# Patient Record
Sex: FEMALE | Race: WHITE | Employment: UNEMPLOYED | URBAN - METROPOLITAN AREA
[De-identification: names, ages, dates, MRNs, and addresses within clinical notes are randomized per-mention and may not be internally consistent; named-entity substitution may affect disease eponyms.]

---

## 2018-03-15 ENCOUNTER — OFFICE VISIT (OUTPATIENT)
Dept: FAMILY MEDICINE CLINIC | Age: 48
End: 2018-03-15
Payer: COMMERCIAL

## 2018-03-15 VITALS
RESPIRATION RATE: 20 BRPM | SYSTOLIC BLOOD PRESSURE: 134 MMHG | TEMPERATURE: 97.6 F | HEIGHT: 61 IN | WEIGHT: 184 LBS | DIASTOLIC BLOOD PRESSURE: 89 MMHG | HEART RATE: 95 BPM | BODY MASS INDEX: 34.74 KG/M2

## 2018-03-15 DIAGNOSIS — M35.00 SJOGREN'S SYNDROME, WITH UNSPECIFIED ORGAN INVOLVEMENT (HCC): ICD-10-CM

## 2018-03-15 DIAGNOSIS — H93.13 TINNITUS OF BOTH EARS: Primary | ICD-10-CM

## 2018-03-15 PROCEDURE — G8427 DOCREV CUR MEDS BY ELIG CLIN: HCPCS | Performed by: FAMILY MEDICINE

## 2018-03-15 PROCEDURE — 99212 OFFICE O/P EST SF 10 MIN: CPT | Performed by: FAMILY MEDICINE

## 2018-03-15 PROCEDURE — G8484 FLU IMMUNIZE NO ADMIN: HCPCS | Performed by: FAMILY MEDICINE

## 2018-03-15 PROCEDURE — G8417 CALC BMI ABV UP PARAM F/U: HCPCS | Performed by: FAMILY MEDICINE

## 2018-03-15 PROCEDURE — 99213 OFFICE O/P EST LOW 20 MIN: CPT | Performed by: FAMILY MEDICINE

## 2018-03-15 PROCEDURE — 1036F TOBACCO NON-USER: CPT | Performed by: FAMILY MEDICINE

## 2018-03-15 RX ORDER — IBUPROFEN 800 MG/1
800 TABLET ORAL EVERY 6 HOURS PRN
Qty: 60 TABLET | Refills: 2 | Status: SHIPPED | OUTPATIENT
Start: 2018-03-15 | End: 2018-06-28

## 2018-03-15 RX ORDER — INSULIN LISPRO 200 [IU]/ML
INJECTION, SOLUTION SUBCUTANEOUS
COMMUNITY
Start: 2018-03-06 | End: 2019-12-20 | Stop reason: ALTCHOICE

## 2018-03-15 RX ORDER — MINOXIDIL 2 %
SOLUTION, NON-ORAL TOPICAL
Refills: 2 | COMMUNITY
Start: 2018-02-24 | End: 2019-12-20 | Stop reason: SDUPTHER

## 2018-03-15 ASSESSMENT — ENCOUNTER SYMPTOMS
VOMITING: 0
DOUBLE VISION: 0
BLURRED VISION: 0
CONSTIPATION: 0
SORE THROAT: 0
DIARRHEA: 0
NAUSEA: 0
COUGH: 0
SHORTNESS OF BREATH: 0
ABDOMINAL PAIN: 0
BACK PAIN: 0

## 2018-03-15 NOTE — PROGRESS NOTES
likely due to MTX and other rheumatoid meds. Will send for hearing test and she will discuss with Rheumatology about other meds for the future. Consider ENT consult in future if worsens. Health Maintenance     Health Maintenance Due   Topic Date Due    HIV screen  02/11/1986    DTaP/Tdap/Td vaccine (1 - Tdap) 02/11/1990    Cervical cancer screen  02/11/1992    Diabetic foot exam  09/24/2016    Diabetic retinal exam  03/03/2017    Flu vaccine (1) 09/01/2017    A1C test (Diabetic or Prediabetic)  02/08/2018            Reviewed age and gender appropriate health screening exams and vaccinations. Advised patient regarding importance of keeping up with recommended health maintenance and to schedule as soon as possible if overdue, as this is important in assessing for undiagnosed pathology, especially cancer, as well as protecting against potentially harmful/life threatening disease. Refilled all appropriate medications today. RTO in 3 months.     Future Appointments  Date Time Provider Jenae Kang   4/13/2018 1:00 PM SCHEDULE, [facility] None   5/25/2018 1:20 PM MD Anne Marie Rojasjosiah [facility]            Signed by : Berto Goss M.D., [place] PGY-3    This case was discussed with Dr Efraín Lowery (s)

## 2018-04-06 ENCOUNTER — HOSPITAL ENCOUNTER (OUTPATIENT)
Age: 48
Discharge: HOME | End: 2018-04-06
Payer: COMMERCIAL

## 2018-04-06 LAB
ALBUMIN SERPL-MCNC: 3.9 G/DL (ref 3.5–5.2)
ALP BLD-CCNC: 107 U/L (ref 35–104)
ALT SERPL-CCNC: 16 U/L (ref 0–32)
ANION GAP SERPL CALCULATED.3IONS-SCNC: 16 MMOL/L (ref 7–16)
AST SERPL-CCNC: 13 U/L (ref 0–31)
BILIRUB SERPL-MCNC: 0.3 MG/DL (ref 0–1.2)
BUN BLDV-MCNC: 13 MG/DL (ref 6–20)
CALCIUM SERPL-MCNC: 9.5 MG/DL (ref 8.6–10.2)
CHLORIDE BLD-SCNC: 105 MMOL/L (ref 98–107)
CHOLESTEROL, TOTAL: 197 MG/DL (ref 0–199)
CO2: 22 MMOL/L (ref 22–29)
CREAT SERPL-MCNC: 0.6 MG/DL (ref 0.5–1)
CREATININE URINE: 182 MG/DL (ref 29–226)
GFR AFRICAN AMERICAN: >60
GFR NON-AFRICAN AMERICAN: >60 ML/MIN/1.73
GLUCOSE BLD-MCNC: 79 MG/DL (ref 74–109)
HBA1C MFR BLD: 10 % (ref 4.8–5.9)
HCT VFR BLD CALC: 39.3 % (ref 34–48)
HDLC SERPL-MCNC: 28 MG/DL
HEMOGLOBIN: 12.3 G/DL (ref 11.5–15.5)
LDL CHOLESTEROL CALCULATED: ABNORMAL MG/DL (ref 0–99)
MCH RBC QN AUTO: 24 PG (ref 26–35)
MCHC RBC AUTO-ENTMCNC: 31.3 % (ref 32–34.5)
MCV RBC AUTO: 76.8 FL (ref 80–99.9)
MICROALBUMIN UR-MCNC: 1848.7 MG/L
MICROALBUMIN/CREAT UR-RTO: 1015.8 (ref 0–30)
PDW BLD-RTO: 14.7 FL (ref 11.5–15)
PLATELET # BLD: 285 E9/L (ref 130–450)
PMV BLD AUTO: 9.4 FL (ref 7–12)
POTASSIUM SERPL-SCNC: 3.7 MMOL/L (ref 3.5–5)
RBC # BLD: 5.12 E12/L (ref 3.5–5.5)
SODIUM BLD-SCNC: 143 MMOL/L (ref 132–146)
T4 FREE: 0.81 NG/DL (ref 0.93–1.7)
TOTAL PROTEIN: 7.4 G/DL (ref 6.4–8.3)
TRIGL SERPL-MCNC: 418 MG/DL (ref 0–149)
TSH SERPL DL<=0.05 MIU/L-ACNC: 4.47 UIU/ML (ref 0.27–4.2)
VITAMIN B-12: 459 PG/ML (ref 211–946)
VITAMIN D 25-HYDROXY: 14 NG/ML (ref 30–100)
VLDLC SERPL CALC-MCNC: ABNORMAL MG/DL
WBC # BLD: 13.2 E9/L (ref 4.5–11.5)

## 2018-04-06 PROCEDURE — 84443 ASSAY THYROID STIM HORMONE: CPT

## 2018-04-06 PROCEDURE — 83721 ASSAY OF BLOOD LIPOPROTEIN: CPT

## 2018-04-06 PROCEDURE — 85027 COMPLETE CBC AUTOMATED: CPT

## 2018-04-06 PROCEDURE — 36415 COLL VENOUS BLD VENIPUNCTURE: CPT

## 2018-04-06 PROCEDURE — 80053 COMPREHEN METABOLIC PANEL: CPT

## 2018-04-06 PROCEDURE — 82570 ASSAY OF URINE CREATININE: CPT

## 2018-04-06 PROCEDURE — 82607 VITAMIN B-12: CPT

## 2018-04-06 PROCEDURE — 80061 LIPID PANEL: CPT

## 2018-04-06 PROCEDURE — 84439 ASSAY OF FREE THYROXINE: CPT

## 2018-04-06 PROCEDURE — 83036 HEMOGLOBIN GLYCOSYLATED A1C: CPT

## 2018-04-06 PROCEDURE — 82044 UR ALBUMIN SEMIQUANTITATIVE: CPT

## 2018-04-06 PROCEDURE — 82306 VITAMIN D 25 HYDROXY: CPT

## 2018-04-10 LAB
Lab: NORMAL
REPORT: NORMAL
THIS TEST SENT TO: NORMAL

## 2018-04-13 ENCOUNTER — HOSPITAL ENCOUNTER (OUTPATIENT)
Dept: AUDIOLOGY | Age: 48
Discharge: HOME | End: 2018-04-13
Payer: COMMERCIAL

## 2018-04-13 PROCEDURE — 92567 TYMPANOMETRY: CPT | Performed by: AUDIOLOGIST

## 2018-04-13 PROCEDURE — 92557 COMPREHENSIVE HEARING TEST: CPT | Performed by: AUDIOLOGIST

## 2018-06-05 ENCOUNTER — OFFICE VISIT (OUTPATIENT)
Dept: FAMILY MEDICINE CLINIC | Age: 48
End: 2018-06-05
Payer: COMMERCIAL

## 2018-06-05 VITALS
RESPIRATION RATE: 16 BRPM | WEIGHT: 178.1 LBS | HEIGHT: 61 IN | HEART RATE: 77 BPM | OXYGEN SATURATION: 98 % | DIASTOLIC BLOOD PRESSURE: 74 MMHG | BODY MASS INDEX: 33.62 KG/M2 | SYSTOLIC BLOOD PRESSURE: 119 MMHG | TEMPERATURE: 97.7 F

## 2018-06-05 DIAGNOSIS — H93.13 TINNITUS OF BOTH EARS: ICD-10-CM

## 2018-06-05 DIAGNOSIS — M35.00 SJOGREN'S SYNDROME, WITH UNSPECIFIED ORGAN INVOLVEMENT (HCC): ICD-10-CM

## 2018-06-05 DIAGNOSIS — M06.9 RHEUMATOID ARTHRITIS, INVOLVING UNSPECIFIED SITE, UNSPECIFIED RHEUMATOID FACTOR PRESENCE: Primary | ICD-10-CM

## 2018-06-05 PROCEDURE — 99212 OFFICE O/P EST SF 10 MIN: CPT | Performed by: FAMILY MEDICINE

## 2018-06-05 PROCEDURE — G8417 CALC BMI ABV UP PARAM F/U: HCPCS | Performed by: FAMILY MEDICINE

## 2018-06-05 PROCEDURE — 99213 OFFICE O/P EST LOW 20 MIN: CPT | Performed by: FAMILY MEDICINE

## 2018-06-05 PROCEDURE — G8427 DOCREV CUR MEDS BY ELIG CLIN: HCPCS | Performed by: FAMILY MEDICINE

## 2018-06-05 PROCEDURE — 1036F TOBACCO NON-USER: CPT | Performed by: FAMILY MEDICINE

## 2018-06-05 ASSESSMENT — ENCOUNTER SYMPTOMS
BACK PAIN: 0
BLURRED VISION: 0
DOUBLE VISION: 0
NAUSEA: 0
COUGH: 0
SORE THROAT: 0
VOMITING: 0
CONSTIPATION: 0
SHORTNESS OF BREATH: 0
ABDOMINAL PAIN: 0
DIARRHEA: 0

## 2018-06-28 ENCOUNTER — APPOINTMENT (OUTPATIENT)
Dept: GENERAL RADIOLOGY | Age: 48
End: 2018-06-28
Payer: COMMERCIAL

## 2018-06-28 ENCOUNTER — HOSPITAL ENCOUNTER (EMERGENCY)
Age: 48
Discharge: HOME | End: 2018-06-28
Attending: EMERGENCY MEDICINE
Payer: COMMERCIAL

## 2018-06-28 VITALS
RESPIRATION RATE: 14 BRPM | SYSTOLIC BLOOD PRESSURE: 119 MMHG | DIASTOLIC BLOOD PRESSURE: 85 MMHG | OXYGEN SATURATION: 97 % | TEMPERATURE: 96.6 F | HEART RATE: 107 BPM

## 2018-06-28 DIAGNOSIS — N34.2 INFECTIVE URETHRITIS: Primary | ICD-10-CM

## 2018-06-28 DIAGNOSIS — R73.9 HYPERGLYCEMIA: ICD-10-CM

## 2018-06-28 LAB
ALBUMIN SERPL-MCNC: 4.3 G/DL (ref 3.5–5.2)
ALP BLD-CCNC: 106 U/L (ref 35–104)
ALT SERPL-CCNC: 14 U/L (ref 0–32)
ANION GAP SERPL CALCULATED.3IONS-SCNC: 15 MMOL/L (ref 7–16)
ANION GAP SERPL CALCULATED.3IONS-SCNC: 15 MMOL/L (ref 7–16)
AST SERPL-CCNC: 14 U/L (ref 0–31)
BACTERIA: ABNORMAL /HPF
BETA-HYDROXYBUTYRATE: 0.93 MMOL/L (ref 0.02–0.27)
BILIRUB SERPL-MCNC: 0.3 MG/DL (ref 0–1.2)
BILIRUBIN URINE: NEGATIVE
BLOOD, URINE: NEGATIVE
BUN BLDV-MCNC: 30 MG/DL (ref 6–20)
BUN BLDV-MCNC: 31 MG/DL (ref 6–20)
CALCIUM SERPL-MCNC: 10.2 MG/DL (ref 8.6–10.2)
CALCIUM SERPL-MCNC: 9.5 MG/DL (ref 8.6–10.2)
CHLORIDE BLD-SCNC: 105 MMOL/L (ref 98–107)
CHLORIDE BLD-SCNC: 106 MMOL/L (ref 98–107)
CHP ED QC CHECK: YES
CLARITY: CLEAR
CO2: 20 MMOL/L (ref 22–29)
CO2: 21 MMOL/L (ref 22–29)
COLOR: YELLOW
CREAT SERPL-MCNC: 0.9 MG/DL (ref 0.5–1)
CREAT SERPL-MCNC: 0.9 MG/DL (ref 0.5–1)
EPITHELIAL CELLS, UA: ABNORMAL /HPF
GFR AFRICAN AMERICAN: >60
GFR AFRICAN AMERICAN: >60
GFR NON-AFRICAN AMERICAN: >60 ML/MIN/1.73
GFR NON-AFRICAN AMERICAN: >60 ML/MIN/1.73
GLUCOSE BLD-MCNC: 172 MG/DL (ref 74–109)
GLUCOSE BLD-MCNC: 187 MG/DL (ref 74–109)
GLUCOSE URINE: NEGATIVE MG/DL
HCT VFR BLD CALC: 43 % (ref 34–48)
HEMOGLOBIN: 13.4 G/DL (ref 11.5–15.5)
KETONES, URINE: 15 MG/DL
LEUKOCYTE ESTERASE, URINE: ABNORMAL
MCH RBC QN AUTO: 23.5 PG (ref 26–35)
MCHC RBC AUTO-ENTMCNC: 31.2 % (ref 32–34.5)
MCV RBC AUTO: 75.4 FL (ref 80–99.9)
NITRITE, URINE: NEGATIVE
PDW BLD-RTO: 15.4 FL (ref 11.5–15)
PH UA: 5.5 (ref 5–9)
PH VENOUS: 7.32 (ref 7.3–7.42)
PLATELET # BLD: 256 E9/L (ref 130–450)
PMV BLD AUTO: 10.5 FL (ref 7–12)
POTASSIUM SERPL-SCNC: 4.3 MMOL/L (ref 3.5–5)
POTASSIUM SERPL-SCNC: 4.4 MMOL/L (ref 3.5–5)
PREGNANCY TEST URINE, POC: NEGATIVE
PROTEIN UA: 100 MG/DL
RBC # BLD: 5.7 E12/L (ref 3.5–5.5)
RBC UA: ABNORMAL /HPF (ref 0–2)
SODIUM BLD-SCNC: 141 MMOL/L (ref 132–146)
SODIUM BLD-SCNC: 141 MMOL/L (ref 132–146)
SPECIFIC GRAVITY UA: 1.02 (ref 1–1.03)
TOTAL PROTEIN: 8.1 G/DL (ref 6.4–8.3)
UROBILINOGEN, URINE: 0.2 E.U./DL
WBC # BLD: 16.4 E9/L (ref 4.5–11.5)
WBC UA: ABNORMAL /HPF (ref 0–5)

## 2018-06-28 PROCEDURE — 99284 EMERGENCY DEPT VISIT MOD MDM: CPT

## 2018-06-28 PROCEDURE — 2580000003 HC RX 258: Performed by: EMERGENCY MEDICINE

## 2018-06-28 PROCEDURE — 36415 COLL VENOUS BLD VENIPUNCTURE: CPT

## 2018-06-28 PROCEDURE — 82010 KETONE BODYS QUAN: CPT

## 2018-06-28 PROCEDURE — 6360000002 HC RX W HCPCS: Performed by: EMERGENCY MEDICINE

## 2018-06-28 PROCEDURE — 82800 BLOOD PH: CPT

## 2018-06-28 PROCEDURE — 96365 THER/PROPH/DIAG IV INF INIT: CPT

## 2018-06-28 PROCEDURE — 81001 URINALYSIS AUTO W/SCOPE: CPT

## 2018-06-28 PROCEDURE — 80048 BASIC METABOLIC PNL TOTAL CA: CPT

## 2018-06-28 PROCEDURE — 80053 COMPREHEN METABOLIC PANEL: CPT

## 2018-06-28 PROCEDURE — 85027 COMPLETE CBC AUTOMATED: CPT

## 2018-06-28 PROCEDURE — 71045 X-RAY EXAM CHEST 1 VIEW: CPT

## 2018-06-28 RX ORDER — CEFDINIR 300 MG/1
300 CAPSULE ORAL 2 TIMES DAILY
Qty: 14 CAPSULE | Refills: 0 | Status: SHIPPED | OUTPATIENT
Start: 2018-06-28 | End: 2018-07-05

## 2018-06-28 RX ORDER — LISINOPRIL 10 MG/1
10 TABLET ORAL DAILY
COMMUNITY
End: 2021-06-30

## 2018-06-28 RX ORDER — HYDROXYCHLOROQUINE SULFATE 200 MG/1
200 TABLET, FILM COATED ORAL DAILY
COMMUNITY
End: 2019-03-01 | Stop reason: ALTCHOICE

## 2018-06-28 RX ORDER — ERGOCALCIFEROL 1.25 MG/1
50000 CAPSULE ORAL WEEKLY
COMMUNITY
End: 2021-06-30 | Stop reason: ALTCHOICE

## 2018-06-28 RX ORDER — PREGABALIN 100 MG/1
100 CAPSULE ORAL 3 TIMES DAILY
COMMUNITY
End: 2021-09-10 | Stop reason: SDUPTHER

## 2018-06-28 RX ORDER — FLUOXETINE HYDROCHLORIDE 20 MG/1
40 CAPSULE ORAL DAILY
COMMUNITY
End: 2020-02-04 | Stop reason: CLARIF

## 2018-06-28 RX ORDER — CHLORAL HYDRATE 500 MG
1000 CAPSULE ORAL 4 TIMES DAILY
COMMUNITY
End: 2021-09-28

## 2018-06-28 RX ORDER — 0.9 % SODIUM CHLORIDE 0.9 %
1000 INTRAVENOUS SOLUTION INTRAVENOUS ONCE
Status: COMPLETED | OUTPATIENT
Start: 2018-06-28 | End: 2018-06-28

## 2018-06-28 RX ADMIN — SODIUM CHLORIDE 1000 ML: 9 INJECTION, SOLUTION INTRAVENOUS at 17:27

## 2018-06-28 RX ADMIN — DEXTROSE MONOHYDRATE 1 G: 5 INJECTION, SOLUTION INTRAVENOUS at 20:30

## 2018-06-28 ASSESSMENT — PAIN DESCRIPTION - LOCATION: LOCATION: BACK;HEAD

## 2018-06-28 ASSESSMENT — PAIN SCALES - GENERAL: PAINLEVEL_OUTOF10: 4

## 2018-06-28 NOTE — ED PROVIDER NOTES
Department of Emergency Medicine   ED  Provider Note  Admit Date/RoomTime: 6/28/2018  4:28 PM  ED Room: 40/40     HPI:   Gold Lorenzo is a 53 y.o. female presenting to the ED for blood sugar check, beginning today. The complaint has been intermittent, moderate in severity, and worsened by nothing. Patient reports she has diabetes with fluctuating blood sugar and has trouble keeping it regular. When asked what her normal dose of Humalog, she states she does not know because fluctuates. Patient reports that her blood sugar was in the 400s this morning, took 20 units of Humalog then was in the 300s around lunchtime. She took another dose of her Humalog around lunchtime without eating, then reports her blood sugar was in the 80s. She also experienced dizziness, nausea, and 1 episode of emesis at this time she came ED for evaluation. Asymptomatic at time of interview. Patient denies fever/chills, cough, congestion, chest pain, shortness of breath, edema, headache, visual disturbances, focal paresthesias, focal weakness, abdominal pain, diarrhea, constipation, hematochezia, melena, incontinence, dysuria, hematuria, trauma, neck or back pain or other complaints. ROS:   Pertinent positives and negatives are stated within HPI, all other systems reviewed and are negative.    --------------------------------------------- PAST HISTORY ---------------------------------------------  Past Medical History:  has a past medical history of Anxiety; Arthritis; Depression; Diabetes mellitus ([facility] [address] and Thyroid disease. Past Surgical History:  has a past surgical history that includes Shoulder arthroscopy (Right, 06/16/2017). Social History:  reports that she has quit smoking. She has never used smokeless tobacco. She reports that she does not drink alcohol or use drugs. Family History: She was adopted. Family history is unknown by patient. The patients home medications have been reviewed.     Allergies: Amoxicillin and WBC, UA 10-20 (A) 0 - 5 /HPF    RBC, UA 0-1 0 - 2 /HPF    Epi Cells FEW /HPF    Bacteria, UA MODERATE (A) /HPF   Basic Metabolic Panel   Result Value Ref Range    Sodium 141 132 - 146 mmol/L    Potassium 4.3 3.5 - 5.0 mmol/L    Chloride 106 98 - 107 mmol/L    CO2 20 (L) 22 - 29 mmol/L    Anion Gap 15 7 - 16 mmol/L    Glucose 187 (H) 74 - 109 mg/dL    BUN 30 (H) 6 - 20 mg/dL    CREATININE 0.9 0.5 - 1.0 mg/dL    GFR Non-African American >60 >=60 mL/min/1.73    GFR African American >60     Calcium 9.5 8.6 - 10.2 mg/dL   POC Pregnancy Urine Qual   Result Value Ref Range    Preg Test, Ur NEGATIVE     QC OK? YES        RADIOLOGY:  Interpreted by Radiologist.  XR CHEST PORTABLE   Final Result   ALERT:  THIS IS AN ABNORMAL REPORT   1. Possible worsening right basilar subsegmental atelectasis/scarring   suspected, however, further evaluation with CT scan of the chest is   recommended to exclude any potential of an underlying pulmonary   nodule, mass or malignancy.          ------------------------- NURSING NOTES AND VITALS REVIEWED ---------------------------   The nursing notes within the ED encounter and vital signs as below have been reviewed. /85   Pulse 107   Temp 96.6 °F (35.9 °C) (Temporal)   Resp 14   SpO2 97%   Oxygen Saturation Interpretation: Normal    ---------------------------------------------------PHYSICAL EXAM--------------------------------------    Constitutional/General: Alert and oriented x3, well appearing, non toxic in NAD  Head: NC/AT  Eyes: PERRL, EOMI  Mouth: Oropharynx clear, handling secretions,   Neck: Supple, full ROM,   Pulmonary: Lungs clear to auscultation bilaterally, no wheezes, rales, or rhonchi. Not in respiratory distress  Cardiovascular:  Regular rate and regular rhythm, no murmurs, gallops, or rubs. 2+ distal pulses  Abdomen: Soft, non tender, non distended. No guarding or rebound. Extremities: Moves all extremities x 4.  Warm and well perfused  Skin: warm and dry without under my direction and personally reviewed by me in its entirety. I confirm that the note above accurately reflects all work, treatment, procedures, and medical decision making performed by me.          Michael Ornelas,   07/04/18 3063

## 2018-06-28 NOTE — ED NOTES
Niño catheter order discontinued per Dr. Josie Fletcher. Pt voided and specimen collected.      Troy Dutton RN  06/28/18 Nery Cao

## 2018-07-06 ENCOUNTER — HOSPITAL ENCOUNTER (EMERGENCY)
Age: 48
Discharge: HOME | End: 2018-07-07
Attending: EMERGENCY MEDICINE
Payer: COMMERCIAL

## 2018-07-06 DIAGNOSIS — R11.2 NAUSEA AND VOMITING, INTRACTABILITY OF VOMITING NOT SPECIFIED, UNSPECIFIED VOMITING TYPE: Primary | ICD-10-CM

## 2018-07-06 LAB
ALBUMIN SERPL-MCNC: 3.9 G/DL (ref 3.5–5.2)
ALP BLD-CCNC: 107 U/L (ref 35–104)
ALT SERPL-CCNC: 17 U/L (ref 0–32)
ANION GAP SERPL CALCULATED.3IONS-SCNC: 16 MMOL/L (ref 7–16)
AST SERPL-CCNC: 18 U/L (ref 0–31)
BACTERIA: ABNORMAL /HPF
BETA-HYDROXYBUTYRATE: 0.64 MMOL/L (ref 0.02–0.27)
BILIRUB SERPL-MCNC: 0.4 MG/DL (ref 0–1.2)
BILIRUBIN URINE: NEGATIVE
BLOOD, URINE: ABNORMAL
BUN BLDV-MCNC: 13 MG/DL (ref 6–20)
CALCIUM SERPL-MCNC: 9.2 MG/DL (ref 8.6–10.2)
CHLORIDE BLD-SCNC: 103 MMOL/L (ref 98–107)
CLARITY: CLEAR
CO2: 24 MMOL/L (ref 22–29)
COLOR: YELLOW
CREAT SERPL-MCNC: 0.7 MG/DL (ref 0.5–1)
EPITHELIAL CELLS, UA: ABNORMAL /HPF
GFR AFRICAN AMERICAN: >60
GFR NON-AFRICAN AMERICAN: >60 ML/MIN/1.73
GLUCOSE BLD-MCNC: 157 MG/DL (ref 74–109)
GLUCOSE URINE: NEGATIVE MG/DL
HCT VFR BLD CALC: 40.2 % (ref 34–48)
HEMOGLOBIN: 12.6 G/DL (ref 11.5–15.5)
KETONES, URINE: 15 MG/DL
LACTIC ACID: 1.2 MMOL/L (ref 0.5–2.2)
LEUKOCYTE ESTERASE, URINE: ABNORMAL
LIPASE: 18 U/L (ref 13–60)
MCH RBC QN AUTO: 23.7 PG (ref 26–35)
MCHC RBC AUTO-ENTMCNC: 31.3 % (ref 32–34.5)
MCV RBC AUTO: 75.7 FL (ref 80–99.9)
NITRITE, URINE: NEGATIVE
PDW BLD-RTO: 15.6 FL (ref 11.5–15)
PH UA: 6 (ref 5–9)
PH VENOUS: 7.42 (ref 7.3–7.42)
PLATELET # BLD: 254 E9/L (ref 130–450)
PMV BLD AUTO: 10.2 FL (ref 7–12)
POTASSIUM SERPL-SCNC: 3.5 MMOL/L (ref 3.5–5)
PROTEIN UA: 100 MG/DL
RBC # BLD: 5.31 E12/L (ref 3.5–5.5)
RBC UA: ABNORMAL /HPF (ref 0–2)
SODIUM BLD-SCNC: 143 MMOL/L (ref 132–146)
SPECIFIC GRAVITY UA: 1.02 (ref 1–1.03)
TOTAL PROTEIN: 7.2 G/DL (ref 6.4–8.3)
UROBILINOGEN, URINE: 0.2 E.U./DL
WBC # BLD: 13.2 E9/L (ref 4.5–11.5)
WBC UA: ABNORMAL /HPF (ref 0–5)

## 2018-07-06 PROCEDURE — 80053 COMPREHEN METABOLIC PANEL: CPT

## 2018-07-06 PROCEDURE — 83605 ASSAY OF LACTIC ACID: CPT

## 2018-07-06 PROCEDURE — 87088 URINE BACTERIA CULTURE: CPT

## 2018-07-06 PROCEDURE — 96374 THER/PROPH/DIAG INJ IV PUSH: CPT

## 2018-07-06 PROCEDURE — 2580000003 HC RX 258: Performed by: PREVENTIVE MEDICINE

## 2018-07-06 PROCEDURE — 85027 COMPLETE CBC AUTOMATED: CPT

## 2018-07-06 PROCEDURE — 82800 BLOOD PH: CPT

## 2018-07-06 PROCEDURE — 82010 KETONE BODYS QUAN: CPT

## 2018-07-06 PROCEDURE — 96376 TX/PRO/DX INJ SAME DRUG ADON: CPT

## 2018-07-06 PROCEDURE — 81001 URINALYSIS AUTO W/SCOPE: CPT

## 2018-07-06 PROCEDURE — 83690 ASSAY OF LIPASE: CPT

## 2018-07-06 PROCEDURE — 6360000002 HC RX W HCPCS: Performed by: PREVENTIVE MEDICINE

## 2018-07-06 PROCEDURE — 99284 EMERGENCY DEPT VISIT MOD MDM: CPT

## 2018-07-06 PROCEDURE — 36415 COLL VENOUS BLD VENIPUNCTURE: CPT

## 2018-07-06 RX ORDER — 0.9 % SODIUM CHLORIDE 0.9 %
2000 INTRAVENOUS SOLUTION INTRAVENOUS ONCE
Status: COMPLETED | OUTPATIENT
Start: 2018-07-06 | End: 2018-07-06

## 2018-07-06 RX ORDER — ONDANSETRON 4 MG/1
4 TABLET, ORALLY DISINTEGRATING ORAL EVERY 8 HOURS PRN
Qty: 24 TABLET | Refills: 0 | Status: SHIPPED | OUTPATIENT
Start: 2018-07-06 | End: 2018-09-05 | Stop reason: ALTCHOICE

## 2018-07-06 RX ORDER — ONDANSETRON 2 MG/ML
4 INJECTION INTRAMUSCULAR; INTRAVENOUS ONCE
Status: COMPLETED | OUTPATIENT
Start: 2018-07-06 | End: 2018-07-06

## 2018-07-06 RX ADMIN — ONDANSETRON 4 MG: 2 INJECTION INTRAMUSCULAR; INTRAVENOUS at 22:32

## 2018-07-06 RX ADMIN — ONDANSETRON 4 MG: 2 INJECTION INTRAMUSCULAR; INTRAVENOUS at 20:26

## 2018-07-06 RX ADMIN — SODIUM CHLORIDE 2000 ML: 9 INJECTION, SOLUTION INTRAVENOUS at 20:25

## 2018-07-06 ASSESSMENT — PAIN DESCRIPTION - FREQUENCY: FREQUENCY: CONTINUOUS

## 2018-07-06 ASSESSMENT — ENCOUNTER SYMPTOMS
ALLERGIC/IMMUNOLOGIC NEGATIVE: 1
VOMITING: 1
ABDOMINAL PAIN: 0
NAUSEA: 1
COUGH: 0
CONSTIPATION: 0
CHEST TIGHTNESS: 0
SHORTNESS OF BREATH: 0
DIARRHEA: 1

## 2018-07-06 ASSESSMENT — PAIN DESCRIPTION - DESCRIPTORS: DESCRIPTORS: SORE

## 2018-07-06 ASSESSMENT — PAIN DESCRIPTION - ORIENTATION: ORIENTATION: LEFT

## 2018-07-06 ASSESSMENT — PAIN DESCRIPTION - PAIN TYPE: TYPE: ACUTE PAIN

## 2018-07-06 ASSESSMENT — PAIN SCALES - GENERAL: PAINLEVEL_OUTOF10: 5

## 2018-07-07 VITALS
DIASTOLIC BLOOD PRESSURE: 86 MMHG | TEMPERATURE: 97.7 F | OXYGEN SATURATION: 97 % | RESPIRATION RATE: 16 BRPM | HEART RATE: 91 BPM | SYSTOLIC BLOOD PRESSURE: 145 MMHG

## 2018-07-07 NOTE — ED PROVIDER NOTES
This is a 61-year-old white female with past history of insulin-dependent diabetes, anxiety, thyroid disease presenting to the emergency department with 1 week of emesis. The patient reports she was seen approximately one week ago in the emergency department and was prescribed Cefdinir for a asymptomatic bacteriuria, 3 days into her treatment she began to have indigestion and nausea with vomiting too many times to count. She reports she has been unable to hold down her fluids since that time she now reporting left flank pain that was 3 out of 10 severity with no radiation. She continues to endorse no hematuria, dysuria or urinary frequency. Review of Systems   Constitutional: Positive for chills. Negative for fever. HENT: Negative for congestion. Eyes: Negative for visual disturbance. Respiratory: Negative for cough, chest tightness and shortness of breath. Cardiovascular: Negative for chest pain, palpitations and leg swelling. Gastrointestinal: Positive for diarrhea, nausea and vomiting. Negative for abdominal pain and constipation. Endocrine: Negative. Genitourinary: Positive for flank pain. Negative for dysuria, frequency, hematuria and urgency. Musculoskeletal: Negative for arthralgias. Skin: Negative. Allergic/Immunologic: Negative. Neurological: Negative for dizziness, weakness and headaches. Hematological: Negative. Psychiatric/Behavioral: Negative. Physical Exam   Constitutional: She is oriented to person, place, and time. She appears well-developed and well-nourished. No distress. HENT:   Head: Normocephalic and atraumatic. Right Ear: External ear normal.   Left Ear: External ear normal.   Nose: Nose normal.   Mouth/Throat: Oropharynx is clear and moist. No oropharyngeal exudate. Eyes: Conjunctivae and EOM are normal. Pupils are equal, round, and reactive to light. Right eye exhibits no discharge. Left eye exhibits no discharge.    Neck: Normal range of motion. Neck supple. No JVD present. No tracheal deviation present. No thyromegaly present. Cardiovascular: Normal rate, regular rhythm, normal heart sounds and intact distal pulses. Exam reveals no gallop and no friction rub. No murmur heard. Pulmonary/Chest: Effort normal and breath sounds normal. No respiratory distress. She has no wheezes. She has no rales. Abdominal: Soft. Bowel sounds are normal. She exhibits no distension. There is no tenderness. There is no rebound and no guarding. There is no left-sided CVA tenderness to palpation. Musculoskeletal: Normal range of motion. She exhibits no edema. Lymphadenopathy:     She has no cervical adenopathy. Neurological: She is alert and oriented to person, place, and time. Skin: Skin is warm and dry. She is not diaphoretic. Psychiatric: She has a normal mood and affect. Her behavior is normal. Judgment and thought content normal.   Vitals reviewed. Procedures    MDM  Number of Diagnoses or Management Options  Nausea and vomiting, intractability of vomiting not specified, unspecified vomiting type:   Diagnosis management comments: 76-year-old white female with past history of insulin-dependent diabetes mellitus with left flank pain and emesis times one week after being placed on Omnicef. We will obtain appropriate laboratory studies at this time. In the meantime will treat supportively with IV fluids and Zofran by mouth. 2319: Patient is feeling much better after receiving Zofran. Plan is to discharged home with recommendation to follow-up with her primary care physician. She continues to demonstrate asymptomatic bacteriuria. Recommend discontinuing her Omnicef. I have discussed the results of the workup as well as the plan with the patient and family who indicate understanding and agreement with the plan. All questions answered at this time.                --------------------------------------------- PAST HISTORY ---------------------------------------------  Past Medical History:  has a past medical history of Anxiety; Arthritis; Depression; Diabetes mellitus ([address] and Thyroid disease. Past Surgical History:  has a past surgical history that includes Shoulder arthroscopy (Right, 06/16/2017). Social History:  reports that she has quit smoking. She has never used smokeless tobacco. She reports that she does not drink alcohol or use drugs. Family History: She was adopted. Family history is unknown by patient. The patients home medications have been reviewed.     Allergies: Amoxicillin and Sulfa antibiotics    -------------------------------------------------- RESULTS -------------------------------------------------  Labs:  Results for orders placed or performed during the hospital encounter of 07/06/18   Urine culture   Result Value Ref Range    Urine Culture, Routine <10,000 CFU/mL  Corynebacterium species      CBC   Result Value Ref Range    WBC 13.2 (H) 4.5 - 11.5 E9/L    RBC 5.31 3.50 - 5.50 E12/L    Hemoglobin 12.6 11.5 - 15.5 g/dL    Hematocrit 40.2 34.0 - 48.0 %    MCV 75.7 (L) 80.0 - 99.9 fL    MCH 23.7 (L) 26.0 - 35.0 pg    MCHC 31.3 (L) 32.0 - 34.5 %    RDW 15.6 (H) 11.5 - 15.0 fL    Platelets 613 482 - 558 E9/L    MPV 10.2 7.0 - 12.0 fL   Comprehensive Metabolic Panel   Result Value Ref Range    Sodium 143 132 - 146 mmol/L    Potassium 3.5 3.5 - 5.0 mmol/L    Chloride 103 98 - 107 mmol/L    CO2 24 22 - 29 mmol/L    Anion Gap 16 7 - 16 mmol/L    Glucose 157 (H) 74 - 109 mg/dL    BUN 13 6 - 20 mg/dL    CREATININE 0.7 0.5 - 1.0 mg/dL    GFR Non-African American >60 >=60 mL/min/1.73    GFR African American >60     Calcium 9.2 8.6 - 10.2 mg/dL    Total Protein 7.2 6.4 - 8.3 g/dL    Alb 3.9 3.5 - 5.2 g/dL    Total Bilirubin 0.4 0.0 - 1.2 mg/dL    Alkaline Phosphatase 107 (H) 35 - 104 U/L    ALT 17 0 - 32 U/L    AST 18 0 - 31 U/L   Lipase   Result Value Ref Range    Lipase 18 13 - 60 U/L   Lactic Acid,

## 2018-07-09 LAB — URINE CULTURE, ROUTINE: NORMAL

## 2018-07-10 ENCOUNTER — OFFICE VISIT (OUTPATIENT)
Dept: FAMILY MEDICINE CLINIC | Age: 48
End: 2018-07-10
Payer: COMMERCIAL

## 2018-07-10 VITALS
DIASTOLIC BLOOD PRESSURE: 80 MMHG | BODY MASS INDEX: 33.23 KG/M2 | TEMPERATURE: 97.5 F | HEIGHT: 61 IN | HEART RATE: 82 BPM | OXYGEN SATURATION: 97 % | SYSTOLIC BLOOD PRESSURE: 129 MMHG | WEIGHT: 176 LBS | RESPIRATION RATE: 18 BRPM

## 2018-07-10 DIAGNOSIS — M54.2 NECK PAIN: ICD-10-CM

## 2018-07-10 PROCEDURE — 99212 OFFICE O/P EST SF 10 MIN: CPT | Performed by: STUDENT IN AN ORGANIZED HEALTH CARE EDUCATION/TRAINING PROGRAM

## 2018-07-10 PROCEDURE — 1036F TOBACCO NON-USER: CPT | Performed by: STUDENT IN AN ORGANIZED HEALTH CARE EDUCATION/TRAINING PROGRAM

## 2018-07-10 PROCEDURE — 99213 OFFICE O/P EST LOW 20 MIN: CPT | Performed by: STUDENT IN AN ORGANIZED HEALTH CARE EDUCATION/TRAINING PROGRAM

## 2018-07-10 PROCEDURE — G8427 DOCREV CUR MEDS BY ELIG CLIN: HCPCS | Performed by: STUDENT IN AN ORGANIZED HEALTH CARE EDUCATION/TRAINING PROGRAM

## 2018-07-10 PROCEDURE — G8417 CALC BMI ABV UP PARAM F/U: HCPCS | Performed by: STUDENT IN AN ORGANIZED HEALTH CARE EDUCATION/TRAINING PROGRAM

## 2018-09-05 ENCOUNTER — HOSPITAL ENCOUNTER (EMERGENCY)
Age: 48
Discharge: HOME | End: 2018-09-05
Payer: COMMERCIAL

## 2018-09-05 VITALS
TEMPERATURE: 97.3 F | HEIGHT: 61 IN | RESPIRATION RATE: 16 BRPM | DIASTOLIC BLOOD PRESSURE: 78 MMHG | WEIGHT: 176 LBS | OXYGEN SATURATION: 100 % | BODY MASS INDEX: 33.23 KG/M2 | HEART RATE: 80 BPM | SYSTOLIC BLOOD PRESSURE: 116 MMHG

## 2018-09-05 DIAGNOSIS — N39.0 URINARY TRACT INFECTION WITHOUT HEMATURIA, SITE UNSPECIFIED: ICD-10-CM

## 2018-09-05 DIAGNOSIS — R11.2 NAUSEA AND VOMITING, INTRACTABILITY OF VOMITING NOT SPECIFIED, UNSPECIFIED VOMITING TYPE: Primary | ICD-10-CM

## 2018-09-05 LAB
ANION GAP SERPL CALCULATED.3IONS-SCNC: 15 MMOL/L (ref 7–16)
BACTERIA: ABNORMAL /HPF
BASOPHILS ABSOLUTE: 0.06 E9/L (ref 0–0.2)
BASOPHILS RELATIVE PERCENT: 0.5 % (ref 0–2)
BILIRUBIN URINE: NEGATIVE
BLOOD, URINE: ABNORMAL
BUN BLDV-MCNC: 22 MG/DL (ref 6–20)
CALCIUM SERPL-MCNC: 9.8 MG/DL (ref 8.6–10.2)
CHLORIDE BLD-SCNC: 102 MMOL/L (ref 98–107)
CLARITY: ABNORMAL
CO2: 22 MMOL/L (ref 22–29)
COLOR: YELLOW
CREAT SERPL-MCNC: 0.9 MG/DL (ref 0.5–1)
EOSINOPHILS ABSOLUTE: 0.07 E9/L (ref 0.05–0.5)
EOSINOPHILS RELATIVE PERCENT: 0.6 % (ref 0–6)
EPITHELIAL CELLS, UA: ABNORMAL /HPF
GFR AFRICAN AMERICAN: >60
GFR NON-AFRICAN AMERICAN: >60 ML/MIN/1.73
GLUCOSE BLD-MCNC: 206 MG/DL (ref 74–109)
GLUCOSE URINE: 250 MG/DL
HCT VFR BLD CALC: 43.8 % (ref 34–48)
HEMOGLOBIN: 13.4 G/DL (ref 11.5–15.5)
IMMATURE GRANULOCYTES #: 0.1 E9/L
IMMATURE GRANULOCYTES %: 0.9 % (ref 0–5)
KETONES, URINE: ABNORMAL MG/DL
LEUKOCYTE ESTERASE, URINE: ABNORMAL
LYMPHOCYTES ABSOLUTE: 2.54 E9/L (ref 1.5–4)
LYMPHOCYTES RELATIVE PERCENT: 22 % (ref 20–42)
MCH RBC QN AUTO: 23.3 PG (ref 26–35)
MCHC RBC AUTO-ENTMCNC: 30.6 % (ref 32–34.5)
MCV RBC AUTO: 76.3 FL (ref 80–99.9)
MONOCYTES ABSOLUTE: 0.46 E9/L (ref 0.1–0.95)
MONOCYTES RELATIVE PERCENT: 4 % (ref 2–12)
NEUTROPHILS ABSOLUTE: 8.32 E9/L (ref 1.8–7.3)
NEUTROPHILS RELATIVE PERCENT: 72 % (ref 43–80)
NITRITE, URINE: NEGATIVE
PDW BLD-RTO: 15.6 FL (ref 11.5–15)
PH UA: 5.5 (ref 5–9)
PLATELET # BLD: 258 E9/L (ref 130–450)
PMV BLD AUTO: 10.1 FL (ref 7–12)
POTASSIUM SERPL-SCNC: 4 MMOL/L (ref 3.5–5)
PROTEIN UA: 100 MG/DL
RBC # BLD: 5.74 E12/L (ref 3.5–5.5)
RBC UA: ABNORMAL /HPF (ref 0–2)
SODIUM BLD-SCNC: 139 MMOL/L (ref 132–146)
SPECIFIC GRAVITY UA: >=1.03 (ref 1–1.03)
UROBILINOGEN, URINE: 0.2 E.U./DL
WBC # BLD: 11.6 E9/L (ref 4.5–11.5)
WBC UA: >20 /HPF (ref 0–5)

## 2018-09-05 PROCEDURE — 80048 BASIC METABOLIC PNL TOTAL CA: CPT

## 2018-09-05 PROCEDURE — 87088 URINE BACTERIA CULTURE: CPT

## 2018-09-05 PROCEDURE — 96365 THER/PROPH/DIAG IV INF INIT: CPT

## 2018-09-05 PROCEDURE — 85025 COMPLETE CBC W/AUTO DIFF WBC: CPT

## 2018-09-05 PROCEDURE — 96375 TX/PRO/DX INJ NEW DRUG ADDON: CPT

## 2018-09-05 PROCEDURE — 96376 TX/PRO/DX INJ SAME DRUG ADON: CPT

## 2018-09-05 PROCEDURE — 2580000003 HC RX 258: Performed by: PHYSICIAN ASSISTANT

## 2018-09-05 PROCEDURE — 6360000002 HC RX W HCPCS: Performed by: PHYSICIAN ASSISTANT

## 2018-09-05 PROCEDURE — 99284 EMERGENCY DEPT VISIT MOD MDM: CPT

## 2018-09-05 PROCEDURE — 36415 COLL VENOUS BLD VENIPUNCTURE: CPT

## 2018-09-05 PROCEDURE — 81001 URINALYSIS AUTO W/SCOPE: CPT

## 2018-09-05 RX ORDER — ONDANSETRON 2 MG/ML
4 INJECTION INTRAMUSCULAR; INTRAVENOUS ONCE
Status: COMPLETED | OUTPATIENT
Start: 2018-09-05 | End: 2018-09-05

## 2018-09-05 RX ORDER — SODIUM CHLORIDE, SODIUM LACTATE, POTASSIUM CHLORIDE, AND CALCIUM CHLORIDE .6; .31; .03; .02 G/100ML; G/100ML; G/100ML; G/100ML
2000 INJECTION, SOLUTION INTRAVENOUS ONCE
Status: COMPLETED | OUTPATIENT
Start: 2018-09-05 | End: 2018-09-05

## 2018-09-05 RX ORDER — PROMETHAZINE HYDROCHLORIDE 25 MG/1
25 TABLET ORAL EVERY 6 HOURS PRN
Qty: 20 TABLET | Refills: 0 | Status: SHIPPED | OUTPATIENT
Start: 2018-09-05 | End: 2018-09-10

## 2018-09-05 RX ORDER — METOCLOPRAMIDE HYDROCHLORIDE 5 MG/ML
10 INJECTION INTRAMUSCULAR; INTRAVENOUS ONCE
Status: COMPLETED | OUTPATIENT
Start: 2018-09-05 | End: 2018-09-05

## 2018-09-05 RX ORDER — NITROFURANTOIN 25; 75 MG/1; MG/1
100 CAPSULE ORAL 2 TIMES DAILY
Qty: 10 CAPSULE | Refills: 0 | Status: SHIPPED | OUTPATIENT
Start: 2018-09-05 | End: 2018-09-10

## 2018-09-05 RX ORDER — 0.9 % SODIUM CHLORIDE 0.9 %
2000 INTRAVENOUS SOLUTION INTRAVENOUS ONCE
Status: DISCONTINUED | OUTPATIENT
Start: 2018-09-05 | End: 2018-09-05

## 2018-09-05 RX ADMIN — ONDANSETRON 4 MG: 2 SOLUTION INTRAMUSCULAR; INTRAVENOUS at 14:42

## 2018-09-05 RX ADMIN — CEFTRIAXONE SODIUM 1 G: 1 INJECTION, POWDER, FOR SOLUTION INTRAMUSCULAR; INTRAVENOUS at 14:42

## 2018-09-05 RX ADMIN — SODIUM CHLORIDE, POTASSIUM CHLORIDE, SODIUM LACTATE AND CALCIUM CHLORIDE 2000 ML: 600; 310; 30; 20 INJECTION, SOLUTION INTRAVENOUS at 11:51

## 2018-09-05 RX ADMIN — ONDANSETRON 4 MG: 2 INJECTION INTRAMUSCULAR; INTRAVENOUS at 11:52

## 2018-09-05 RX ADMIN — METOCLOPRAMIDE 10 MG: 5 INJECTION, SOLUTION INTRAMUSCULAR; INTRAVENOUS at 11:52

## 2018-09-05 NOTE — ED PROVIDER NOTES
>60 >=60 mL/min/1.73    GFR African American >60     Calcium 9.8 8.6 - 10.2 mg/dL   Urinalysis   Result Value Ref Range    Color, UA Yellow Straw/Yellow    Clarity, UA CLOUDY (A) Clear    Glucose, Ur 250 (A) Negative mg/dL    Bilirubin Urine Negative Negative    Ketones, Urine TRACE (A) Negative mg/dL    Specific Gravity, UA >=1.030 1.005 - 1.030    Blood, Urine TRACE (A) Negative    pH, UA 5.5 5.0 - 9.0    Protein,  (A) Negative mg/dL    Urobilinogen, Urine 0.2 <2.0 E.U./dL    Nitrite, Urine Negative Negative    Leukocyte Esterase, Urine LARGE (A) Negative   Microscopic Urinalysis   Result Value Ref Range    WBC, UA >20 0 - 5 /HPF    RBC, UA 1-3 0 - 2 /HPF    Epi Cells MODERATE /HPF    Bacteria, UA MANY (A) /HPF     Imaging: All Radiology results interpreted by Radiologist unless otherwise noted. No orders to display     ED Course / Medical Decision Making     Medications   ondansetron [facility]) injection 4 mg (4 mg Intravenous Given 9/5/18 1152)   metoclopramide (REGLAN) injection 10 mg (10 mg Intravenous Given 9/5/18 1152)   lactated ringers bolus (0 mLs Intravenous Stopped 9/5/18 1457)   cefTRIAXone (ROCEPHIN) 1 g in dextrose 5 % 50 mL IVPB (vial-mate) (0 g Intravenous Stopped 9/5/18 1518)   ondansetron (ZOFRAN) injection 4 mg (4 mg Intravenous Given 9/5/18 1442)        Re-examination:  9/5/18       Time: 1515   Improved after receiving additional nausea medication and antibiotics for urinary tract infection. Consult(s):   none. Procedure(s):   none    MDM:   Presented with abdominal cramping and nausea and vomiting and was determined to have urinary tract infection and otherwise mildly elevated blood sugar. She was treated with IV fluids, antibiotics and antibiotics and improved while in the emergency room. She was discharged home to continue antibiotic therapy and also Phenergan for nausea control. Counseling:     The emergency provider has spoken with the patient and discussed todays results, in addition to providing specific details for the plan of care and counseling regarding the diagnosis and prognosis. Questions are answered at this time and they are agreeable with the plan. Assessment     1. Nausea and vomiting, intractability of vomiting not specified, unspecified vomiting type    2. Urinary tract infection without hematuria, site unspecified      Plan   Discharge to home  Patient condition is good    New Medications     New Prescriptions    NITROFURANTOIN, MACROCRYSTAL-MONOHYDRATE, (MACROBID) 100 MG CAPSULE    Take 1 capsule by mouth 2 times daily for 5 days    PROMETHAZINE (PHENERGAN) 25 MG TABLET    Take 1 tablet by mouth every 6 hours as needed for Nausea     Electronically signed by FRANSISCO Thompson   DD: 9/5/18  **This report was transcribed using voice recognition software. Every effort was made to ensure accuracy; however, inadvertent computerized transcription errors may be present.   END OF ED PROVIDER NOTE       FRANSISCO Nagy  09/05/18 2403

## 2018-09-05 NOTE — LETTER
[address] [facility] Emergency Department  [place] [address]  Phone: [phone]               September 5, 2018    Patient: Bushra Mendoza   YOB: 1970   Date of Visit: 9/5/2018       To Whom It May Concern:    Bushra Mendoza was seen and treated in our emergency department on 9/5/2018. She may return to work on 9/6/2018.       Sincerely,       Jeremias Harris RN         Signature:__________________________________

## 2018-09-05 NOTE — ED NOTES
FIRST PROVIDER CONTACT ASSESSMENT NOTE      Department of Emergency Medicine   9/5/18  11:19 AM    Chief Complaint: No chief complaint on file. History of Present Illness:    Nelly Whitney is a 53 y.o. female who presents to the ED by private car for nausea and vomiting since Monday. No abdominal pain, diarrhea. Last BM Monday. Focused Screening Exam:  Constitutional:  Alert, appears stated age and is in no distress.       *ALLERGIES*     Amoxicillin and Sulfa antibiotics     ED Triage Vitals   BP Temp Temp src Pulse Resp SpO2 Height Weight   -- -- -- -- -- -- -- --        Initial Plan of Care:  Initiate Treatment-Testing, Proceed toTreatment Area When Bed Available for ED Attending/MLP to Continue Care    -----------------END OF FIRST PROVIDER CONTACT ASSESSMENT NOTE--------------  Electronically signed by Ginnie Barthel, PA   DD: 9/5/18       Ginnie Barthel, PA  09/05/18 1123

## 2018-09-06 LAB — URINE CULTURE, ROUTINE: NORMAL

## 2018-11-01 ENCOUNTER — OFFICE VISIT (OUTPATIENT)
Dept: FAMILY MEDICINE CLINIC | Age: 48
End: 2018-11-01
Payer: COMMERCIAL

## 2018-11-01 VITALS
HEART RATE: 93 BPM | BODY MASS INDEX: 32.85 KG/M2 | WEIGHT: 174 LBS | SYSTOLIC BLOOD PRESSURE: 123 MMHG | TEMPERATURE: 97.8 F | OXYGEN SATURATION: 98 % | DIASTOLIC BLOOD PRESSURE: 76 MMHG | HEIGHT: 61 IN

## 2018-11-01 DIAGNOSIS — K31.84 DIABETIC GASTROPARESIS (HCC): Primary | ICD-10-CM

## 2018-11-01 DIAGNOSIS — Z79.4 TYPE 2 DIABETES MELLITUS WITH COMPLICATION, WITH LONG-TERM CURRENT USE OF INSULIN (HCC): ICD-10-CM

## 2018-11-01 DIAGNOSIS — E11.43 DIABETIC GASTROPARESIS (HCC): Primary | ICD-10-CM

## 2018-11-01 DIAGNOSIS — Z87.39 HISTORY OF BONE CYST: ICD-10-CM

## 2018-11-01 DIAGNOSIS — E11.8 TYPE 2 DIABETES MELLITUS WITH COMPLICATION, WITH LONG-TERM CURRENT USE OF INSULIN (HCC): ICD-10-CM

## 2018-11-01 PROCEDURE — G8417 CALC BMI ABV UP PARAM F/U: HCPCS | Performed by: STUDENT IN AN ORGANIZED HEALTH CARE EDUCATION/TRAINING PROGRAM

## 2018-11-01 PROCEDURE — 99213 OFFICE O/P EST LOW 20 MIN: CPT | Performed by: STUDENT IN AN ORGANIZED HEALTH CARE EDUCATION/TRAINING PROGRAM

## 2018-11-01 PROCEDURE — G8427 DOCREV CUR MEDS BY ELIG CLIN: HCPCS | Performed by: STUDENT IN AN ORGANIZED HEALTH CARE EDUCATION/TRAINING PROGRAM

## 2018-11-01 PROCEDURE — 99212 OFFICE O/P EST SF 10 MIN: CPT | Performed by: STUDENT IN AN ORGANIZED HEALTH CARE EDUCATION/TRAINING PROGRAM

## 2018-11-01 PROCEDURE — 3046F HEMOGLOBIN A1C LEVEL >9.0%: CPT | Performed by: STUDENT IN AN ORGANIZED HEALTH CARE EDUCATION/TRAINING PROGRAM

## 2018-11-01 PROCEDURE — 2022F DILAT RTA XM EVC RTNOPTHY: CPT | Performed by: STUDENT IN AN ORGANIZED HEALTH CARE EDUCATION/TRAINING PROGRAM

## 2018-11-01 PROCEDURE — 96160 PT-FOCUSED HLTH RISK ASSMT: CPT | Performed by: STUDENT IN AN ORGANIZED HEALTH CARE EDUCATION/TRAINING PROGRAM

## 2018-11-01 PROCEDURE — 1036F TOBACCO NON-USER: CPT | Performed by: STUDENT IN AN ORGANIZED HEALTH CARE EDUCATION/TRAINING PROGRAM

## 2018-11-01 PROCEDURE — G8484 FLU IMMUNIZE NO ADMIN: HCPCS | Performed by: STUDENT IN AN ORGANIZED HEALTH CARE EDUCATION/TRAINING PROGRAM

## 2018-11-01 RX ORDER — PROMETHAZINE HYDROCHLORIDE 12.5 MG/1
12.5 TABLET ORAL EVERY 6 HOURS PRN
Qty: 48 TABLET | Refills: 0 | Status: SHIPPED | OUTPATIENT
Start: 2018-11-01 | End: 2018-11-08

## 2018-11-01 ASSESSMENT — PATIENT HEALTH QUESTIONNAIRE - PHQ9
4. FEELING TIRED OR HAVING LITTLE ENERGY: 3
1. LITTLE INTEREST OR PLEASURE IN DOING THINGS: 3
7. TROUBLE CONCENTRATING ON THINGS, SUCH AS READING THE NEWSPAPER OR WATCHING TELEVISION: 0
3. TROUBLE FALLING OR STAYING ASLEEP: 3
9. THOUGHTS THAT YOU WOULD BE BETTER OFF DEAD, OR OF HURTING YOURSELF: 3
10. IF YOU CHECKED OFF ANY PROBLEMS, HOW DIFFICULT HAVE THESE PROBLEMS MADE IT FOR YOU TO DO YOUR WORK, TAKE CARE OF THINGS AT HOME, OR GET ALONG WITH OTHER PEOPLE: 2
2. FEELING DOWN, DEPRESSED OR HOPELESS: 3
5. POOR APPETITE OR OVEREATING: 3
SUM OF ALL RESPONSES TO PHQ QUESTIONS 1-9: 21
6. FEELING BAD ABOUT YOURSELF - OR THAT YOU ARE A FAILURE OR HAVE LET YOURSELF OR YOUR FAMILY DOWN: 3
SUM OF ALL RESPONSES TO PHQ QUESTIONS 1-9: 21
8. MOVING OR SPEAKING SO SLOWLY THAT OTHER PEOPLE COULD HAVE NOTICED. OR THE OPPOSITE, BEING SO FIGETY OR RESTLESS THAT YOU HAVE BEEN MOVING AROUND A LOT MORE THAN USUAL: 0
SUM OF ALL RESPONSES TO PHQ9 QUESTIONS 1 & 2: 6

## 2018-11-05 ASSESSMENT — ENCOUNTER SYMPTOMS
BACK PAIN: 0
RHINORRHEA: 0
VOMITING: 1
ABDOMINAL PAIN: 0
EYE DISCHARGE: 0
SHORTNESS OF BREATH: 0
EYE REDNESS: 0
WHEEZING: 0
CONSTIPATION: 0
COUGH: 0
EYE ITCHING: 0
ABDOMINAL DISTENTION: 0
NAUSEA: 1
DIARRHEA: 1
SORE THROAT: 0

## 2018-11-28 ENCOUNTER — OFFICE VISIT (OUTPATIENT)
Dept: FAMILY MEDICINE CLINIC | Age: 48
End: 2018-11-28
Payer: COMMERCIAL

## 2018-11-28 VITALS
RESPIRATION RATE: 18 BRPM | HEART RATE: 99 BPM | BODY MASS INDEX: 33.23 KG/M2 | HEIGHT: 61 IN | SYSTOLIC BLOOD PRESSURE: 128 MMHG | WEIGHT: 176 LBS | DIASTOLIC BLOOD PRESSURE: 82 MMHG | TEMPERATURE: 97.5 F | OXYGEN SATURATION: 99 %

## 2018-11-28 DIAGNOSIS — E11.8 TYPE 2 DIABETES MELLITUS WITH COMPLICATION, WITH LONG-TERM CURRENT USE OF INSULIN (HCC): Chronic | ICD-10-CM

## 2018-11-28 DIAGNOSIS — R60.9 SWELLING: ICD-10-CM

## 2018-11-28 DIAGNOSIS — R11.2 NON-INTRACTABLE VOMITING WITH NAUSEA, UNSPECIFIED VOMITING TYPE: Primary | ICD-10-CM

## 2018-11-28 DIAGNOSIS — Z79.4 TYPE 2 DIABETES MELLITUS WITH COMPLICATION, WITH LONG-TERM CURRENT USE OF INSULIN (HCC): Chronic | ICD-10-CM

## 2018-11-28 PROCEDURE — G8417 CALC BMI ABV UP PARAM F/U: HCPCS | Performed by: FAMILY MEDICINE

## 2018-11-28 PROCEDURE — 2022F DILAT RTA XM EVC RTNOPTHY: CPT | Performed by: FAMILY MEDICINE

## 2018-11-28 PROCEDURE — G8484 FLU IMMUNIZE NO ADMIN: HCPCS | Performed by: FAMILY MEDICINE

## 2018-11-28 PROCEDURE — 99212 OFFICE O/P EST SF 10 MIN: CPT | Performed by: FAMILY MEDICINE

## 2018-11-28 PROCEDURE — G8427 DOCREV CUR MEDS BY ELIG CLIN: HCPCS | Performed by: FAMILY MEDICINE

## 2018-11-28 PROCEDURE — 1036F TOBACCO NON-USER: CPT | Performed by: FAMILY MEDICINE

## 2018-11-28 PROCEDURE — 3046F HEMOGLOBIN A1C LEVEL >9.0%: CPT | Performed by: FAMILY MEDICINE

## 2018-11-28 PROCEDURE — 99213 OFFICE O/P EST LOW 20 MIN: CPT | Performed by: FAMILY MEDICINE

## 2018-11-28 RX ORDER — METOCLOPRAMIDE 5 MG/1
5 TABLET ORAL 3 TIMES DAILY
Qty: 90 TABLET | Refills: 5 | Status: SHIPPED | OUTPATIENT
Start: 2018-11-28 | End: 2018-12-06 | Stop reason: SDUPTHER

## 2018-11-28 NOTE — PATIENT INSTRUCTIONS
DASH Diet    What is the DASH diet? DASH stands for Dietary Approaches to Stop Hypertension. It is a balanced eating plan that your family doctor might recommend to help you lower your blood pressure. The DASH diet:  Is low in salt, saturated fat, cholesterol and total fat. Emphasizes fruits, vegetables, and fat-free or low-fat milk and milk products. Includes whole grains, fish, poultry and nuts. Limits the amount of red meat, sweets, added sugars and sugar-containing beverages in your diet. Is rich in potassium, magnesium and calcium, as well as protein and fiber. How can the DASH diet help me stay healthy? Getting too much sodium (salt) in your diet can contribute to high blood pressure (also called hypertension). Some salt is in foods naturally, and some salt is added to food when it is processed or prepared. Following the DASH diet can help you lower your blood pressure, or prevent high blood pressure, by reducing the amount of sodium in your diet to less than 2,300 mg per day. The fruits, vegetables and whole grains recommended in the DASH diet provide many other elements of a healthy diet, such as lycopene, beta-carotene and isoflavones. These can help protect your body against common health conditions, such as cancer, osteoporosis, stroke and diabetes. Following the DASH diet can also help reduce your risk of heart disease by lowering your low-density lipoprotein (LDL, or \"bad\") cholesterol level. Following the DASH diet may drop your blood pressure by a few points in as little as 2 weeks. However, you should not stop taking your blood pressure medicine, or any other prescribed medicine, without talking to your doctor first.  What kinds of foods are included in the [address]? The DASH diet is nutritionally balanced for good health. You dont need to buy any special foods or pills, or cook with any special recipes, to follow the DASH diet.  If you follow the DASH diet, you will eat about 2,000

## 2018-11-28 NOTE — PROGRESS NOTES
Chief Complaint   Patient presents with     Vomiting     for 3 months, about once weekly   No nausea   No relation to and particular food or time of day    No abd pain       Health Maintenance     patient declined the flu vaccine     Has had some left knee swelling        /82 (Site: Right Upper Arm, Position: Sitting, Cuff Size: Medium Adult)   Pulse 99   Temp 97.5 °F (36.4 °C) (Oral)   Resp 18   Ht 5' 1\" (1.549 m)   Wt 176 lb (79.8 kg)   SpO2 99%   BMI 33.25 kg/m²     General appearance fair  TM's intact   Canals clear    Nose and throat free of inflammation and exudate     Neck supple    No carotid bruits    No palpable thyroid abnormalities    Heart: regular rhythm   Lungs clear anterior and posterior    Abdomen supple     No masses, tenderness or organomegaly    Normal bowel sounds    Normal musculature   Neurological motor and sensory intact   Foot exam normal       Lab Results   Component Value Date    WBC 11.6 (H) 09/05/2018    HGB 13.4 09/05/2018    HCT 43.8 09/05/2018     09/05/2018    CHOL 197 04/06/2018    TRIG 418 (H) 04/06/2018    HDL 28 04/06/2018    ALT 17 07/06/2018    AST 18 07/06/2018     09/05/2018    K 4.0 09/05/2018     09/05/2018    CREATININE 0.9 09/05/2018    BUN 22 (H) 09/05/2018    CO2 22 09/05/2018    TSH 4.470 (H) 04/06/2018    LABA1C 10.0 (H) 04/06/2018    LABMICR 1848.7 (H) 04/06/2018           Patient Active Problem List   Diagnosis    Type 2 diabetes mellitus with complication, with long-term current use of insulin (HCC)    Depression    Rheumatoid arthritis (HCC)    Diabetic ketosis (HCC)    Microcytic anemia    Hyperglycemia    Acquired hypothyroidism    Vitamin D deficiency    Infective urethritis       Current Outpatient Prescriptions   Medication Sig Dispense Refill    metoclopramide (REGLAN) 5 MG tablet Take 1 tablet by mouth 3 times daily 90 tablet 5    metFORMIN (GLUCOPHAGE) 850 MG tablet Take 850 mg by mouth 2 times daily (with

## 2018-11-30 ENCOUNTER — HOSPITAL ENCOUNTER (OUTPATIENT)
Dept: ULTRASOUND IMAGING | Age: 48
Discharge: HOME | End: 2018-12-02
Payer: COMMERCIAL

## 2018-11-30 DIAGNOSIS — R11.2 NON-INTRACTABLE VOMITING WITH NAUSEA, UNSPECIFIED VOMITING TYPE: ICD-10-CM

## 2018-11-30 PROCEDURE — 76705 ECHO EXAM OF ABDOMEN: CPT

## 2018-12-06 ENCOUNTER — OFFICE VISIT (OUTPATIENT)
Dept: FAMILY MEDICINE CLINIC | Age: 48
End: 2018-12-06
Payer: COMMERCIAL

## 2018-12-06 VITALS
HEIGHT: 61 IN | TEMPERATURE: 97.8 F | HEART RATE: 88 BPM | DIASTOLIC BLOOD PRESSURE: 80 MMHG | BODY MASS INDEX: 33.08 KG/M2 | WEIGHT: 175.2 LBS | SYSTOLIC BLOOD PRESSURE: 126 MMHG | RESPIRATION RATE: 16 BRPM | OXYGEN SATURATION: 96 %

## 2018-12-06 DIAGNOSIS — K31.84 GASTROPARESIS: ICD-10-CM

## 2018-12-06 DIAGNOSIS — E11.8 TYPE 2 DIABETES MELLITUS WITH COMPLICATION, WITH LONG-TERM CURRENT USE OF INSULIN (HCC): Primary | Chronic | ICD-10-CM

## 2018-12-06 DIAGNOSIS — Z79.4 TYPE 2 DIABETES MELLITUS WITH COMPLICATION, WITH LONG-TERM CURRENT USE OF INSULIN (HCC): Primary | Chronic | ICD-10-CM

## 2018-12-06 PROCEDURE — 99213 OFFICE O/P EST LOW 20 MIN: CPT | Performed by: STUDENT IN AN ORGANIZED HEALTH CARE EDUCATION/TRAINING PROGRAM

## 2018-12-06 PROCEDURE — G8427 DOCREV CUR MEDS BY ELIG CLIN: HCPCS | Performed by: STUDENT IN AN ORGANIZED HEALTH CARE EDUCATION/TRAINING PROGRAM

## 2018-12-06 PROCEDURE — 1036F TOBACCO NON-USER: CPT | Performed by: STUDENT IN AN ORGANIZED HEALTH CARE EDUCATION/TRAINING PROGRAM

## 2018-12-06 PROCEDURE — G8417 CALC BMI ABV UP PARAM F/U: HCPCS | Performed by: STUDENT IN AN ORGANIZED HEALTH CARE EDUCATION/TRAINING PROGRAM

## 2018-12-06 PROCEDURE — 3046F HEMOGLOBIN A1C LEVEL >9.0%: CPT | Performed by: STUDENT IN AN ORGANIZED HEALTH CARE EDUCATION/TRAINING PROGRAM

## 2018-12-06 PROCEDURE — G8484 FLU IMMUNIZE NO ADMIN: HCPCS | Performed by: STUDENT IN AN ORGANIZED HEALTH CARE EDUCATION/TRAINING PROGRAM

## 2018-12-06 PROCEDURE — 2022F DILAT RTA XM EVC RTNOPTHY: CPT | Performed by: STUDENT IN AN ORGANIZED HEALTH CARE EDUCATION/TRAINING PROGRAM

## 2018-12-06 RX ORDER — METOCLOPRAMIDE 5 MG/1
5 TABLET ORAL 4 TIMES DAILY
Qty: 120 TABLET | Refills: 3 | Status: SHIPPED | OUTPATIENT
Start: 2018-12-06 | End: 2019-12-20 | Stop reason: ALTCHOICE

## 2018-12-06 ASSESSMENT — ENCOUNTER SYMPTOMS
RHINORRHEA: 0
WHEEZING: 0
COUGH: 0
COLOR CHANGE: 0
SORE THROAT: 0
NAUSEA: 0
EYE REDNESS: 0
ABDOMINAL PAIN: 0
VOMITING: 0
ABDOMINAL DISTENTION: 0
BACK PAIN: 0
CONSTIPATION: 0
DIARRHEA: 0
EYE DISCHARGE: 0
SHORTNESS OF BREATH: 0
EYE ITCHING: 0

## 2018-12-06 NOTE — PATIENT INSTRUCTIONS
Patient Education   Patient Education        Gastroparesis: Care Instructions  Your Care Instructions    When you have gastroparesis, your stomach takes a lot longer to empty. This delay can cause belly pain, bloating, and belching. It also can cause hiccups, heartburn, nausea or vomiting. You may not feel like eating. These symptoms may come and go. They most often occur during and after meals. You may feel full after only a few bites of food. This condition occurs when the nerves to the stomach don't work properly. Diabetes is the most common cause of this nerve damage. Gastroparesis can make it harder to control your blood sugar levels. But keeping your blood sugar levels under control may help with your symptoms. Parkinson's disease, stroke, and some medicines can also cause this condition. Home treatment can often help. Follow-up care is a key part of your treatment and safety. Be sure to make and go to all appointments, and call your doctor if you are having problems. It's also a good idea to know your test results and keep a list of the medicines you take. How can you care for yourself at home? · Eat several small meals each day rather than three large meals. · Eat foods that are low in fiber and fat. · If your doctor suggests it, take medicines that help the stomach empty more quickly. These are called motility agents. When should you call for help? Call your doctor now or seek immediate medical care if:    · You are vomiting.     · You have new or worse belly pain.     · You have a fever.     · You cannot pass stools or gas.    Watch closely for changes in your health, and be sure to contact your doctor if you have any problems. Where can you learn more? Go to https://Bank[employer].rugi[employer]. org and sign in to your [employer] account. Enter M106 in the [employer]ipp[employer] box to learn more about \"Gastroparesis: Care Instructions. \"     If you do not have an account, please click on the procedure. Metoclopramide oral is taken for only 4 to 12 weeks. NEVER USE METOCLOPRAMIDE IN LARGER AMOUNTS THAN RECOMMENDED, OR FOR LONGER THAN 12 WEEKS. High doses or long-term use of metoclopramide can cause a serious movement disorder that may not be reversible. The longer you use metoclopramide, the more likely you are to develop this movement disorder. The risk of this side effect is higher in diabetics and older adults (especially women). Metoclopramide is usually taken 30 minutes before meals and at bedtime, or only with meals that usually cause heartburn. Follow your doctor's dosing instructions very carefully. Do not use two different forms of metoclopramide (such as tablets and oral syrup) at the same time. Measure liquid medicine carefully. Use the dosing syringe provided, or use a medicine dose-measuring device (not a kitchen spoon). To take the orally disintegrating tablet (ODT):  · Remove a tablet from its blister pack only when you are ready to take the tablet. Use dry hands and take care not to damage a tablet while pushing it out of the blister. · Place the tablet in your mouth and allow it to dissolve, without chewing or swallowing it whole. You may sip liquid if needed to help swallow the dissolved tablet. Store at room temperature in a tightly-closed container, away from moisture and heat. Keep the bottle tightly closed. After you stop taking metoclopramide, you may have unpleasant withdrawal symptoms such as headache, dizziness, or nervousness. What happens if I miss a dose? Take the medicine as soon as you can, but skip the missed dose if it is almost time for your next dose. Do not take two doses at one time. What happens if I overdose? Seek emergency medical attention or call the Poison Help line at 1-147.120.5471. Overdose symptoms may include drowsiness, confusion, or uncontrolled muscle movements. What should I avoid while taking metoclopramide?   Drinking alcohol with this

## 2019-02-23 ENCOUNTER — APPOINTMENT (OUTPATIENT)
Dept: GENERAL RADIOLOGY | Age: 49
End: 2019-02-23
Payer: COMMERCIAL

## 2019-02-23 ENCOUNTER — HOSPITAL ENCOUNTER (EMERGENCY)
Age: 49
Discharge: HOME | End: 2019-02-23
Attending: EMERGENCY MEDICINE
Payer: COMMERCIAL

## 2019-02-23 VITALS
TEMPERATURE: 98 F | DIASTOLIC BLOOD PRESSURE: 60 MMHG | RESPIRATION RATE: 18 BRPM | HEART RATE: 109 BPM | SYSTOLIC BLOOD PRESSURE: 133 MMHG | BODY MASS INDEX: 33.07 KG/M2 | OXYGEN SATURATION: 94 % | WEIGHT: 175 LBS

## 2019-02-23 DIAGNOSIS — E86.0 MILD DEHYDRATION: ICD-10-CM

## 2019-02-23 DIAGNOSIS — R05.9 COUGH: ICD-10-CM

## 2019-02-23 DIAGNOSIS — J10.1 INFLUENZA A: Primary | ICD-10-CM

## 2019-02-23 LAB
ACETAMINOPHEN LEVEL: <5 MCG/ML (ref 10–30)
ALBUMIN SERPL-MCNC: 3.9 G/DL (ref 3.5–5.2)
ALP BLD-CCNC: 133 U/L (ref 35–104)
ALT SERPL-CCNC: 9 U/L (ref 0–32)
ANION GAP SERPL CALCULATED.3IONS-SCNC: 10 MMOL/L (ref 7–16)
AST SERPL-CCNC: 11 U/L (ref 0–31)
BASOPHILS ABSOLUTE: 0.04 E9/L (ref 0–0.2)
BASOPHILS RELATIVE PERCENT: 0.5 % (ref 0–2)
BETA-HYDROXYBUTYRATE: 0.07 MMOL/L (ref 0.02–0.27)
BILIRUB SERPL-MCNC: 0.2 MG/DL (ref 0–1.2)
BUN BLDV-MCNC: 11 MG/DL (ref 6–20)
CALCIUM SERPL-MCNC: 8.7 MG/DL (ref 8.6–10.2)
CHLORIDE BLD-SCNC: 107 MMOL/L (ref 98–107)
CHP ED QC CHECK: YES
CO2: 22 MMOL/L (ref 22–29)
CREAT SERPL-MCNC: 0.9 MG/DL (ref 0.5–1)
EOSINOPHILS ABSOLUTE: 0.12 E9/L (ref 0.05–0.5)
EOSINOPHILS RELATIVE PERCENT: 1.4 % (ref 0–6)
ETHANOL: <10 MG/DL (ref 0–0.08)
GFR AFRICAN AMERICAN: >60
GFR NON-AFRICAN AMERICAN: >60 ML/MIN/1.73
GLUCOSE BLD-MCNC: 173 MG/DL
GLUCOSE BLD-MCNC: 173 MG/DL (ref 74–99)
HCT VFR BLD CALC: 37.3 % (ref 34–48)
HEMOGLOBIN: 11.6 G/DL (ref 11.5–15.5)
IMMATURE GRANULOCYTES #: 0.07 E9/L
IMMATURE GRANULOCYTES %: 0.8 % (ref 0–5)
INFLUENZA A BY PCR: DETECTED
INFLUENZA B BY PCR: NOT DETECTED
LACTIC ACID: 2.1 MMOL/L (ref 0.5–2.2)
LIPASE: 27 U/L (ref 13–60)
LYMPHOCYTES ABSOLUTE: 1.37 E9/L (ref 1.5–4)
LYMPHOCYTES RELATIVE PERCENT: 15.7 % (ref 20–42)
MAGNESIUM: 2.5 MG/DL (ref 1.6–2.6)
MCH RBC QN AUTO: 24.2 PG (ref 26–35)
MCHC RBC AUTO-ENTMCNC: 31.1 % (ref 32–34.5)
MCV RBC AUTO: 77.7 FL (ref 80–99.9)
METER GLUCOSE: 135 MG/DL (ref 74–99)
METER GLUCOSE: 173 MG/DL (ref 74–99)
MONOCYTES ABSOLUTE: 0.56 E9/L (ref 0.1–0.95)
MONOCYTES RELATIVE PERCENT: 6.4 % (ref 2–12)
NEUTROPHILS ABSOLUTE: 6.55 E9/L (ref 1.8–7.3)
NEUTROPHILS RELATIVE PERCENT: 75.2 % (ref 43–80)
PDW BLD-RTO: 14.8 FL (ref 11.5–15)
PLATELET # BLD: 170 E9/L (ref 130–450)
PMV BLD AUTO: 10.1 FL (ref 7–12)
POTASSIUM SERPL-SCNC: 3.8 MMOL/L (ref 3.5–5)
RBC # BLD: 4.8 E12/L (ref 3.5–5.5)
SALICYLATE, SERUM: <0.3 MG/DL (ref 0–30)
SODIUM BLD-SCNC: 139 MMOL/L (ref 132–146)
STREP GRP A PCR: NEGATIVE
TOTAL PROTEIN: 7.2 G/DL (ref 6.4–8.3)
TRICYCLIC ANTIDEPRESSANTS SCREEN SERUM: NEGATIVE NG/ML
WBC # BLD: 8.7 E9/L (ref 4.5–11.5)

## 2019-02-23 PROCEDURE — 87040 BLOOD CULTURE FOR BACTERIA: CPT

## 2019-02-23 PROCEDURE — 99285 EMERGENCY DEPT VISIT HI MDM: CPT

## 2019-02-23 PROCEDURE — 80053 COMPREHEN METABOLIC PANEL: CPT

## 2019-02-23 PROCEDURE — 6370000000 HC RX 637 (ALT 250 FOR IP): Performed by: EMERGENCY MEDICINE

## 2019-02-23 PROCEDURE — 83735 ASSAY OF MAGNESIUM: CPT

## 2019-02-23 PROCEDURE — 93005 ELECTROCARDIOGRAM TRACING: CPT | Performed by: EMERGENCY MEDICINE

## 2019-02-23 PROCEDURE — G0480 DRUG TEST DEF 1-7 CLASSES: HCPCS

## 2019-02-23 PROCEDURE — 2580000003 HC RX 258: Performed by: EMERGENCY MEDICINE

## 2019-02-23 PROCEDURE — 87880 STREP A ASSAY W/OPTIC: CPT

## 2019-02-23 PROCEDURE — 71046 X-RAY EXAM CHEST 2 VIEWS: CPT

## 2019-02-23 PROCEDURE — 96360 HYDRATION IV INFUSION INIT: CPT

## 2019-02-23 PROCEDURE — 85025 COMPLETE CBC W/AUTO DIFF WBC: CPT

## 2019-02-23 PROCEDURE — 82010 KETONE BODYS QUAN: CPT

## 2019-02-23 PROCEDURE — 94664 DEMO&/EVAL PT USE INHALER: CPT

## 2019-02-23 PROCEDURE — 83690 ASSAY OF LIPASE: CPT

## 2019-02-23 PROCEDURE — 82962 GLUCOSE BLOOD TEST: CPT

## 2019-02-23 PROCEDURE — 80307 DRUG TEST PRSMV CHEM ANLYZR: CPT

## 2019-02-23 PROCEDURE — 36415 COLL VENOUS BLD VENIPUNCTURE: CPT

## 2019-02-23 PROCEDURE — 83605 ASSAY OF LACTIC ACID: CPT

## 2019-02-23 PROCEDURE — 87502 INFLUENZA DNA AMP PROBE: CPT

## 2019-02-23 PROCEDURE — 6360000002 HC RX W HCPCS: Performed by: EMERGENCY MEDICINE

## 2019-02-23 PROCEDURE — 96361 HYDRATE IV INFUSION ADD-ON: CPT

## 2019-02-23 RX ORDER — OSELTAMIVIR PHOSPHATE 75 MG/1
75 CAPSULE ORAL 2 TIMES DAILY
Qty: 10 CAPSULE | Refills: 0 | Status: SHIPPED | OUTPATIENT
Start: 2019-02-23 | End: 2019-02-28

## 2019-02-23 RX ORDER — ALBUTEROL SULFATE 90 UG/1
2 AEROSOL, METERED RESPIRATORY (INHALATION) EVERY 6 HOURS PRN
Qty: 1 INHALER | Refills: 0 | Status: SHIPPED | OUTPATIENT
Start: 2019-02-23 | End: 2019-12-20 | Stop reason: ALTCHOICE

## 2019-02-23 RX ORDER — BROMPHENIRAMINE MALEATE, PSEUDOEPHEDRINE HYDROCHLORIDE, AND DEXTROMETHORPHAN HYDROBROMIDE 2; 30; 10 MG/5ML; MG/5ML; MG/5ML
5 SYRUP ORAL 4 TIMES DAILY PRN
Qty: 120 ML | Refills: 0 | Status: SHIPPED | OUTPATIENT
Start: 2019-02-23 | End: 2019-02-28

## 2019-02-23 RX ORDER — ALBUTEROL SULFATE 2.5 MG/3ML
2.5 SOLUTION RESPIRATORY (INHALATION) ONCE
Status: COMPLETED | OUTPATIENT
Start: 2019-02-23 | End: 2019-02-23

## 2019-02-23 RX ORDER — OSELTAMIVIR PHOSPHATE 75 MG/1
75 CAPSULE ORAL ONCE
Status: COMPLETED | OUTPATIENT
Start: 2019-02-23 | End: 2019-02-23

## 2019-02-23 RX ORDER — 0.9 % SODIUM CHLORIDE 0.9 %
30 INTRAVENOUS SOLUTION INTRAVENOUS ONCE
Status: COMPLETED | OUTPATIENT
Start: 2019-02-23 | End: 2019-02-23

## 2019-02-23 RX ORDER — IBUPROFEN 800 MG/1
800 TABLET ORAL ONCE
Status: COMPLETED | OUTPATIENT
Start: 2019-02-23 | End: 2019-02-23

## 2019-02-23 RX ORDER — IBUPROFEN 800 MG/1
800 TABLET ORAL EVERY 8 HOURS PRN
Qty: 21 TABLET | Refills: 0 | Status: SHIPPED | OUTPATIENT
Start: 2019-02-23 | End: 2019-12-20 | Stop reason: ALTCHOICE

## 2019-02-23 RX ADMIN — IBUPROFEN 800 MG: 800 TABLET ORAL at 11:05

## 2019-02-23 RX ADMIN — SODIUM CHLORIDE 2382 ML: 9 INJECTION, SOLUTION INTRAVENOUS at 08:58

## 2019-02-23 RX ADMIN — ALBUTEROL SULFATE 2.5 MG: 2.5 SOLUTION RESPIRATORY (INHALATION) at 08:50

## 2019-02-23 RX ADMIN — OSELTAMIVIR PHOSPHATE 75 MG: 75 CAPSULE ORAL at 11:05

## 2019-02-23 ASSESSMENT — PAIN SCALES - GENERAL: PAINLEVEL_OUTOF10: 4

## 2019-02-24 LAB
EKG ATRIAL RATE: 109 BPM
EKG P AXIS: 20 DEGREES
EKG P-R INTERVAL: 146 MS
EKG Q-T INTERVAL: 350 MS
EKG QRS DURATION: 76 MS
EKG QTC CALCULATION (BAZETT): 471 MS
EKG R AXIS: -3 DEGREES
EKG T AXIS: 10 DEGREES
EKG VENTRICULAR RATE: 109 BPM

## 2019-02-27 LAB
BLOOD CULTURE, ROUTINE: NORMAL
CULTURE, BLOOD 2: NORMAL

## 2019-03-01 ENCOUNTER — OFFICE VISIT (OUTPATIENT)
Dept: FAMILY MEDICINE CLINIC | Age: 49
End: 2019-03-01
Payer: COMMERCIAL

## 2019-03-01 VITALS
WEIGHT: 172 LBS | DIASTOLIC BLOOD PRESSURE: 76 MMHG | BODY MASS INDEX: 32.47 KG/M2 | RESPIRATION RATE: 18 BRPM | SYSTOLIC BLOOD PRESSURE: 134 MMHG | HEIGHT: 61 IN | HEART RATE: 93 BPM | TEMPERATURE: 98.1 F | OXYGEN SATURATION: 97 %

## 2019-03-01 DIAGNOSIS — R05.9 COUGH: Primary | ICD-10-CM

## 2019-03-01 PROCEDURE — 1036F TOBACCO NON-USER: CPT | Performed by: FAMILY MEDICINE

## 2019-03-01 PROCEDURE — G8484 FLU IMMUNIZE NO ADMIN: HCPCS | Performed by: FAMILY MEDICINE

## 2019-03-01 PROCEDURE — 99212 OFFICE O/P EST SF 10 MIN: CPT | Performed by: FAMILY MEDICINE

## 2019-03-01 PROCEDURE — 99213 OFFICE O/P EST LOW 20 MIN: CPT | Performed by: FAMILY MEDICINE

## 2019-03-01 PROCEDURE — G8427 DOCREV CUR MEDS BY ELIG CLIN: HCPCS | Performed by: FAMILY MEDICINE

## 2019-03-01 PROCEDURE — G8417 CALC BMI ABV UP PARAM F/U: HCPCS | Performed by: FAMILY MEDICINE

## 2019-03-01 RX ORDER — BENZONATATE 100 MG/1
100-200 CAPSULE ORAL 3 TIMES DAILY PRN
Qty: 60 CAPSULE | Refills: 0 | Status: SHIPPED | OUTPATIENT
Start: 2019-03-01 | End: 2019-03-08

## 2019-03-01 ASSESSMENT — ENCOUNTER SYMPTOMS
ABDOMINAL DISTENTION: 0
SORE THROAT: 1
SINUS PAIN: 0
DIARRHEA: 0
SHORTNESS OF BREATH: 0
VOMITING: 0
CONSTIPATION: 0
SINUS PRESSURE: 0
ABDOMINAL PAIN: 0
CHEST TIGHTNESS: 0
RHINORRHEA: 0
COUGH: 1
WHEEZING: 0

## 2019-03-16 ENCOUNTER — HOSPITAL ENCOUNTER (EMERGENCY)
Age: 49
Discharge: HOME | End: 2019-03-16
Attending: EMERGENCY MEDICINE
Payer: COMMERCIAL

## 2019-03-16 ENCOUNTER — APPOINTMENT (OUTPATIENT)
Dept: CT IMAGING | Age: 49
End: 2019-03-16
Payer: COMMERCIAL

## 2019-03-16 VITALS
TEMPERATURE: 96.8 F | RESPIRATION RATE: 18 BRPM | HEIGHT: 61 IN | OXYGEN SATURATION: 99 % | WEIGHT: 172 LBS | BODY MASS INDEX: 32.47 KG/M2 | SYSTOLIC BLOOD PRESSURE: 125 MMHG | HEART RATE: 88 BPM | DIASTOLIC BLOOD PRESSURE: 78 MMHG

## 2019-03-16 DIAGNOSIS — H57.12 PAIN AROUND LEFT EYE: Primary | ICD-10-CM

## 2019-03-16 LAB
ANION GAP SERPL CALCULATED.3IONS-SCNC: 12 MMOL/L (ref 7–16)
BASOPHILS ABSOLUTE: 0.06 E9/L (ref 0–0.2)
BASOPHILS RELATIVE PERCENT: 0.5 % (ref 0–2)
BUN BLDV-MCNC: 23 MG/DL (ref 6–20)
C-REACTIVE PROTEIN: 1.1 MG/DL (ref 0–0.4)
CALCIUM SERPL-MCNC: 9.2 MG/DL (ref 8.6–10.2)
CHLORIDE BLD-SCNC: 103 MMOL/L (ref 98–107)
CO2: 26 MMOL/L (ref 22–29)
CREAT SERPL-MCNC: 0.8 MG/DL (ref 0.5–1)
EOSINOPHILS ABSOLUTE: 0.28 E9/L (ref 0.05–0.5)
EOSINOPHILS RELATIVE PERCENT: 2.4 % (ref 0–6)
GFR AFRICAN AMERICAN: >60
GFR NON-AFRICAN AMERICAN: >60 ML/MIN/1.73
GLUCOSE BLD-MCNC: 273 MG/DL (ref 74–99)
HCT VFR BLD CALC: 36.2 % (ref 34–48)
HEMOGLOBIN: 11.5 G/DL (ref 11.5–15.5)
IMMATURE GRANULOCYTES #: 0.1 E9/L
IMMATURE GRANULOCYTES %: 0.9 % (ref 0–5)
LYMPHOCYTES ABSOLUTE: 3.9 E9/L (ref 1.5–4)
LYMPHOCYTES RELATIVE PERCENT: 34 % (ref 20–42)
MCH RBC QN AUTO: 24.7 PG (ref 26–35)
MCHC RBC AUTO-ENTMCNC: 31.8 % (ref 32–34.5)
MCV RBC AUTO: 77.7 FL (ref 80–99.9)
MONOCYTES ABSOLUTE: 0.57 E9/L (ref 0.1–0.95)
MONOCYTES RELATIVE PERCENT: 5 % (ref 2–12)
NEUTROPHILS ABSOLUTE: 6.55 E9/L (ref 1.8–7.3)
NEUTROPHILS RELATIVE PERCENT: 57.2 % (ref 43–80)
PDW BLD-RTO: 15.3 FL (ref 11.5–15)
PLATELET # BLD: 273 E9/L (ref 130–450)
PMV BLD AUTO: 10.2 FL (ref 7–12)
POTASSIUM SERPL-SCNC: 4.1 MMOL/L (ref 3.5–5)
RBC # BLD: 4.66 E12/L (ref 3.5–5.5)
SEDIMENTATION RATE, ERYTHROCYTE: 43 MM/HR (ref 0–20)
SODIUM BLD-SCNC: 141 MMOL/L (ref 132–146)
WBC # BLD: 11.5 E9/L (ref 4.5–11.5)

## 2019-03-16 PROCEDURE — 85651 RBC SED RATE NONAUTOMATED: CPT

## 2019-03-16 PROCEDURE — 36415 COLL VENOUS BLD VENIPUNCTURE: CPT

## 2019-03-16 PROCEDURE — 6360000004 HC RX CONTRAST MEDICATION: Performed by: RADIOLOGY

## 2019-03-16 PROCEDURE — 70470 CT HEAD/BRAIN W/O & W/DYE: CPT

## 2019-03-16 PROCEDURE — 86140 C-REACTIVE PROTEIN: CPT

## 2019-03-16 PROCEDURE — 99283 EMERGENCY DEPT VISIT LOW MDM: CPT

## 2019-03-16 PROCEDURE — 80048 BASIC METABOLIC PNL TOTAL CA: CPT

## 2019-03-16 PROCEDURE — 85025 COMPLETE CBC W/AUTO DIFF WBC: CPT

## 2019-03-16 RX ORDER — TETRACAINE HYDROCHLORIDE 5 MG/ML
2 SOLUTION OPHTHALMIC ONCE
Status: DISCONTINUED | OUTPATIENT
Start: 2019-03-16 | End: 2019-03-16 | Stop reason: HOSPADM

## 2019-03-16 RX ADMIN — IOPAMIDOL 90 ML: 755 INJECTION, SOLUTION INTRAVENOUS at 15:30

## 2019-03-16 ASSESSMENT — VISUAL ACUITY
OD: 20/40
OS: 20/50

## 2019-03-16 ASSESSMENT — PAIN DESCRIPTION - ORIENTATION: ORIENTATION: LEFT

## 2019-03-16 ASSESSMENT — PAIN SCALES - GENERAL: PAINLEVEL_OUTOF10: 5

## 2019-03-16 ASSESSMENT — PAIN DESCRIPTION - DESCRIPTORS: DESCRIPTORS: OTHER (COMMENT)

## 2019-03-16 ASSESSMENT — PAIN DESCRIPTION - PAIN TYPE: TYPE: ACUTE PAIN

## 2019-03-16 ASSESSMENT — PAIN DESCRIPTION - LOCATION: LOCATION: EYE

## 2019-04-12 ENCOUNTER — TELEPHONE (OUTPATIENT)
Dept: FAMILY MEDICINE CLINIC | Age: 49
End: 2019-04-12

## 2019-04-12 DIAGNOSIS — E11.8 TYPE 2 DIABETES MELLITUS WITH COMPLICATION, WITH LONG-TERM CURRENT USE OF INSULIN (HCC): Primary | ICD-10-CM

## 2019-04-12 DIAGNOSIS — Z79.4 TYPE 2 DIABETES MELLITUS WITH COMPLICATION, WITH LONG-TERM CURRENT USE OF INSULIN (HCC): Primary | ICD-10-CM

## 2019-04-12 NOTE — TELEPHONE ENCOUNTER
Pt is requesting a referral for diabetes education   Pt is scheduled for diabetes education on 4-16-19 already

## 2019-04-16 ENCOUNTER — HOSPITAL ENCOUNTER (OUTPATIENT)
Dept: DIABETES SERVICES | Age: 49
Setting detail: THERAPIES SERIES
Discharge: HOME | End: 2019-04-16
Payer: COMMERCIAL

## 2019-04-16 ENCOUNTER — TELEPHONE (OUTPATIENT)
Dept: FAMILY MEDICINE CLINIC | Age: 49
End: 2019-04-16

## 2019-04-16 PROCEDURE — G0109 DIAB MANAGE TRN IND/GROUP: HCPCS

## 2019-04-16 NOTE — TELEPHONE ENCOUNTER
Call placed to patient per request of Dr Elie Jackson for positive depression screen. Patient states she has psychiatrist and has appointment next Friday. Dr Elie Jackson aware.

## 2019-04-17 ENCOUNTER — HOSPITAL ENCOUNTER (OUTPATIENT)
Dept: DIABETES SERVICES | Age: 49
Setting detail: THERAPIES SERIES
Discharge: HOME | End: 2019-04-17
Payer: COMMERCIAL

## 2019-04-17 PROCEDURE — G0109 DIAB MANAGE TRN IND/GROUP: HCPCS

## 2019-04-18 ENCOUNTER — HOSPITAL ENCOUNTER (OUTPATIENT)
Age: 49
Discharge: HOME | End: 2019-04-18
Payer: COMMERCIAL

## 2019-04-18 ENCOUNTER — HOSPITAL ENCOUNTER (OUTPATIENT)
Dept: DIABETES SERVICES | Age: 49
Setting detail: THERAPIES SERIES
Discharge: HOME | End: 2019-04-18
Payer: COMMERCIAL

## 2019-04-18 LAB
ALBUMIN SERPL-MCNC: 3.6 G/DL (ref 3.5–5.2)
ALP BLD-CCNC: 109 U/L (ref 35–104)
ALT SERPL-CCNC: 18 U/L (ref 0–32)
ANION GAP SERPL CALCULATED.3IONS-SCNC: 11 MMOL/L (ref 7–16)
AST SERPL-CCNC: 17 U/L (ref 0–31)
BILIRUB SERPL-MCNC: <0.2 MG/DL (ref 0–1.2)
BUN BLDV-MCNC: 15 MG/DL (ref 6–20)
CALCIUM SERPL-MCNC: 8.6 MG/DL (ref 8.6–10.2)
CHLORIDE BLD-SCNC: 111 MMOL/L (ref 98–107)
CHOLESTEROL, TOTAL: 203 MG/DL (ref 0–199)
CO2: 21 MMOL/L (ref 22–29)
CREAT SERPL-MCNC: 0.7 MG/DL (ref 0.5–1)
GFR AFRICAN AMERICAN: >60
GFR NON-AFRICAN AMERICAN: >60 ML/MIN/1.73
GLUCOSE BLD-MCNC: 79 MG/DL (ref 74–99)
HBA1C MFR BLD: 12.6 % (ref 4–5.6)
HDLC SERPL-MCNC: 34 MG/DL
LDL CHOLESTEROL CALCULATED: 119 MG/DL (ref 0–99)
MICROALBUMIN UR-MCNC: 1057.1 MG/L
POTASSIUM SERPL-SCNC: 3.5 MMOL/L (ref 3.5–5)
SODIUM BLD-SCNC: 143 MMOL/L (ref 132–146)
T4 FREE: 0.9 NG/DL (ref 0.93–1.7)
TOTAL PROTEIN: 7.1 G/DL (ref 6.4–8.3)
TRIGL SERPL-MCNC: 248 MG/DL (ref 0–149)
TSH SERPL DL<=0.05 MIU/L-ACNC: 3.54 UIU/ML (ref 0.27–4.2)
VITAMIN B-12: 379 PG/ML (ref 211–946)
VITAMIN D 25-HYDROXY: 29 NG/ML (ref 30–100)
VLDLC SERPL CALC-MCNC: 50 MG/DL

## 2019-04-18 PROCEDURE — 80053 COMPREHEN METABOLIC PANEL: CPT

## 2019-04-18 PROCEDURE — 84443 ASSAY THYROID STIM HORMONE: CPT

## 2019-04-18 PROCEDURE — 82607 VITAMIN B-12: CPT

## 2019-04-18 PROCEDURE — 84681 ASSAY OF C-PEPTIDE: CPT

## 2019-04-18 PROCEDURE — 80061 LIPID PANEL: CPT

## 2019-04-18 PROCEDURE — 36415 COLL VENOUS BLD VENIPUNCTURE: CPT

## 2019-04-18 PROCEDURE — 97804 MEDICAL NUTRITION GROUP: CPT

## 2019-04-18 PROCEDURE — 84439 ASSAY OF FREE THYROXINE: CPT

## 2019-04-18 PROCEDURE — 82044 UR ALBUMIN SEMIQUANTITATIVE: CPT

## 2019-04-18 PROCEDURE — 83036 HEMOGLOBIN GLYCOSYLATED A1C: CPT

## 2019-04-18 PROCEDURE — 82985 ASSAY OF GLYCATED PROTEIN: CPT

## 2019-04-18 PROCEDURE — 82306 VITAMIN D 25 HYDROXY: CPT

## 2019-04-18 PROCEDURE — 86376 MICROSOMAL ANTIBODY EACH: CPT

## 2019-04-18 SDOH — ECONOMIC STABILITY: FOOD INSECURITY: ADDITIONAL INFORMATION: YES

## 2019-04-18 ASSESSMENT — PATIENT HEALTH QUESTIONNAIRE - PHQ9
SUM OF ALL RESPONSES TO PHQ QUESTIONS 1-9: 19
2. FEELING DOWN, DEPRESSED OR HOPELESS: 3
SUM OF ALL RESPONSES TO PHQ9 QUESTIONS 1 & 2: 5
SUM OF ALL RESPONSES TO PHQ QUESTIONS 1-9: 19
1. LITTLE INTEREST OR PLEASURE IN DOING THINGS: 2

## 2019-04-18 NOTE — PROGRESS NOTES
Diabetes Self-Management Education Record    Participant Name: Nino Tavarez  Referring Provider: Kirti Islas MD  Assessment/Evaluation Ratings:  1=Needs Instruction   4=Demonstrates Understanding/Competency  2=Needs Review   NC=Not Covered    3=Comprehends Key Points  N/A=Not Applicable  Topics/Learning Objectives Pre-session Assess Date:  4/16/19  [address] Date Reinforce Date Post- session Eval Comments   Diabetes disease process & Treatment process: Define diabetes & pre-diabetes; Identify own type of diabetes; role of the pancreas; signs/symptoms; diagnostic criteria; prevention & treatment options; contributing factors. 2 4/16/19 KMS  4 Type 2, A1C 14.9%   Incorporating nutritional management into lifestyle: Describe effect of type, amount & timing of food on blood glucose; Describe basic meal planning techniques & current nutrition guidelines;Correctly read food labels & demonstrate CHO counting & portion control with personalized meal plan. Identify dining out strategies, & dietary sick day guidelines. 1 4/17/19 [facility]                4/18/19 [address] [place] [address]  3                3 4/17/19 Pt attended Session 2. Participated in group activities on Diabetes Plate Method and healthy  food choices. Demonstrated understanding of carb counting using food lists with carbohydrate serving sizes. Read CHO content of food correctly on nutrition facts using various food labels . Questions answered. Followed along and took notes. 4/18/19 Pt participated in group activities for meal planning. Instruction provided on a 1400 carb-consistent meal plan. Pt was able to choose correct amount of carbohydrate at meals using food models and Meal Panning Food Lists sheet Questions answered. Followed along and took notes. Incorporating physical activity into lifestyle:   Verbalize effect of exercise on blood glucose levels; benefits of regular exercise; safety considerations; contraindications; maintenance of activity.  2 4/16/19 KMS  4 Walks    Using medications safely:  Identify effects of diabetes medicines on blood glucose levels; List diabetes medication taken, action & side effects; appropriate injection sites; proper storage; supplies needed; proper technique; safe needle disposal guidelines. 2 4/16/19 KMS  4/17/19 KMS  4 Metformin 2 times daily. Invokana daily. Humilin u-500   110 B /70 D. Victoza 1.8    Monitoring blood glucose, interpreting and using results:  Identify recommended & personal blood glucose targets; importance of testing; testing supplies; HgbA1C target levels; Factors affecting blood glucose; Importance of logging blood glucose levels for pattern recognition; ketone testing; safe lancet disposal. 2 4/16/19 KMS  4 Monitors QID   Prevention, detection & treatment of acute complications:  Identify symptoms of hyper & hypoglycemia, and prevention & treatment strategies. Describe sick day guidelines & indications for ketone testing & physician notification. Identify short term consequences of poor control. 2 4/16/19 KMS  4    Prevention, detection & treatment of chronic complications:  Define the natural course of diabetes & describe the relationship of blood glucose levels to long term complications of diabetes. Identify preventative measures & standards of care. 2 4/16/19 KMS  4 Suffers from neuropathy    Developing strategies to address psychosocial issues:  Describe feelings about living with diabetes; Describe how stress, depression & anxiety affect blood glucose; Identify coping strategies; Identify support needed & support network available. 2 4/16/19 KMS  4/17/19 KMS  4 PHQ-9 Depression Screen Score: 19  Left message with nurse at [address] [place] [address] office with regard to \"thoughts of being better off dead\" on depression screening tool. Faxed copy to office. Patient states she has no thoughts of hurting herself.     Developing strategies to promote health/change behavior: Identify 7 self-care behaviors; Personal health risk factors; Benefits, challenges & strategies for behavioral change; Individualized goal selection. 2 4/17/19 [address]  4 Goal: I will call my diabetes educators for questions and support. Identified Barriers to learning/adherence to self management plan:    Emotional    Instruction Method:  Power Point Presentation , Handouts and Return demonstration     Education Materials/Equipment Provided: Self-management manual, Meal Plan and Nutritional Packet       Encounter Type Date Start Time End Time Comments No Show Dates   Assessment 4/16/19 [facility] [address] 930   In person    Session 1 4/16/19  1200      Session 2 4/17/19 [place]  4/17/19 3 [address]  [phone]     Session 3 4/18/19 [address] [phone]      Individual MNT         Gestational MNT         Shared Med Appt         Session 4        Meter Instrx        Insulin Instrx            Additional Comments:    DSMS Support Plan:  Follow-up plan/Date: July 2019   Contact Post Class Regarding:   Fasting Blood Sugar   HgbA1C   Weight   Hypertension/Follow-up with Physician   Self-Foot Exam Frequency   Monitoring Frequency   Exercise Routine   Goal Attainment  Post Education Referrals:      ? WIC   ? PAP   ? Wound Care   ? Social Service   ?  Home Medical    ?Physician Specialist   ?[address]   ? Sleep Lab   ? Other

## 2019-04-18 NOTE — PROGRESS NOTES
Diabetes Self-Management Education Record    Participant Name: Gorge Stinson  Referring Provider: Brandi South MD  Assessment/Evaluation Ratings:  1=Needs Instruction   4=Demonstrates Understanding/Competency  2=Needs Review   NC=Not Covered    3=Comprehends Key Points  N/A=Not Applicable  Topics/Learning Objectives Pre-session Assess Date:  4/16/19  [address] Date Reinforce Date Post- session Eval Comments   Diabetes disease process & Treatment process: Define diabetes & pre-diabetes; Identify own type of diabetes; role of the pancreas; signs/symptoms; diagnostic criteria; prevention & treatment options; contributing factors. 2       Incorporating nutritional management into lifestyle: Describe effect of type, amount & timing of food on blood glucose; Describe basic meal planning techniques & current nutrition guidelines;Correctly read food labels & demonstrate CHO counting & portion control with personalized meal plan. Identify dining out strategies, & dietary sick day guidelines. 1 4/17/19 763 [facility]                4/18/19 3 [facility]  3                3 4/17/19 Pt attended Session 2. Participated in group activities on Diabetes Plate Method and healthy  food choices. Demonstrated understanding of carb counting using food lists with carbohydrate serving sizes. Read CHO content of food correctly on nutrition facts using various food labels . Questions answered. Followed along and took notes. 4/18/19 Pt participated in group activities for meal planning. Instruction provided on a 1400 carb-consistent meal plan. Pt was able to choose correct amount of carbohydrate at meals using food models and Meal Panning Food Lists sheet Questions answered. Followed along and took notes. Incorporating physical activity into lifestyle:   Verbalize effect of exercise on blood glucose levels; benefits of regular exercise; safety considerations; contraindications; maintenance of activity.  2       Using medications safely:  Identify effects of diabetes medicines on blood glucose levels; List diabetes medication taken, action & side effects; appropriate injection sites; proper storage; supplies needed; proper technique; safe needle disposal guidelines. Metformin 2 times daily. Invokana daily. Humilin u-500   110 B /70 D. Victoza 1.8    Monitoring blood glucose, interpreting and using results:  Identify recommended & personal blood glucose targets; importance of testing; testing supplies; HgbA1C target levels; Factors affecting blood glucose; Importance of logging blood glucose levels for pattern recognition; ketone testing; safe lancet disposal. 2       Prevention, detection & treatment of acute complications:  Identify symptoms of hyper & hypoglycemia, and prevention & treatment strategies. Describe sick day guidelines & indications for ketone testing & physician notification. Identify short term consequences of poor control. 2       Prevention, detection & treatment of chronic complications:  Define the natural course of diabetes & describe the relationship of blood glucose levels to long term complications of diabetes. Identify preventative measures & standards of care. 2       Developing strategies to address psychosocial issues:  Describe feelings about living with diabetes; Describe how stress, depression & anxiety affect blood glucose; Identify coping strategies; Identify support needed & support network available. 2    PHQ-9 Depression Screen Score: 19  ? Physician notified of suicidal ideations   Developing strategies to promote health/change behavior: Identify 7 self-care behaviors; Personal health risk factors; Benefits, challenges & strategies for behavioral change; Individualized goal selection. 2 4/17/19 763 [facility]  4 Goal: I will call my diabetes educators for questions and support.      Identified Barriers to learning/adherence to self management plan:    Emotional    Instruction Method:  Power Point Presentation , Handouts and Return

## 2019-04-21 LAB
C-PEPTIDE: 0.2 NG/ML (ref 0.8–3.5)
FRUCTOSAMINE: 334 UMOL/L (ref 170–285)
THYROID PEROXIDASE (TPO) ABS: <0.3 IU/ML (ref 0–9)

## 2019-05-15 ENCOUNTER — HOSPITAL ENCOUNTER (OUTPATIENT)
Dept: GENERAL RADIOLOGY | Age: 49
Discharge: HOME | End: 2019-05-17
Payer: COMMERCIAL

## 2019-05-15 DIAGNOSIS — Z12.39 SCREENING BREAST EXAMINATION: ICD-10-CM

## 2019-05-15 DIAGNOSIS — R60.9 SWELLING: ICD-10-CM

## 2019-05-15 PROCEDURE — 73562 X-RAY EXAM OF KNEE 3: CPT

## 2019-05-15 PROCEDURE — 77063 BREAST TOMOSYNTHESIS BI: CPT

## 2019-06-17 ENCOUNTER — OFFICE VISIT (OUTPATIENT)
Dept: FAMILY MEDICINE CLINIC | Age: 49
End: 2019-06-17
Payer: COMMERCIAL

## 2019-06-17 ENCOUNTER — HOSPITAL ENCOUNTER (OUTPATIENT)
Age: 49
Discharge: HOME | End: 2019-06-19
Payer: COMMERCIAL

## 2019-06-17 VITALS
OXYGEN SATURATION: 97 % | TEMPERATURE: 97.7 F | HEART RATE: 100 BPM | DIASTOLIC BLOOD PRESSURE: 89 MMHG | HEIGHT: 61 IN | BODY MASS INDEX: 32.28 KG/M2 | RESPIRATION RATE: 18 BRPM | SYSTOLIC BLOOD PRESSURE: 125 MMHG | WEIGHT: 171 LBS

## 2019-06-17 DIAGNOSIS — R53.83 FATIGUE, UNSPECIFIED TYPE: ICD-10-CM

## 2019-06-17 DIAGNOSIS — M25.462 SWELLING OF KNEE JOINT, LEFT: Primary | ICD-10-CM

## 2019-06-17 LAB
ALBUMIN SERPL-MCNC: 4.3 G/DL (ref 3.5–5.2)
ALP BLD-CCNC: 115 U/L (ref 35–104)
ALT SERPL-CCNC: 16 U/L (ref 0–32)
ANION GAP SERPL CALCULATED.3IONS-SCNC: 15 MMOL/L (ref 7–16)
AST SERPL-CCNC: 17 U/L (ref 0–31)
BILIRUB SERPL-MCNC: 0.2 MG/DL (ref 0–1.2)
BUN BLDV-MCNC: 12 MG/DL (ref 6–20)
CALCIUM SERPL-MCNC: 11.2 MG/DL (ref 8.6–10.2)
CHLORIDE BLD-SCNC: 105 MMOL/L (ref 98–107)
CO2: 23 MMOL/L (ref 22–29)
CREAT SERPL-MCNC: 0.6 MG/DL (ref 0.5–1)
FERRITIN: 80 NG/ML
GFR AFRICAN AMERICAN: >60
GFR NON-AFRICAN AMERICAN: >60 ML/MIN/1.73
GLUCOSE BLD-MCNC: 53 MG/DL (ref 74–99)
HCT VFR BLD CALC: 42.3 % (ref 34–48)
HEMOGLOBIN: 12.9 G/DL (ref 11.5–15.5)
IRON SATURATION: 20 % (ref 15–50)
IRON: 57 MCG/DL (ref 37–145)
MCH RBC QN AUTO: 24.6 PG (ref 26–35)
MCHC RBC AUTO-ENTMCNC: 30.5 % (ref 32–34.5)
MCV RBC AUTO: 80.6 FL (ref 80–99.9)
PDW BLD-RTO: 14.7 FL (ref 11.5–15)
PLATELET # BLD: 288 E9/L (ref 130–450)
PMV BLD AUTO: 9.8 FL (ref 7–12)
POTASSIUM SERPL-SCNC: 4.3 MMOL/L (ref 3.5–5)
RBC # BLD: 5.25 E12/L (ref 3.5–5.5)
SODIUM BLD-SCNC: 143 MMOL/L (ref 132–146)
TOTAL IRON BINDING CAPACITY: 280 MCG/DL (ref 250–450)
TOTAL PROTEIN: 7.8 G/DL (ref 6.4–8.3)
WBC # BLD: 11.6 E9/L (ref 4.5–11.5)

## 2019-06-17 PROCEDURE — 80053 COMPREHEN METABOLIC PANEL: CPT

## 2019-06-17 PROCEDURE — 83540 ASSAY OF IRON: CPT

## 2019-06-17 PROCEDURE — 85027 COMPLETE CBC AUTOMATED: CPT

## 2019-06-17 PROCEDURE — 99213 OFFICE O/P EST LOW 20 MIN: CPT | Performed by: STUDENT IN AN ORGANIZED HEALTH CARE EDUCATION/TRAINING PROGRAM

## 2019-06-17 PROCEDURE — 99212 OFFICE O/P EST SF 10 MIN: CPT | Performed by: STUDENT IN AN ORGANIZED HEALTH CARE EDUCATION/TRAINING PROGRAM

## 2019-06-17 PROCEDURE — 1036F TOBACCO NON-USER: CPT | Performed by: STUDENT IN AN ORGANIZED HEALTH CARE EDUCATION/TRAINING PROGRAM

## 2019-06-17 PROCEDURE — G8417 CALC BMI ABV UP PARAM F/U: HCPCS | Performed by: STUDENT IN AN ORGANIZED HEALTH CARE EDUCATION/TRAINING PROGRAM

## 2019-06-17 PROCEDURE — 82728 ASSAY OF FERRITIN: CPT

## 2019-06-17 PROCEDURE — 36415 COLL VENOUS BLD VENIPUNCTURE: CPT | Performed by: FAMILY MEDICINE

## 2019-06-17 PROCEDURE — G8427 DOCREV CUR MEDS BY ELIG CLIN: HCPCS | Performed by: STUDENT IN AN ORGANIZED HEALTH CARE EDUCATION/TRAINING PROGRAM

## 2019-06-17 PROCEDURE — 83550 IRON BINDING TEST: CPT

## 2019-06-17 NOTE — PROGRESS NOTES
S: 51 y.o. female here for knee swelling. 10 yrs of this, ever since twisting injury. 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 L knee. No exacerbating/alleviating factors. MRI 2012 ? Cyst. Xray neg a month ago. O: VS: /89 (Site: Right Upper Arm, Position: Sitting, Cuff Size: Medium Adult)   Pulse 100   Temp 97.7 °F (36.5 °C) (Oral)   Resp 18   Ht 5' 1\" (1.549 m)   Wt 171 lb (77.6 kg)   SpO2 97%   BMI 32.31 kg/m²    General: NAD, alert and interacting appropriately. Ext:  No swelling/effusion. No ttp. Ant and post drawer and V/V neg. Impression: L knee swelling. Plan:   MRI L knee     Attending Physician Statement  I have discussed the case, including pertinent history and exam findings with the resident. I agree with the documented assessment and plan.

## 2019-06-17 NOTE — PROGRESS NOTES
and vomiting. Endocrine: Negative for cold intolerance, heat intolerance, polydipsia and polyuria. Genitourinary: Negative for difficulty urinating, frequency and urgency. Musculoskeletal: Positive for joint swelling. Negative for arthralgias, back pain, gait problem and myalgias. Skin: Negative for color change, pallor, rash and wound. Neurological: Negative for dizziness, weakness and headaches. Psychiatric/Behavioral: Negative for agitation, confusion and suicidal ideas. The patient is not nervous/anxious. All other systems reviewed and are negative. Prior to Visit Medications    Medication Sig Taking? Authorizing Provider   metFORMIN (GLUCOPHAGE) 850 MG tablet Take 850 mg by mouth 2 times daily (with meals) Yes Historical Provider, MD   FLUoxetine (PROZAC) 20 MG capsule Take 20 mg by mouth daily Yes Historical Provider, MD   vitamin D (ERGOCALCIFEROL) 27434 units CAPS capsule Take 50,000 Units by mouth once a week Yes Historical Provider, MD   pregabalin (LYRICA) 100 MG capsule Take 100 mg by mouth 2 times daily. . Yes Historical Provider, MD   lisinopril (PRINIVIL;ZESTRIL) 10 MG tablet Take 10 mg by mouth daily Yes Historical Provider, MD   Omega-3 Fatty Acids (FISH OIL) 1000 MG CAPS Take 1,000 mg by mouth 4 times daily Yes Historical Provider, MD   methotrexate (RHEUMATREX) 2.5 MG chemo tablet Take 15 mg by mouth once a week Yes Historical Provider, MD   HUMALOG KWIKPEN 200 UNIT/ML SOPN pen  Yes Historical Provider, MD   Omega-3 Fatty Acids ([employer] FISH OIL) 1200 MG CAPS TAKE ONE CAPSULE BY MOUTH 4 TIMES A DAY Yes Historical Provider, MD   canagliflozin (INVOKANA) 300 MG TABS tablet Take 300 mg by mouth every morning (before breakfast)  Yes Historical Provider, MD   Levothyroxine Sodium 75 MCG CAPS TAKE 1 TABLET BY MOUTH EVERY DAY Yes Historical Provider, MD   folic acid (FOLVITE) 1 MG tablet Take 1 tablet by mouth daily Yes Bonnie Lam MD   Multiple Vitamins-Minerals (MULTIVITAMIN WITH MINERALS) tablet Take 1 tablet by mouth daily. Yes J Luis Will MD   brompheniramine-pseudoephedrine 1-15 MG/5ML ELIX elixir Take 5 mLs by mouth every 6 hours as needed  Historical Provider, MD   albuterol sulfate HFA (PROAIR HFA) 108 (90 Base) MCG/ACT inhaler Inhale 2 puffs into the lungs every 6 hours as needed for Wheezing May substitute any inhaler  Christian Beers, DO   ibuprofen (IBU) 800 MG tablet Take 1 tablet by mouth every 8 hours as needed for Pain  Christian Beers, DO   metoclopramide (REGLAN) 5 MG tablet Take 1 tablet by mouth 4 times daily  Kirti Islas MD   Insulin Degludec (TRESIBA FLEXTOUCH) 100 UNIT/ML SOPN Inject 92 Units into the skin nightly   Historical Provider, MD   Liraglutide (VICTOZA) 18 MG/3ML SOPN SC injection Inject 1.8 mg into the skin daily  Historical Provider, MD   OLANZapine (ZYPREXA) 2.5 MG tablet Take 2.5 mg by mouth nightly  Historical Provider, MD   FREESTYLE LITE strip USE AS DIRECTED  J Luis Will MD   FREESTYLE LANCETS [address] CHECK BS [address]  J Luis Will MD   Glucose Blood (BLOOD GLUCOSE TEST STRIPS) STRP Please give appropriate test strip  J Luis Will MD   Insulin Pen Needle 31G X 8 MM MISC 1 each by Does not apply route daily. J Luis Will MD        Social History     Tobacco Use    Smoking status: Former Smoker    Smokeless tobacco: Never Used   Substance Use Topics    Alcohol use: No        Vitals:    06/17/19 1303   BP: 125/89   Site: Right Upper Arm   Position: Sitting   Cuff Size: Medium Adult   Pulse: 100   Resp: 18   Temp: 97.7 °F (36.5 °C)   TempSrc: Oral   SpO2: 97%   Weight: 171 lb (77.6 kg)   Height: 5' 1\" (1.549 m)     Estimated body mass index is 32.31 kg/m² as calculated from the following:    Height as of this encounter: 5' 1\" (1.549 m). Weight as of this encounter: 171 lb (77.6 kg). Physical Exam   Constitutional: She appears well-developed and well-nourished. No distress.    HENT:   Head: Normocephalic and atraumatic. Right Ear: External ear normal.   Left Ear: External ear normal.   Nose: Nose normal.   Mouth/Throat: Oropharynx is clear and moist.   Eyes: Conjunctivae and EOM are normal. Right eye exhibits no discharge. Left eye exhibits no discharge. Neck: Normal range of motion. Neck supple. No thyromegaly present. Cardiovascular: Normal rate, regular rhythm, normal heart sounds and intact distal pulses. No murmur heard. Pulmonary/Chest: Effort normal and breath sounds normal. No respiratory distress. She has no wheezes. Abdominal: Soft. Bowel sounds are normal. She exhibits no distension. There is no tenderness. Musculoskeletal: Normal range of motion. She exhibits no edema or tenderness. Left knee: Normal. She exhibits normal range of motion, no swelling, no effusion, no deformity, no LCL laxity and no MCL laxity. No tenderness found. No medial joint line, no lateral joint line, no MCL, no LCL and no patellar tendon tenderness noted. Minimal to no diameter difference in left knee vs right, likely normal variant   Lymphadenopathy:     She has no cervical adenopathy. Neurological: She is alert. She has normal reflexes. No cranial nerve deficit. Skin: Skin is warm and dry. No rash noted. No erythema. Psychiatric: She has a normal mood and affect. Her behavior is normal. Judgment and thought content normal.       ASSESSMENT/PLAN:  1. Swelling of knee joint, left  - chronic in nature  - MRI previously with cyst, to reassess with MRI  - Consider PT    2. Fatigue, unspecified type  - Patient requested labs  - likely multifactorial diabetes, lifestyle  - CBC; Future  - FERRITIN; Future  - IRON AND TIBC; Future  - COMPREHENSIVE METABOLIC PANEL; Future      Return in about 3 months (around 9/17/2019). An electronic signature was used to authenticate this note.     --Eliud Espinoza MD on 6/19/2019 at 2:37 PM

## 2019-06-19 ASSESSMENT — ENCOUNTER SYMPTOMS
WHEEZING: 0
EYE DISCHARGE: 0
ABDOMINAL DISTENTION: 0
CONSTIPATION: 0
NAUSEA: 0
COUGH: 0
RHINORRHEA: 0
EYE ITCHING: 0
BACK PAIN: 0
SHORTNESS OF BREATH: 0
EYE REDNESS: 0
SORE THROAT: 0
COLOR CHANGE: 0
DIARRHEA: 0
ABDOMINAL PAIN: 0
VOMITING: 0

## 2019-07-01 ENCOUNTER — HOSPITAL ENCOUNTER (OUTPATIENT)
Dept: MRI IMAGING | Age: 49
Discharge: HOME | End: 2019-07-03
Payer: COMMERCIAL

## 2019-07-01 DIAGNOSIS — M25.462 SWELLING OF KNEE JOINT, LEFT: ICD-10-CM

## 2019-07-01 PROCEDURE — A9579 GAD-BASE MR CONTRAST NOS,1ML: HCPCS | Performed by: RADIOLOGY

## 2019-07-01 PROCEDURE — 73723 MRI JOINT LWR EXTR W/O&W/DYE: CPT

## 2019-07-01 PROCEDURE — 6360000004 HC RX CONTRAST MEDICATION: Performed by: RADIOLOGY

## 2019-07-01 RX ADMIN — GADOTERIDOL 16 ML: 279.3 INJECTION, SOLUTION INTRAVENOUS at 15:43

## 2019-07-10 ENCOUNTER — HOSPITAL ENCOUNTER (OUTPATIENT)
Dept: DIABETES SERVICES | Age: 49
Setting detail: THERAPIES SERIES
Discharge: HOME | End: 2019-07-10
Payer: COMMERCIAL

## 2019-07-10 PROCEDURE — G0109 DIAB MANAGE TRN IND/GROUP: HCPCS

## 2019-07-10 NOTE — PROGRESS NOTES
Diabetes Self-Management Education Record    Participant Name: Asiya Client  Referring Provider: Nick Williamson MD  Assessment/Evaluation Ratings:  1=Needs Instruction   4=Demonstrates Understanding/Competency  2=Needs Review   NC=Not Covered    3=Comprehends Key Points  N/A=Not Applicable  Topics/Learning Objectives Pre-session Assess Date:  4/16/19  [address] Date Reinforce Date Post- session Eval Comments   Diabetes disease process & Treatment process: Define diabetes & pre-diabetes; Identify own type of diabetes; role of the pancreas; signs/symptoms; diagnostic criteria; prevention & treatment options; contributing factors. 2 4/16/19 KMS  4 Type 2, A1C 14.9%   Incorporating nutritional management into lifestyle: Describe effect of type, amount & timing of food on blood glucose; Describe basic meal planning techniques & current nutrition guidelines;Correctly read food labels & demonstrate CHO counting & portion control with personalized meal plan. Identify dining out strategies, & dietary sick day guidelines. 1 4/17/19 [facility]                4/18/19 3 [facility]  3                3 4/17/19 Pt attended Session 2. Participated in group activities on Diabetes Plate Method and healthy  food choices. Demonstrated understanding of carb counting using food lists with carbohydrate serving sizes. Read CHO content of food correctly on nutrition facts using various food labels . Questions answered. Followed along and took notes. 4/18/19 Pt participated in group activities for meal planning. Instruction provided on a 1400 carb-consistent meal plan. Pt was able to choose correct amount of carbohydrate at meals using food models and Meal Panning Food Lists sheet Questions answered. Followed along and took notes. Incorporating physical activity into lifestyle:   Verbalize effect of exercise on blood glucose levels; benefits of regular exercise; safety considerations; contraindications; maintenance of activity.  2 4/16/19 KMS  4 Walks    Using medications safely:  Identify effects of diabetes medicines on blood glucose levels; List diabetes medication taken, action & side effects; appropriate injection sites; proper storage; supplies needed; proper technique; safe needle disposal guidelines. 2 4/16/19 KMS  4/17/19 KMS  4 Metformin 2 times daily. Invokana daily. Humilin u-500   110 B /70 D. Victoza 1.8    Monitoring blood glucose, interpreting and using results:  Identify recommended & personal blood glucose targets; importance of testing; testing supplies; HgbA1C target levels; Factors affecting blood glucose; Importance of logging blood glucose levels for pattern recognition; ketone testing; safe lancet disposal. 2 4/16/19 KMS  4 Monitors QID   Prevention, detection & treatment of acute complications:  Identify symptoms of hyper & hypoglycemia, and prevention & treatment strategies. Describe sick day guidelines & indications for ketone testing & physician notification. Identify short term consequences of poor control. 2 4/16/19 KMS  4    Prevention, detection & treatment of chronic complications:  Define the natural course of diabetes & describe the relationship of blood glucose levels to long term complications of diabetes. Identify preventative measures & standards of care. 2 4/16/19 KMS  4 Suffers from neuropathy    Developing strategies to address psychosocial issues:  Describe feelings about living with diabetes; Describe how stress, depression & anxiety affect blood glucose; Identify coping strategies; Identify support needed & support network available. 2 4/16/19 KMS  4/17/19 KMS  4 PHQ-9 Depression Screen Score: 19  Left message with nurse at [address] [place] [address] office with regard to \"thoughts of being better off dead\" on depression screening tool. Faxed copy to office. Patient states she has no thoughts of hurting herself.     Developing strategies to promote health/change behavior: Identify 7 self-care behaviors; Personal health risk

## 2019-07-11 ENCOUNTER — TELEPHONE (OUTPATIENT)
Dept: FAMILY MEDICINE CLINIC | Age: 49
End: 2019-07-11

## 2019-07-12 NOTE — TELEPHONE ENCOUNTER
Pllease let her know there are mild degenerative (arthritic) changes. Consider NSAID use sparingly and stretching and strength training.      Electronically signed by Dewey Moore MD on 7/12/2019 at 9:31 AM

## 2019-08-13 ENCOUNTER — HOSPITAL ENCOUNTER (EMERGENCY)
Age: 49
Discharge: HOME | End: 2019-08-14
Attending: EMERGENCY MEDICINE
Payer: COMMERCIAL

## 2019-08-13 ENCOUNTER — APPOINTMENT (OUTPATIENT)
Dept: GENERAL RADIOLOGY | Age: 49
End: 2019-08-13
Payer: COMMERCIAL

## 2019-08-13 DIAGNOSIS — E16.2 HYPOGLYCEMIA: Primary | ICD-10-CM

## 2019-08-13 DIAGNOSIS — N39.0 URINARY TRACT INFECTION WITHOUT HEMATURIA, SITE UNSPECIFIED: ICD-10-CM

## 2019-08-13 LAB
ALBUMIN SERPL-MCNC: 3.9 G/DL (ref 3.5–5.2)
ALP BLD-CCNC: 106 U/L (ref 35–104)
ALT SERPL-CCNC: 14 U/L (ref 0–32)
ANION GAP SERPL CALCULATED.3IONS-SCNC: 9 MMOL/L (ref 7–16)
AST SERPL-CCNC: 13 U/L (ref 0–31)
BILIRUB SERPL-MCNC: <0.2 MG/DL (ref 0–1.2)
BUN BLDV-MCNC: 15 MG/DL (ref 6–20)
CALCIUM SERPL-MCNC: 9.3 MG/DL (ref 8.6–10.2)
CHLORIDE BLD-SCNC: 108 MMOL/L (ref 98–107)
CO2: 26 MMOL/L (ref 22–29)
CREAT SERPL-MCNC: 0.8 MG/DL (ref 0.5–1)
GFR AFRICAN AMERICAN: >60
GFR NON-AFRICAN AMERICAN: >60 ML/MIN/1.73
GLUCOSE BLD-MCNC: 131 MG/DL (ref 74–99)
HCT VFR BLD CALC: 39.2 % (ref 34–48)
HEMOGLOBIN: 12 G/DL (ref 11.5–15.5)
LACTIC ACID: 1.6 MMOL/L (ref 0.5–2.2)
LIPASE: 27 U/L (ref 13–60)
MCH RBC QN AUTO: 24.4 PG (ref 26–35)
MCHC RBC AUTO-ENTMCNC: 30.6 % (ref 32–34.5)
MCV RBC AUTO: 79.7 FL (ref 80–99.9)
METER GLUCOSE: 102 MG/DL (ref 74–99)
PDW BLD-RTO: 14.8 FL (ref 11.5–15)
PLATELET # BLD: 257 E9/L (ref 130–450)
PMV BLD AUTO: 10.5 FL (ref 7–12)
POTASSIUM SERPL-SCNC: 3.2 MMOL/L (ref 3.5–5)
RBC # BLD: 4.92 E12/L (ref 3.5–5.5)
SODIUM BLD-SCNC: 143 MMOL/L (ref 132–146)
TOTAL PROTEIN: 7.5 G/DL (ref 6.4–8.3)
WBC # BLD: 14.1 E9/L (ref 4.5–11.5)

## 2019-08-13 PROCEDURE — 80053 COMPREHEN METABOLIC PANEL: CPT

## 2019-08-13 PROCEDURE — 74022 RADEX COMPL AQT ABD SERIES: CPT

## 2019-08-13 PROCEDURE — 93005 ELECTROCARDIOGRAM TRACING: CPT | Performed by: EMERGENCY MEDICINE

## 2019-08-13 PROCEDURE — 2580000003 HC RX 258: Performed by: EMERGENCY MEDICINE

## 2019-08-13 PROCEDURE — 85027 COMPLETE CBC AUTOMATED: CPT

## 2019-08-13 PROCEDURE — 99285 EMERGENCY DEPT VISIT HI MDM: CPT

## 2019-08-13 PROCEDURE — 82962 GLUCOSE BLOOD TEST: CPT

## 2019-08-13 PROCEDURE — 83605 ASSAY OF LACTIC ACID: CPT

## 2019-08-13 PROCEDURE — 83690 ASSAY OF LIPASE: CPT

## 2019-08-13 PROCEDURE — 36415 COLL VENOUS BLD VENIPUNCTURE: CPT

## 2019-08-13 RX ORDER — SODIUM CHLORIDE 9 MG/ML
INJECTION, SOLUTION INTRAVENOUS CONTINUOUS
Status: DISCONTINUED | OUTPATIENT
Start: 2019-08-13 | End: 2019-08-14 | Stop reason: HOSPADM

## 2019-08-13 RX ADMIN — SODIUM CHLORIDE: 9 INJECTION, SOLUTION INTRAVENOUS at 23:06

## 2019-08-14 VITALS
HEIGHT: 61 IN | BODY MASS INDEX: 31.15 KG/M2 | OXYGEN SATURATION: 96 % | RESPIRATION RATE: 18 BRPM | HEART RATE: 93 BPM | DIASTOLIC BLOOD PRESSURE: 76 MMHG | SYSTOLIC BLOOD PRESSURE: 103 MMHG | WEIGHT: 165 LBS | TEMPERATURE: 96.7 F

## 2019-08-14 LAB
AMORPHOUS: ABNORMAL
BACTERIA: ABNORMAL /HPF
BILIRUBIN URINE: NEGATIVE
BLOOD, URINE: ABNORMAL
CLARITY: ABNORMAL
COLOR: YELLOW
EKG ATRIAL RATE: 87 BPM
EKG P AXIS: 22 DEGREES
EKG P-R INTERVAL: 144 MS
EKG Q-T INTERVAL: 382 MS
EKG QRS DURATION: 76 MS
EKG QTC CALCULATION (BAZETT): 459 MS
EKG R AXIS: 5 DEGREES
EKG T AXIS: 30 DEGREES
EKG VENTRICULAR RATE: 87 BPM
EPITHELIAL CELLS, UA: ABNORMAL /HPF
GLUCOSE URINE: 250 MG/DL
KETONES, URINE: NEGATIVE MG/DL
LEUKOCYTE ESTERASE, URINE: ABNORMAL
NITRITE, URINE: NEGATIVE
PH UA: 5.5 (ref 5–9)
PROTEIN UA: >=300 MG/DL
RBC UA: ABNORMAL /HPF (ref 0–2)
SPECIFIC GRAVITY UA: 1.02 (ref 1–1.03)
UROBILINOGEN, URINE: 0.2 E.U./DL
WBC UA: ABNORMAL /HPF (ref 0–5)

## 2019-08-14 PROCEDURE — 93010 ELECTROCARDIOGRAM REPORT: CPT | Performed by: INTERNAL MEDICINE

## 2019-08-14 PROCEDURE — 81001 URINALYSIS AUTO W/SCOPE: CPT

## 2019-08-14 RX ORDER — NITROFURANTOIN 25; 75 MG/1; MG/1
100 CAPSULE ORAL 2 TIMES DAILY
Qty: 10 CAPSULE | Refills: 0 | Status: SHIPPED | OUTPATIENT
Start: 2019-08-14 | End: 2019-08-19

## 2019-08-14 RX ORDER — POTASSIUM CHLORIDE 20 MEQ/1
40 TABLET, EXTENDED RELEASE ORAL ONCE
Status: COMPLETED | OUTPATIENT
Start: 2019-08-14 | End: 2019-08-14

## 2019-08-14 RX ADMIN — POTASSIUM CHLORIDE 40 MEQ: 20 TABLET, EXTENDED RELEASE ORAL at 00:27

## 2019-08-14 NOTE — ED PROVIDER NOTES
Department of Emergency Medicine   ED  Provider Note  Admit Date/RoomTime: 8/13/2019  9:53 PM  ED Room: [facility]/14B-14      History of Present Illness:  8/13/19, Time: 9:54 PM         Dot Garcia is a 51 y.o. female presenting to the ED for hypoglycemia, beginning today. The complaint has been constant, moderate in severity, and worsened by nothing. Pt states that she has a hx of DM and took 80 units of humulin today but did not take her metformin. Pt reports that her blood sugar was 78 so she called her health insurance nurse hotline and they told her to come to the ED. Pt states that she had 2 episodes of emesis today. Pt denies cough, congestion, fever, chills, diarrhea, HA, CP, SOB, abdominal pain, back pain, neck pain, LOC, syncope, urinary symptoms, constipation, or any other symptoms. Review of Systems:   Pertinent positives and negatives are stated within HPI, all other systems reviewed and are negative.    --------------------------------------------- PAST HISTORY ---------------------------------------------  Past Medical History:  has a past medical history of Anxiety, Arthritis, Depression, Diabetes mellitus ([address] and Thyroid disease. Past Surgical History:  has a past surgical history that includes Shoulder arthroscopy (Right, 06/16/2017). Social History:  reports that she has quit smoking. She has never used smokeless tobacco. She reports that she does not drink alcohol or use drugs. Family History: She was adopted. Family history is unknown by patient. The patients home medications have been reviewed. Allergies: Amoxicillin; Sulfa antibiotics; and Cefdinir      ---------------------------------------------------PHYSICAL EXAM--------------------------------------    Constitutional/General: Alert and oriented x3, mild distress. Head: Normocephalic and atraumatic  Eyes: PERRL, EOMI. Mouth: Oropharynx clear, mucous membranes moist.   Neck: Supple. Full ROM.     Respiratory: Lungs clear to auscultation bilaterally, no wheezes, rales, or rhonchi. Not in respiratory distress   Cardiovascular:  Regular rate. Regular rhythm. No murmurs, gallops, or rubs. 2+ distal pulses  GI:  Abdomen Soft, Non tender, Non distended. No rebound, guarding, or rigidity. Musculoskeletal: Moves all extremities x 4. Warm and well perfused. Integument: skin warm and dry. No rashes. Neurologic: GCS 15, 5/5 strength.     -------------------------------------------------- RESULTS -------------------------------------------------  I have personally reviewed all laboratory and imaging results for this patient. Results are listed below.      LABS:  Results for orders placed or performed during the hospital encounter of 08/13/19   CBC   Result Value Ref Range    WBC 14.1 (H) 4.5 - 11.5 E9/L    RBC 4.92 3.50 - 5.50 E12/L    Hemoglobin 12.0 11.5 - 15.5 g/dL    Hematocrit 39.2 34.0 - 48.0 %    MCV 79.7 (L) 80.0 - 99.9 fL    MCH 24.4 (L) 26.0 - 35.0 pg    MCHC 30.6 (L) 32.0 - 34.5 %    RDW 14.8 11.5 - 15.0 fL    Platelets 150 375 - 219 E9/L    MPV 10.5 7.0 - 12.0 fL   Comprehensive Metabolic Panel   Result Value Ref Range    Sodium 143 132 - 146 mmol/L    Potassium 3.2 (L) 3.5 - 5.0 mmol/L    Chloride 108 (H) 98 - 107 mmol/L    CO2 26 22 - 29 mmol/L    Anion Gap 9 7 - 16 mmol/L    Glucose 131 (H) 74 - 99 mg/dL    BUN 15 6 - 20 mg/dL    CREATININE 0.8 0.5 - 1.0 mg/dL    GFR Non-African American >60 >=60 mL/min/1.73    GFR African American >60     Calcium 9.3 8.6 - 10.2 mg/dL    Total Protein 7.5 6.4 - 8.3 g/dL    Alb 3.9 3.5 - 5.2 g/dL    Total Bilirubin <0.2 0.0 - 1.2 mg/dL    Alkaline Phosphatase 106 (H) 35 - 104 U/L    ALT 14 0 - 32 U/L    AST 13 0 - 31 U/L   Urinalysis   Result Value Ref Range    Color, UA Yellow Straw/Yellow    Clarity, UA SL CLOUDY Clear    Glucose, Ur 250 (A) Negative mg/dL    Bilirubin Urine Negative Negative    Ketones, Urine Negative Negative mg/dL    Specific Gravity, UA 1.025 1.005 - 1.030 dinner time. Patient had two episodes of emesis at home. Patient's BGL was 78 at home and came to the ED after calling her health insurance nurse hotline and they told her to come and be evaluated. Fluids given. XR of the abdomen chest, lab work, and EKG ordered. Re-Evaluations:           She rechecked several times and reporting no chest pain or abdominal pain. Patient awake alert oriented x3. Patient neurologically intact abdomen is soft and nontender there is no rebound or guarding patient is afebrile. Patient made aware of findings and need for follow-up. Consultations:                 Counseling: The emergency provider has spoken with the patient and discussed todays results, in addition to providing specific details for the plan of care and counseling regarding the diagnosis and prognosis. Questions are answered at this time and they are agreeable with the plan.     --------------------------------- IMPRESSION AND DISPOSITION ---------------------------------    IMPRESSION  1. Hypoglycemia    2. Urinary tract infection without hematuria, site unspecified        DISPOSITION  Disposition: Discharge to home  Patient condition is stable    8/13/19, 9:54 PM.    This note is prepared by Antlemo Gillespie, acting as Scribe for Nettie Moreno MD.    Nettie Moreno MD:  The scribe's documentation has been prepared under my direction and personally reviewed by me in its entirety. I confirm that the note above accurately reflects all work, treatment, procedures, and medical decision making performed by me.            Nettie Moreno MD  08/13/19 Rhianna Najera MD  08/14/19 [id number]

## 2019-12-20 ENCOUNTER — TELEPHONE (OUTPATIENT)
Dept: FAMILY MEDICINE CLINIC | Age: 49
End: 2019-12-20

## 2019-12-20 ENCOUNTER — OFFICE VISIT (OUTPATIENT)
Dept: FAMILY MEDICINE CLINIC | Age: 49
End: 2019-12-20
Payer: COMMERCIAL

## 2019-12-20 VITALS
OXYGEN SATURATION: 98 % | DIASTOLIC BLOOD PRESSURE: 88 MMHG | HEART RATE: 101 BPM | RESPIRATION RATE: 16 BRPM | HEIGHT: 61 IN | BODY MASS INDEX: 34.74 KG/M2 | SYSTOLIC BLOOD PRESSURE: 136 MMHG | WEIGHT: 184 LBS | TEMPERATURE: 97.7 F

## 2019-12-20 DIAGNOSIS — E11.8 TYPE 2 DIABETES MELLITUS WITH COMPLICATION, WITH LONG-TERM CURRENT USE OF INSULIN (HCC): ICD-10-CM

## 2019-12-20 DIAGNOSIS — M06.9 RHEUMATOID ARTHRITIS, INVOLVING UNSPECIFIED SITE, UNSPECIFIED RHEUMATOID FACTOR PRESENCE: ICD-10-CM

## 2019-12-20 DIAGNOSIS — Z79.4 TYPE 2 DIABETES MELLITUS WITH COMPLICATION, WITH LONG-TERM CURRENT USE OF INSULIN (HCC): ICD-10-CM

## 2019-12-20 DIAGNOSIS — M25.552 PAIN OF BOTH HIP JOINTS: ICD-10-CM

## 2019-12-20 DIAGNOSIS — M25.551 PAIN OF BOTH HIP JOINTS: ICD-10-CM

## 2019-12-20 DIAGNOSIS — M25.552 PAIN OF BOTH HIP JOINTS: Primary | ICD-10-CM

## 2019-12-20 DIAGNOSIS — M25.462 SWELLING OF KNEE JOINT, LEFT: Primary | ICD-10-CM

## 2019-12-20 DIAGNOSIS — M25.551 PAIN OF BOTH HIP JOINTS: Primary | ICD-10-CM

## 2019-12-20 PROCEDURE — 99213 OFFICE O/P EST LOW 20 MIN: CPT | Performed by: STUDENT IN AN ORGANIZED HEALTH CARE EDUCATION/TRAINING PROGRAM

## 2019-12-20 PROCEDURE — G8427 DOCREV CUR MEDS BY ELIG CLIN: HCPCS | Performed by: STUDENT IN AN ORGANIZED HEALTH CARE EDUCATION/TRAINING PROGRAM

## 2019-12-20 PROCEDURE — 1036F TOBACCO NON-USER: CPT | Performed by: STUDENT IN AN ORGANIZED HEALTH CARE EDUCATION/TRAINING PROGRAM

## 2019-12-20 PROCEDURE — 99212 OFFICE O/P EST SF 10 MIN: CPT | Performed by: STUDENT IN AN ORGANIZED HEALTH CARE EDUCATION/TRAINING PROGRAM

## 2019-12-20 PROCEDURE — 3046F HEMOGLOBIN A1C LEVEL >9.0%: CPT | Performed by: STUDENT IN AN ORGANIZED HEALTH CARE EDUCATION/TRAINING PROGRAM

## 2019-12-20 PROCEDURE — 2022F DILAT RTA XM EVC RTNOPTHY: CPT | Performed by: STUDENT IN AN ORGANIZED HEALTH CARE EDUCATION/TRAINING PROGRAM

## 2019-12-20 PROCEDURE — G8484 FLU IMMUNIZE NO ADMIN: HCPCS | Performed by: STUDENT IN AN ORGANIZED HEALTH CARE EDUCATION/TRAINING PROGRAM

## 2019-12-20 PROCEDURE — G8417 CALC BMI ABV UP PARAM F/U: HCPCS | Performed by: STUDENT IN AN ORGANIZED HEALTH CARE EDUCATION/TRAINING PROGRAM

## 2019-12-20 RX ORDER — LEVOTHYROXINE SODIUM 88 UG/1
1 TABLET ORAL DAILY
COMMUNITY
Start: 2019-12-12 | End: 2021-09-21 | Stop reason: SDUPTHER

## 2019-12-20 RX ORDER — INSULIN HUMAN 500 [IU]/ML
100 INJECTION, SOLUTION SUBCUTANEOUS EVERY MORNING
COMMUNITY
Start: 2019-11-25 | End: 2021-03-24 | Stop reason: ALTCHOICE

## 2019-12-20 RX ORDER — NAPROXEN 250 MG/1
500 TABLET ORAL 2 TIMES DAILY WITH MEALS
Qty: 120 TABLET | Refills: 2 | Status: SHIPPED | OUTPATIENT
Start: 2019-12-20 | End: 2020-02-21 | Stop reason: SDUPTHER

## 2019-12-20 RX ORDER — NAPROXEN 250 MG/1
250 TABLET ORAL 2 TIMES DAILY WITH MEALS
Qty: 60 TABLET | Refills: 0 | Status: SHIPPED | OUTPATIENT
Start: 2019-12-20 | End: 2019-12-20

## 2019-12-22 ASSESSMENT — ENCOUNTER SYMPTOMS
SORE THROAT: 0
WHEEZING: 0
CONSTIPATION: 0
VOMITING: 0
RHINORRHEA: 0
COLOR CHANGE: 0
SHORTNESS OF BREATH: 0
BACK PAIN: 1
DIARRHEA: 0
COUGH: 0
EYE DISCHARGE: 0
ABDOMINAL DISTENTION: 0
EYE REDNESS: 0
NAUSEA: 0
EYE ITCHING: 0
ABDOMINAL PAIN: 0

## 2019-12-30 RX ORDER — ACETAMINOPHEN 500 MG
1000 TABLET ORAL 3 TIMES DAILY
Qty: 540 TABLET | Refills: 1 | Status: SHIPPED | OUTPATIENT
Start: 2019-12-30 | End: 2020-07-16

## 2020-02-03 ENCOUNTER — CARE COORDINATION (OUTPATIENT)
Dept: CARE COORDINATION | Age: 50
End: 2020-02-03

## 2020-02-03 SDOH — SOCIAL STABILITY: SOCIAL NETWORK: HOW OFTEN DO YOU ATTEND CHURCH OR RELIGIOUS SERVICES?: MORE THAN 4 TIMES PER YEAR

## 2020-02-03 SDOH — ECONOMIC STABILITY: TRANSPORTATION INSECURITY
IN THE PAST 12 MONTHS, HAS THE LACK OF TRANSPORTATION KEPT YOU FROM MEDICAL APPOINTMENTS OR FROM GETTING MEDICATIONS?: NO

## 2020-02-03 SDOH — ECONOMIC STABILITY: TRANSPORTATION INSECURITY
IN THE PAST 12 MONTHS, HAS LACK OF TRANSPORTATION KEPT YOU FROM MEETINGS, WORK, OR FROM GETTING THINGS NEEDED FOR DAILY LIVING?: NO

## 2020-02-03 SDOH — HEALTH STABILITY: PHYSICAL HEALTH: ON AVERAGE, HOW MANY MINUTES DO YOU ENGAGE IN EXERCISE AT THIS LEVEL?: 0 MIN

## 2020-02-03 SDOH — ECONOMIC STABILITY: INCOME INSECURITY: HOW HARD IS IT FOR YOU TO PAY FOR THE VERY BASICS LIKE FOOD, HOUSING, MEDICAL CARE, AND HEATING?: VERY HARD

## 2020-02-03 SDOH — SOCIAL STABILITY: SOCIAL NETWORK: IN A TYPICAL WEEK, HOW MANY TIMES DO YOU TALK ON THE PHONE WITH FAMILY, FRIENDS, OR NEIGHBORS?: NEVER

## 2020-02-03 SDOH — SOCIAL STABILITY: SOCIAL NETWORK: ARE YOU MARRIED, WIDOWED, DIVORCED, SEPARATED, NEVER MARRIED, OR LIVING WITH A PARTNER?: NEVER MARRIED

## 2020-02-03 SDOH — HEALTH STABILITY: MENTAL HEALTH
STRESS IS WHEN SOMEONE FEELS TENSE, NERVOUS, ANXIOUS, OR CAN'T SLEEP AT NIGHT BECAUSE THEIR MIND IS TROUBLED. HOW STRESSED ARE YOU?: VERY MUCH

## 2020-02-03 SDOH — ECONOMIC STABILITY: FOOD INSECURITY: WITHIN THE PAST 12 MONTHS, THE FOOD YOU BOUGHT JUST DIDN'T LAST AND YOU DIDN'T HAVE MONEY TO GET MORE.: NEVER TRUE

## 2020-02-03 SDOH — SOCIAL STABILITY: SOCIAL NETWORK: HOW OFTEN DO YOU ATTENT MEETINGS OF THE CLUB OR ORGANIZATION YOU BELONG TO?: NEVER

## 2020-02-03 SDOH — HEALTH STABILITY: PHYSICAL HEALTH: ON AVERAGE, HOW MANY DAYS PER WEEK DO YOU ENGAGE IN MODERATE TO STRENUOUS EXERCISE (LIKE A BRISK WALK)?: 0 DAYS

## 2020-02-03 SDOH — SOCIAL STABILITY: SOCIAL NETWORK: HOW OFTEN DO YOU GET TOGETHER WITH FRIENDS OR RELATIVES?: NEVER

## 2020-02-03 SDOH — ECONOMIC STABILITY: FOOD INSECURITY: WITHIN THE PAST 12 MONTHS, YOU WORRIED THAT YOUR FOOD WOULD RUN OUT BEFORE YOU GOT MONEY TO BUY MORE.: NEVER TRUE

## 2020-02-03 SDOH — SOCIAL STABILITY: SOCIAL NETWORK
DO YOU BELONG TO ANY CLUBS OR ORGANIZATIONS SUCH AS CHURCH GROUPS UNIONS, FRATERNAL OR ATHLETIC GROUPS, OR SCHOOL GROUPS?: YES

## 2020-02-03 NOTE — CARE COORDINATION
Ambulatory Care Coordination Note  2/3/2020  CM Risk Score: 9  Charlson 10 Year Mortality Risk Score: 10%     ACC: Alejandro Vaca, RN    Summary Note:   -ACM called and spoke with Lulu to discuss care coordination services as requested by Dr Octavio Montgomery. Pt being enrolled for DM and depression.  -Discussed when to call provider for symptoms or changes in condition, verbalized understanding    *Enrollment  -Pt reports living independently in an apt.  -Pt doesn't drive but utilizes [employer] services through [address] [place] [address] Road for transportation to her OV. -Reports having SNAP benefits  -Reports that she doesn't get the flu shot. *Medications  -Medications reviewed and she reports taking them  -Reports Metformin does is 850mg BID WC, Prozac was d/c'd and Cymbalta 60mg PO daily was added, Humulin R is 75 units in the AM and 70 units in the evening. ACM will confirm with Dr Guerrero Patel office and pharmacy  -ACM discussed pill pack option as pt states she forgets to take her medications sometime, pt declined. *DM  -Reports most recent BS was 285 and reports checking her BS QID.   -Is aware of s/sx hypo/hyperglycemia and how to treat, and denies any symptoms at this time  -Reports that she doesn't watch her carb and sweets intake as she states she doesn't have access to fruits and vegetables all the time. -ACM discussed DM zone tool, will mail to pt along with handout, \"What can I eat?  Dollar store shopping\"  -ACM discussed the Fruit and vegetable program and pt states she participated in it previously and would like to participate this year, ACM will contact JILLIAN Fan regarding pt's interest int the program.    *BH  -Pt reports that she is currently being seen at [facility] for depression and that they prescribe her medications  -Attends once every 2 weeks     *Plan  -Care coordination  -ACM will refer pt to JILLIAN Fan for eligibility for the fruit and vegetable program  -ACM will contact  Diabetes Education:  Completed (Comment: 2/3/2020-did in 2019)    Other Services or Interventions:  2/3/2020-food pantry at the [facility] 3rd Saturday every month   Medi Set or Pill Pack:  Declined   Transportation Services:  Completed   Zone Management Tools: In Process         Set up/Review an Education Plan, Set up/Review Goals              Prior to Admission medications    Medication Sig Start Date End Date Taking? Authorizing Provider   acetaminophen (TYLENOL) 500 MG tablet Take 2 tablets by mouth 3 times daily 12/30/19  Yes Renaldo Kebede MD   diclofenac sodium 1 % GEL Apply 4 g topically 4 times daily 12/30/19  Yes Renaldo Kebede MD   levothyroxine (SYNTHROID) 88 MCG tablet Take 1 tablet by mouth daily 12/12/19  Yes Historical Provider, MD   metFORMIN (GLUCOPHAGE) 1000 MG tablet Take 1,000 mg by mouth 2 times daily (with meals)   Yes Historical Provider, MD   naproxen (NAPROSYN) 250 MG tablet Take 2 tablets by mouth 2 times daily (with meals) 12/20/19  Yes Renaldo Kebede MD   vitamin D (ERGOCALCIFEROL) 91461 units CAPS capsule Take 50,000 Units by mouth once a week   Yes Historical Provider, MD   pregabalin (LYRICA) 100 MG capsule Take 100 mg by mouth 3 times daily.     Yes Historical Provider, MD   lisinopril (PRINIVIL;ZESTRIL) 10 MG tablet Take 10 mg by mouth daily   Yes Historical Provider, MD   Omega-3 Fatty Acids (FISH OIL) 1000 MG CAPS Take 1,000 mg by mouth 4 times daily   Yes Historical Provider, MD   methotrexate (RHEUMATREX) 2.5 MG chemo tablet Take 15 mg by mouth once a week   Yes Historical Provider, MD   Liraglutide (VICTOZA) 18 MG/3ML SOPN SC injection Inject 1.8 mg into the skin daily   Yes Historical Provider, MD   canagliflozin (INVOKANA) 300 MG TABS tablet Take 300 mg by mouth every morning (before breakfast)    Yes Historical Provider, MD   folic acid (FOLVITE) 1 MG tablet Take 1 tablet by mouth daily 11/14/16  Yes Horacio Simms MD   Multiple Vitamins-Minerals (MULTIVITAMIN WITH MINERALS) tablet Take 1 tablet by mouth daily. 3/19/15  Yes Rex Tobias MD   HUMULIN R U-500 KWIKPEN 500 UNIT/ML SOPN concentrated injection pen Inject 85 Units into the skin every morning 70  Unit every evening 11/25/19   Historical Provider, MD   FLUoxetine (PROZAC) 20 MG capsule Take 40 mg by mouth daily     Historical Provider, MD   OLANZapine (ZYPREXA) 2.5 MG tablet Take 2.5 mg by mouth nightly    Historical Provider, MD   FREESTYLE LITE strip USE AS DIRECTED 8/27/15   Rex Tobias MD   FREESTYLE LANCETS MISC CHECK BS 4 TIMES DAILY 7/23/15   Rex Tobias MD   Insulin Pen Needle 31G X 8 MM MISC 1 each by Does not apply route daily. 9/30/14   Rex Tobias MD       No future appointments. ,   Diabetes Assessment    Medic Alert ID:  Yes  Meal Planning:  None   How often do you test your blood sugar?:  Other (Comment: QID)   Do you have barriers with adherence to non-pharmacologic self-management interventions?  (Nutrition/Exercise/Self-Monitoring):  Yes   Have you ever had to go to the ED for symptoms of low blood sugar?:  Yes       No patient-reported symptoms   Do you have hyperglycemia symptoms?:  No   Do you have hypoglycemia symptoms?:  No   Last Blood Sugar Value:  285   Blood Sugar Monitoring Regimen:  Morning Fasting, Before Meals   Blood Sugar Trends:  No Change       and   General Assessment    Do you have any symptoms that are causing concern?:  No

## 2020-02-03 NOTE — LETTER
2/3/2020    [address] [place] [address]    Dear Savita Garg,    I enjoyed speaking with you and wanted to send some additional information. Honey Serna MD and I will work together to ensure your needs are met and help you achieve your health goals. We are committed to walk with you on this journey and look forward to working with you. Please feel free to contact me with any questions or concerns. I am available by phone or for appointments at the office. You can reach me at [phone](Office) or [phone] (Work cell).       In good health,       Nolan Vargas, [address]

## 2020-02-04 RX ORDER — DULOXETIN HYDROCHLORIDE 60 MG/1
60 CAPSULE, DELAYED RELEASE ORAL DAILY
COMMUNITY
Start: 2020-01-31 | End: 2021-03-24

## 2020-02-04 NOTE — CARE COORDINATION
I agree with the Care Coordinator's Plan of Care     Electronically signed by Lisa Alvares MD on 2/4/2020 at 3:47 PM

## 2020-02-11 ENCOUNTER — CARE COORDINATION (OUTPATIENT)
Dept: CARE COORDINATION | Age: 50
End: 2020-02-11

## 2020-02-11 NOTE — CARE COORDINATION
Ambulatory Care Coordination Note  2/11/2020  CM Risk Score: 9  Charlson 10 Year Mortality Risk Score: 10%     ACC: Alejandro Vaca RN    *Summary Note:   -ACM called and spoke with Lulu for DM/depression f/u, check progress, and assess needs, discuss mailed information.  -Medications reviewed and she reports taking them as prescribed. -Reports that she received mailed information and denies any quesitons at this time  -Denies any issues or concerns at this time.  -Encouraged Lulu to schedule f/u appt with provider, verbalized understanding  -Discussed when to call provider for symptoms or changes in condition. *DM  -BS this   -Denies any s/sx hypo/hyperglycemia  -Reports receiving DM zone tool and using daily. *Plan  -Care coordination  -Lulu to schedule appt with provider  -Reinforce DM zone tool use and when to call provider for symptoms or changes in condition  -ACM will f/u in about 1 week to check progress and assess needs. Care Coordination Interventions    Program Enrollment:  Complex Care  Referral from Primary Care Provider:  No  Suggested Interventions and [facility] [address] [address]:  Completed (Comment: 2/3/2020-goes to [facility]-medications prescribed )  Diabetes Education:  Completed (Comment: 2/3/2020-did in 2019)  Medi Set or Pill Pack:  Declined (Comment: 2/3/2020-discussed, declined)  Transportation Support:  Completed  Zone Management Tools:  Completed (Comment: 2/3/2020-DM zone tool mailed)  Other Services or Interventions:  2/3/2020-food pantry at the [facility] 3rd Saturday every month         Goals Addressed                 This Visit's Progress     Conditions and Symptoms   On track     I will schedule office visits, as directed by my provider. I will keep my appointment or reschedule if I have to cancel. I will notify my provider of any barriers to my plan of care. I will follow my Zone Management tool to seek urgent or emergent care.   I will notify my provider of any symptoms that indicate a worsening of my condition. Barriers: lack of motivation, lack of support, overwhelmed by complexity of regimen, stress and lack of education  Plan for overcoming my barriers: Will work with ACM and providers  Confidence: 9/10  Anticipated Goal Completion Date: 4/27/2020         Medication Management   On track     I will take my medication as directed. I will notify my provider of any problems with medications, like adverse effects or side effects. I will notify my provider/Care Coordinator if I am unable to afford my medications. I will notify my provider for advice before I stop taking any of my medication. Barriers: lack of motivation, lack of support, overwhelmed by complexity of regimen, stress and lack of education  Plan for overcoming my barriers: Will work with ACM and providers  Confidence: 9/10  Anticipated Goal Completion Date: 4/27/2020              Prior to Admission medications    Medication Sig Start Date End Date Taking?  Authorizing Provider   DULoxetine (CYMBALTA) 60 MG extended release capsule Take 60 mg by mouth daily 1/31/20  Yes Historical Provider, MD   acetaminophen (TYLENOL) 500 MG tablet Take 2 tablets by mouth 3 times daily 12/30/19  Yes Eric Coleman MD   diclofenac sodium 1 % GEL Apply 4 g topically 4 times daily 12/30/19  Yes Eric Coleman MD   levothyroxine (SYNTHROID) 88 MCG tablet Take 1 tablet by mouth daily 12/12/19  Yes Historical Provider, MD   HUMULIN R U-500 KWIKPEN 500 UNIT/ML SOPN concentrated injection pen Inject 75 Units into the skin every morning 70  Unit every evening  11/25/19  Yes Historical Provider, MD   metFORMIN (GLUCOPHAGE) 1000 MG tablet Take 850 mg by mouth 2 times daily (with meals)    Yes Historical Provider, MD   naproxen (NAPROSYN) 250 MG tablet Take 2 tablets by mouth 2 times daily (with meals) 12/20/19  Yes Eric Coleman MD   vitamin D (ERGOCALCIFEROL) 31714 units CAPS capsule Take 50,000 Units by mouth once a week   Yes Historical Provider, MD   pregabalin (LYRICA) 100 MG capsule Take 100 mg by mouth 3 times daily. Yes Historical Provider, MD   lisinopril (PRINIVIL;ZESTRIL) 10 MG tablet Take 10 mg by mouth daily   Yes Historical Provider, MD   Omega-3 Fatty Acids (FISH OIL) 1000 MG CAPS Take 1,000 mg by mouth 4 times daily   Yes Historical Provider, MD   methotrexate (RHEUMATREX) 2.5 MG chemo tablet Take 15 mg by mouth once a week   Yes Historical Provider, MD   Liraglutide (VICTOZA) 18 MG/3ML SOPN SC injection Inject 1.8 mg into the skin daily   Yes Historical Provider, MD   canagliflozin (INVOKANA) 300 MG TABS tablet Take 300 mg by mouth every morning (before breakfast)    Yes Historical Provider, MD   folic acid (FOLVITE) 1 MG tablet Take 1 tablet by mouth daily 11/14/16  Yes Michaela Castillo MD   Multiple Vitamins-Minerals (MULTIVITAMIN WITH MINERALS) tablet Take 1 tablet by mouth daily. 3/19/15  Yes Matias Calvo MD   OLANZapine (ZYPREXA) 2.5 MG tablet Take 2.5 mg by mouth nightly    Historical Provider, MD   FREESTYLE LITE strip USE AS DIRECTED 8/27/15   Matias Calvo MD   FREESTYLE LANCETS MISC CHECK BS 4 TIMES DAILY 7/23/15   Matias Calvo MD   Insulin Pen Needle 31G X 8 MM MISC 1 each by Does not apply route daily. 9/30/14   Matias Calvo MD       No future appointments. ,   Diabetes Assessment    Medic Alert ID:  Yes  Meal Planning:  None   How often do you test your blood sugar?:  Other (Comment: QID)   Do you have barriers with adherence to non-pharmacologic self-management interventions?  (Nutrition/Exercise/Self-Monitoring):  Yes   Have you ever had to go to the ED for symptoms of low blood sugar?:  Yes       No patient-reported symptoms   Do you have hyperglycemia symptoms?:  No   Do you have hypoglycemia symptoms?:  No       and   General Assessment    Do you have any symptoms that are causing concern?:  No

## 2020-02-17 ENCOUNTER — OFFICE VISIT (OUTPATIENT)
Dept: FAMILY MEDICINE CLINIC | Age: 50
End: 2020-02-17
Payer: COMMERCIAL

## 2020-02-17 ENCOUNTER — HOSPITAL ENCOUNTER (OUTPATIENT)
Age: 50
Discharge: HOME | End: 2020-02-19
Payer: COMMERCIAL

## 2020-02-17 VITALS
SYSTOLIC BLOOD PRESSURE: 132 MMHG | TEMPERATURE: 97.5 F | WEIGHT: 175 LBS | OXYGEN SATURATION: 98 % | HEIGHT: 61 IN | DIASTOLIC BLOOD PRESSURE: 83 MMHG | RESPIRATION RATE: 16 BRPM | HEART RATE: 101 BPM | BODY MASS INDEX: 33.04 KG/M2

## 2020-02-17 LAB
BACTERIA: ABNORMAL /HPF
BILIRUBIN URINE: NEGATIVE
BILIRUBIN, POC: NORMAL
BLOOD URINE, POC: NORMAL
BLOOD, URINE: ABNORMAL
CLARITY, POC: NORMAL
CLARITY: ABNORMAL
COLOR, POC: YELLOW
COLOR: YELLOW
EPITHELIAL CELLS, UA: ABNORMAL /HPF
GLUCOSE URINE, POC: NEGATIVE
GLUCOSE URINE: NEGATIVE MG/DL
KETONES, POC: NEGATIVE
KETONES, URINE: NEGATIVE MG/DL
LEUKOCYTE EST, POC: NORMAL
LEUKOCYTE ESTERASE, URINE: ABNORMAL
NITRITE, POC: POSITIVE
NITRITE, URINE: NEGATIVE
PH UA: 6 (ref 5–9)
PH, POC: 6
PROTEIN UA: >=300 MG/DL
PROTEIN, POC: >=300
RBC UA: ABNORMAL /HPF (ref 0–2)
SPECIFIC GRAVITY UA: >=1.03 (ref 1–1.03)
SPECIFIC GRAVITY, POC: >=1.03
UROBILINOGEN, POC: 1
UROBILINOGEN, URINE: 0.2 E.U./DL
WBC UA: ABNORMAL /HPF (ref 0–5)

## 2020-02-17 PROCEDURE — 87186 SC STD MICRODIL/AGAR DIL: CPT

## 2020-02-17 PROCEDURE — 87088 URINE BACTERIA CULTURE: CPT

## 2020-02-17 PROCEDURE — 81001 URINALYSIS AUTO W/SCOPE: CPT

## 2020-02-17 PROCEDURE — 81002 URINALYSIS NONAUTO W/O SCOPE: CPT | Performed by: STUDENT IN AN ORGANIZED HEALTH CARE EDUCATION/TRAINING PROGRAM

## 2020-02-17 PROCEDURE — 99212 OFFICE O/P EST SF 10 MIN: CPT | Performed by: STUDENT IN AN ORGANIZED HEALTH CARE EDUCATION/TRAINING PROGRAM

## 2020-02-17 PROCEDURE — 99213 OFFICE O/P EST LOW 20 MIN: CPT | Performed by: STUDENT IN AN ORGANIZED HEALTH CARE EDUCATION/TRAINING PROGRAM

## 2020-02-17 PROCEDURE — 81003 URINALYSIS AUTO W/O SCOPE: CPT | Performed by: STUDENT IN AN ORGANIZED HEALTH CARE EDUCATION/TRAINING PROGRAM

## 2020-02-17 RX ORDER — HYDROXYCHLOROQUINE SULFATE 200 MG/1
200 TABLET, FILM COATED ORAL 2 TIMES DAILY
Status: ON HOLD | COMMUNITY
End: 2022-05-31 | Stop reason: HOSPADM

## 2020-02-17 RX ORDER — NITROFURANTOIN 25; 75 MG/1; MG/1
100 CAPSULE ORAL 2 TIMES DAILY
Qty: 10 CAPSULE | Refills: 0 | Status: SHIPPED | OUTPATIENT
Start: 2020-02-17 | End: 2020-02-22

## 2020-02-17 RX ORDER — ATORVASTATIN CALCIUM 40 MG/1
1 TABLET, FILM COATED ORAL NIGHTLY
COMMUNITY
Start: 2020-02-12 | End: 2021-03-24 | Stop reason: DRUGHIGH

## 2020-02-17 NOTE — PROGRESS NOTES
2020     Rose Mary Hermosillo (:  1970) is a 53 y.o. female, here for evaluation of the following medical concerns:    HPI  Here because she noticed in the last few days her urine has been clear. Denies dysuria, urinary frequency, incontinence, fevers, chills, abdominal or suprapubic pain. Has had UTIs in the past and is typically asymptomatic. Review of Systems   Constitutional: Negative for activity change, appetite change, chills, diaphoresis, fatigue, fever and unexpected weight change. Respiratory: Negative for cough, chest tightness, shortness of breath and wheezing. Cardiovascular: Negative for chest pain, palpitations and leg swelling. Gastrointestinal: Negative for abdominal pain, constipation, diarrhea, nausea and vomiting. Genitourinary: Negative for decreased urine volume, difficulty urinating, dyspareunia, dysuria, enuresis, flank pain, frequency, hematuria, menstrual problem and urgency. Musculoskeletal: Negative for arthralgias, back pain, gait problem, joint swelling and myalgias. Neurological: Negative for dizziness, syncope, weakness and headaches. Psychiatric/Behavioral: Negative for sleep disturbance and suicidal ideas. The patient is not nervous/anxious. Prior to Visit Medications    Medication Sig Taking?  Authorizing Provider   atorvastatin (LIPITOR) 40 MG tablet Take 1 tablet by mouth nightly Yes Historical Provider, MD   metFORMIN (GLUCOPHAGE) 850 MG tablet Take 850 mg by mouth 2 times daily (with meals) Yes Historical Provider, MD   hydroxychloroquine (PLAQUENIL) 200 MG tablet Take 200 mg by mouth 2 times daily Yes Historical Provider, MD   nitrofurantoin, macrocrystal-monohydrate, (MACROBID) 100 MG capsule Take 1 capsule by mouth 2 times daily for 5 days Yes Haritha Hinojosa MD   DULoxetine (CYMBALTA) 60 MG extended release capsule Take 60 mg by mouth daily Yes Historical Provider, MD   acetaminophen (TYLENOL) 500 MG tablet Take 2 tablets by mouth 3 times daily Yes Lisa Alvares MD   diclofenac sodium 1 % GEL Apply 4 g topically 4 times daily Yes Lisa Alvares MD   levothyroxine (SYNTHROID) 88 MCG tablet Take 1 tablet by mouth daily Yes Historical Provider, MD   HUMULIN R U-500 KWIKPEN 500 UNIT/ML SOPN concentrated injection pen Inject 75 Units into the skin every morning 70  Unit every evening  Yes Historical Provider, MD   naproxen (NAPROSYN) 250 MG tablet Take 2 tablets by mouth 2 times daily (with meals) Yes Lisa Alvares MD   vitamin D (ERGOCALCIFEROL) 58265 units CAPS capsule Take 50,000 Units by mouth once a week Yes Historical Provider, MD   pregabalin (LYRICA) 100 MG capsule Take 100 mg by mouth 3 times daily. Yes Historical Provider, MD   lisinopril (PRINIVIL;ZESTRIL) 10 MG tablet Take 10 mg by mouth daily Yes Historical Provider, MD   Omega-3 Fatty Acids (FISH OIL) 1000 MG CAPS Take 1,000 mg by mouth 4 times daily Yes Historical Provider, MD   methotrexate (RHEUMATREX) 2.5 MG chemo tablet Take 15 mg by mouth once a week Yes Historical Provider, MD   Liraglutide (VICTOZA) 18 MG/3ML SOPN SC injection Inject 1.8 mg into the skin daily Yes Historical Provider, MD   OLANZapine (ZYPREXA) 2.5 MG tablet Take 2.5 mg by mouth nightly Yes Historical Provider, MD   canagliflozin (INVOKANA) 300 MG TABS tablet Take 300 mg by mouth every morning (before breakfast)  Yes Historical Provider, MD   folic acid (FOLVITE) 1 MG tablet Take 1 tablet by mouth daily Yes Harriet Tatum MD   FREESTYLE LITE strip USE AS DIRECTED Yes Aurora Esquivel MD   FREESTYLE LANCETS MISC CHECK BS 4 TIMES DAILY Yes Aurora Esquivel MD   Multiple Vitamins-Minerals (MULTIVITAMIN WITH MINERALS) tablet Take 1 tablet by mouth daily. Yes Aurora Esquivel MD   Insulin Pen Needle 31G X 8 MM MISC 1 each by Does not apply route daily.  Yes Aurora Esquivel MD        Social History     Tobacco Use    Smoking status: Former Smoker    Smokeless tobacco: Never Used   Substance Use
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
## 2020-02-18 ENCOUNTER — TELEPHONE (OUTPATIENT)
Dept: FAMILY MEDICINE CLINIC | Age: 50
End: 2020-02-18

## 2020-02-18 ASSESSMENT — ENCOUNTER SYMPTOMS
CONSTIPATION: 0
ABDOMINAL PAIN: 0
VOMITING: 0
NAUSEA: 0
BACK PAIN: 0
DIARRHEA: 0
SHORTNESS OF BREATH: 0
WHEEZING: 0
COUGH: 0
CHEST TIGHTNESS: 0

## 2020-02-18 NOTE — TELEPHONE ENCOUNTER
To document. Can we ask if she snores at night, has apneic episodes, wakes up with a headache, takes naps during the day. I can order from there. Thank you!     Electronically signed by Lisa Alvares MD on 2/18/2020 at 3:49 PM

## 2020-02-18 NOTE — TELEPHONE ENCOUNTER
Patient called to state her psychiatrist advised her to ask her pcp for a sleep study  If applicable, please order

## 2020-02-19 ENCOUNTER — CARE COORDINATION (OUTPATIENT)
Dept: CARE COORDINATION | Age: 50
End: 2020-02-19

## 2020-02-21 LAB
ORGANISM: ABNORMAL
ORGANISM: ABNORMAL
URINE CULTURE, ROUTINE: ABNORMAL
URINE CULTURE, ROUTINE: ABNORMAL

## 2020-02-21 RX ORDER — NAPROXEN 250 MG/1
TABLET ORAL
Qty: 60 TABLET | Refills: 0 | Status: SHIPPED
Start: 2020-02-21 | End: 2020-03-20 | Stop reason: SDUPTHER

## 2020-02-26 ENCOUNTER — CARE COORDINATION (OUTPATIENT)
Dept: CARE COORDINATION | Age: 50
End: 2020-02-26

## 2020-02-26 NOTE — CARE COORDINATION
Ambulatory Care Coordination Note  2/26/2020  CM Risk Score: 9  Charlson 10 Year Mortality Risk Score: 10%     ACC: Alejandro Vaca, RN    Summary Note:   -ACM called and spoke with Lulu for DM f/u, check progress, and assess needs. -Medications reviewed and she reports that her Metformin was increased to 1000mg BID and she is no longer taking Invokana d/t insurance not covering. ACM will confirm with Dr Pat López office and update med rec and notify provider.  -Denies any new issues or concerns. -ACM inquired if pt received VM from ACM with JILLIAN Restrepo's contact information regarding the fruit and vegetable program, states that she didn't get it, ACM gave pt the contact information again and encouraged her to contact Joanne Sykes, verbalized understanding. *DM  -BS this   -Denies any s/sx hypo/hyperglycemia  -Reports using DM zone tool  -States that her BS have been elevated a little, her Metformin was increased to 1000mg PO BID and Invokana was discontinued d/t not being covered by insurance, she was started on Jardiance 25mg PO daily. ACM confirmed with Lulu at Dr Pat López office, med rec update and ACM will notify provider.      *Plan  -Care coordination  -Reinforce DM zone tool use and when to call provider for symptoms or changes in condition  -Lulu to contact JILLIAN Restrepo regarding fruit and vegetable program enrollment  -ACM will f/u in about 1 week to check progress and assess needs      Care Coordination Interventions    Program Enrollment:  Complex Care  Referral from Primary Care Provider:  No  Suggested Interventions and [facility] [address]:  Completed (Comment: 2/3/2020-goes to [facility]-medications prescribed )  Diabetes Education:  Completed (Comment: 2/3/2020-did in 2019)  Medi Set or Pill Pack:  Declined (Comment: 2/3/2020-discussed, declined)  Transportation Support:  Completed  Zone Management Tools:  Completed (Comment: 2/3/2020-DM zone tool

## 2020-03-04 ENCOUNTER — CARE COORDINATION (OUTPATIENT)
Dept: CARE COORDINATION | Age: 50
End: 2020-03-04

## 2020-03-04 NOTE — CARE COORDINATION
urgent or emergent care. I will notify my provider of any symptoms that indicate a worsening of my condition. Barriers: lack of motivation, lack of support, overwhelmed by complexity of regimen, stress and lack of education  Plan for overcoming my barriers: Will work with ACM and providers  Confidence: 9/10  Anticipated Goal Completion Date: 4/27/2020         Medication Management   On track     I will take my medication as directed. I will notify my provider of any problems with medications, like adverse effects or side effects. I will notify my provider/Care Coordinator if I am unable to afford my medications. I will notify my provider for advice before I stop taking any of my medication. Barriers: lack of motivation, lack of support, overwhelmed by complexity of regimen, stress and lack of education  Plan for overcoming my barriers: Will work with ACM and providers  Confidence: 9/10  Anticipated Goal Completion Date: 4/27/2020              Prior to Admission medications    Medication Sig Start Date End Date Taking?  Authorizing Provider   empagliflozin (JARDIANCE) 25 MG tablet Take 25 mg by mouth daily ACM verified with Dr Tara Wei office on 2/26/2020 2/12/20  Yes Historical Provider, MD   metFORMIN (GLUCOPHAGE) 1000 MG tablet Take 1,000 mg by mouth 2 times daily (with meals) ACM verified with Dr Tara Wei office on 2/26/2020 2/12/20  Yes Historical Provider, MD   naproxen (NAPROSYN) 250 MG tablet TAKE 1 TABLET BY MOUTH TWICE A DAY WITH MEALS 2/21/20  Yes Derrick Haley MD   atorvastatin (LIPITOR) 40 MG tablet Take 1 tablet by mouth nightly 2/12/20  Yes Historical Provider, MD   hydroxychloroquine (PLAQUENIL) 200 MG tablet Take 200 mg by mouth 2 times daily   Yes Historical Provider, MD   DULoxetine (CYMBALTA) 60 MG extended release capsule Take 60 mg by mouth daily 1/31/20  Yes Historical Provider, MD   acetaminophen (TYLENOL) 500 MG tablet Take 2 tablets by mouth 3 times daily 12/30/19  Yes Ascencion Joyce Have you ever had to go to the ED for symptoms of low blood sugar?:  Yes   What is the date of your last ED visit for low blood sugar?:  8/13/19       No patient-reported symptoms   Do you have hyperglycemia symptoms?:  No   Do you have hypoglycemia symptoms?:  No   Last Blood Sugar Value:  152   Blood Sugar Monitoring Regimen:  Before Meals, At Bedtime   Blood Sugar Trends:  No Change       and   General Assessment    Do you have any symptoms that are causing concern?:  No

## 2020-03-20 RX ORDER — NAPROXEN 250 MG/1
TABLET ORAL
Qty: 60 TABLET | Refills: 0 | Status: SHIPPED
Start: 2020-03-20 | End: 2020-04-13

## 2020-03-26 ENCOUNTER — CARE COORDINATION (OUTPATIENT)
Dept: CARE COORDINATION | Age: 50
End: 2020-03-26

## 2020-03-26 NOTE — CARE COORDINATION
ACM left message for patient to return call to [phone]. Re: Follow up: DM, Review POC, Education, Goals. Contact information given.

## 2020-03-30 ENCOUNTER — HOSPITAL ENCOUNTER (OUTPATIENT)
Age: 50
Discharge: HOME | End: 2020-03-30
Payer: COMMERCIAL

## 2020-03-30 LAB
ALBUMIN SERPL-MCNC: 3.8 G/DL (ref 3.5–5.2)
ALP BLD-CCNC: 144 U/L (ref 35–104)
ALT SERPL-CCNC: 13 U/L (ref 0–32)
ANION GAP SERPL CALCULATED.3IONS-SCNC: 17 MMOL/L (ref 7–16)
AST SERPL-CCNC: 8 U/L (ref 0–31)
BILIRUB SERPL-MCNC: 0.3 MG/DL (ref 0–1.2)
BUN BLDV-MCNC: 27 MG/DL (ref 6–20)
CALCIUM SERPL-MCNC: 10 MG/DL (ref 8.6–10.2)
CHLORIDE BLD-SCNC: 102 MMOL/L (ref 98–107)
CHOLESTEROL, TOTAL: 222 MG/DL (ref 0–199)
CO2: 19 MMOL/L (ref 22–29)
CREAT SERPL-MCNC: 0.9 MG/DL (ref 0.5–1)
CREATININE URINE: 99 MG/DL (ref 29–226)
GFR AFRICAN AMERICAN: >60
GFR NON-AFRICAN AMERICAN: >60 ML/MIN/1.73
GLUCOSE BLD-MCNC: 544 MG/DL (ref 74–99)
HBA1C MFR BLD: 9.9 % (ref 4–5.6)
HDLC SERPL-MCNC: 31 MG/DL
LDL CHOLESTEROL CALCULATED: ABNORMAL MG/DL (ref 0–99)
MICROALBUMIN UR-MCNC: 3046.4 MG/L
MICROALBUMIN/CREAT UR-RTO: 3077.2 (ref 0–30)
POTASSIUM SERPL-SCNC: 4 MMOL/L (ref 3.5–5)
PROTEIN PROTEIN: 401 MG/DL (ref 0–12)
PROTEIN/CREAT RATIO: 4.1
PROTEIN/CREAT RATIO: 4.1 (ref 0–0.2)
SODIUM BLD-SCNC: 138 MMOL/L (ref 132–146)
TOTAL PROTEIN: 7.7 G/DL (ref 6.4–8.3)
TRIGL SERPL-MCNC: 466 MG/DL (ref 0–149)
VITAMIN D 25-HYDROXY: 22 NG/ML (ref 30–100)
VLDLC SERPL CALC-MCNC: ABNORMAL MG/DL

## 2020-03-30 PROCEDURE — 80053 COMPREHEN METABOLIC PANEL: CPT

## 2020-03-30 PROCEDURE — 36415 COLL VENOUS BLD VENIPUNCTURE: CPT

## 2020-03-30 PROCEDURE — 84156 ASSAY OF PROTEIN URINE: CPT

## 2020-03-30 PROCEDURE — 83036 HEMOGLOBIN GLYCOSYLATED A1C: CPT

## 2020-03-30 PROCEDURE — 82570 ASSAY OF URINE CREATININE: CPT

## 2020-03-30 PROCEDURE — 82044 UR ALBUMIN SEMIQUANTITATIVE: CPT

## 2020-03-30 PROCEDURE — 80061 LIPID PANEL: CPT

## 2020-03-30 PROCEDURE — 82306 VITAMIN D 25 HYDROXY: CPT

## 2020-04-02 ENCOUNTER — CARE COORDINATION (OUTPATIENT)
Dept: CARE COORDINATION | Age: 50
End: 2020-04-02

## 2020-04-02 NOTE — CARE COORDINATION
Ambulatory Care Coordination Note  4/2/2020  CM Risk Score: 5  Charlson 10 Year Mortality Risk Score: 10%     ACC: Alejandro Vaca, RN    Summary Note:   -ACM called and spoke with Lulu for DM f/u, check progress, assess needs. -Medications reviewed and she reports that she is taking them as prescribed.  -Discussed when to call provider for symptoms or changes in condition and what level of care to seek according to symptoms, verbalized understanding.  -Discused Discussed CDC guidelines for COVID-19, verbalized understanding.  -Denies any issues or concerns at this time. *DM  -  -Reports that she had a BS in the 500s and her endocrinologist called her and increased her Humulin R U-500 to 90 units in the AM and 85 units in the evening.  -Using DM zone tool    *Plan  -Care coordination  -Reinforce DM zone tool use, when to call provider for symptoms or changes in condition and what level of care to seek according to symptoms, Discussed CDC guidelines for COVID-19. -ACM will f/u in about 3 weeks to check progress and assess needs, Lulu has ACM's contact information for any issues or concerns. Care Coordination Interventions    Program Enrollment:  Complex Care  Referral from Primary Care Provider:  No  Suggested Interventions and [address] [place] [address]:  Completed (Comment: 2/3/2020-goes to [facility]-medications prescribed )  Diabetes Education:  Completed (Comment: 2/3/2020-did in 2019)  Medi Set or Pill Pack:  Declined (Comment: 2/3/2020-discussed, declined)  Transportation Support:  Completed  Zone Management Tools:  Completed (Comment: 2/3/2020-DM zone tool mailed)  Other Services or Interventions:  2/3/2020-food pantry at the [facility] 3rd Saturday every month         Goals Addressed                 This Visit's Progress     Conditions and Symptoms   On track     I will schedule office visits, as directed by my provider.   I will keep my appointment or reschedule if I Yes Historical Provider, MD   acetaminophen (TYLENOL) 500 MG tablet Take 2 tablets by mouth 3 times daily 12/30/19  Yes Kellen Paez MD   diclofenac sodium 1 % GEL Apply 4 g topically 4 times daily 12/30/19  Yes Kellen Paez MD   levothyroxine (SYNTHROID) 88 MCG tablet Take 1 tablet by mouth daily 12/12/19  Yes Historical Provider, MD   HUMULIN R U-500 KWIKPEN 500 UNIT/ML SOPN concentrated injection pen Inject 90 Units into the skin every morning 85 Unit every evening 11/25/19  Yes Historical Provider, MD   vitamin D (ERGOCALCIFEROL) 03001 units CAPS capsule Take 50,000 Units by mouth once a week   Yes Historical Provider, MD   pregabalin (LYRICA) 100 MG capsule Take 100 mg by mouth 3 times daily. Yes Historical Provider, MD   lisinopril (PRINIVIL;ZESTRIL) 10 MG tablet Take 10 mg by mouth daily   Yes Historical Provider, MD   Omega-3 Fatty Acids (FISH OIL) 1000 MG CAPS Take 1,000 mg by mouth 4 times daily   Yes Historical Provider, MD   methotrexate (RHEUMATREX) 2.5 MG chemo tablet Take 15 mg by mouth once a week   Yes Historical Provider, MD   Liraglutide (VICTOZA) 18 MG/3ML SOPN SC injection Inject 1.8 mg into the skin daily   Yes Historical Provider, MD   folic acid (FOLVITE) 1 MG tablet Take 1 tablet by mouth daily 11/14/16  Yes Judy Lincoln MD   Multiple Vitamins-Minerals (MULTIVITAMIN WITH MINERALS) tablet Take 1 tablet by mouth daily. 3/19/15  Yes Keyla Mtz MD   FREESTYLE LITE strip USE AS DIRECTED 8/27/15   Keyla Mtz MD   FREESTYLE LANCETS MISC CHECK BS 4 TIMES DAILY 7/23/15   Keyla Mtz MD   Insulin Pen Needle 31G X 8 MM MISC 1 each by Does not apply route daily.  9/30/14   Keyla Mtz MD       Future Appointments   Date Time Provider Jenae Pearl   5/9/2020 10:00 PM [facility] 4 [facility]     ,   Diabetes Assessment    Medic Alert ID:  Yes  Meal Planning:  None   How often do you test your blood sugar?:  Other (Comment: QID)   Do you have barriers with adherence to non-pharmacologic self-management interventions?  (Nutrition/Exercise/Self-Monitoring):  Yes   Have you ever had to go to the ED for symptoms of low blood sugar?:  Yes   What is the date of your last ED visit for low blood sugar?:  8/13/19       No patient-reported symptoms   Do you have hyperglycemia symptoms?:  No   Do you have hypoglycemia symptoms?:  No   Last Blood Sugar Value:  113   Blood Sugar Monitoring Regimen:  Morning Fasting, Before Meals   Blood Sugar Trends:  Fluctuating       and   General Assessment    Do you have any symptoms that are causing concern?:  No

## 2020-04-13 RX ORDER — NAPROXEN 250 MG/1
TABLET ORAL
Qty: 60 TABLET | Refills: 0 | Status: SHIPPED
Start: 2020-04-13 | End: 2020-05-18

## 2020-04-22 ENCOUNTER — CARE COORDINATION (OUTPATIENT)
Dept: CARE COORDINATION | Age: 50
End: 2020-04-22

## 2020-04-22 NOTE — CARE COORDINATION
Ambulatory Care Coordination Note  4/22/2020  CM Risk Score: 5  Charlson 10 Year Mortality Risk Score: 10%     ACC: Alejandro Vaca, RN    Summary Note:   -ACM called and spoke with Lulu for DM f/u, check progress and assess needs. -Medications reviewed and she reports taking them as prescribed. -Denies any issues or concerns at this time.  -Discussed when to call provider for symptoms or changes in condition and what level of care to seek according to symptoms, verbalized understanding.  -Discussed CDC guidelines for COVID-19, verbalized understanding. *DM  -BS this . -Reports that her Humulin R U-500 has been increased to 100 units in the AM and 75 units in the evening, ACM will contact Dr Ermalinda Essex office to verify.  -Reports using DM zone tool  -Will discuss dietician referral at next outreach    *Plan  -Care coordination  -ACM will contact Dr Ermalinda Essex office to confirm Humulin R U-500 dose change  -ACM will discuss possible dietician referral at next outreach  -Reinforce when to call provider for symptoms or changes in condition and what level of care to seek according to symptoms and CDC guidelines for COVID-19. -ACM will f/u in about 2-3 weeks to check progress and assess needs.           Care Coordination Interventions    Program Enrollment:  Complex Care  Referral from Primary Care Provider:  No  Suggested Interventions and [facility] [address]:  Completed (Comment: 2/3/2020-goes to [facility]-medications prescribed )  Diabetes Education:  Completed (Comment: 2/3/2020-did in 2019)  Medi Set or Pill Pack:  Declined (Comment: 2/3/2020-discussed, declined)  Transportation Support:  Completed  Zone Management Tools:  Completed (Comment: 2/3/2020-DM zone tool mailed)  Other Services or Interventions:  2/3/2020-food pantry at the [facility] 3rd Saturday every month         Goals Addressed    None         Prior to Admission medications    Medication Sig Start Date End Date

## 2020-05-14 ENCOUNTER — CARE COORDINATION (OUTPATIENT)
Dept: CARE COORDINATION | Age: 50
End: 2020-05-14

## 2020-05-14 NOTE — CARE COORDINATION
lisinopril (PRINIVIL;ZESTRIL) 10 MG tablet Take 10 mg by mouth daily   Yes Historical Provider, MD   Omega-3 Fatty Acids (FISH OIL) 1000 MG CAPS Take 1,000 mg by mouth 4 times daily   Yes Historical Provider, MD   methotrexate (RHEUMATREX) 2.5 MG chemo tablet Take 15 mg by mouth once a week   Yes Historical Provider, MD   Liraglutide (VICTOZA) 18 MG/3ML SOPN SC injection Inject 1.8 mg into the skin daily   Yes Historical Provider, MD   folic acid (FOLVITE) 1 MG tablet Take 1 tablet by mouth daily 11/14/16  Yes Roxana Moore MD   Multiple Vitamins-Minerals (MULTIVITAMIN WITH MINERALS) tablet Take 1 tablet by mouth daily. 3/19/15  Yes Albino Paula MD   FREESTYLE LITE strip USE AS DIRECTED 8/27/15   Albino Paula MD   FREESTYLE LANCETS MISC CHECK BS 4 TIMES DAILY 7/23/15   Albino Paula MD   Insulin Pen Needle 31G X 8 MM MISC 1 each by Does not apply route daily. 9/30/14   Albino Paula MD       No future appointments. ,   Diabetes Assessment    Medic Alert ID:  Yes  Meal Planning:  None   How often do you test your blood sugar?:  Other (Comment: QID)   Do you have barriers with adherence to non-pharmacologic self-management interventions?  (Nutrition/Exercise/Self-Monitoring):  Yes   Have you ever had to go to the ED for symptoms of low blood sugar?:  Yes   What is the date of your last ED visit for low blood sugar?:  8/13/19       No patient-reported symptoms   Do you have hyperglycemia symptoms?:  No   Do you have hypoglycemia symptoms?:  No   Last Blood Sugar Value:  193   Blood Sugar Monitoring Regimen:  Before Meals, At Bedtime   Blood Sugar Trends:  No Change       and   General Assessment    Do you have any symptoms that are causing concern?:  No

## 2020-05-18 RX ORDER — NAPROXEN 250 MG/1
TABLET ORAL
Qty: 60 TABLET | Refills: 0 | Status: SHIPPED
Start: 2020-05-18 | End: 2020-06-22 | Stop reason: SDUPTHER

## 2020-06-03 ENCOUNTER — TELEPHONE (OUTPATIENT)
Dept: FAMILY MEDICINE CLINIC | Age: 50
End: 2020-06-03

## 2020-06-05 ENCOUNTER — HOSPITAL ENCOUNTER (OUTPATIENT)
Age: 50
Discharge: HOME | End: 2020-06-07
Payer: COMMERCIAL

## 2020-06-05 ENCOUNTER — NURSE ONLY (OUTPATIENT)
Dept: PRIMARY CARE CLINIC | Age: 50
End: 2020-06-05

## 2020-06-05 PROCEDURE — U0003 INFECTIOUS AGENT DETECTION BY NUCLEIC ACID (DNA OR RNA); SEVERE ACUTE RESPIRATORY SYNDROME CORONAVIRUS 2 (SARS-COV-2) (CORONAVIRUS DISEASE [COVID-19]), AMPLIFIED PROBE TECHNIQUE, MAKING USE OF HIGH THROUGHPUT TECHNOLOGIES AS DESCRIBED BY CMS-2020-01-R: HCPCS

## 2020-06-06 LAB — SARS-COV-2: NOT DETECTED

## 2020-06-11 ENCOUNTER — HOSPITAL ENCOUNTER (OUTPATIENT)
Dept: SLEEP CENTER | Age: 50
Discharge: HOME | End: 2020-06-11
Payer: COMMERCIAL

## 2020-06-11 VITALS
TEMPERATURE: 97 F | DIASTOLIC BLOOD PRESSURE: 98 MMHG | HEIGHT: 61 IN | WEIGHT: 185 LBS | SYSTOLIC BLOOD PRESSURE: 158 MMHG | BODY MASS INDEX: 34.93 KG/M2 | OXYGEN SATURATION: 98 % | HEART RATE: 94 BPM

## 2020-06-11 PROCEDURE — 95811 POLYSOM 6/>YRS CPAP 4/> PARM: CPT

## 2020-06-11 ASSESSMENT — SLEEP AND FATIGUE QUESTIONNAIRES
HOW LIKELY ARE YOU TO NOD OFF OR FALL ASLEEP WHILE LYING DOWN TO REST IN THE AFTERNOON WHEN CIRCUMSTANCES PERMIT: 3
HOW LIKELY ARE YOU TO NOD OFF OR FALL ASLEEP WHILE SITTING AND READING: 2
HOW LIKELY ARE YOU TO NOD OFF OR FALL ASLEEP WHILE WATCHING TV: 3
HOW LIKELY ARE YOU TO NOD OFF OR FALL ASLEEP IN A CAR, WHILE STOPPED FOR A FEW MINUTES IN TRAFFIC: 0
HOW LIKELY ARE YOU TO NOD OFF OR FALL ASLEEP WHILE SITTING AND TALKING TO SOMEONE: 0
HOW LIKELY ARE YOU TO NOD OFF OR FALL ASLEEP WHEN YOU ARE A PASSENGER IN A CAR FOR AN HOUR WITHOUT A BREAK: 2
ESS TOTAL SCORE: 11
HOW LIKELY ARE YOU TO NOD OFF OR FALL ASLEEP WHILE SITTING QUIETLY AFTER LUNCH WITHOUT ALCOHOL: 1
HOW LIKELY ARE YOU TO NOD OFF OR FALL ASLEEP WHILE SITTING INACTIVE IN A PUBLIC PLACE: 0

## 2020-06-15 NOTE — PROGRESS NOTES
and gradually increased  to 14 before changing to bi-level. At a CPAP of 12, the patient slept  for 49 minutes, which was all non-REM stage sleep. The apnea/hypopnea  index was five. Lowest oxygen saturation was 89%. AROUSAL ANALYSIS:  There were four arousals/awakenings, the sleep  disruption index is normal.    LIMB MOVEMENT SUMMARY:  There were 12 limb movements, the limb movement  index is normal.    OXYGEN SATURATION:  Average oxygen saturation while awake was 97%. Lowest saturation was 81%. The patient spent less than 5% of the time  with saturation less than 90%. HEART RATE SUMMARY:  Average heart rate was 100 beats per minute while  awake. Maximum heart rate during sleep was 109 beats per minute and  minimum heart rate was 86 beats per minute. MISCELLANEOUS:  [place] Sleepiness Scale score is 11/24. Snoring was  moderate. This was graded as 5 on a 1 to 10 scale. There was no  apparent bruxism. IMPRESSION:  1. Severe obstructive sleep apnea. 2.  Mild oxygen desaturation. 3.  Moderate snoring. 4.  No cardiac dysrhythmia. DISCUSSION:  Prior to the titration of CPAP, this patient had an  apnea/hypopnea index of 41. Normal is less than five and mild is 5 to  15. Greater than 30 is considered severe, leaving this patient in the  severe category. Along with this, there was moderate snoring, but  fairly good oxygen saturation. With titration of CPAP, which the  patient tolerated well, good results were achieved with normalization of  the apnea/hypopnea index, improvement of the very mild oxygen  desaturation, and elimination of snoring. SUGGESTIONS:  1.  Dr. Devonda Epley to discuss the results of study with the patient. 2.  The patient should have CPAP at 12 cm of water pressure. 3.  The patient should use a ResMed AirFit F10 full facemask, size small  with heated humidification.         Kilo Brown MD  Diplomat of Sleep Medicine    D: 06/14/2020 13:22:25       T: 06/14/2020 13:24:21

## 2020-06-22 RX ORDER — NAPROXEN 250 MG/1
TABLET ORAL
Qty: 60 TABLET | Refills: 0 | Status: SHIPPED
Start: 2020-06-22 | End: 2020-07-20

## 2020-06-27 ENCOUNTER — TELEPHONE (OUTPATIENT)
Dept: FAMILY MEDICINE CLINIC | Age: 50
End: 2020-06-27

## 2020-07-16 ENCOUNTER — OFFICE VISIT (OUTPATIENT)
Dept: FAMILY MEDICINE CLINIC | Age: 50
End: 2020-07-16
Payer: COMMERCIAL

## 2020-07-16 VITALS
TEMPERATURE: 97.7 F | SYSTOLIC BLOOD PRESSURE: 119 MMHG | HEART RATE: 104 BPM | DIASTOLIC BLOOD PRESSURE: 82 MMHG | BODY MASS INDEX: 34.74 KG/M2 | HEIGHT: 61 IN | WEIGHT: 184 LBS | OXYGEN SATURATION: 96 %

## 2020-07-16 PROBLEM — G47.33 SEVERE OBSTRUCTIVE SLEEP APNEA: Status: ACTIVE | Noted: 2020-07-16

## 2020-07-16 PROCEDURE — 1036F TOBACCO NON-USER: CPT | Performed by: FAMILY MEDICINE

## 2020-07-16 PROCEDURE — G8417 CALC BMI ABV UP PARAM F/U: HCPCS | Performed by: FAMILY MEDICINE

## 2020-07-16 PROCEDURE — 99212 OFFICE O/P EST SF 10 MIN: CPT | Performed by: STUDENT IN AN ORGANIZED HEALTH CARE EDUCATION/TRAINING PROGRAM

## 2020-07-16 PROCEDURE — 99213 OFFICE O/P EST LOW 20 MIN: CPT | Performed by: STUDENT IN AN ORGANIZED HEALTH CARE EDUCATION/TRAINING PROGRAM

## 2020-07-16 PROCEDURE — G8427 DOCREV CUR MEDS BY ELIG CLIN: HCPCS | Performed by: FAMILY MEDICINE

## 2020-07-16 RX ORDER — SEMAGLUTIDE 1.34 MG/ML
1 INJECTION, SOLUTION SUBCUTANEOUS WEEKLY
COMMUNITY
Start: 2020-05-12 | End: 2021-09-21

## 2020-07-16 ASSESSMENT — ENCOUNTER SYMPTOMS
TROUBLE SWALLOWING: 0
EYE PAIN: 0
SHORTNESS OF BREATH: 0
EYE ITCHING: 0
CHEST TIGHTNESS: 0
BACK PAIN: 0
ABDOMINAL PAIN: 0
ANAL BLEEDING: 0
VOICE CHANGE: 0

## 2020-07-16 NOTE — PROGRESS NOTES
CC: Sleep Results/Difficult Breathing    HPI:  53 y.o. woman IDDM, Depression, Hypothyroidism, Recent Dx Sleep Apnea presents for follow-up of sleep apnea results. CPAP came 1 week ago, started using it last night. Sleep Study  6/14/2020 - showed CPAP 12 cmm, severe sleep apnea, resumed full face mask. Episode of waking up not breathing- was in deep sleep able to fall asleep with mask on. Awoke suddenly gasping for breath. Unsure of how long it lasted. Could not breathe. Took mask off and was able to fall asleep after. Slept 8 hours total, and feels tired. No CP, SOB, heart palpitations, swelling in legs.      Patient Active Problem List    Diagnosis Date Noted    Severe obstructive sleep apnea 07/16/2020    Gastroparesis 12/06/2018    Infective urethritis 06/28/2018    Acquired hypothyroidism 12/20/2016    Vitamin D deficiency 12/20/2016    Hyperglycemia     Microcytic anemia     Diabetic ketosis ([facility] [address] 09/01/2015    Type 2 diabetes mellitus with complication, with long-term current use of insulin ([facility] [address] 05/30/2014    Depression 05/30/2014    Rheumatoid arthritis ([facility] [address] 05/30/2014       Past Medical History:   Diagnosis Date    Anxiety     Arthritis     Depression     Diabetes mellitus ([facility] [address]     Thyroid disease        Current Outpatient Medications on File Prior to Visit   Medication Sig Dispense Refill    OZEMPIC, 1 MG/DOSE, 2 MG/1.5ML SOPN Inject 1 mg into the skin once a week       naproxen (NAPROSYN) 250 MG tablet TAKE 1 TABLET BY MOUTH TWICE A DAY WITH MEALS 60 tablet 0    empagliflozin (JARDIANCE) 25 MG tablet Take 25 mg by mouth daily ACM verified with Dr Brittany Jones office on 2/26/2020      metFORMIN (GLUCOPHAGE) 1000 MG tablet Take 500 mg by mouth 2 times daily (with meals) ACM verified with Dr Brittany Jones office on 2/26/2020       atorvastatin (LIPITOR) 40 MG tablet Take 1 tablet by mouth nightly      hydroxychloroquine (PLAQUENIL) 200 MG tablet Take 200 mg by mouth 2 times daily      DULoxetine (CYMBALTA) 60 MG extended release capsule Take 60 mg by mouth daily      acetaminophen (TYLENOL) 500 MG tablet Take 2 tablets by mouth 3 times daily 540 tablet 1    diclofenac sodium 1 % GEL Apply 4 g topically 4 times daily 4 Tube 1    levothyroxine (SYNTHROID) 88 MCG tablet Take 1 tablet by mouth daily      HUMULIN R U-500 KWIKPEN 500 UNIT/ML SOPN concentrated injection pen Inject 100 Units into the skin every morning 85 Units in the  in the PM      vitamin D (ERGOCALCIFEROL) 94098 units CAPS capsule Take 50,000 Units by mouth once a week      pregabalin (LYRICA) 100 MG capsule Take 100 mg by mouth 3 times daily.  lisinopril (PRINIVIL;ZESTRIL) 10 MG tablet Take 10 mg by mouth daily      Omega-3 Fatty Acids (FISH OIL) 1000 MG CAPS Take 1,000 mg by mouth 4 times daily      methotrexate (RHEUMATREX) 2.5 MG chemo tablet Take 15 mg by mouth once a week      folic acid (FOLVITE) 1 MG tablet Take 1 tablet by mouth daily 30 tablet 1    Multiple Vitamins-Minerals (MULTIVITAMIN WITH MINERALS) tablet Take 1 tablet by mouth daily. 30 tablet 3    Liraglutide (VICTOZA) 18 MG/3ML SOPN SC injection Inject 1.8 mg into the skin daily      FREESTYLE LITE strip USE AS DIRECTED 100 strip 3    FREESTYLE LANCETS MISC CHECK BS 4 TIMES DAILY 100 each 3    Insulin Pen Needle 31G X 8 MM MISC 1 each by Does not apply route daily. 100 each 5     No current facility-administered medications on file prior to visit.         Allergies   Allergen Reactions    Amoxicillin      Hives all over body, states that hives are severe     Sulfa Antibiotics     Cefdinir        Family History   Adopted: Yes   Family history unknown: Yes       Past Surgical History:   Procedure Laterality Date    SHOULDER ARTHROSCOPY Right 06/16/2017       Social History     Tobacco Use    Smoking status: Former Smoker    Smokeless tobacco: Never Used   Substance Use Topics    Alcohol use: No    Drug use: No       ROS:    Review of Systems   Constitutional: Negative for activity change and appetite change. HENT: Negative for trouble swallowing and voice change. Eyes: Negative for pain and itching. Respiratory: Negative for chest tightness and shortness of breath. Cardiovascular: Negative for chest pain and leg swelling. Gastrointestinal: Negative for abdominal pain and anal bleeding. Genitourinary: Negative for decreased urine volume and dysuria. Musculoskeletal: Negative for back pain and neck pain. Neurological: Negative for seizures, syncope, weakness, light-headedness and headaches. Psychiatric/Behavioral: Negative for behavioral problems. The patient is nervous/anxious. Objective:    VS:  Blood pressure 119/82, pulse 104, temperature 97.7 °F (36.5 °C), temperature source Temporal, height 5' 1\" (1.549 m), weight 184 lb (83.5 kg), SpO2 96 %, not currently breastfeeding. Physical Exam   Constitutional: She is oriented to person, place, and time. She appears well-developed and well-nourished. HENT:   Head: Normocephalic and atraumatic. Enlarged neck girth. Eyes: EOM are normal. Right eye exhibits no discharge. Left eye exhibits no discharge. Cardiovascular: Normal rate and regular rhythm. Exam reveals no gallop and no friction rub. No murmur heard. Pulmonary/Chest: Effort normal and breath sounds normal. She has no wheezes. She has no rales. Abdominal: Soft. Bowel sounds are normal. She exhibits no distension. There is no abdominal tenderness. Neurological: She is alert and oriented to person, place, and time. Skin: Skin is warm and dry. No rash noted. Assessment:    Advised to contact CPAP company about alternate masks. Referral to Dr. Alida Palacios for sleep medicine referral, to discuss ongoing concerns/if CPAP changes/titrations need to be made in future     Diagnosis Orders   1. Severe obstructive sleep apnea  Yon Zazueta MD, [facility] ([place])       Plans:  As above. RTO 3 week TSH, and A1C. Please see Patient Instructions for further counseling and information given. Advised to please be adherent to the treatment plans discussed today, and please call with any questions or concerns, letting the office know of any reasons that the plans may not be followed. The risks of untreated conditions include worsening illness, injury, disability, and possibly, death. Please call if symptoms change in any way, worsen, or fail to completely resolve, as this could necessitate a change to treatment plans. Patient and/or caregiver expressed understanding. Indications and proper use of medication(s) reviewed. Potential side-effects and risks of medication(s) also explained. Patient and/or caregiver was instructed to call if any new symptoms develop prior to next visit. Health risk factors discussed and addressed.

## 2020-07-16 NOTE — PROGRESS NOTES
S: Lulu Ludwig 53 y.o. female  here for F/U sleep study results. New to provider  Suspected LIZABETH and sleep study 6/2020: Severe LIZABETH (AHI 41) with response to CPAP. CPAP at 12 cm H20. Compliant with CPAP (just got it this week). Still trying to get used to it. Denies CP, palpitations, SOB, EDS. Consider referral to Sleep Medicine for CPAP management  O: VS: /82 (Site: Left Upper Arm, Position: Sitting, Cuff Size: Medium Adult)   Pulse 104   Temp 97.7 °F (36.5 °C) (Temporal)   Ht 5' 1\" (1.549 m)   Wt 184 lb (83.5 kg)   SpO2 96%   BMI 34.77 kg/m²    General: NAD              HEENT: Mallampati 3   CV:  RRR, no gallops, rubs, or murmurs   Resp: CTAB no R/R/W   Abd:  Soft, nontender, no masses    Ext:  no C/C/E    Assessment / Plan:      Lulu was seen today for check-up, arm pain and other. Diagnoses and all orders for this visit:    Assessment:     Advised to contact CPAP company about alternate masks. Referral to Dr. Arabella Wolf for sleep medicine referral, to discuss ongoing concerns/if CPAP changes/titrations need to be made in future      Diagnosis Orders   1. Severe obstructive sleep apnea  Bleem Cuadra MD, Pulmonary, [place] ([place])       LIZABETH: now on CPAP therapy. Consider referral to Sleep Medicine for CPAP management       Return in about 1 month (around 8/16/2020). F/u 2 weeks for routine check up of DM and hypothyroidism    Attending Physician Statement  I have discussed the case, including pertinent history and exam findings with the resident. I agree with the documented assessment and plan.          Tosha Amezcua MD

## 2020-07-20 RX ORDER — NAPROXEN 250 MG/1
TABLET ORAL
Qty: 60 TABLET | Refills: 0 | Status: SHIPPED
Start: 2020-07-20 | End: 2020-08-18

## 2020-07-20 NOTE — TELEPHONE ENCOUNTER
Last Appointment:  7/16/2020  Future Appointments   Date Time Provider Jenae Kang   7/30/2020  2:40 PM MD Jen [facility]

## 2020-07-30 ENCOUNTER — OFFICE VISIT (OUTPATIENT)
Dept: FAMILY MEDICINE CLINIC | Age: 50
End: 2020-07-30
Payer: COMMERCIAL

## 2020-07-30 ENCOUNTER — HOSPITAL ENCOUNTER (OUTPATIENT)
Age: 50
Discharge: HOME | End: 2020-08-01
Payer: COMMERCIAL

## 2020-07-30 VITALS
TEMPERATURE: 97.2 F | SYSTOLIC BLOOD PRESSURE: 100 MMHG | DIASTOLIC BLOOD PRESSURE: 70 MMHG | BODY MASS INDEX: 33.25 KG/M2 | WEIGHT: 176 LBS | HEART RATE: 101 BPM | RESPIRATION RATE: 16 BRPM | OXYGEN SATURATION: 97 %

## 2020-07-30 LAB
CHP ED QC CHECK: YES
GLUCOSE BLD-MCNC: 127 MG/DL
HBA1C MFR BLD: 9.9 %

## 2020-07-30 PROCEDURE — 2022F DILAT RTA XM EVC RTNOPTHY: CPT | Performed by: FAMILY MEDICINE

## 2020-07-30 PROCEDURE — 84443 ASSAY THYROID STIM HORMONE: CPT

## 2020-07-30 PROCEDURE — 86703 HIV-1/HIV-2 1 RESULT ANTBDY: CPT

## 2020-07-30 PROCEDURE — G8427 DOCREV CUR MEDS BY ELIG CLIN: HCPCS | Performed by: FAMILY MEDICINE

## 2020-07-30 PROCEDURE — 83036 HEMOGLOBIN GLYCOSYLATED A1C: CPT | Performed by: STUDENT IN AN ORGANIZED HEALTH CARE EDUCATION/TRAINING PROGRAM

## 2020-07-30 PROCEDURE — 82962 GLUCOSE BLOOD TEST: CPT | Performed by: STUDENT IN AN ORGANIZED HEALTH CARE EDUCATION/TRAINING PROGRAM

## 2020-07-30 PROCEDURE — 99213 OFFICE O/P EST LOW 20 MIN: CPT | Performed by: STUDENT IN AN ORGANIZED HEALTH CARE EDUCATION/TRAINING PROGRAM

## 2020-07-30 PROCEDURE — G8417 CALC BMI ABV UP PARAM F/U: HCPCS | Performed by: FAMILY MEDICINE

## 2020-07-30 PROCEDURE — 99212 OFFICE O/P EST SF 10 MIN: CPT | Performed by: STUDENT IN AN ORGANIZED HEALTH CARE EDUCATION/TRAINING PROGRAM

## 2020-07-30 PROCEDURE — 1036F TOBACCO NON-USER: CPT | Performed by: FAMILY MEDICINE

## 2020-07-30 PROCEDURE — 3046F HEMOGLOBIN A1C LEVEL >9.0%: CPT | Performed by: FAMILY MEDICINE

## 2020-07-30 PROCEDURE — 80053 COMPREHEN METABOLIC PANEL: CPT

## 2020-07-30 PROCEDURE — 36415 COLL VENOUS BLD VENIPUNCTURE: CPT | Performed by: FAMILY MEDICINE

## 2020-07-30 RX ORDER — DULOXETIN HYDROCHLORIDE 30 MG/1
CAPSULE, DELAYED RELEASE ORAL
COMMUNITY
Start: 2020-07-18 | End: 2021-09-21

## 2020-07-30 ASSESSMENT — ENCOUNTER SYMPTOMS
CONSTIPATION: 0
CHEST TIGHTNESS: 0
EYE ITCHING: 0
ABDOMINAL PAIN: 0
EYE PAIN: 0
SHORTNESS OF BREATH: 0
BACK PAIN: 0

## 2020-07-30 NOTE — PROGRESS NOTES
S: Lulu Ludwig 53 y.o. female  here for F/U IRDM, hypothyroidism and nausea. She has a complicated medical histroy  IRDM: Hgba1C 9.9% today. Meds: Humulin R 100 units and 85 units PM; Ozempic 1 mg/week; metformin 1000 mg BID; Jardiance 25 mg/day. Followed currently by  Endocrinology; wants to see [place] [id number] provider based on location. Blood glucose monitoring FBS; values 200-500 and are long standing. Recent values are 500 and she has S/S hyperglycemia (ie nausea). +DE; suboptimal dietary compliance. Other that S/S hyperglycemia, denies S/S of secondary DM complications  Hypothyroidism: levothyroxine 88 mcg/day; eats 15 minutes after dosing. Overdue for TSH monitoring. Symptoms include fatigue  LIZABETH: trouble with current CPAP mask; needs adjustments with CPAP (initial F/U should be with DME company)  HM: HIV screening  O: VS: /70 (Site: Left Upper Arm, Position: Sitting, Cuff Size: Medium Adult)   Pulse 101   Temp 97.2 °F (36.2 °C) (Oral)   Resp 16   Wt 176 lb (79.8 kg)   SpO2 97%   BMI 33.25 kg/m²    General: NAD; non toxic              Moist mucus membranes   CV:  RRR, no gallops, rubs, or murmurs   Resp: CTAB no R/R/W   Abd:  Soft, nontender, no masses    Ext:  no C/C/E    Assessment / Plan:      Lulu was seen today for diabetes and nausea. Diagnoses and all orders for this visit:    Type 2 diabetes mellitus with complication, with long-term current use of insulin (HCC)  -     POCT glycosylated hemoglobin (Hb A1C)  -     COMPREHENSIVE METABOLIC PANEL; Future  -     POCT Glucose    Acquired hypothyroidism  -     TSH; Future    LIZABETH (obstructive sleep apnea)  -     Andrez Masters MD, Sleep Medicine, [place]    Fatigue due to excessive exertion, initial encounter  -     HIV-1 AND HIV-2 ANTIBODIES; Future    Rheumatoid arthritis, involving unspecified site, unspecified rheumatoid factor presence [facility])  -     [facility]      IRDM: uncontrolled. Symptoms of hypoglycemia. Clinical picture does not suggest DKA. Recommend calling [facility] to coordinate care. No changes at this time per PCP  Hypothyroidism: TSH and adjust levothyroxine accordingly. Counseled about optimal dosing around meals  LIZABETH: needs new Sleep Medicine referral  HM: HIV screening       Return in about 2 months (around 9/30/2020) for diabetes, weight loss, thyroid follow up. or PRN    Attending Physician Statement  I have discussed the case, including pertinent history and exam findings with the resident. I agree with the documented assessment and plan.          Carmela Burger MD

## 2020-07-30 NOTE — PROGRESS NOTES
Pen Needle 31G X 8 MM MISC 1 each by Does not apply route daily. 100 each 5     No current facility-administered medications on file prior to visit. Allergies   Allergen Reactions    Amoxicillin      Hives all over body, states that hives are severe     Sulfa Antibiotics     Cefdinir        Family History   Adopted: Yes   Family history unknown: Yes       Past Surgical History:   Procedure Laterality Date    SHOULDER ARTHROSCOPY Right 06/16/2017       Social History     Tobacco Use    Smoking status: Former Smoker    Smokeless tobacco: Never Used   Substance Use Topics    Alcohol use: No    Drug use: No       ROS:    Review of Systems   Constitutional: Negative for activity change and appetite change. Eyes: Negative for pain and itching. Respiratory: Negative for chest tightness and shortness of breath. Cardiovascular: Negative for chest pain and leg swelling. Gastrointestinal: Negative for abdominal pain and constipation. Genitourinary: Negative for decreased urine volume and flank pain. Musculoskeletal: Negative for arthralgias and back pain. Neurological: Negative for light-headedness and headaches. Psychiatric/Behavioral: Negative for confusion and decreased concentration. Objective:    VS:  Blood pressure 100/70, pulse 101, temperature 97.2 °F (36.2 °C), temperature source Oral, resp. rate 16, weight 176 lb (79.8 kg), SpO2 97 %, not currently breastfeeding. Physical Exam   Constitutional: She is oriented to person, place, and time. She appears well-developed and well-nourished. HENT:   Head: Normocephalic and atraumatic. Cardiovascular: Normal rate, regular rhythm, normal heart sounds and intact distal pulses. Pulmonary/Chest: Effort normal and breath sounds normal.   Abdominal: Soft. Bowel sounds are normal.   Musculoskeletal: Normal range of motion. General: No edema. Neurological: She is alert and oriented to person, place, and time.    Skin: Skin is warm and dry. Assessment:    Hypothyroid- repeat TSH. Will adjust synthroid accordingly    Diabetes Type 2- not controlled. She is following [facility]. To call their [phone] to discuss plan with Dr. Melony Langston. CMP today, will follow-up on anion gap, and glucose. Plans:  As above. RTO  2 months for follow-up of weight loss, diabetes, TSH    Please see Patient Instructions for further counseling and information given. Advised to please be adherent to the treatment plans discussed today, and please call with any questions or concerns, letting the office know of any reasons that the plans may not be followed. The risks of untreated conditions include worsening illness, injury, disability, and possibly, death. Please call if symptoms change in any way, worsen, or fail to completely resolve, as this could necessitate a change to treatment plans. Patient and/or caregiver expressed understanding. Indications and proper use of medication(s) reviewed. Potential side-effects and risks of medication(s) also explained. Patient and/or caregiver was instructed to call if any new symptoms develop prior to next visit. Health risk factors discussed and addressed.

## 2020-07-31 LAB
ALBUMIN SERPL-MCNC: 4.1 G/DL (ref 3.5–5.2)
ALP BLD-CCNC: 125 U/L (ref 35–104)
ALT SERPL-CCNC: 17 U/L (ref 0–32)
ANION GAP SERPL CALCULATED.3IONS-SCNC: 15 MMOL/L (ref 7–16)
AST SERPL-CCNC: 22 U/L (ref 0–31)
BILIRUB SERPL-MCNC: 0.3 MG/DL (ref 0–1.2)
BUN BLDV-MCNC: 23 MG/DL (ref 6–20)
CALCIUM SERPL-MCNC: 11.8 MG/DL (ref 8.6–10.2)
CHLORIDE BLD-SCNC: 105 MMOL/L (ref 98–107)
CO2: 23 MMOL/L (ref 22–29)
CREAT SERPL-MCNC: 1.1 MG/DL (ref 0.5–1)
GFR AFRICAN AMERICAN: >60
GFR NON-AFRICAN AMERICAN: 53 ML/MIN/1.73
GLUCOSE BLD-MCNC: 126 MG/DL (ref 74–99)
HIV-1 AND HIV-2 ANTIBODIES: NORMAL
POTASSIUM SERPL-SCNC: 4.2 MMOL/L (ref 3.5–5)
SODIUM BLD-SCNC: 143 MMOL/L (ref 132–146)
TOTAL PROTEIN: 7.9 G/DL (ref 6.4–8.3)
TSH SERPL DL<=0.05 MIU/L-ACNC: 4.16 UIU/ML (ref 0.27–4.2)

## 2020-08-05 ENCOUNTER — HOSPITAL ENCOUNTER (EMERGENCY)
Age: 50
Discharge: HOME | End: 2020-08-05
Attending: EMERGENCY MEDICINE
Payer: COMMERCIAL

## 2020-08-05 VITALS
HEART RATE: 98 BPM | DIASTOLIC BLOOD PRESSURE: 82 MMHG | RESPIRATION RATE: 16 BRPM | TEMPERATURE: 97.1 F | SYSTOLIC BLOOD PRESSURE: 117 MMHG | OXYGEN SATURATION: 97 %

## 2020-08-05 LAB
ACETAMINOPHEN LEVEL: <5 MCG/ML (ref 10–30)
ALBUMIN SERPL-MCNC: 3.8 G/DL (ref 3.5–5.2)
ALP BLD-CCNC: 127 U/L (ref 35–104)
ALT SERPL-CCNC: 17 U/L (ref 0–32)
AMPHETAMINE SCREEN, URINE: NOT DETECTED
ANION GAP SERPL CALCULATED.3IONS-SCNC: 13 MMOL/L (ref 7–16)
AST SERPL-CCNC: 15 U/L (ref 0–31)
BACTERIA: ABNORMAL /HPF
BARBITURATE SCREEN URINE: NOT DETECTED
BASOPHILS ABSOLUTE: 0.08 E9/L (ref 0–0.2)
BASOPHILS RELATIVE PERCENT: 0.6 % (ref 0–2)
BENZODIAZEPINE SCREEN, URINE: NOT DETECTED
BILIRUB SERPL-MCNC: 0.3 MG/DL (ref 0–1.2)
BILIRUBIN URINE: NEGATIVE
BLOOD, URINE: ABNORMAL
BUN BLDV-MCNC: 19 MG/DL (ref 6–20)
CALCIUM SERPL-MCNC: 10.2 MG/DL (ref 8.6–10.2)
CANNABINOID SCREEN URINE: NOT DETECTED
CHLORIDE BLD-SCNC: 103 MMOL/L (ref 98–107)
CLARITY: ABNORMAL
CO2: 25 MMOL/L (ref 22–29)
COCAINE METABOLITE SCREEN URINE: NOT DETECTED
COLOR: YELLOW
CREAT SERPL-MCNC: 0.9 MG/DL (ref 0.5–1)
EOSINOPHILS ABSOLUTE: 0.07 E9/L (ref 0.05–0.5)
EOSINOPHILS RELATIVE PERCENT: 0.5 % (ref 0–6)
ETHANOL: <10 MG/DL (ref 0–0.08)
FENTANYL SCREEN, URINE: NOT DETECTED
GFR AFRICAN AMERICAN: >60
GFR NON-AFRICAN AMERICAN: >60 ML/MIN/1.73
GLUCOSE BLD-MCNC: 140 MG/DL (ref 74–99)
GLUCOSE URINE: NEGATIVE MG/DL
HCG QUALITATIVE: NEGATIVE
HCT VFR BLD CALC: 38.8 % (ref 34–48)
HEMOGLOBIN: 11.9 G/DL (ref 11.5–15.5)
IMMATURE GRANULOCYTES #: 0.15 E9/L
IMMATURE GRANULOCYTES %: 1.2 % (ref 0–5)
KETONES, URINE: NEGATIVE MG/DL
LEUKOCYTE ESTERASE, URINE: ABNORMAL
LYMPHOCYTES ABSOLUTE: 2.48 E9/L (ref 1.5–4)
LYMPHOCYTES RELATIVE PERCENT: 19.5 % (ref 20–42)
Lab: NORMAL
MCH RBC QN AUTO: 23.8 PG (ref 26–35)
MCHC RBC AUTO-ENTMCNC: 30.7 % (ref 32–34.5)
MCV RBC AUTO: 77.6 FL (ref 80–99.9)
METHADONE SCREEN, URINE: NOT DETECTED
MONOCYTES ABSOLUTE: 0.53 E9/L (ref 0.1–0.95)
MONOCYTES RELATIVE PERCENT: 4.2 % (ref 2–12)
NEUTROPHILS ABSOLUTE: 9.42 E9/L (ref 1.8–7.3)
NEUTROPHILS RELATIVE PERCENT: 74 % (ref 43–80)
NITRITE, URINE: NEGATIVE
OPIATE SCREEN URINE: NOT DETECTED
OXYCODONE URINE: NOT DETECTED
PDW BLD-RTO: 15.6 FL (ref 11.5–15)
PH UA: 6.5 (ref 5–9)
PHENCYCLIDINE SCREEN URINE: NOT DETECTED
PLATELET # BLD: 286 E9/L (ref 130–450)
PMV BLD AUTO: 10.3 FL (ref 7–12)
POTASSIUM SERPL-SCNC: 3.8 MMOL/L (ref 3.5–5)
PROTEIN UA: >=300 MG/DL
RBC # BLD: 5 E12/L (ref 3.5–5.5)
RBC UA: ABNORMAL /HPF (ref 0–2)
SALICYLATE, SERUM: <0.3 MG/DL (ref 0–30)
SODIUM BLD-SCNC: 141 MMOL/L (ref 132–146)
SPECIFIC GRAVITY UA: 1.02 (ref 1–1.03)
TOTAL PROTEIN: 7.4 G/DL (ref 6.4–8.3)
TRICYCLIC ANTIDEPRESSANTS SCREEN SERUM: NEGATIVE NG/ML
TROPONIN: <0.01 NG/ML (ref 0–0.03)
UROBILINOGEN, URINE: 0.2 E.U./DL
WBC # BLD: 12.7 E9/L (ref 4.5–11.5)
WBC UA: >20 /HPF (ref 0–5)

## 2020-08-05 PROCEDURE — 99284 EMERGENCY DEPT VISIT MOD MDM: CPT

## 2020-08-05 PROCEDURE — 84703 CHORIONIC GONADOTROPIN ASSAY: CPT

## 2020-08-05 PROCEDURE — 93005 ELECTROCARDIOGRAM TRACING: CPT | Performed by: EMERGENCY MEDICINE

## 2020-08-05 PROCEDURE — 84484 ASSAY OF TROPONIN QUANT: CPT

## 2020-08-05 PROCEDURE — 85025 COMPLETE CBC W/AUTO DIFF WBC: CPT

## 2020-08-05 PROCEDURE — G0480 DRUG TEST DEF 1-7 CLASSES: HCPCS

## 2020-08-05 PROCEDURE — 80053 COMPREHEN METABOLIC PANEL: CPT

## 2020-08-05 PROCEDURE — 99283 EMERGENCY DEPT VISIT LOW MDM: CPT

## 2020-08-05 PROCEDURE — 80307 DRUG TEST PRSMV CHEM ANLYZR: CPT

## 2020-08-05 PROCEDURE — 81001 URINALYSIS AUTO W/SCOPE: CPT

## 2020-08-05 RX ORDER — ONDANSETRON 4 MG/1
4 TABLET, ORALLY DISINTEGRATING ORAL EVERY 8 HOURS PRN
Qty: 24 TABLET | Refills: 0 | Status: SHIPPED | OUTPATIENT
Start: 2020-08-05 | End: 2021-03-24 | Stop reason: ALTCHOICE

## 2020-08-05 ASSESSMENT — PAIN SCALES - GENERAL: PAINLEVEL_OUTOF10: 3

## 2020-08-05 ASSESSMENT — PAIN DESCRIPTION - LOCATION: LOCATION: ABDOMEN
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
## 2020-08-06 LAB
EKG ATRIAL RATE: 90 BPM
EKG P AXIS: 29 DEGREES
EKG P-R INTERVAL: 144 MS
EKG Q-T INTERVAL: 360 MS
EKG QRS DURATION: 76 MS
EKG QTC CALCULATION (BAZETT): 440 MS
EKG R AXIS: 47 DEGREES
EKG T AXIS: 23 DEGREES
EKG VENTRICULAR RATE: 90 BPM

## 2020-08-06 NOTE — ED NOTES
Patient was provided hospital pants and belongings were released to the patient. Call placed to taxi service. Patient provided discharge paperwork and taxi voucher. Patient escorted to ED lobby to wait for taxi.       Ruby Peter  08/05/20 2035

## 2020-08-06 NOTE — ED PROVIDER NOTES
HPI:  8/6/20,   Time: 1:11 PM EDT         Edison Garcia is a 53 y.o. female presenting to the ED for SI depression, No HI, beginning one week ago. The complaint has been persistent, moderate in severity, and worsened by nothing. History of SI with no plan since childhood. No Auditory or visual hallucinations. Sent by PCP  for evaluation. No fever  HA chills chest pain NVD. Denies smoking or ETOH use. No illicit drug use. ROS:   Pertinent positives and negatives are stated within HPI, all other systems reviewed and are negative.  --------------------------------------------- PAST HISTORY ---------------------------------------------  Past Medical History:  has a past medical history of Anxiety, Arthritis, Depression, Diabetes mellitus ([address] LIZABETH (obstructive sleep apnea), and Thyroid disease. Past Surgical History:  has a past surgical history that includes Shoulder arthroscopy (Right, 06/16/2017). Social History:  reports that she has quit smoking. She has never used smokeless tobacco. She reports that she does not drink alcohol or use drugs. Family History: She was adopted. Family history is unknown by patient. The patients home medications have been reviewed.     Allergies: Amoxicillin; Sulfa antibiotics; and Cefdinir    -------------------------------------------------- RESULTS -------------------------------------------------  All laboratory and radiology results have been personally reviewed by myself   LABS:  Results for orders placed or performed during the hospital encounter of 08/05/20   CBC Auto Differential   Result Value Ref Range    WBC 12.7 (H) 4.5 - 11.5 E9/L    RBC 5.00 3.50 - 5.50 E12/L    Hemoglobin 11.9 11.5 - 15.5 g/dL    Hematocrit 38.8 34.0 - 48.0 %    MCV 77.6 (L) 80.0 - 99.9 fL    MCH 23.8 (L) 26.0 - 35.0 pg    MCHC 30.7 (L) 32.0 - 34.5 %    RDW 15.6 (H) 11.5 - 15.0 fL    Platelets 054 078 - 997 E9/L    MPV 10.3 7.0 - 12.0 fL    Neutrophils % 74.0 43.0 - 80.0 %    Immature Granulocytes % 1.2 0.0 - 5.0 %    Lymphocytes % 19.5 (L) 20.0 - 42.0 %    Monocytes % 4.2 2.0 - 12.0 %    Eosinophils % 0.5 0.0 - 6.0 %    Basophils % 0.6 0.0 - 2.0 %    Neutrophils Absolute 9.42 (H) 1.80 - 7.30 E9/L    Immature Granulocytes # 0.15 E9/L    Lymphocytes Absolute 2.48 1.50 - 4.00 E9/L    Monocytes Absolute 0.53 0.10 - 0.95 E9/L    Eosinophils Absolute 0.07 0.05 - 0.50 E9/L    Basophils Absolute 0.08 0.00 - 0.20 E9/L   Troponin   Result Value Ref Range    Troponin <0.01 0.00 - 0.03 ng/mL   Comprehensive Metabolic Panel   Result Value Ref Range    Sodium 141 132 - 146 mmol/L    Potassium 3.8 3.5 - 5.0 mmol/L    Chloride 103 98 - 107 mmol/L    CO2 25 22 - 29 mmol/L    Anion Gap 13 7 - 16 mmol/L    Glucose 140 (H) 74 - 99 mg/dL    BUN 19 6 - 20 mg/dL    CREATININE 0.9 0.5 - 1.0 mg/dL    GFR Non-African American >60 >=60 mL/min/1.73    GFR African American >60     Calcium 10.2 8.6 - 10.2 mg/dL    Total Protein 7.4 6.4 - 8.3 g/dL    Alb 3.8 3.5 - 5.2 g/dL    Total Bilirubin 0.3 0.0 - 1.2 mg/dL    Alkaline Phosphatase 127 (H) 35 - 104 U/L    ALT 17 0 - 32 U/L    AST 15 0 - 31 U/L   Urinalysis   Result Value Ref Range    Color, UA Yellow Straw/Yellow    Clarity, UA SL CLOUDY Clear    Glucose, Ur Negative Negative mg/dL    Bilirubin Urine Negative Negative    Ketones, Urine Negative Negative mg/dL    Specific Gravity, UA 1.020 1.005 - 1.030    Blood, Urine SMALL (A) Negative    pH, UA 6.5 5.0 - 9.0    Protein, UA >=300 (A) Negative mg/dL    Urobilinogen, Urine 0.2 <2.0 E.U./dL    Nitrite, Urine Negative Negative    Leukocyte Esterase, Urine SMALL (A) Negative   Serum Drug Screen   Result Value Ref Range    Ethanol Lvl <10 mg/dL    Acetaminophen Level <5.0 (L) 10.0 - 75.5 mcg/mL    Salicylate, Serum <6.6 0.0 - 30.0 mg/dL    TCA Scrn NEGATIVE Cutoff:300 ng/mL   Urine Drug Screen   Result Value Ref Range    Amphetamine Screen, Urine NOT DETECTED Negative <1000 ng/mL    Barbiturate Screen, Ur NOT DETECTED Negative < 200 ng/mL    Benzodiazepine Screen, Urine NOT DETECTED Negative < 200 ng/mL    Cannabinoid Scrn, Ur NOT DETECTED Negative < 50ng/mL    Cocaine Metabolite Screen, Urine NOT DETECTED Negative < 300 ng/mL    Opiate Scrn, Ur NOT DETECTED Negative < 300ng/mL    PCP Screen, Urine NOT DETECTED Negative < 25 ng/mL    Methadone Screen, Urine NOT DETECTED Negative <300 ng/mL    Oxycodone Urine NOT DETECTED Negative <100 ng/mL    FENTANYL SCREEN, URINE NOT DETECTED Negative <1 ng/mL    Drug Screen Comment: see below    HCG Qualitative, Serum   Result Value Ref Range    hCG Qual NEGATIVE NEGATIVE   Microscopic Urinalysis   Result Value Ref Range    WBC, UA >20 (A) 0 - 5 /HPF    RBC, UA 2-5 0 - 2 /HPF    Bacteria, UA MANY (A) None Seen /HPF   EKG 12 Lead   Result Value Ref Range    Ventricular Rate 90 BPM    Atrial Rate 90 BPM    P-R Interval 144 ms    QRS Duration 76 ms    Q-T Interval 360 ms    QTc Calculation (Bazett) 440 ms    P Axis 29 degrees    R Axis 47 degrees    T Axis 23 degrees       RADIOLOGY:  Interpreted by Radiologist.  No orders to display       ------------------------- NURSING NOTES AND VITALS REVIEWED ---------------------------   The nursing notes within the ED encounter and vital signs as below have been reviewed. /82   Pulse 98   Temp 97.1 °F (36.2 °C) (Temporal)   Resp 16   SpO2 97%   Oxygen Saturation Interpretation: Normal      ---------------------------------------------------PHYSICAL EXAM--------------------------------------      Constitutional/General: Alert and oriented x3, well appearing, non toxic in NAD  Head: NC/AT  Eyes: PERRL, EOMI  Mouth: Oropharynx clear, handling secretions, no trismus  Neck: Supple, full ROM, no meningeal signs  Pulmonary: Lungs clear to auscultation bilaterally, no wheezes, rales, or rhonchi. Not in respiratory distress  Cardiovascular:  Regular rate and rhythm, no murmurs, gallops, or rubs.  2+ distal pulses  Abdomen: Soft, non tender, non distended, Extremities: Moves all extremities x 4. Warm and well perfused  Skin: warm and dry without rash  Neurologic: GCS 15,  Psych: Depressed affect. Thought process coherant and congruent.       ------------------------------ ED COURSE/MEDICAL DECISION MAKING----------------------  Medications - No data to display      Medical Decision Making:    Medically clear for LSW evaluation and placement. Safe for OP follow up with Psychiatry and counselors. Counseling: The emergency provider has spoken with the patient and discussed todays results, in addition to providing specific details for the plan of care and counseling regarding the diagnosis and prognosis. Questions are answered at this time and they are agreeable with the plan.      --------------------------------- IMPRESSION AND DISPOSITION ---------------------------------    IMPRESSION  1.  Suicidal ideation        DISPOSITION  Disposition: Discharge to home  Patient condition is stable                Geno Valencia DO  08/06/20 1545

## 2020-08-17 NOTE — TELEPHONE ENCOUNTER
Last Appointment:  7/30/2020  Future Appointments   Date Time Provider Jenae Kang   9/30/2020  2:20 PM MD [facility]

## 2020-08-18 RX ORDER — NAPROXEN 250 MG/1
TABLET ORAL
Qty: 60 TABLET | Refills: 0 | Status: SHIPPED
Start: 2020-08-18 | End: 2020-09-15

## 2020-09-15 RX ORDER — NAPROXEN 250 MG/1
TABLET ORAL
Qty: 60 TABLET | Refills: 0 | Status: SHIPPED
Start: 2020-09-15 | End: 2020-10-12

## 2020-10-02 ENCOUNTER — OFFICE VISIT (OUTPATIENT)
Dept: FAMILY MEDICINE CLINIC | Age: 50
End: 2020-10-02
Payer: COMMERCIAL

## 2020-10-02 VITALS
HEART RATE: 98 BPM | OXYGEN SATURATION: 99 % | WEIGHT: 188 LBS | SYSTOLIC BLOOD PRESSURE: 130 MMHG | HEIGHT: 61 IN | DIASTOLIC BLOOD PRESSURE: 72 MMHG | BODY MASS INDEX: 35.5 KG/M2 | TEMPERATURE: 98.2 F

## 2020-10-02 PROBLEM — F32.A MILD DEPRESSION: Status: ACTIVE | Noted: 2020-10-02

## 2020-10-02 PROCEDURE — 3046F HEMOGLOBIN A1C LEVEL >9.0%: CPT | Performed by: FAMILY MEDICINE

## 2020-10-02 PROCEDURE — G8417 CALC BMI ABV UP PARAM F/U: HCPCS | Performed by: FAMILY MEDICINE

## 2020-10-02 PROCEDURE — G8427 DOCREV CUR MEDS BY ELIG CLIN: HCPCS | Performed by: FAMILY MEDICINE

## 2020-10-02 PROCEDURE — 1036F TOBACCO NON-USER: CPT | Performed by: FAMILY MEDICINE

## 2020-10-02 PROCEDURE — G8484 FLU IMMUNIZE NO ADMIN: HCPCS | Performed by: FAMILY MEDICINE

## 2020-10-02 PROCEDURE — 2022F DILAT RTA XM EVC RTNOPTHY: CPT | Performed by: FAMILY MEDICINE

## 2020-10-02 PROCEDURE — 99213 OFFICE O/P EST LOW 20 MIN: CPT | Performed by: STUDENT IN AN ORGANIZED HEALTH CARE EDUCATION/TRAINING PROGRAM

## 2020-10-02 PROCEDURE — 99212 OFFICE O/P EST SF 10 MIN: CPT | Performed by: STUDENT IN AN ORGANIZED HEALTH CARE EDUCATION/TRAINING PROGRAM

## 2020-10-02 RX ORDER — INSULIN GLARGINE 100 [IU]/ML
45 INJECTION, SOLUTION SUBCUTANEOUS 2 TIMES DAILY
COMMUNITY
Start: 2020-09-28 | End: 2021-07-12 | Stop reason: SDUPTHER

## 2020-10-02 RX ORDER — INSULIN LISPRO 200 [IU]/ML
40 INJECTION, SOLUTION SUBCUTANEOUS
COMMUNITY
Start: 2020-08-31 | End: 2021-09-28

## 2020-10-02 RX ORDER — ACETAMINOPHEN 500 MG
500 TABLET ORAL EVERY 4 HOURS PRN
COMMUNITY
Start: 2019-12-30 | End: 2021-03-24

## 2020-10-02 RX ORDER — ALBUTEROL SULFATE 90 UG/1
2 AEROSOL, METERED RESPIRATORY (INHALATION)
COMMUNITY
Start: 2019-02-23 | End: 2021-03-24 | Stop reason: ALTCHOICE

## 2020-10-02 RX ORDER — ATORVASTATIN CALCIUM 80 MG/1
TABLET, FILM COATED ORAL
COMMUNITY
Start: 2020-08-31 | End: 2021-09-28

## 2020-10-02 ASSESSMENT — PATIENT HEALTH QUESTIONNAIRE - PHQ9
6. FEELING BAD ABOUT YOURSELF - OR THAT YOU ARE A FAILURE OR HAVE LET YOURSELF OR YOUR FAMILY DOWN: 3
9. THOUGHTS THAT YOU WOULD BE BETTER OFF DEAD, OR OF HURTING YOURSELF: 3
4. FEELING TIRED OR HAVING LITTLE ENERGY: 0
10. IF YOU CHECKED OFF ANY PROBLEMS, HOW DIFFICULT HAVE THESE PROBLEMS MADE IT FOR YOU TO DO YOUR WORK, TAKE CARE OF THINGS AT HOME, OR GET ALONG WITH OTHER PEOPLE: 2
3. TROUBLE FALLING OR STAYING ASLEEP: 3
SUM OF ALL RESPONSES TO PHQ QUESTIONS 1-9: 9
8. MOVING OR SPEAKING SO SLOWLY THAT OTHER PEOPLE COULD HAVE NOTICED. OR THE OPPOSITE, BEING SO FIGETY OR RESTLESS THAT YOU HAVE BEEN MOVING AROUND A LOT MORE THAN USUAL: 0
5. POOR APPETITE OR OVEREATING: 0
SUM OF ALL RESPONSES TO PHQ QUESTIONS 1-9: 9
7. TROUBLE CONCENTRATING ON THINGS, SUCH AS READING THE NEWSPAPER OR WATCHING TELEVISION: 0

## 2020-10-02 ASSESSMENT — ENCOUNTER SYMPTOMS
CONSTIPATION: 0
CHEST TIGHTNESS: 0
SHORTNESS OF BREATH: 0
NAUSEA: 0
WHEEZING: 0
SORE THROAT: 0
DIARRHEA: 0
ABDOMINAL PAIN: 0

## 2020-10-02 NOTE — PROGRESS NOTES
S: 53 y.o. female here for f/u of L shoulder pain, SI and weight gain. Follows w/ Endo in [facility] for DM and hypothyroidism. Has freestyle beltran. Last TSH 4.15, stable on LT4 88 mcg. Gained 7 lbs. Not interested in trying to lose weight. L shoulder pain starting a month ago. Worse w/ IR. Similar to previous R shoulder pain (s/p R shoulder arthroscopy). No weakness/numbness. No trauma. Hospitalized for SI. States she has felt like killing herself every day since she was 7 yo. No HI. No AH or VH. No access to firearms at home. On cymbalta. [facility]. SI no worse lately. Declined flu shot or pap. Wants referral to Rheum for RA.     O: VS: /72 (Site: Left Upper Arm, Position: Sitting, Cuff Size: Medium Adult)   Pulse 98   Temp 98.2 °F (36.8 °C) (Temporal)   Ht 5' 1\" (1.549 m)   Wt 188 lb (85.3 kg)   SpO2 99%   BMI 35.52 kg/m²    General: NAD, alert and interacting appropriately. CV:  RRR, no gallops, rubs, or murmurs    Resp: CTAB   Ext:  L shoulder can abduct to 120 before pain. ttp infraspinatus and lateral delt. ER and IR. Neg [place]. Motor 5/5. Impression: shoulder pain 2/2 tendinitis vs frozen shoulder vs muscle strain. Depression w/ baseline SI. Hypothyroidism. Plan:   PT   Will speak w/ psych re SI and cymbalta  Continue LT4  HM advised      Attending Physician Statement  I have discussed the case, including pertinent history and exam findings with the resident. I agree with the documented assessment and plan.

## 2020-10-02 NOTE — PROGRESS NOTES
CC: DM/Left Shoulder Pain    HPI:  53 y.o. female presents for:    Diabetes Type 2- Insulin Dependent  Fasting AM: 200 mg/dl  Fluctuating glucose throughout day  Humalog Sliding Scale Pre-Meal, Lanuts 45 U BID, Gail Saba- Endocrinology. Seeing in [address] a ''insulin sensor' on left arm recently. No near syncope- fatigue, sweating. No chest pain, sob, headache, polyuria, polyphagia. Hypothyroid-   Dr. Lawyer Saba following. TSH: 4.150  Synthroid: 88 mcg    SI  Feels suicidal ideation, no plan  ER visit: 8/2020- for this reason. [address]- 3 weeks ago  Psychiatrist: does not know name- she is only on duloxetine 30 mg  She says symptoms have been same since age of 6  PHQ-9: Mild Depression    Left Shoulder Pain  Onset 1 month ago  Only pain internal rotation and 'churning shoulder back'  Stabbing pain  Unsure if associated with stiffness. Bc she has RA and unable to tell what stiffness feels like anymore. No loss sensation numbness/tingling/dropping objects  S/p Right Shoulder Arthroscopy- 2017    Health Maintenance- due flu shot  Flu Shot- declined does not want.      Patient Active Problem List    Diagnosis Date Noted    Severe obstructive sleep apnea 07/16/2020    Gastroparesis 12/06/2018    Infective urethritis 06/28/2018    Acquired hypothyroidism 12/20/2016    Vitamin D deficiency 12/20/2016    Hyperglycemia     Microcytic anemia     Diabetic ketosis ([address] 09/01/2015    Type 2 diabetes mellitus with complication, with long-term current use of insulin ([address] 05/30/2014    Depression 05/30/2014    Rheumatoid arthritis ([address] 05/30/2014       Past Medical History:   Diagnosis Date    Anxiety     Arthritis     Depression     Diabetes mellitus ([address]     LIZABETH (obstructive sleep apnea)     Thyroid disease        Current Outpatient Medications on File Prior to Visit   Medication Sig Dispense Refill    LANTUS SOLOSTAR 100 UNIT/ML injection pen       HUMALOG KWIKPEN 200 UNIT/ML SOPN pen INJECT 22 62 UNITS 2 3 TIMES A DAY WITH MEALS PER SLIDING SCALE UP  UNITS      naproxen (NAPROSYN) 250 MG tablet TAKE 1 TABLET BY MOUTH TWICE A DAY WITH MEALS 60 tablet 0    DULoxetine (CYMBALTA) 30 MG extended release capsule TAKE 1 CAPSULE BY MOUTH EVERY EVENING 60MG IN AM   30MG IN PM   90MG/DAY      OZEMPIC, 1 MG/DOSE, 2 MG/1.5ML SOPN Inject 1 mg into the skin once a week       empagliflozin (JARDIANCE) 25 MG tablet Take 25 mg by mouth daily ACM verified with Dr Enma Lang office on 2/26/2020      metFORMIN (GLUCOPHAGE) 1000 MG tablet Take 500 mg by mouth 2 times daily (with meals) ACM verified with Dr Enma Lang office on 2/26/2020       atorvastatin (LIPITOR) 40 MG tablet Take 1 tablet by mouth nightly      hydroxychloroquine (PLAQUENIL) 200 MG tablet Take 200 mg by mouth 2 times daily      DULoxetine (CYMBALTA) 60 MG extended release capsule Take 60 mg by mouth daily      levothyroxine (SYNTHROID) 88 MCG tablet Take 1 tablet by mouth daily      vitamin D (ERGOCALCIFEROL) 41808 units CAPS capsule Take 50,000 Units by mouth once a week      pregabalin (LYRICA) 100 MG capsule Take 100 mg by mouth 3 times daily.  lisinopril (PRINIVIL;ZESTRIL) 10 MG tablet Take 10 mg by mouth daily      Omega-3 Fatty Acids (FISH OIL) 1000 MG CAPS Take 1,000 mg by mouth 4 times daily      methotrexate (RHEUMATREX) 2.5 MG chemo tablet Take 15 mg by mouth once a week      folic acid (FOLVITE) 1 MG tablet Take 1 tablet by mouth daily 30 tablet 1    FREESTYLE LITE strip USE AS DIRECTED 100 strip 3    FREESTYLE LANCETS MISC CHECK BS 4 TIMES DAILY 100 each 3    Multiple Vitamins-Minerals (MULTIVITAMIN WITH MINERALS) tablet Take 1 tablet by mouth daily. 30 tablet 3    Insulin Pen Needle 31G X 8 MM MISC 1 each by Does not apply route daily.  100 each 5    ondansetron (ZOFRAN ODT) 4 MG disintegrating tablet Take 1 tablet by mouth every 8 hours as needed for Nausea or Vomiting (Patient not taking: Reported on 10/2/2020) 24 tablet 0    canagliflozin (INVOKANA) 300 MG TABS tablet Take 300 mg by mouth      diclofenac sodium 1 % GEL Apply 4 g topically 4 times daily (Patient not taking: Reported on 10/2/2020) 4 Tube 1    HUMULIN R U-500 KWIKPEN 500 UNIT/ML SOPN concentrated injection pen Inject 100 Units into the skin every morning 85 Units in the  in the PM       No current facility-administered medications on file prior to visit. Allergies   Allergen Reactions    Amoxicillin      Hives all over body, states that hives are severe     Sulfa Antibiotics     Cefdinir        Family History   Adopted: Yes   Family history unknown: Yes       Past Surgical History:   Procedure Laterality Date    SHOULDER ARTHROSCOPY Right 06/16/2017       Social History     Tobacco Use    Smoking status: Former Smoker    Smokeless tobacco: Never Used   Substance Use Topics    Alcohol use: No    Drug use: No       ROS:    Review of Systems   Constitutional: Negative for activity change and appetite change. HENT: Negative for congestion and sore throat. Respiratory: Negative for chest tightness, shortness of breath and wheezing. Cardiovascular: Negative for chest pain and leg swelling. Gastrointestinal: Negative for abdominal pain, constipation, diarrhea and nausea. Genitourinary: Negative for difficulty urinating and dysuria. Musculoskeletal: Negative for arthralgias and neck pain. Skin: Positive for rash. Neurological: Positive for numbness. Negative for syncope and weakness. Psychiatric/Behavioral: Positive for sleep disturbance and suicidal ideas. Objective:    VS:  Blood pressure 130/72, pulse 98, temperature 98.2 °F (36.8 °C), temperature source Temporal, height 5' 1\" (1.549 m), weight 188 lb (85.3 kg), SpO2 99 %, not currently breastfeeding. Physical Exam   Constitutional: She is oriented to person, place, and time. She appears well-developed and well-nourished. HENT:   Head: Normocephalic and atraumatic. Cardiovascular: Normal rate and regular rhythm. Exam reveals no gallop and no friction rub. No murmur heard. Pulmonary/Chest: Effort normal and breath sounds normal. She has no wheezes. She has no rales. Abdominal: Soft. There is no abdominal tenderness. There is no rebound. Musculoskeletal:      Comments: Negative Hawkin and Neer sign. Unable to abduct past 110 degrees. Strength 5/5. DTRs intact. +TTP lateral deltoid and infraspinatus. Elbow external and internal rotation nl. Shoulder shrug (CN XII) intact. Neurological: She is alert and oriented to person, place, and time. Skin: Skin is dry. No rash noted. Psychiatric: She has a normal mood and affect. Her behavior is normal.   Vitals reviewed. Assessment:     Diagnosis Orders   1. Left shoulder pain, unspecified chronicity  [facility], Onita Perish   2. Mild depression ([facility]     3. Hypothyroidism, unspecified type     4. Type 2 diabetes mellitus without complication, without long-term current use of insulin ([facility]     5. Rheumatoid arthritis, involving unspecified site, unspecified whether rheumatoid factor present [facility])  External Referral To Rheumatology   6. Needs flu shot       C/W follow-up [facility] for DM2 and Hypothyroid. Plans:  As above. RTO as needed. Please see Patient Instructions for further counseling and information given. Advised to please be adherent to the treatment plans discussed today, and please call with any questions or concerns, letting the office know of any reasons that the plans may not be followed. The risks of untreated conditions include worsening illness, injury, disability, and possibly, death. Please call if symptoms change in any way, worsen, or fail to completely resolve, as this could necessitate a change to treatment plans. Patient and/or caregiver expressed understanding.      Indications and proper use of medication(s) reviewed. Potential side-effects and risks of medication(s) also explained. Patient and/or caregiver was instructed to call if any new symptoms develop prior to next visit. Health risk factors discussed and addressed.

## 2020-10-12 RX ORDER — NAPROXEN 250 MG/1
TABLET ORAL
Qty: 60 TABLET | Refills: 5 | Status: SHIPPED
Start: 2020-10-12 | End: 2021-09-10 | Stop reason: SDUPTHER

## 2020-10-19 ENCOUNTER — TELEPHONE (OUTPATIENT)
Dept: FAMILY MEDICINE CLINIC | Age: 50
End: 2020-10-19

## 2021-03-05 ENCOUNTER — CLINICAL DOCUMENTATION (OUTPATIENT)
Dept: FAMILY MEDICINE CLINIC | Age: 51
End: 2021-03-05

## 2021-03-05 ENCOUNTER — HOSPITAL ENCOUNTER (OUTPATIENT)
Age: 51
Discharge: HOME | End: 2021-03-05
Payer: COMMERCIAL

## 2021-03-05 LAB
ALBUMIN SERPL-MCNC: 3.1 G/DL (ref 3.5–5.2)
ALP BLD-CCNC: 120 U/L (ref 35–104)
ALT SERPL-CCNC: 9 U/L (ref 0–32)
ANION GAP SERPL CALCULATED.3IONS-SCNC: 12 MMOL/L (ref 7–16)
AST SERPL-CCNC: 8 U/L (ref 0–31)
BILIRUB SERPL-MCNC: <0.2 MG/DL (ref 0–1.2)
BUN BLDV-MCNC: 17 MG/DL (ref 6–20)
CALCIUM SERPL-MCNC: 9.3 MG/DL (ref 8.6–10.2)
CHLORIDE BLD-SCNC: 100 MMOL/L (ref 98–107)
CO2: 23 MMOL/L (ref 22–29)
CREAT SERPL-MCNC: 0.7 MG/DL (ref 0.5–1)
CREATININE URINE: 63 MG/DL (ref 29–226)
GFR AFRICAN AMERICAN: >60
GFR NON-AFRICAN AMERICAN: >60 ML/MIN/1.73
GLUCOSE BLD-MCNC: 556 MG/DL (ref 74–99)
POTASSIUM SERPL-SCNC: 3.4 MMOL/L (ref 3.5–5)
PROTEIN PROTEIN: 336 MG/DL (ref 0–12)
PROTEIN/CREAT RATIO: 5.3
PROTEIN/CREAT RATIO: 5.3 (ref 0–0.2)
SODIUM BLD-SCNC: 135 MMOL/L (ref 132–146)
TOTAL PROTEIN: 6.2 G/DL (ref 6.4–8.3)

## 2021-03-05 PROCEDURE — 82570 ASSAY OF URINE CREATININE: CPT

## 2021-03-05 PROCEDURE — 36415 COLL VENOUS BLD VENIPUNCTURE: CPT

## 2021-03-05 PROCEDURE — 84156 ASSAY OF PROTEIN URINE: CPT

## 2021-03-05 PROCEDURE — 80053 COMPREHEN METABOLIC PANEL: CPT

## 2021-03-06 DIAGNOSIS — E87.6 HYPOKALEMIA: Primary | ICD-10-CM

## 2021-03-06 RX ORDER — POTASSIUM CHLORIDE 20 MEQ/1
20 TABLET, EXTENDED RELEASE ORAL DAILY
Qty: 5 TABLET | Refills: 0 | Status: SHIPPED | OUTPATIENT
Start: 2021-03-06 | End: 2021-05-17 | Stop reason: SDUPTHER

## 2021-03-08 ENCOUNTER — TELEPHONE (OUTPATIENT)
Dept: FAMILY MEDICINE CLINIC | Age: 51
End: 2021-03-08

## 2021-03-08 DIAGNOSIS — Z01.89 ROUTINE LAB DRAW: Primary | ICD-10-CM

## 2021-03-08 NOTE — TELEPHONE ENCOUNTER
Lulu called in regarding her labs and her new medication, she is asking if you could please call her back.   Thank you

## 2021-03-24 ENCOUNTER — OFFICE VISIT (OUTPATIENT)
Dept: FAMILY MEDICINE CLINIC | Age: 51
End: 2021-03-24
Payer: COMMERCIAL

## 2021-03-24 ENCOUNTER — TELEPHONE (OUTPATIENT)
Dept: FAMILY MEDICINE CLINIC | Age: 51
End: 2021-03-24

## 2021-03-24 VITALS
BODY MASS INDEX: 30.02 KG/M2 | RESPIRATION RATE: 16 BRPM | HEART RATE: 99 BPM | SYSTOLIC BLOOD PRESSURE: 147 MMHG | WEIGHT: 159 LBS | OXYGEN SATURATION: 97 % | DIASTOLIC BLOOD PRESSURE: 91 MMHG | TEMPERATURE: 97.9 F | HEIGHT: 61 IN

## 2021-03-24 DIAGNOSIS — N28.9 NEPHROPATHY: ICD-10-CM

## 2021-03-24 DIAGNOSIS — E87.6 HYPOKALEMIA: ICD-10-CM

## 2021-03-24 DIAGNOSIS — Z11.59 ENCOUNTER FOR HEPATITIS C SCREENING TEST FOR LOW RISK PATIENT: ICD-10-CM

## 2021-03-24 DIAGNOSIS — Z79.4 TYPE 2 DIABETES MELLITUS WITH OTHER SPECIFIED COMPLICATION, WITH LONG-TERM CURRENT USE OF INSULIN (HCC): ICD-10-CM

## 2021-03-24 DIAGNOSIS — F32.A DEPRESSION, UNSPECIFIED DEPRESSION TYPE: ICD-10-CM

## 2021-03-24 DIAGNOSIS — M25.50 POLYARTHRALGIA: ICD-10-CM

## 2021-03-24 DIAGNOSIS — I10 HYPERTENSION, UNSPECIFIED TYPE: Primary | ICD-10-CM

## 2021-03-24 DIAGNOSIS — E11.69 TYPE 2 DIABETES MELLITUS WITH OTHER SPECIFIED COMPLICATION, WITH LONG-TERM CURRENT USE OF INSULIN (HCC): ICD-10-CM

## 2021-03-24 LAB
ANION GAP SERPL CALCULATED.3IONS-SCNC: 11 MMOL/L (ref 7–16)
BUN BLDV-MCNC: 18 MG/DL (ref 6–20)
CALCIUM SERPL-MCNC: 8.7 MG/DL (ref 8.6–10.2)
CHLORIDE BLD-SCNC: 100 MMOL/L (ref 98–107)
CHOLESTEROL, TOTAL: 272 MG/DL (ref 0–199)
CO2: 19 MMOL/L (ref 22–29)
CREAT SERPL-MCNC: 0.7 MG/DL (ref 0.5–1)
GFR AFRICAN AMERICAN: >60
GFR NON-AFRICAN AMERICAN: >60 ML/MIN/1.73
GLUCOSE BLD-MCNC: 658 MG/DL (ref 74–99)
HBA1C MFR BLD: 14.7 % (ref 4–5.6)
HDLC SERPL-MCNC: 26 MG/DL
LDL CHOLESTEROL CALCULATED: ABNORMAL MG/DL (ref 0–99)
POTASSIUM SERPL-SCNC: 4.5 MMOL/L (ref 3.5–5)
SODIUM BLD-SCNC: 130 MMOL/L (ref 132–146)
TRIGL SERPL-MCNC: 551 MG/DL (ref 0–149)
VLDLC SERPL CALC-MCNC: ABNORMAL MG/DL

## 2021-03-24 PROCEDURE — G8427 DOCREV CUR MEDS BY ELIG CLIN: HCPCS | Performed by: FAMILY MEDICINE

## 2021-03-24 PROCEDURE — 3017F COLORECTAL CA SCREEN DOC REV: CPT | Performed by: FAMILY MEDICINE

## 2021-03-24 PROCEDURE — G8484 FLU IMMUNIZE NO ADMIN: HCPCS | Performed by: FAMILY MEDICINE

## 2021-03-24 PROCEDURE — 36415 COLL VENOUS BLD VENIPUNCTURE: CPT | Performed by: FAMILY MEDICINE

## 2021-03-24 PROCEDURE — 99213 OFFICE O/P EST LOW 20 MIN: CPT | Performed by: STUDENT IN AN ORGANIZED HEALTH CARE EDUCATION/TRAINING PROGRAM

## 2021-03-24 PROCEDURE — 99212 OFFICE O/P EST SF 10 MIN: CPT | Performed by: STUDENT IN AN ORGANIZED HEALTH CARE EDUCATION/TRAINING PROGRAM

## 2021-03-24 PROCEDURE — 1036F TOBACCO NON-USER: CPT | Performed by: FAMILY MEDICINE

## 2021-03-24 PROCEDURE — 3046F HEMOGLOBIN A1C LEVEL >9.0%: CPT | Performed by: FAMILY MEDICINE

## 2021-03-24 PROCEDURE — 2022F DILAT RTA XM EVC RTNOPTHY: CPT | Performed by: FAMILY MEDICINE

## 2021-03-24 PROCEDURE — G8417 CALC BMI ABV UP PARAM F/U: HCPCS | Performed by: FAMILY MEDICINE

## 2021-03-24 RX ORDER — LISINOPRIL 10 MG/1
10 TABLET ORAL DAILY
Qty: 30 TABLET | Refills: 0 | Status: SHIPPED
Start: 2021-03-24 | End: 2021-04-15

## 2021-03-24 RX ORDER — ADHESIVE BANDAGE 3/4"
1 BANDAGE TOPICAL 2 TIMES DAILY
Qty: 1 EACH | Refills: 0 | Status: CANCELLED | OUTPATIENT
Start: 2021-03-24 | End: 2021-04-23

## 2021-03-24 RX ORDER — BLOOD PRESSURE TEST KIT
1 KIT MISCELLANEOUS 2 TIMES DAILY
Qty: 1 KIT | Refills: 0 | Status: SHIPPED | OUTPATIENT
Start: 2021-03-24

## 2021-03-24 SDOH — ECONOMIC STABILITY: TRANSPORTATION INSECURITY
IN THE PAST 12 MONTHS, HAS LACK OF TRANSPORTATION KEPT YOU FROM MEETINGS, WORK, OR FROM GETTING THINGS NEEDED FOR DAILY LIVING?: YES

## 2021-03-24 SDOH — ECONOMIC STABILITY: TRANSPORTATION INSECURITY
IN THE PAST 12 MONTHS, HAS THE LACK OF TRANSPORTATION KEPT YOU FROM MEDICAL APPOINTMENTS OR FROM GETTING MEDICATIONS?: YES

## 2021-03-24 NOTE — PROGRESS NOTES
S: 49 y.o. female here for HTN. Had not been taking lisinopril for a while due to low BPs. Has been taking lisinopril now since the 8th, but didn't take it this AM.   Wants Rx for BP cuff. Has limited funds. No job in a year. Sees [place] [facility] for depression. On cymbalta. No SI or HI. Follows w/ Endo for DM. HTN nephropathy per Nephro. O: VS: BP (!) 147/91   Pulse 99   Temp 97.9 °F (36.6 °C) (Temporal)   Resp 16   Ht 5' 1\" (1.549 m)   Wt 159 lb (72.1 kg)   SpO2 97%   BMI 30.04 kg/m²    General: NAD, alert and interacting appropriately. Pallor noted. CV:  RRR, no gallops, rubs, or murmurs. No conjunctival pallor. Resp: CTAB   Abd:  Soft, nontender   Ext:  No edema. Sensory exam of the foot is normal, tested with the monofilament. Good pulses, no lesions or ulcers, good peripheral pulses. Impression: HTN, HTN nephropathy, DM, depression  Plan:   Continue lisinopril at 10, get BP cuff and report numbers  F/u w/ Nephro  F/u w/ Endo  F/u w/ Psych  Hep C, lipids    Attending Physician Statement  I have discussed the case, including pertinent history and exam findings with the resident. I agree with the documented assessment and plan.

## 2021-03-24 NOTE — PROGRESS NOTES
CC:  Elevated BP/BP Cuff Request    HPI:  49 y.o. woman presents for elevated BP reading (after seeing Nephrology March 2021). Requests DME order for BP cuff. No CP, SOB, vision changes, palpitations, swelling in legs. Following Nephrology for HTN Nephropathy, next appointment in 1 month. DM2 - [facility] following. No recent A1C. Gone annual diabetic eye exam in 2021. Due diabetic foot exam.    Stresses- no money, TP, cleaning supplies. [facility]. Constant SI, no HI. Hears high-pitched sounds sometimes. Denies substance use. Talked to [facility] and other  before. Only on Cymabalta. Not interested in other meds at this time \"on too many meds. \"    Patient Active Problem List    Diagnosis Date Noted    Mild depression ([facility] 10/02/2020    Severe obstructive sleep apnea 07/16/2020    Gastroparesis 12/06/2018    Infective urethritis 06/28/2018    Acquired hypothyroidism 12/20/2016    Vitamin D deficiency 12/20/2016    Hyperglycemia     Microcytic anemia     Diabetic ketosis ([address] 09/01/2015    Type 2 diabetes mellitus with complication, with long-term current use of insulin ([address] 05/30/2014    Depression 05/30/2014    Rheumatoid arthritis ([address] 05/30/2014       Past Medical History:   Diagnosis Date    Anxiety     Arthritis     Depression     Diabetes mellitus ([facility])     Left shoulder pain     LIZABETH (obstructive sleep apnea)     Thyroid disease        Current Outpatient Medications on File Prior to Visit   Medication Sig Dispense Refill    naproxen (NAPROSYN) 250 MG tablet TAKE 1 TABLET BY MOUTH TWICE A DAY WITH MEALS 60 tablet 5    LANTUS SOLOSTAR 100 UNIT/ML injection pen Inject 45 Units into the skin 2 times daily       HUMALOG KWIKPEN 200 UNIT/ML SOPN pen INJECT 22 62 UNITS 2 3 TIMES A DAY WITH MEALS PER SLIDING SCALE UP  UNITS      atorvastatin (LIPITOR) 80 MG tablet TAKE 1 TABLET BY MOUTH EVERYDAY AT BEDTIME      canagliflozin (INVOKANA) 300 MG TABS tablet Take 300 mg by mouth every morning (before breakfast)       DULoxetine (CYMBALTA) 30 MG extended release capsule TAKE 1 CAPSULE BY MOUTH EVERY EVENING 60MG IN AM   30MG IN PM   90MG/DAY      OZEMPIC, 1 MG/DOSE, 2 MG/1.5ML SOPN Inject 1 mg into the skin once a week       empagliflozin (JARDIANCE) 25 MG tablet Take 25 mg by mouth daily ACM verified with Dr Ruelas Foot office on 2/26/2020      metFORMIN (GLUCOPHAGE) 1000 MG tablet Take 1,000 mg by mouth 2 times daily (with meals) ACM verified with Dr Ruelas Foot office on 2/26/2020       hydroxychloroquine (PLAQUENIL) 200 MG tablet Take 200 mg by mouth 2 times daily      diclofenac sodium 1 % GEL Apply 4 g topically 4 times daily 4 Tube 1    levothyroxine (SYNTHROID) 88 MCG tablet Take 1 tablet by mouth daily      HUMULIN R U-500 KWIKPEN 500 UNIT/ML SOPN concentrated injection pen Inject 100 Units into the skin every morning 85 Units in the  in the PM      vitamin D (ERGOCALCIFEROL) 76936 units CAPS capsule Take 50,000 Units by mouth once a week      pregabalin (LYRICA) 100 MG capsule Take 100 mg by mouth 3 times daily.  Omega-3 Fatty Acids (FISH OIL) 1000 MG CAPS Take 1,000 mg by mouth 4 times daily      methotrexate (RHEUMATREX) 2.5 MG chemo tablet Take 15 mg by mouth once a week      folic acid (FOLVITE) 1 MG tablet Take 1 tablet by mouth daily 30 tablet 1    FREESTYLE LITE strip USE AS DIRECTED 100 strip 3    FREESTYLE LANCETS MISC CHECK BS 4 TIMES DAILY 100 each 3    Multiple Vitamins-Minerals (MULTIVITAMIN WITH MINERALS) tablet Take 1 tablet by mouth daily. 30 tablet 3    Insulin Pen Needle 31G X 8 MM MISC 1 each by Does not apply route daily.  100 each 5    potassium chloride (KLOR-CON M) 20 MEQ extended release tablet Take 1 tablet by mouth daily for 5 days (Patient not taking: Reported on 3/24/2021) 5 tablet 0    lisinopril (PRINIVIL;ZESTRIL) 10 MG tablet Take 10 mg by mouth daily       No current facility-administered medications on file prior to visit. Allergies   Allergen Reactions    Amoxicillin      Hives all over body, states that hives are severe     Sulfa Antibiotics     Cefdinir        Family History   Adopted: Yes   Family history unknown: Yes       Past Surgical History:   Procedure Laterality Date    SHOULDER ARTHROSCOPY Right 06/16/2017       Social History     Tobacco Use    Smoking status: Former Smoker    Smokeless tobacco: Never Used   Substance Use Topics    Alcohol use: No    Drug use: No       ROS:    Review of Systems   Constitutional: Negative for activity change, appetite change, chills and fatigue. HENT: Negative for congestion and sore throat. Respiratory: Negative for choking and chest tightness. Cardiovascular: Negative for chest pain, palpitations and leg swelling. Gastrointestinal: Negative for abdominal distention and abdominal pain. Genitourinary: Negative for difficulty urinating and dysuria. Musculoskeletal: Negative for arthralgias and back pain. Skin: Negative for color change and pallor. Neurological: Negative for facial asymmetry and headaches. Psychiatric/Behavioral: +low mood/anxious, negative for SI, HI      Objective:    VS:  Blood pressure (!) 147/91, pulse 99, temperature 97.9 °F (36.6 °C), temperature source Temporal, resp. rate 16, height 5' 1\" (1.549 m), weight 159 lb (72.1 kg), SpO2 97 %, not currently breastfeeding. Physical Exam   Constitutional: She is oriented to person, place, and time. She appears well-developed and well-nourished. Pale appearing   HENT:   Head: Normocephalic and atraumatic. Cardiovascular: Normal rate, regular rhythm and normal heart sounds. Exam reveals no gallop and no friction rub. No murmur heard. Pulmonary/Chest: Breath sounds normal. She has no wheezes. She has no rales. She exhibits no tenderness. Abdominal: Soft. Bowel sounds are normal. She exhibits no distension. There is no abdominal tenderness.    Musculoskeletal: Normal range of motion. General: No edema. Neurological: She is alert and oriented to person, place, and time. Skin: Skin is warm and dry. No rash noted. Psychiatric: She has a normal mood and affect. Vitals reviewed. Assessment:     Diagnosis Orders   1. Hypertension, unspecified type  lisinopril (PRINIVIL;ZESTRIL) 10 MG tablet    Blood Pressure KIT   2. Type 2 diabetes mellitus with other specified complication, with long-term current use of insulin ([facility])  LIPID PANEL    HEMOGLOBIN A1C    Diabetic Foot Exam   3. Depression, unspecified depression type           Plans:  As above. RTO in 2 months for follow-up of HTN. Please see Patient Instructions for further counseling and information given. Advised to please be adherent to the treatment plans discussed today, and please call with any questions or concerns, letting the office know of any reasons that the plans may not be followed. The risks of untreated conditions include worsening illness, injury, disability, and possibly, death. Please call if symptoms change in any way, worsen, or fail to completely resolve, as this could necessitate a change to treatment plans. Patient and/or caregiver expressed understanding. Indications and proper use of medication(s) reviewed. Potential side-effects and risks of medication(s) also explained. Patient and/or caregiver was instructed to call if any new symptoms develop prior to next visit. Health risk factors discussed and addressed.

## 2021-03-25 DIAGNOSIS — Z11.59 ENCOUNTER FOR HEPATITIS C SCREENING TEST FOR LOW RISK PATIENT: ICD-10-CM

## 2021-03-25 DIAGNOSIS — N28.9 NEPHROPATHY: ICD-10-CM

## 2021-03-25 NOTE — TELEPHONE ENCOUNTER
Received critical result of glucose 658 on BMP checked earlier today. Called pt to notify, has not taken insulin or checked BGL at home. Advised pt to go to ER for further workup and management. Verbalized understanding.

## 2021-03-29 LAB — HEPATITIS C ANTIBODY INTERPRETATION: NORMAL

## 2021-05-16 ENCOUNTER — APPOINTMENT (OUTPATIENT)
Dept: ULTRASOUND IMAGING | Age: 51
End: 2021-05-16
Payer: COMMERCIAL

## 2021-05-16 ENCOUNTER — APPOINTMENT (OUTPATIENT)
Dept: GENERAL RADIOLOGY | Age: 51
End: 2021-05-16
Payer: COMMERCIAL

## 2021-05-16 LAB
ALBUMIN SERPL-MCNC: 3.1 G/DL (ref 3.5–5.2)
ALP BLD-CCNC: 130 U/L (ref 35–104)
ALT SERPL-CCNC: 16 U/L (ref 0–32)
ANION GAP SERPL CALCULATED.3IONS-SCNC: 11 MMOL/L (ref 7–16)
AST SERPL-CCNC: 15 U/L (ref 0–31)
BILIRUB SERPL-MCNC: <0.2 MG/DL (ref 0–1.2)
BUN BLDV-MCNC: 22 MG/DL (ref 6–20)
CALCIUM SERPL-MCNC: 9.2 MG/DL (ref 8.6–10.2)
CHLORIDE BLD-SCNC: 107 MMOL/L (ref 98–107)
CO2: 23 MMOL/L (ref 22–29)
CREAT SERPL-MCNC: 0.8 MG/DL (ref 0.5–1)
GFR AFRICAN AMERICAN: >60
GFR NON-AFRICAN AMERICAN: >60 ML/MIN/1.73
GLUCOSE BLD-MCNC: 167 MG/DL (ref 74–99)
POTASSIUM SERPL-SCNC: 3.4 MMOL/L (ref 3.5–5)
PRO-BNP: 298 PG/ML (ref 0–125)
SODIUM BLD-SCNC: 141 MMOL/L (ref 132–146)
TOTAL PROTEIN: 6.7 G/DL (ref 6.4–8.3)
TROPONIN: <0.01 NG/ML (ref 0–0.03)

## 2021-05-16 PROCEDURE — 83880 ASSAY OF NATRIURETIC PEPTIDE: CPT

## 2021-05-16 PROCEDURE — 71046 X-RAY EXAM CHEST 2 VIEWS: CPT

## 2021-05-16 PROCEDURE — 93970 EXTREMITY STUDY: CPT

## 2021-05-16 PROCEDURE — 80053 COMPREHEN METABOLIC PANEL: CPT

## 2021-05-16 PROCEDURE — 93005 ELECTROCARDIOGRAM TRACING: CPT | Performed by: NURSE PRACTITIONER

## 2021-05-16 PROCEDURE — 93970 EXTREMITY STUDY: CPT | Performed by: RADIOLOGY

## 2021-05-16 PROCEDURE — 84484 ASSAY OF TROPONIN QUANT: CPT

## 2021-05-16 PROCEDURE — 99283 EMERGENCY DEPT VISIT LOW MDM: CPT

## 2021-05-16 ASSESSMENT — PAIN SCALES - GENERAL: PAINLEVEL_OUTOF10: 5

## 2021-05-17 ENCOUNTER — HOSPITAL ENCOUNTER (EMERGENCY)
Age: 51
Discharge: HOME | End: 2021-05-17
Payer: COMMERCIAL

## 2021-05-17 ENCOUNTER — TELEPHONE (OUTPATIENT)
Dept: FAMILY MEDICINE CLINIC | Age: 51
End: 2021-05-17

## 2021-05-17 VITALS
RESPIRATION RATE: 16 BRPM | TEMPERATURE: 98.2 F | WEIGHT: 157 LBS | OXYGEN SATURATION: 100 % | DIASTOLIC BLOOD PRESSURE: 100 MMHG | HEIGHT: 61 IN | SYSTOLIC BLOOD PRESSURE: 169 MMHG | HEART RATE: 99 BPM | BODY MASS INDEX: 29.64 KG/M2

## 2021-05-17 DIAGNOSIS — R22.43 LOCALIZED SWELLING OF BOTH LOWER LEGS: Primary | ICD-10-CM

## 2021-05-17 DIAGNOSIS — R60.9 PERIPHERAL EDEMA: Primary | ICD-10-CM

## 2021-05-17 DIAGNOSIS — E87.6 HYPOKALEMIA: ICD-10-CM

## 2021-05-17 LAB
EKG ATRIAL RATE: 100 BPM
EKG P AXIS: 42 DEGREES
EKG P-R INTERVAL: 168 MS
EKG Q-T INTERVAL: 366 MS
EKG QRS DURATION: 74 MS
EKG QTC CALCULATION (BAZETT): 472 MS
EKG R AXIS: 12 DEGREES
EKG T AXIS: 46 DEGREES
EKG VENTRICULAR RATE: 100 BPM

## 2021-05-17 PROCEDURE — 93010 ELECTROCARDIOGRAM REPORT: CPT | Performed by: INTERNAL MEDICINE

## 2021-05-17 RX ORDER — POTASSIUM CHLORIDE 20 MEQ/1
40 TABLET, EXTENDED RELEASE ORAL DAILY
Qty: 5 TABLET | Refills: 0 | Status: SHIPPED
Start: 2021-05-17 | End: 2021-07-29 | Stop reason: ALTCHOICE

## 2021-05-17 RX ORDER — FUROSEMIDE 20 MG/1
40 TABLET ORAL DAILY
Qty: 20 TABLET | Refills: 0 | Status: SHIPPED
Start: 2021-05-17 | End: 2021-07-12 | Stop reason: ALTCHOICE

## 2021-05-17 NOTE — TELEPHONE ENCOUNTER
Please assist me in calling patient,to let her know I ordered lasix, she needs to take her potassium supplement with it. I have also refilled her potassium supplement.     Thank you

## 2021-05-17 NOTE — ED TRIAGE NOTES
FIRST PROVIDER CONTACT ASSESSMENT NOTE        Department of Emergency Medicine            ED  First Provider Note            5/16/21  9:33 PM EDT    Chief Complaint: Leg Swelling (ble, noticed this morning, denies chest pain and sob. )      History of Present Illness:    Mulu Carlton is a 49 y.o. female who presents to the emergency department for BLE swelling  Focused Screening Exam:  Constitutional:  Alert, appears stated age and is in no distress.     *ALLERGIES*     Amoxicillin, Sulfa antibiotics, and Cefdinir     ED Triage Vitals [05/16/21 1957]   BP Temp Temp src Pulse Resp SpO2 Height Weight   (!) 163/94 -- -- 103 16 100 % 5' 1\" (1.549 m) 157 lb (71.2 kg)        Initial Plan of Care:  Initiate Treatment-Testing, Proceed toTreatment Area When Bed Available for ED Attending/MLP to Continue Care    -----------------END OF FIRST PROVIDER CONTACT ASSESSMENT NOTE--------------  Electronically signed by ANA Yancey CNP   DD: 5/16/21

## 2021-05-17 NOTE — ED PROVIDER NOTES
[address]  Department of Emergency Medicine   ED  Encounter Note  Admit Date/RoomTime: 2021 12:01 AM  ED Room: ED-SCR/SCR    NAME: Martin Aranda  : 1970  MRN: [id number]     Chief Complaint:  Leg Swelling (ble, noticed this morning, denies chest pain and sob. )    History of Present Illness       Lulu Espinoza is a 49 y.o. old female who presents to the emergency department by ambulance, for non-traumatic Bilateral leg pain edema occured several hour(s) prior to arrival.   The complaint is due to no known cause. Her weight bearing status is normal.  Patient has no prior history of pain/injury with regards to today's visit. Since onset the symptoms have been stable. Her pain is aggraveated by any movement and relieved by nothing, as no treatment has been provided prior to this visit. She denies any fall/injury, chest pain, shortness of breath. She denies any redness or wounds to the bilateral lower extremities. She denies any previous history of VTE.    ROS   Pertinent positives and negatives are stated within HPI, all other systems reviewed and are negative. Past Medical History:  has a past medical history of Anxiety, Arthritis, Depression, Diabetes mellitus ([address] Hypertensive chronic kidney disease, Left shoulder pain, LIZABETH (obstructive sleep apnea), and Thyroid disease. Surgical History:  has a past surgical history that includes Shoulder arthroscopy (Right, 2017). Social History:  reports that she has quit smoking. She has never used smokeless tobacco. She reports that she does not drink alcohol and does not use drugs. Family History: She was adopted. Family history is unknown by patient.      Allergies: Amoxicillin, Sulfa antibiotics, and Cefdinir    Physical Exam   Oxygen Saturation Interpretation: Normal.        ED Triage Vitals   BP Temp Temp src Pulse Resp SpO2 Height Weight   21 0001 -- 21 1957 05/16/21 1957 05/16/21 1957 05/16/21 1957   (!) 163/94 98.2 °F (36.8 °C)  103 16 100 % 5' 1\" (1.549 m) 157 lb (71.2 kg)         Constitutional:  Alert, development consistent with age. HEENT:  NC/NT. Airway patent. Neck:  Normal ROM. Supple. Extremity(s):  Bilateral: lower extremity               Tenderness:  mild. Swelling: Mild. Calf:  Negative Varinder's sign. No cords or calf tenderness. Calf/Ankle edema is present. .             Deformity: No.                ROM: full range of motion. Skin:  normal exam; no wounds, erythema, or swelling. Neurovascular: Motor deficit: none. Sensory deficit: none. Pulse deficit: none. Capillary refill: normal.  Gait:  normal.  Lymphatics: No lymphangitis or adenopathy noted. Neurological:  Oriented x3. Motor functions intact.     Lab / Imaging Results   (All laboratory and radiology results have been personally reviewed by myself)  Labs:  Results for orders placed or performed during the hospital encounter of 05/17/21   Comprehensive Metabolic Panel   Result Value Ref Range    Sodium 141 132 - 146 mmol/L    Potassium 3.4 (L) 3.5 - 5.0 mmol/L    Chloride 107 98 - 107 mmol/L    CO2 23 22 - 29 mmol/L    Anion Gap 11 7 - 16 mmol/L    Glucose 167 (H) 74 - 99 mg/dL    BUN 22 (H) 6 - 20 mg/dL    CREATININE 0.8 0.5 - 1.0 mg/dL    GFR Non-African American >60 >=60 mL/min/1.73    GFR African American >60     Calcium 9.2 8.6 - 10.2 mg/dL    Total Protein 6.7 6.4 - 8.3 g/dL    Albumin 3.1 (L) 3.5 - 5.2 g/dL    Total Bilirubin <0.2 0.0 - 1.2 mg/dL    Alkaline Phosphatase 130 (H) 35 - 104 U/L    ALT 16 0 - 32 U/L    AST 15 0 - 31 U/L   Brain Natriuretic Peptide   Result Value Ref Range    Pro- (H) 0 - 125 pg/mL   Troponin   Result Value Ref Range    Troponin <0.01 0.00 - 0.03 ng/mL   EKG 12 Lead   Result Value Ref Range    Ventricular Rate 100 BPM    Atrial Rate 100 BPM    P-R Interval

## 2021-05-18 DIAGNOSIS — E87.6 HYPOKALEMIA: Primary | ICD-10-CM

## 2021-05-18 RX ORDER — POTASSIUM CHLORIDE 20 MEQ/1
20 TABLET, EXTENDED RELEASE ORAL 2 TIMES DAILY
Qty: 20 TABLET | Refills: 0 | Status: SHIPPED
Start: 2021-05-18 | End: 2021-07-12 | Stop reason: ALTCHOICE

## 2021-06-07 ENCOUNTER — HOSPITAL ENCOUNTER (OUTPATIENT)
Age: 51
Discharge: HOME | End: 2021-06-07
Payer: COMMERCIAL

## 2021-06-07 LAB
ALBUMIN SERPL-MCNC: 3.3 G/DL (ref 3.5–5.2)
ALP BLD-CCNC: 121 U/L (ref 35–104)
ALT SERPL-CCNC: 9 U/L (ref 0–32)
ANION GAP SERPL CALCULATED.3IONS-SCNC: 13 MMOL/L (ref 7–16)
AST SERPL-CCNC: 10 U/L (ref 0–31)
BASOPHILS ABSOLUTE: 0.06 E9/L (ref 0–0.2)
BASOPHILS RELATIVE PERCENT: 0.5 % (ref 0–2)
BILIRUB SERPL-MCNC: <0.2 MG/DL (ref 0–1.2)
BUN BLDV-MCNC: 13 MG/DL (ref 6–20)
CALCIUM SERPL-MCNC: 8.7 MG/DL (ref 8.6–10.2)
CHLORIDE BLD-SCNC: 103 MMOL/L (ref 98–107)
CHOLESTEROL, TOTAL: 229 MG/DL (ref 0–199)
CO2: 18 MMOL/L (ref 22–29)
CREAT SERPL-MCNC: 0.7 MG/DL (ref 0.5–1)
CREATININE URINE: 50 MG/DL (ref 29–226)
EOSINOPHILS ABSOLUTE: 0.47 E9/L (ref 0.05–0.5)
EOSINOPHILS RELATIVE PERCENT: 3.7 % (ref 0–6)
GFR AFRICAN AMERICAN: >60
GFR NON-AFRICAN AMERICAN: >60 ML/MIN/1.73
GLUCOSE BLD-MCNC: 364 MG/DL (ref 74–99)
HBA1C MFR BLD: 14.3 % (ref 4–5.6)
HCT VFR BLD CALC: 29.2 % (ref 34–48)
HDLC SERPL-MCNC: 37 MG/DL
HEMOGLOBIN: 8.8 G/DL (ref 11.5–15.5)
IMMATURE GRANULOCYTES #: 0.28 E9/L
IMMATURE GRANULOCYTES %: 2.2 % (ref 0–5)
LACTIC ACID: 2.2 MMOL/L (ref 0.5–2.2)
LDL CHOLESTEROL CALCULATED: 147 MG/DL (ref 0–99)
LYMPHOCYTES ABSOLUTE: 3.11 E9/L (ref 1.5–4)
LYMPHOCYTES RELATIVE PERCENT: 24.4 % (ref 20–42)
MAGNESIUM: 2.1 MG/DL (ref 1.6–2.6)
MCH RBC QN AUTO: 22.9 PG (ref 26–35)
MCHC RBC AUTO-ENTMCNC: 30.1 % (ref 32–34.5)
MCV RBC AUTO: 76 FL (ref 80–99.9)
MONOCYTES ABSOLUTE: 0.53 E9/L (ref 0.1–0.95)
MONOCYTES RELATIVE PERCENT: 4.2 % (ref 2–12)
NEUTROPHILS ABSOLUTE: 8.3 E9/L (ref 1.8–7.3)
NEUTROPHILS RELATIVE PERCENT: 65 % (ref 43–80)
PDW BLD-RTO: 15 FL (ref 11.5–15)
PHOSPHORUS: 4 MG/DL (ref 2.5–4.5)
PLATELET # BLD: 300 E9/L (ref 130–450)
PMV BLD AUTO: 9.4 FL (ref 7–12)
POTASSIUM SERPL-SCNC: 3.8 MMOL/L (ref 3.5–5)
PROTEIN PROTEIN: 452 MG/DL (ref 0–12)
PROTEIN/CREAT RATIO: 9
PROTEIN/CREAT RATIO: 9 (ref 0–0.2)
RBC # BLD: 3.84 E12/L (ref 3.5–5.5)
SODIUM BLD-SCNC: 134 MMOL/L (ref 132–146)
T4 FREE: 0.77 NG/DL (ref 0.93–1.7)
TOTAL PROTEIN: 6.6 G/DL (ref 6.4–8.3)
TRIGL SERPL-MCNC: 227 MG/DL (ref 0–149)
TSH SERPL DL<=0.05 MIU/L-ACNC: 7.46 UIU/ML (ref 0.27–4.2)
URIC ACID, SERUM: 5.7 MG/DL (ref 2.4–5.7)
VITAMIN B-12: 480 PG/ML (ref 211–946)
VITAMIN D 25-HYDROXY: 13 NG/ML (ref 30–100)
VLDLC SERPL CALC-MCNC: 45 MG/DL
WBC # BLD: 12.8 E9/L (ref 4.5–11.5)

## 2021-06-07 PROCEDURE — 85025 COMPLETE CBC W/AUTO DIFF WBC: CPT

## 2021-06-07 PROCEDURE — 36415 COLL VENOUS BLD VENIPUNCTURE: CPT

## 2021-06-07 PROCEDURE — 82607 VITAMIN B-12: CPT

## 2021-06-07 PROCEDURE — 83036 HEMOGLOBIN GLYCOSYLATED A1C: CPT

## 2021-06-07 PROCEDURE — 84439 ASSAY OF FREE THYROXINE: CPT

## 2021-06-07 PROCEDURE — 80053 COMPREHEN METABOLIC PANEL: CPT

## 2021-06-07 PROCEDURE — 82306 VITAMIN D 25 HYDROXY: CPT

## 2021-06-07 PROCEDURE — 84443 ASSAY THYROID STIM HORMONE: CPT

## 2021-06-07 PROCEDURE — 84100 ASSAY OF PHOSPHORUS: CPT

## 2021-06-07 PROCEDURE — 84156 ASSAY OF PROTEIN URINE: CPT

## 2021-06-07 PROCEDURE — 82570 ASSAY OF URINE CREATININE: CPT

## 2021-06-07 PROCEDURE — 83735 ASSAY OF MAGNESIUM: CPT

## 2021-06-07 PROCEDURE — 80061 LIPID PANEL: CPT

## 2021-06-07 PROCEDURE — 83605 ASSAY OF LACTIC ACID: CPT

## 2021-06-07 PROCEDURE — 84550 ASSAY OF BLOOD/URIC ACID: CPT

## 2021-06-17 ENCOUNTER — OFFICE VISIT (OUTPATIENT)
Dept: FAMILY MEDICINE CLINIC | Age: 51
End: 2021-06-17
Payer: COMMERCIAL

## 2021-06-17 VITALS
SYSTOLIC BLOOD PRESSURE: 138 MMHG | HEART RATE: 98 BPM | HEIGHT: 61 IN | OXYGEN SATURATION: 98 % | DIASTOLIC BLOOD PRESSURE: 82 MMHG | TEMPERATURE: 98 F | WEIGHT: 166 LBS | BODY MASS INDEX: 31.34 KG/M2

## 2021-06-17 DIAGNOSIS — L50.9 HIVES: ICD-10-CM

## 2021-06-17 DIAGNOSIS — E11.69 TYPE 2 DIABETES MELLITUS WITH OTHER SPECIFIED COMPLICATION, UNSPECIFIED WHETHER LONG TERM INSULIN USE (HCC): Primary | ICD-10-CM

## 2021-06-17 DIAGNOSIS — M25.473 ANKLE SWELLING, UNSPECIFIED LATERALITY: ICD-10-CM

## 2021-06-17 DIAGNOSIS — F41.9 ANXIETY: ICD-10-CM

## 2021-06-17 PROBLEM — E11.22 TYPE 2 DIABETES MELLITUS WITH CHRONIC KIDNEY DISEASE (HCC): Status: ACTIVE | Noted: 2021-03-08

## 2021-06-17 PROBLEM — E16.2 HYPOGLYCEMIA: Status: ACTIVE | Noted: 2021-06-17

## 2021-06-17 PROBLEM — N18.1 CHRONIC KIDNEY DISEASE, STAGE I: Status: ACTIVE | Noted: 2021-03-08

## 2021-06-17 PROBLEM — E78.5 HYPERLIPIDEMIA: Status: ACTIVE | Noted: 2021-03-08

## 2021-06-17 PROBLEM — E87.6 HYPOKALEMIA: Status: ACTIVE | Noted: 2021-03-10

## 2021-06-17 PROBLEM — Z79.4 ENCOUNTER FOR LONG-TERM (CURRENT) USE OF INSULIN (HCC): Status: ACTIVE | Noted: 2021-03-08

## 2021-06-17 PROBLEM — D64.9 ANEMIA: Status: ACTIVE | Noted: 2021-06-16

## 2021-06-17 PROBLEM — E11.21: Status: ACTIVE | Noted: 2021-03-08

## 2021-06-17 PROBLEM — E03.9 HYPOTHYROIDISM: Status: ACTIVE | Noted: 2021-03-08

## 2021-06-17 PROBLEM — M79.7 FIBROMYALGIA: Status: ACTIVE | Noted: 2021-03-08

## 2021-06-17 PROBLEM — I12.9 BENIGN HYPERTENSIVE KIDNEY DISEASE WITH CHRONIC KIDNEY DISEASE: Status: ACTIVE | Noted: 2021-03-08

## 2021-06-17 PROBLEM — R80.9 PROTEINURIA: Status: ACTIVE | Noted: 2021-06-16

## 2021-06-17 PROBLEM — U07.1 COVID-19: Status: ACTIVE | Noted: 2021-06-17

## 2021-06-17 PROCEDURE — 1036F TOBACCO NON-USER: CPT | Performed by: FAMILY MEDICINE

## 2021-06-17 PROCEDURE — 99212 OFFICE O/P EST SF 10 MIN: CPT | Performed by: STUDENT IN AN ORGANIZED HEALTH CARE EDUCATION/TRAINING PROGRAM

## 2021-06-17 PROCEDURE — 2022F DILAT RTA XM EVC RTNOPTHY: CPT | Performed by: FAMILY MEDICINE

## 2021-06-17 PROCEDURE — G8427 DOCREV CUR MEDS BY ELIG CLIN: HCPCS | Performed by: FAMILY MEDICINE

## 2021-06-17 PROCEDURE — G8417 CALC BMI ABV UP PARAM F/U: HCPCS | Performed by: FAMILY MEDICINE

## 2021-06-17 PROCEDURE — 3017F COLORECTAL CA SCREEN DOC REV: CPT | Performed by: FAMILY MEDICINE

## 2021-06-17 PROCEDURE — 3046F HEMOGLOBIN A1C LEVEL >9.0%: CPT | Performed by: FAMILY MEDICINE

## 2021-06-17 PROCEDURE — 99213 OFFICE O/P EST LOW 20 MIN: CPT | Performed by: STUDENT IN AN ORGANIZED HEALTH CARE EDUCATION/TRAINING PROGRAM

## 2021-06-17 RX ORDER — CETIRIZINE HYDROCHLORIDE 10 MG/1
10 TABLET ORAL DAILY
Qty: 30 TABLET | Refills: 0 | Status: SHIPPED
Start: 2021-06-17 | End: 2021-07-12 | Stop reason: SDUPTHER

## 2021-06-17 RX ORDER — METFORMIN HYDROCHLORIDE 500 MG/1
TABLET, EXTENDED RELEASE ORAL
COMMUNITY
Start: 2021-04-15 | End: 2021-09-21

## 2021-06-17 RX ORDER — CLOTRIMAZOLE AND BETAMETHASONE DIPROPIONATE 10; .64 MG/G; MG/G
CREAM TOPICAL
COMMUNITY
Start: 2021-04-10 | End: 2021-10-21 | Stop reason: ALTCHOICE

## 2021-06-17 RX ORDER — DULOXETIN HYDROCHLORIDE 60 MG/1
CAPSULE, DELAYED RELEASE ORAL
COMMUNITY
Start: 2021-05-24 | End: 2021-09-10 | Stop reason: SDUPTHER

## 2021-06-17 RX ORDER — BUPROPION HYDROCHLORIDE 100 MG/1
TABLET, EXTENDED RELEASE ORAL
COMMUNITY
Start: 2021-06-08 | End: 2021-09-21 | Stop reason: SINTOL

## 2021-06-17 RX ORDER — CHOLECALCIFEROL (VITAMIN D3) 125 MCG
500 CAPSULE ORAL DAILY
COMMUNITY
Start: 2021-06-16 | End: 2021-09-10 | Stop reason: SDUPTHER

## 2021-06-17 RX ORDER — LAMOTRIGINE 100 MG/1
TABLET ORAL
COMMUNITY
Start: 2021-06-09 | End: 2021-09-10 | Stop reason: SDUPTHER

## 2021-06-17 RX ORDER — CALCIUM CARBONATE 500(1250)
TABLET ORAL
COMMUNITY
Start: 2021-05-30 | End: 2021-09-10 | Stop reason: SDUPTHER

## 2021-06-17 SDOH — ECONOMIC STABILITY: FOOD INSECURITY: WITHIN THE PAST 12 MONTHS, THE FOOD YOU BOUGHT JUST DIDN'T LAST AND YOU DIDN'T HAVE MONEY TO GET MORE.: NEVER TRUE

## 2021-06-17 SDOH — ECONOMIC STABILITY: FOOD INSECURITY: WITHIN THE PAST 12 MONTHS, YOU WORRIED THAT YOUR FOOD WOULD RUN OUT BEFORE YOU GOT MONEY TO BUY MORE.: NEVER TRUE

## 2021-06-17 ASSESSMENT — ENCOUNTER SYMPTOMS
EYE PAIN: 0
SHORTNESS OF BREATH: 0
CHEST TIGHTNESS: 0
SORE THROAT: 0
BACK PAIN: 0
WHEEZING: 0
ABDOMINAL PAIN: 0
ABDOMINAL DISTENTION: 0
EYE ITCHING: 0
COUGH: 0

## 2021-06-17 ASSESSMENT — SOCIAL DETERMINANTS OF HEALTH (SDOH): HOW HARD IS IT FOR YOU TO PAY FOR THE VERY BASICS LIKE FOOD, HOUSING, MEDICAL CARE, AND HEATING?: VERY HARD

## 2021-06-17 NOTE — PROGRESS NOTES
CC:  Ankle swelling/Hives/Anxiety/DM2    HPI:  49 y.o. woman presents for:    Ankle Swelling    ER visit May 16th leg swelling  US LE: no DVT  ER visit was sent home without a diuretic  Continues to have ankle swelling since that date  She has had no fall, trauma, inciting incident for this. +Gets a frothy color of urine ocassionally  No chest pain, chest tightness, sob, orthopnea, abdominal pain, n/v    Nephrology is following  Urine Protein/Creatinine: 9.0 (high)  CMP: GFR >60, Creatinine wnl  Nephrology is planning on doing a renal biopsy    Seen today, she continues to have swelling from bilateral ankles to her feet  She has no pain, tenderness or numbness in feet/ankles. Does have pins and needles sensation- but this is chronic, had it before her feet were swelling from Diabetes. Patients last echo was in 2015- nl.       Hives    2 months onset on lower extremities  It is now traveling upwards to her arms  Very itchy, also scales, scabs. No fevers, chills, tick bites, urinary symptoms, abdominal pain, n/v    She has allergies to - sulfa, amoxicillin, milk, tomatoes, alcohol  Does not get hives with food allergies listed above, only a stuffy nose  Allergy testing for food was done in the 1980s    Patient has gotten hives with amoxicillin, sulfa meds in the past  Hives she has today are worse than prior- used hydrocortisone with full relief  Now cannot afford hydrocortisone or benadryl for it. No recent med changes prior to hives onset, as per patient. Anxiety - feels anxiety is worse than it has ever been  Does have thoughts of hurting herself (see prior clinic notes) this has been present she was a child  She states today will not actually carry that out  Is following a counseling service, is interested in seeking an alternate provider for care. No substance use. DM II- Poorly controlled glucose. Seeing Endocrinology at [facility]. Is willing to try alternate insulin/DM pharmacotherapy. A1C: >14.        Patient Active Problem List    Diagnosis Date Noted    Mild depression ([facility] 10/02/2020    Severe obstructive sleep apnea 07/16/2020    Gastroparesis 12/06/2018    Infective urethritis 06/28/2018    Acquired hypothyroidism 12/20/2016    Vitamin D deficiency 12/20/2016    Hyperglycemia     Microcytic anemia     Diabetic ketosis ([facility] 09/01/2015    Type 2 diabetes mellitus with complication, with long-term current use of insulin ([facility] 05/30/2014    Depression 05/30/2014    Rheumatoid arthritis ([facility] 05/30/2014       Past Medical History:   Diagnosis Date    Anxiety     Arthritis     Depression     Diabetes mellitus (HCC)     Hypertensive chronic kidney disease     Left shoulder pain     LIZABETH (obstructive sleep apnea)     Thyroid disease        Current Outpatient Medications on File Prior to Visit   Medication Sig Dispense Refill    potassium chloride (KLOR-CON M) 20 MEQ extended release tablet Take 1 tablet by mouth 2 times daily for 10 days 20 tablet 0    furosemide (LASIX) 20 MG tablet Take 2 tablets by mouth daily for 10 days 20 tablet 0    potassium chloride (KLOR-CON M) 20 MEQ extended release tablet Take 2 tablets by mouth daily for 10 days 5 tablet 0    lisinopril (PRINIVIL;ZESTRIL) 10 MG tablet Take 1 tablet by mouth daily 90 tablet 2    diclofenac sodium (VOLTAREN) 1 % GEL Apply topically 2 times daily 150 g 1    Blood Pressure KIT 1 box by Does not apply route 2 times daily 1 kit 0    naproxen (NAPROSYN) 250 MG tablet TAKE 1 TABLET BY MOUTH TWICE A DAY WITH MEALS 60 tablet 5    LANTUS SOLOSTAR 100 UNIT/ML injection pen Inject 45 Units into the skin 2 times daily       HUMALOG KWIKPEN 200 UNIT/ML SOPN pen INJECT 22 62 UNITS 2 3 TIMES A DAY WITH MEALS PER SLIDING SCALE UP  UNITS      atorvastatin (LIPITOR) 80 MG tablet TAKE 1 TABLET BY MOUTH EVERYDAY AT BEDTIME      canagliflozin (INVOKANA) 300 MG TABS tablet Take 300 mg by mouth every morning (before Tobacco Use    Smoking status: Former Smoker    Smokeless tobacco: Never Used   Vaping Use    Vaping Use: Never used   Substance Use Topics    Alcohol use: No    Drug use: No       ROS:    Review of Systems   Constitutional: Negative for activity change and chills. HENT: Negative for congestion and sore throat. Eyes: Negative for pain and itching. Respiratory: Negative for cough, chest tightness, shortness of breath and wheezing. Cardiovascular: Negative for chest pain, palpitations and leg swelling. Gastrointestinal: Negative for abdominal distention and abdominal pain. Endocrine: Negative for cold intolerance and heat intolerance. Genitourinary: Negative for difficulty urinating and dysuria. Musculoskeletal: Negative for arthralgias and back pain. Skin: Positive for rash. Neurological: Negative for light-headedness and headaches. Psychiatric/Behavioral: Negative for dysphoric mood and hallucinations. The patient is nervous/anxious. Objective:    VS:  Blood pressure 138/82, pulse 98, temperature 98 °F (36.7 °C), temperature source Temporal, height 5' 1\" (1.549 m), weight 166 lb (75.3 kg), SpO2 98 %, not currently breastfeeding. Physical Exam  Constitutional:       Comments: [place]   HENT:      Nose: Nose normal.   Cardiovascular:      Rate and Rhythm: Normal rate and regular rhythm. Pulmonary:      Effort: Pulmonary effort is normal.      Breath sounds: Normal breath sounds. No wheezing or rales. Abdominal:      General: Abdomen is flat. Bowel sounds are normal. There is no distension. Palpations: Abdomen is soft. Tenderness: There is no abdominal tenderness. Musculoskeletal:         General: No swelling or deformity. Normal range of motion. Cervical back: Normal range of motion. Lymphadenopathy:      Cervical: No cervical adenopathy. Skin:     General: Skin is warm. Capillary Refill: Capillary refill takes less than 2 seconds. Comments: Uritcaria on shins bilaterally, also on flexural surface of arms. Some on upper back area. Neurological:      General: No focal deficit present. Mental Status: She is alert and oriented to person, place, and time. Psychiatric:         Mood and Affect: Mood normal.         Behavior: Behavior normal.      Comments: Anxious Appearing         Assessment:     Diagnosis Plan   1. Type 2 diabetes mellitus with other specified complication, unspecified whether long term insulin use (HCC)  MICROALBUMIN / CREATININE URINE RATIO  Currently under care of [facility]  C/W current meds and follow-up in 2 weeks for re-evaluation, for medical management   2. Ankle swelling, unspecified laterality  No remarkable findings seen on physical exam today. Elevation advised  Is also advised to follow through with renal biopsy, as this can cause pedal edema. 3. Hives  hydrocortisone 2.5 % cream    cetirizine (ZYRTEC) 10 MG tablet   4. Anxiety  External Referral To Psychiatry         Plans:  As above. RTO in 2 weeks for anxiety/DM2    Please see Patient Instructions for further counseling and information given. Advised to please be adherent to the treatment plans discussed today, and please call with any questions or concerns, letting the office know of any reasons that the plans may not be followed. The risks of untreated conditions include worsening illness, injury, disability, and possibly, death. Please call if symptoms change in any way, worsen, or fail to completely resolve, as this could necessitate a change to treatment plans. Patient and/or caregiver expressed understanding. Indications and proper use of medication(s) reviewed. Potential side-effects and risks of medication(s) also explained. Patient and/or caregiver was instructed to call if any new symptoms develop prior to next visit. Health risk factors discussed and addressed.

## 2021-06-17 NOTE — PROGRESS NOTES
S: Lulu Ludwig 49 y.o. female  here for patient of urticaria and bilateral ankle edema and anxiety. She is a poorly controlled insulin requiring diabetic. Urticaria: Onset 4/21. Initial occurrence on legs then spread to the arms. Unable to afford over-the-counter hydrocortisone cream.  Denies anaphylactoid symptoms. No recent prescription of antibiotics, which are known triggers of urticaria for this patient. Ankle edema: Present x1 month. Seen in ED. Ultrasound of lower extremities negative for DVT. Was sent home with no additional treatment. She has a history of nephrotic syndrome that is being followed by nephrology at this time. IR DM: She is poorly controlled despite multiple oral and injectable medications. Hemoglobin A1c done 6/7/2021 is 14.3%  Anxiety: Patient reports that her anxiety is the worst it ever been, despite counseling and medications including duloxetine and bupropion. O: VS: /82   Pulse 98   Temp 98 °F (36.7 °C) (Temporal)   Ht 5' 1\" (1.549 m)   Wt 166 lb (75.3 kg)   SpO2 98%   BMI 31.37 kg/m²    General: NAD. Presence of cushingoid hump prominently noted. HEENT: WDL              Neck: WDL   CV:  RRR, no gallops, rubs, or murmurs   Resp: CTAB no R/R/W   Abd:  Soft, nontender, no masses    Ext:  no C/C/E              Skin: Remarkable for urticaria legs and upper arms. Assessment / Plan:      Lulu was seen today for urticaria, foot swelling and anxiety. Diagnoses and all orders for this visit:    Type 2 diabetes mellitus with other specified complication, unspecified whether long term insulin use (HCC)  -     MICROALBUMIN / CREATININE URINE RATIO; Future    Ankle swelling, unspecified laterality  -     Cancel: EMG; Future    Hives  -     hydrocortisone 2.5 % cream; Apply topically 2 times daily. -     cetirizine (ZYRTEC) 10 MG tablet; Take 1 tablet by mouth daily    Anxiety  -     External Referral To Psychiatry    Assessment/Plan:  1.   Insulin

## 2021-06-18 ENCOUNTER — HOSPITAL ENCOUNTER (OUTPATIENT)
Age: 51
Discharge: HOME | End: 2021-06-18
Payer: COMMERCIAL

## 2021-06-18 LAB
ALBUMIN SERPL-MCNC: 3.2 G/DL (ref 3.5–5.2)
ALP BLD-CCNC: 113 U/L (ref 35–104)
ALT SERPL-CCNC: 9 U/L (ref 0–32)
ANION GAP SERPL CALCULATED.3IONS-SCNC: 11 MMOL/L (ref 7–16)
AST SERPL-CCNC: 8 U/L (ref 0–31)
BILIRUB SERPL-MCNC: <0.2 MG/DL (ref 0–1.2)
BUN BLDV-MCNC: 16 MG/DL (ref 6–20)
CALCIUM SERPL-MCNC: 8.4 MG/DL (ref 8.6–10.2)
CHLORIDE BLD-SCNC: 105 MMOL/L (ref 98–107)
CHOLESTEROL, TOTAL: 193 MG/DL (ref 0–199)
CO2: 21 MMOL/L (ref 22–29)
CREAT SERPL-MCNC: 0.7 MG/DL (ref 0.5–1)
CREATININE URINE: 136 MG/DL (ref 29–226)
GFR AFRICAN AMERICAN: >60
GFR NON-AFRICAN AMERICAN: >60 ML/MIN/1.73
GLUCOSE BLD-MCNC: 332 MG/DL (ref 74–99)
HBA1C MFR BLD: 12.7 % (ref 4–5.6)
HDLC SERPL-MCNC: 31 MG/DL
LDL CHOLESTEROL CALCULATED: 108 MG/DL (ref 0–99)
MICROALBUMIN UR-MCNC: >4400 MG/L
MICROALBUMIN/CREAT UR-RTO: 3235.3 (ref 0–30)
POTASSIUM SERPL-SCNC: 3.3 MMOL/L (ref 3.5–5)
SODIUM BLD-SCNC: 137 MMOL/L (ref 132–146)
T4 FREE: 0.91 NG/DL (ref 0.93–1.7)
TOTAL PROTEIN: 6.2 G/DL (ref 6.4–8.3)
TRIGL SERPL-MCNC: 271 MG/DL (ref 0–149)
TSH SERPL DL<=0.05 MIU/L-ACNC: 6.94 UIU/ML (ref 0.27–4.2)
VITAMIN B-12: 444 PG/ML (ref 211–946)
VITAMIN D 25-HYDROXY: 10 NG/ML (ref 30–100)
VLDLC SERPL CALC-MCNC: 54 MG/DL

## 2021-06-18 PROCEDURE — 84443 ASSAY THYROID STIM HORMONE: CPT

## 2021-06-18 PROCEDURE — 83036 HEMOGLOBIN GLYCOSYLATED A1C: CPT

## 2021-06-18 PROCEDURE — 82570 ASSAY OF URINE CREATININE: CPT

## 2021-06-18 PROCEDURE — 82607 VITAMIN B-12: CPT

## 2021-06-18 PROCEDURE — 82044 UR ALBUMIN SEMIQUANTITATIVE: CPT

## 2021-06-18 PROCEDURE — 36415 COLL VENOUS BLD VENIPUNCTURE: CPT

## 2021-06-18 PROCEDURE — 82306 VITAMIN D 25 HYDROXY: CPT

## 2021-06-18 PROCEDURE — 80053 COMPREHEN METABOLIC PANEL: CPT

## 2021-06-18 PROCEDURE — 84439 ASSAY OF FREE THYROXINE: CPT

## 2021-06-18 PROCEDURE — 80061 LIPID PANEL: CPT

## 2021-06-26 DIAGNOSIS — M25.50 POLYARTHRALGIA: ICD-10-CM

## 2021-06-30 ENCOUNTER — HOSPITAL ENCOUNTER (OUTPATIENT)
Dept: CT IMAGING | Age: 51
Discharge: HOME | End: 2021-07-02
Payer: COMMERCIAL

## 2021-06-30 VITALS
HEART RATE: 92 BPM | SYSTOLIC BLOOD PRESSURE: 172 MMHG | RESPIRATION RATE: 16 BRPM | HEIGHT: 61 IN | BODY MASS INDEX: 31.53 KG/M2 | WEIGHT: 167 LBS | DIASTOLIC BLOOD PRESSURE: 86 MMHG | TEMPERATURE: 97.4 F

## 2021-06-30 LAB
ANION GAP SERPL CALCULATED.3IONS-SCNC: 8 MMOL/L (ref 7–16)
BASOPHILS ABSOLUTE: 0.04 E9/L (ref 0–0.2)
BASOPHILS RELATIVE PERCENT: 0.4 % (ref 0–2)
BUN BLDV-MCNC: 15 MG/DL (ref 6–20)
CALCIUM SERPL-MCNC: 8.3 MG/DL (ref 8.6–10.2)
CHLORIDE BLD-SCNC: 105 MMOL/L (ref 98–107)
CO2: 24 MMOL/L (ref 22–29)
CREAT SERPL-MCNC: 0.8 MG/DL (ref 0.5–1)
EOSINOPHILS ABSOLUTE: 0.41 E9/L (ref 0.05–0.5)
EOSINOPHILS RELATIVE PERCENT: 4 % (ref 0–6)
GFR AFRICAN AMERICAN: >60
GFR NON-AFRICAN AMERICAN: >60 ML/MIN/1.73
GLUCOSE BLD-MCNC: 506 MG/DL (ref 74–99)
HCT VFR BLD CALC: 27.2 % (ref 34–48)
HEMOGLOBIN: 8.2 G/DL (ref 11.5–15.5)
IMMATURE GRANULOCYTES #: 0.11 E9/L
IMMATURE GRANULOCYTES %: 1.1 % (ref 0–5)
INR BLD: 1
LYMPHOCYTES ABSOLUTE: 2.88 E9/L (ref 1.5–4)
LYMPHOCYTES RELATIVE PERCENT: 28.4 % (ref 20–42)
MCH RBC QN AUTO: 22.3 PG (ref 26–35)
MCHC RBC AUTO-ENTMCNC: 30.1 % (ref 32–34.5)
MCV RBC AUTO: 74.1 FL (ref 80–99.9)
MONOCYTES ABSOLUTE: 0.5 E9/L (ref 0.1–0.95)
MONOCYTES RELATIVE PERCENT: 4.9 % (ref 2–12)
NEUTROPHILS ABSOLUTE: 6.2 E9/L (ref 1.8–7.3)
NEUTROPHILS RELATIVE PERCENT: 61.2 % (ref 43–80)
PDW BLD-RTO: 14.8 FL (ref 11.5–15)
PLATELET # BLD: 279 E9/L (ref 130–450)
PMV BLD AUTO: 9.6 FL (ref 7–12)
POTASSIUM SERPL-SCNC: 4.3 MMOL/L (ref 3.5–5)
PROTHROMBIN TIME: 11.4 SEC (ref 9.3–12.4)
RBC # BLD: 3.67 E12/L (ref 3.5–5.5)
SODIUM BLD-SCNC: 137 MMOL/L (ref 132–146)
WBC # BLD: 10.1 E9/L (ref 4.5–11.5)

## 2021-06-30 PROCEDURE — 85025 COMPLETE CBC W/AUTO DIFF WBC: CPT

## 2021-06-30 PROCEDURE — 80048 BASIC METABOLIC PNL TOTAL CA: CPT

## 2021-06-30 PROCEDURE — 85610 PROTHROMBIN TIME: CPT

## 2021-06-30 PROCEDURE — 36415 COLL VENOUS BLD VENIPUNCTURE: CPT

## 2021-06-30 ASSESSMENT — PAIN - FUNCTIONAL ASSESSMENT: PAIN_FUNCTIONAL_ASSESSMENT: 0-10

## 2021-06-30 NOTE — PROGRESS NOTES
1030 - Patient arrived via ambulation to Radiology department for renal biopsy. IV placed, blood obtained, IV flushed and prn adapter attached. Blood sample sent to lab for ordered tests. Allergies, home medications, H&P and fasting instructions reviewed with patient. Vital signs taken. Procedural instructions given, questions answered, understanding expressed and consent signed. Patient given fluoroscopy education, no questions at this time.

## 2021-06-30 NOTE — PROGRESS NOTES
Patient is here for kidney biopsy for function scheduled with Dr Junior Jennings II, ordered by Dr Lon Jama. Patient had pre-procedure lab work done while in IR. Patient's labs came back with a critical glucose level of 506. The patient's vitals were taken and her BP was 172/88. Repeat vitals, the BP was 167/88. The patient's Hgb came back at 8.2. Dr Junior Jennings explained to patient that her glucose level was too critical to proceed with the ordered procedure today. He explained to the patient that she needs to follow up with her PCP regarding her lab work. Patient understands. Call was placed to Dr Itz Banks's office regarding this matter. I spoke to the office staff and notified them of the procedure not being done due to lab results. The office requested that a copy of the patient's lab work and vital signs be sent to them via fax with attn: Anita Nation. All requested results were printed and faxed to Dr Banks's office via fax # [phone]. Copy of fax was placed in the patient's chart. Dr Banks's office to follow up with patient in regards to her PCP visit and reschedule patient when glucose level is under control. Patient also stated to Dr Junior Jennings prior to leaving that her blood glucose level normally runs above 300. Patient chart given to .

## 2021-07-12 ENCOUNTER — OFFICE VISIT (OUTPATIENT)
Dept: FAMILY MEDICINE CLINIC | Age: 51
End: 2021-07-12
Payer: COMMERCIAL

## 2021-07-12 VITALS
TEMPERATURE: 98.1 F | WEIGHT: 160 LBS | DIASTOLIC BLOOD PRESSURE: 81 MMHG | BODY MASS INDEX: 30.21 KG/M2 | RESPIRATION RATE: 16 BRPM | OXYGEN SATURATION: 96 % | HEIGHT: 61 IN | SYSTOLIC BLOOD PRESSURE: 119 MMHG | HEART RATE: 102 BPM

## 2021-07-12 DIAGNOSIS — E11.9 TYPE 2 DIABETES MELLITUS WITHOUT COMPLICATION, WITHOUT LONG-TERM CURRENT USE OF INSULIN (HCC): ICD-10-CM

## 2021-07-12 DIAGNOSIS — R21 SKIN RASH: Primary | ICD-10-CM

## 2021-07-12 PROCEDURE — 1036F TOBACCO NON-USER: CPT | Performed by: FAMILY MEDICINE

## 2021-07-12 PROCEDURE — G8427 DOCREV CUR MEDS BY ELIG CLIN: HCPCS | Performed by: FAMILY MEDICINE

## 2021-07-12 PROCEDURE — 99212 OFFICE O/P EST SF 10 MIN: CPT | Performed by: STUDENT IN AN ORGANIZED HEALTH CARE EDUCATION/TRAINING PROGRAM

## 2021-07-12 PROCEDURE — G8417 CALC BMI ABV UP PARAM F/U: HCPCS | Performed by: FAMILY MEDICINE

## 2021-07-12 PROCEDURE — 3017F COLORECTAL CA SCREEN DOC REV: CPT | Performed by: FAMILY MEDICINE

## 2021-07-12 PROCEDURE — 3046F HEMOGLOBIN A1C LEVEL >9.0%: CPT | Performed by: FAMILY MEDICINE

## 2021-07-12 PROCEDURE — 99213 OFFICE O/P EST LOW 20 MIN: CPT | Performed by: STUDENT IN AN ORGANIZED HEALTH CARE EDUCATION/TRAINING PROGRAM

## 2021-07-12 PROCEDURE — 2022F DILAT RTA XM EVC RTNOPTHY: CPT | Performed by: FAMILY MEDICINE

## 2021-07-12 RX ORDER — LIRAGLUTIDE 6 MG/ML
0.6 INJECTION SUBCUTANEOUS DAILY
Qty: 6 ML | Refills: 3 | Status: SHIPPED
Start: 2021-07-12 | End: 2021-09-13

## 2021-07-12 RX ORDER — CETIRIZINE HYDROCHLORIDE 10 MG/1
10 TABLET ORAL DAILY
Qty: 30 TABLET | Refills: 0 | Status: SHIPPED | OUTPATIENT
Start: 2021-07-12 | End: 2021-08-11

## 2021-07-12 RX ORDER — FLASH GLUCOSE SENSOR
1 KIT MISCELLANEOUS CONTINUOUS
COMMUNITY
Start: 2021-06-22 | End: 2021-12-15 | Stop reason: SDUPTHER

## 2021-07-12 RX ORDER — FAMOTIDINE 20 MG/1
20 TABLET, FILM COATED ORAL 2 TIMES DAILY
Qty: 60 TABLET | Refills: 3 | Status: SHIPPED
Start: 2021-07-12 | End: 2021-09-21 | Stop reason: ALTCHOICE

## 2021-07-12 RX ORDER — INSULIN GLARGINE 100 [IU]/ML
60 INJECTION, SOLUTION SUBCUTANEOUS 2 TIMES DAILY
Qty: 16.8 ML | Refills: 0 | Status: SHIPPED
Start: 2021-07-12 | End: 2021-07-12 | Stop reason: SINTOL

## 2021-07-12 RX ORDER — DOXYCYCLINE HYCLATE 100 MG
100 TABLET ORAL 2 TIMES DAILY
Qty: 14 TABLET | Refills: 0 | Status: SHIPPED | OUTPATIENT
Start: 2021-07-12 | End: 2021-07-19

## 2021-07-12 RX ORDER — HUMAN INSULIN 100 [IU]/ML
INJECTION, SUSPENSION SUBCUTANEOUS
COMMUNITY
Start: 2021-06-24 | End: 2021-07-29

## 2021-07-12 NOTE — PROGRESS NOTES
S: 49 y.o. female presents today for Diabetes, Hypertension, and Urticaria    DMII:  Last A1C >12; follows at [facility] with endo; fasting 200-300 baseline; on metformin 500mg, jardiance, insulin lantus 45U BID and HD SSI; +blurry vision and follows with Ophtho; all other relevant ROS negative  Rash / Urticaria f/u: trigger unknown; given zyrtec and steroid cream with some relief and still having flares on her arms; no new detergents or sob or systemic symptoms;      O: VS: /81   Pulse 102   Temp 98.1 °F (36.7 °C) (Temporal)   Resp 16   Ht 5' 1\" (1.549 m)   Wt 160 lb (72.6 kg)   SpO2 96%   BMI 30.23 kg/m²   AAO/NAD, appropriate affect for mood  CV:  RRR, no murmur  Resp: CTAB  Abdomen: SNTND  Ext: No edema  Skin: excoriated papules on the UE and LE throughout with resolving lesions and subsequent hyperpigmentation and scabbed over lesions as well and tender areas in the axillary region; no signs of raised patches    Assessment/Plan:   1) DMII - start victoza; bring in logs and have close f/u  2) Skin rash - likely folliculitis; bactroban nares; doxy; for itch relief pepcid + zyrtec  RTO: Return in about 4 weeks (around 8/9/2021) for Cervical Cancer Exam.    Attending Physician Statement  I have discussed the case, including pertinent history and exam findings with the resident. I agree with the documented assessment and plan.       Electronically signed by Colette Eddy MD on 7/13/2021 at 12:29 PM

## 2021-07-12 NOTE — PROGRESS NOTES
deficiency 12/20/2016    Diabetic ketosis ([facility] 09/01/2015    Type 2 diabetes mellitus with complication, with long-term current use of insulin ([facility] 05/30/2014    Depression 05/30/2014    Sjogren's syndrome ([facility] 05/30/2014    Hyperglycemia 05/30/2014    Depressive disorder 05/30/2014       Past Medical History:   Diagnosis Date    Anxiety     Arthritis     Depression     Diabetes mellitus ([facility]     Hypertensive chronic kidney disease     Left shoulder pain     LIZABETH (obstructive sleep apnea)     Thyroid disease        Current Outpatient Medications on File Prior to Visit   Medication Sig Dispense Refill    Continuous Blood Gluc Sensor (FREESTYLE NIMISHA 2 SENSOR) MISC 1 each by Does not apply route continuous      NOVOLIN 70/30 FLEXPEN (70-30) 100 UNIT/ML injection pen       buPROPion (WELLBUTRIN SR) 100 MG extended release tablet TAKE 1 TABLET BY MOUTH EVERY MORNING      Cholecalciferol (VITAMIN D3) 125 MCG (5000 UT) CAPS TAKE 1 CAPSULE BY MOUTH EVERY DAY      clotrimazole-betamethasone (LOTRISONE) 1-0.05 % cream PLEASE SEE ATTACHED FOR DETAILED DIRECTIONS      vitamin B-12 (CYANOCOBALAMIN) 500 MCG tablet Take 500 mcg by mouth daily       DULoxetine (CYMBALTA) 60 MG extended release capsule TAKE 1 CAPSULE BY MOUTH EVERY MORNING 60MG IN AM   30MG IN PM   90MG/DAY      lamoTRIgine (LAMICTAL) 100 MG tablet TAKE 1 TABLET BY MOUTH EVERY DAY      metFORMIN (GLUCOPHAGE-XR) 500 MG extended release tablet TAKE 2 TABLETS BY MOUTH TWICE A DAY      calcium elemental (OSCAL) 500 MG TABS tablet TAKE 1 TABLET BY MOUTH TWICE A DAY WITH LUNCH AND DINNER      hydrocortisone 2.5 % cream Apply topically 2 times daily.  45 g 0    cetirizine (ZYRTEC) 10 MG tablet Take 1 tablet by mouth daily 30 tablet 0    lisinopril (PRINIVIL;ZESTRIL) 10 MG tablet Take 1 tablet by mouth daily 90 tablet 2    Blood Pressure KIT 1 box by Does not apply route 2 times daily 1 kit 0    naproxen (NAPROSYN) 250 MG tablet TAKE 1 TABLET BY MOUTH TWICE A DAY WITH MEALS 60 tablet 5    LANTUS SOLOSTAR 100 UNIT/ML injection pen Inject 45 Units into the skin 2 times daily       atorvastatin (LIPITOR) 80 MG tablet TAKE 1 TABLET BY MOUTH EVERYDAY AT BEDTIME      DULoxetine (CYMBALTA) 30 MG extended release capsule TAKE 1 CAPSULE BY MOUTH EVERY EVENING 60MG IN AM   30MG IN PM   90MG/DAY      OZEMPIC, 1 MG/DOSE, 2 MG/1.5ML SOPN Inject 1 mg into the skin once a week       empagliflozin (JARDIANCE) 25 MG tablet Take 25 mg by mouth daily ACM verified with Dr Nora Nunes office on 2/26/2020      hydroxychloroquine (PLAQUENIL) 200 MG tablet Take 200 mg by mouth 2 times daily      diclofenac sodium 1 % GEL Apply 4 g topically 4 times daily 4 Tube 1    levothyroxine (SYNTHROID) 88 MCG tablet Take 1 tablet by mouth daily      pregabalin (LYRICA) 100 MG capsule Take 100 mg by mouth 3 times daily.  Omega-3 Fatty Acids (FISH OIL) 1000 MG CAPS Take 1,000 mg by mouth 4 times daily      methotrexate (RHEUMATREX) 2.5 MG chemo tablet Take 15 mg by mouth once a week      folic acid (FOLVITE) 1 MG tablet Take 1 tablet by mouth daily 30 tablet 1    FREESTYLE LITE strip USE AS DIRECTED 100 strip 3    FREESTYLE LANCETS MISC CHECK BS 4 TIMES DAILY 100 each 3    Multiple Vitamins-Minerals (MULTIVITAMIN WITH MINERALS) tablet Take 1 tablet by mouth daily. 30 tablet 3    Insulin Pen Needle 31G X 8 MM MISC 1 each by Does not apply route daily. 100 each 5    potassium chloride (KLOR-CON M) 20 MEQ extended release tablet Take 2 tablets by mouth daily for 10 days 5 tablet 0    HUMALOG KWIKPEN 200 UNIT/ML SOPN pen INJECT 22 62 UNITS 2 3 TIMES A DAY WITH MEALS PER SLIDING SCALE UP  UNITS (Patient not taking: Reported on 7/12/2021)       No current facility-administered medications on file prior to visit.        Allergies   Allergen Reactions    Amoxicillin      Hives all over body, states that hives are severe     Sulfa Antibiotics     Cefdinir     Milk-Related Compounds     Tomato        Family History   Adopted: Yes   Family history unknown: Yes       Past Surgical History:   Procedure Laterality Date    SHOULDER ARTHROSCOPY Right 2017       Social History     Tobacco Use    Smoking status: Former Smoker     Packs/day: 2.00     Years: 0.50     Pack years: 1.00     Start date:      Quit date:      Years since quittin.5    Smokeless tobacco: Never Used   Vaping Use    Vaping Use: Never used   Substance Use Topics    Alcohol use: No    Drug use: No       ROS:    Review of Systems   Constitutional: Negative for activity change and appetite change. HENT: Negative for congestion and sore throat. Eyes: Positive for visual disturbance. Negative for pain and itching. Respiratory: Negative for chest tightness and shortness of breath. Cardiovascular: Negative for chest pain and leg swelling. Gastrointestinal: Negative for anal bleeding and diarrhea. Endocrine: Negative for heat intolerance and polydipsia. Genitourinary: Negative for difficulty urinating and dysuria. Musculoskeletal: Negative for arthralgias and back pain. Skin: Positive for color change. Neurological: Negative for light-headedness and headaches. Hematological: Negative for adenopathy. Does not bruise/bleed easily. Psychiatric/Behavioral: Negative for agitation and confusion. Objective:    VS:  Blood pressure 119/81, pulse 102, temperature 98.1 °F (36.7 °C), temperature source Temporal, resp. rate 16, height 5' 1\" (1.549 m), weight 160 lb (72.6 kg), SpO2 96 %, not currently breastfeeding. Physical Exam  Constitutional:       Appearance: Normal appearance. HENT:      Head: Normocephalic and atraumatic. Cardiovascular:      Rate and Rhythm: Normal rate and regular rhythm. Pulses: Normal pulses. Heart sounds: Normal heart sounds. No murmur heard. No friction rub. No gallop.     Pulmonary:      Effort: Pulmonary effort is normal.      Breath sounds: Normal breath sounds. No wheezing or rales. Abdominal:      General: Abdomen is flat. Bowel sounds are normal. There is no distension. Palpations: Abdomen is soft. Tenderness: There is no abdominal tenderness. Musculoskeletal:      Cervical back: Normal range of motion. Lymphadenopathy:      Cervical: No cervical adenopathy. Skin:     General: Skin is warm. Capillary Refill: Capillary refill takes less than 2 seconds. Comments: Erythematous papules on upper thighs, and upper arms. Also on axilla area. Some scaling. Mild excoriations. No raised lesions swelling or warmth. No pus-like discharge. Neurological:      General: No focal deficit present. Mental Status: She is alert and oriented to person, place, and time. Psychiatric:         Mood and Affect: Mood normal.         Behavior: Behavior normal.           Assessment:     Diagnosis Orders   1. Skin rash 2/2 dermatitis vs. Staph infection folliculitis less likely urticaria, eczema. cetirizine (ZYRTEC) 10 MG tablet    hydrocortisone 2.5 % cream    famotidine (PEPCID) 20 MG tablet    doxycycline hyclate (VIBRA-TABS) 100 MG tablet   2. Type 2 diabetes mellitus without complication, without long-term current use of insulin (HCC)  Liraglutide (VICTOZA) 18 MG/3ML SOPN SC injection    Insulin Pen Needle 31G X 5 MM MISC    DISCONTINUED: LANTUS SOLOSTAR 100 UNIT/ML injection pen    DISCONTINUED: Insulin Pen Needle 31G X 5 MM MISC    DISCONTINUED: Insulin Pen Needle 31G X 5 MM MISC         Plans:  As above. RTO 2 weeks for follow-up of Rash/Cervical Cancer Screening  RTO 4 weeks for DM2    Please see Patient Instructions for further counseling and information given. Advised to please be adherent to the treatment plans discussed today, and please call with any questions or concerns, letting the office know of any reasons that the plans may not be followed.   The risks of untreated conditions include worsening illness, injury, disability, and possibly, death. Please call if symptoms change in any way, worsen, or fail to completely resolve, as this could necessitate a change to treatment plans. Patient and/or caregiver expressed understanding. Indications and proper use of medication(s) reviewed. Potential side-effects and risks of medication(s) also explained. Patient and/or caregiver was instructed to call if any new symptoms develop prior to next visit. Health risk factors discussed and addressed.

## 2021-07-13 DIAGNOSIS — E11.9 TYPE 2 DIABETES MELLITUS WITHOUT COMPLICATION, WITHOUT LONG-TERM CURRENT USE OF INSULIN (HCC): ICD-10-CM

## 2021-07-13 ASSESSMENT — ENCOUNTER SYMPTOMS
ANAL BLEEDING: 0
CHEST TIGHTNESS: 0
DIARRHEA: 0
EYE PAIN: 0
SHORTNESS OF BREATH: 0
COLOR CHANGE: 1
BACK PAIN: 0
EYE ITCHING: 0
SORE THROAT: 0

## 2021-07-13 NOTE — TELEPHONE ENCOUNTER
Patient was started on Victoza  Yesterday however is also on ozempic she wants to know if she soul continue the ozempic. Please advise. Patient also needs pen needles. Pended order please complete and sign.

## 2021-07-18 ENCOUNTER — TELEPHONE (OUTPATIENT)
Dept: FAMILY MEDICINE CLINIC | Age: 51
End: 2021-07-18

## 2021-07-18 DIAGNOSIS — G47.9 SLEEPING DIFFICULTY: ICD-10-CM

## 2021-07-18 DIAGNOSIS — M79.89 LEG SWELLING: Primary | ICD-10-CM

## 2021-07-18 DIAGNOSIS — R60.9 PERIPHERAL EDEMA: Primary | ICD-10-CM

## 2021-07-18 RX ORDER — FUROSEMIDE 20 MG/1
20 TABLET ORAL DAILY
Qty: 5 TABLET | Refills: 0 | Status: SHIPPED | OUTPATIENT
Start: 2021-07-18 | End: 2021-07-29 | Stop reason: ALTCHOICE

## 2021-07-18 RX ORDER — LANOLIN ALCOHOL/MO/W.PET/CERES
3 CREAM (GRAM) TOPICAL NIGHTLY PRN
Qty: 30 TABLET | Refills: 0 | Status: SHIPPED | OUTPATIENT
Start: 2021-07-18 | End: 2021-08-31

## 2021-07-29 ENCOUNTER — HOSPITAL ENCOUNTER (EMERGENCY)
Age: 51
Discharge: HOME | End: 2021-07-29
Attending: EMERGENCY MEDICINE
Payer: COMMERCIAL

## 2021-07-29 ENCOUNTER — OFFICE VISIT (OUTPATIENT)
Dept: FAMILY MEDICINE CLINIC | Age: 51
End: 2021-07-29
Payer: COMMERCIAL

## 2021-07-29 VITALS
WEIGHT: 168 LBS | BODY MASS INDEX: 31.72 KG/M2 | OXYGEN SATURATION: 97 % | SYSTOLIC BLOOD PRESSURE: 173 MMHG | TEMPERATURE: 98 F | RESPIRATION RATE: 16 BRPM | DIASTOLIC BLOOD PRESSURE: 94 MMHG | HEIGHT: 61 IN | HEART RATE: 64 BPM

## 2021-07-29 VITALS
OXYGEN SATURATION: 99 % | HEART RATE: 100 BPM | BODY MASS INDEX: 31.72 KG/M2 | SYSTOLIC BLOOD PRESSURE: 163 MMHG | DIASTOLIC BLOOD PRESSURE: 95 MMHG | WEIGHT: 168 LBS | RESPIRATION RATE: 16 BRPM | HEIGHT: 61 IN

## 2021-07-29 DIAGNOSIS — Z91.199 NON-COMPLIANCE: ICD-10-CM

## 2021-07-29 DIAGNOSIS — I10 HYPERTENSION, UNSPECIFIED TYPE: Primary | ICD-10-CM

## 2021-07-29 DIAGNOSIS — R07.9 CHEST PAIN, UNSPECIFIED TYPE: ICD-10-CM

## 2021-07-29 DIAGNOSIS — R73.9 HYPERGLYCEMIA: Primary | ICD-10-CM

## 2021-07-29 DIAGNOSIS — J34.89 RHINORRHEA: ICD-10-CM

## 2021-07-29 DIAGNOSIS — R05.9 COUGH: ICD-10-CM

## 2021-07-29 LAB
ALBUMIN SERPL-MCNC: 2.9 G/DL (ref 3.5–5.2)
ALP BLD-CCNC: 136 U/L (ref 35–104)
ALT SERPL-CCNC: 12 U/L (ref 0–32)
ANION GAP SERPL CALCULATED.3IONS-SCNC: 11 MMOL/L (ref 7–16)
AST SERPL-CCNC: 15 U/L (ref 0–31)
BACTERIA: ABNORMAL /HPF
BASOPHILS ABSOLUTE: 0.06 E9/L (ref 0–0.2)
BASOPHILS RELATIVE PERCENT: 0.5 % (ref 0–2)
BETA-HYDROXYBUTYRATE: 1.58 MMOL/L (ref 0.02–0.27)
BILIRUB SERPL-MCNC: 0.2 MG/DL (ref 0–1.2)
BILIRUBIN URINE: NEGATIVE
BLOOD, URINE: ABNORMAL
BUN BLDV-MCNC: 12 MG/DL (ref 6–20)
CALCIUM SERPL-MCNC: 9 MG/DL (ref 8.6–10.2)
CHLORIDE BLD-SCNC: 94 MMOL/L (ref 98–107)
CHP ED QC CHECK: NORMAL
CLARITY: CLEAR
CO2: 22 MMOL/L (ref 22–29)
COLOR: YELLOW
CREAT SERPL-MCNC: 0.7 MG/DL (ref 0.5–1)
EOSINOPHILS ABSOLUTE: 0.27 E9/L (ref 0.05–0.5)
EOSINOPHILS RELATIVE PERCENT: 2.2 % (ref 0–6)
GFR AFRICAN AMERICAN: >60
GFR NON-AFRICAN AMERICAN: >60 ML/MIN/1.73
GLUCOSE BLD-MCNC: 496 MG/DL
GLUCOSE BLD-MCNC: 536 MG/DL (ref 74–99)
GLUCOSE BLD-MCNC: 549 MG/DL
GLUCOSE URINE: >=1000 MG/DL
HCT VFR BLD CALC: 29.2 % (ref 34–48)
HEMOGLOBIN: 8.6 G/DL (ref 11.5–15.5)
IMMATURE GRANULOCYTES #: 0.32 E9/L
IMMATURE GRANULOCYTES %: 2.7 % (ref 0–5)
KETONES, URINE: 15 MG/DL
LEUKOCYTE ESTERASE, URINE: NEGATIVE
LIPASE: 45 U/L (ref 13–60)
LYMPHOCYTES ABSOLUTE: 3.21 E9/L (ref 1.5–4)
LYMPHOCYTES RELATIVE PERCENT: 26.6 % (ref 20–42)
MCH RBC QN AUTO: 20.7 PG (ref 26–35)
MCHC RBC AUTO-ENTMCNC: 29.5 % (ref 32–34.5)
MCV RBC AUTO: 70.2 FL (ref 80–99.9)
METER GLUCOSE: 385 MG/DL (ref 74–99)
METER GLUCOSE: 496 MG/DL (ref 74–99)
MONOCYTES ABSOLUTE: 0.52 E9/L (ref 0.1–0.95)
MONOCYTES RELATIVE PERCENT: 4.3 % (ref 2–12)
NEUTROPHILS ABSOLUTE: 7.67 E9/L (ref 1.8–7.3)
NEUTROPHILS RELATIVE PERCENT: 63.7 % (ref 43–80)
NITRITE, URINE: NEGATIVE
PDW BLD-RTO: 14.6 FL (ref 11.5–15)
PH UA: 6 (ref 5–9)
PH VENOUS: 7.37 (ref 7.35–7.45)
PLATELET # BLD: 306 E9/L (ref 130–450)
PMV BLD AUTO: 9.7 FL (ref 7–12)
POTASSIUM SERPL-SCNC: 3.7 MMOL/L (ref 3.5–5)
PROTEIN UA: >=300 MG/DL
RBC # BLD: 4.16 E12/L (ref 3.5–5.5)
RBC UA: ABNORMAL /HPF (ref 0–2)
SODIUM BLD-SCNC: 127 MMOL/L (ref 132–146)
SPECIFIC GRAVITY UA: 1.01 (ref 1–1.03)
TOTAL PROTEIN: 6.5 G/DL (ref 6.4–8.3)
UROBILINOGEN, URINE: 0.2 E.U./DL
WBC # BLD: 12.1 E9/L (ref 4.5–11.5)
WBC UA: ABNORMAL /HPF (ref 0–5)

## 2021-07-29 PROCEDURE — 96374 THER/PROPH/DIAG INJ IV PUSH: CPT

## 2021-07-29 PROCEDURE — 80053 COMPREHEN METABOLIC PANEL: CPT

## 2021-07-29 PROCEDURE — 82962 GLUCOSE BLOOD TEST: CPT

## 2021-07-29 PROCEDURE — 1036F TOBACCO NON-USER: CPT | Performed by: FAMILY MEDICINE

## 2021-07-29 PROCEDURE — 85025 COMPLETE CBC W/AUTO DIFF WBC: CPT

## 2021-07-29 PROCEDURE — 93010 ELECTROCARDIOGRAM REPORT: CPT | Performed by: STUDENT IN AN ORGANIZED HEALTH CARE EDUCATION/TRAINING PROGRAM

## 2021-07-29 PROCEDURE — 83690 ASSAY OF LIPASE: CPT

## 2021-07-29 PROCEDURE — 81001 URINALYSIS AUTO W/SCOPE: CPT

## 2021-07-29 PROCEDURE — G8417 CALC BMI ABV UP PARAM F/U: HCPCS | Performed by: FAMILY MEDICINE

## 2021-07-29 PROCEDURE — 99212 OFFICE O/P EST SF 10 MIN: CPT | Performed by: STUDENT IN AN ORGANIZED HEALTH CARE EDUCATION/TRAINING PROGRAM

## 2021-07-29 PROCEDURE — 99213 OFFICE O/P EST LOW 20 MIN: CPT | Performed by: STUDENT IN AN ORGANIZED HEALTH CARE EDUCATION/TRAINING PROGRAM

## 2021-07-29 PROCEDURE — 2580000003 HC RX 258: Performed by: PHYSICIAN ASSISTANT

## 2021-07-29 PROCEDURE — 82010 KETONE BODYS QUAN: CPT

## 2021-07-29 PROCEDURE — 93005 ELECTROCARDIOGRAM TRACING: CPT | Performed by: STUDENT IN AN ORGANIZED HEALTH CARE EDUCATION/TRAINING PROGRAM

## 2021-07-29 PROCEDURE — G8427 DOCREV CUR MEDS BY ELIG CLIN: HCPCS | Performed by: FAMILY MEDICINE

## 2021-07-29 PROCEDURE — 82800 BLOOD PH: CPT

## 2021-07-29 PROCEDURE — 6370000000 HC RX 637 (ALT 250 FOR IP): Performed by: EMERGENCY MEDICINE

## 2021-07-29 PROCEDURE — 82962 GLUCOSE BLOOD TEST: CPT | Performed by: STUDENT IN AN ORGANIZED HEALTH CARE EDUCATION/TRAINING PROGRAM

## 2021-07-29 PROCEDURE — 2580000003 HC RX 258: Performed by: EMERGENCY MEDICINE

## 2021-07-29 PROCEDURE — 99284 EMERGENCY DEPT VISIT MOD MDM: CPT

## 2021-07-29 PROCEDURE — 3017F COLORECTAL CA SCREEN DOC REV: CPT | Performed by: FAMILY MEDICINE

## 2021-07-29 RX ORDER — 0.9 % SODIUM CHLORIDE 0.9 %
1000 INTRAVENOUS SOLUTION INTRAVENOUS ONCE
Status: COMPLETED | OUTPATIENT
Start: 2021-07-29 | End: 2021-07-29

## 2021-07-29 RX ORDER — GUAIFENESIN 100 MG/5ML
10 SYRUP ORAL EVERY 4 HOURS PRN
Qty: 355 ML | Refills: 0 | Status: SHIPPED
Start: 2021-07-29 | End: 2021-09-21 | Stop reason: ALTCHOICE

## 2021-07-29 RX ADMIN — SODIUM CHLORIDE 1000 ML: 9 INJECTION, SOLUTION INTRAVENOUS at 17:18

## 2021-07-29 RX ADMIN — SODIUM CHLORIDE 1000 ML: 9 INJECTION, SOLUTION INTRAVENOUS at 15:57

## 2021-07-29 RX ADMIN — INSULIN HUMAN 10 UNITS: 100 INJECTION, SOLUTION PARENTERAL at 15:56

## 2021-07-29 NOTE — ED NOTES
Bed: 11  Expected date: 7/29/21  Expected time:   Means of arrival:   Comments:  triage     Mounika Cabral RN  07/29/21 [id number]
DIET called for diabetic tray per Dr. Dorie Gaines, RN  07/29/21 [id number]
When Bed Available for ED Attending/MLP to Continue Care    -------------------------------------------------END OF FIRST PROVIDER CONTACT ASSESSMENT NOTE--------------------------------------------------------  Electronically signed by Kathrine Brock PA-C   DD: 7/29/21       Kathrine Brock PA-C  07/29/21 1147

## 2021-07-29 NOTE — PROGRESS NOTES
CC: Chest pain/cough/rhinorrhea/HTN   ------------------------------------------------------------------------------------------------------------------------  Assessment/Plan    Patient was counseled on medication compliance. POCT blood glucose done in clinic, given history of not taking any meds for chronic conditions in the past 10 days with the exception of psychiatric meds. POCT BG >500 mg/dl, patient was sent to ER for evaluation of hyperglycemia. Counseled and had conversation with patient prior to this and addressed her concerns or questions about going to the ER. Also explained the risks/benefits not taking medications. 1.  Chest pain likely 2/2 viral URI versus ACS  EKG done in office. No remarkable findings. Work-up CXR, CBC to rule out pneumonia or other infectious etiology    2. Uncontrolled hypertension  Risks occluding but not limited to CVA, coronary artery disease discussed with patient today  Addressed her distress and concern in swallowing pills  RTO 2 days for nursing visit BP recheck once taking meds    3. Cough, rhinorrhea  Pending above work-up  PRN robitussin    RTO 2 weeks for follow-up of symptoms, also to discuss adherence  -------------------------------------------------------------------------------------------------------------------------    HPI:  49 y.o. woman presents for concerns of cough runny nose. Today she also reports not been taking any of her oral medications except for her psychiatric meds, as per patient she does not want to swallow pills anymore and that feels it will make her cough/runny nose worse. Requests an alternate form of taking medications.   Also feels her medication list is very lengthy    Cough  11-days of a cough  Non-productive  Coughing throughout the day  More frequent when lying flat in bed  Does not feel symptoms are worsening stable    Feels different from seasonal allergies, \"deeper cough\"    Associated with clear-colored rhinorrhea  Associated with the pain in upper back with coughing  Also endorses chest pain that worsens with coughing and also at rest    No sore throat, decreased appetite, fevers, chills, nausea or emesis    COVID-19 Jj Villareal    HTN  Not taking any medications 10-days  \"Taking pills makes cough worse\"  No headache, no blurry vision, vision loss    Chest Pain  10 days onset  Constant chest pain at rest  Aggravated, worsens with coughing    No palpitations dyspnea syncope    Patient Active Problem List    Diagnosis Date Noted    COVID-19 06/17/2021    Hypoglycemia 06/17/2021    Anemia 06/16/2021    Proteinuria 06/16/2021    Hypokalemia 03/10/2021    Encounter for long-term (current) use of insulin ([address] 03/08/2021    Anxiety disorder 03/08/2021    Benign hypertensive kidney disease with chronic kidney disease 03/08/2021    Chronic kidney disease, stage I 03/08/2021    Fibromyalgia 03/08/2021    Hyperlipidemia 03/08/2021    Nephrotic syndrome due to type 2 diabetes mellitus ([address] 03/08/2021    Hypothyroidism 03/08/2021    Type 2 diabetes mellitus with chronic kidney disease ([address] 03/08/2021    Mild depression ([address] 10/02/2020    Severe obstructive sleep apnea 07/16/2020    Gastroparesis 12/06/2018    Infective urethritis 06/28/2018    Acquired hypothyroidism 12/20/2016    Vitamin D deficiency 12/20/2016    Diabetic ketosis ([address] 09/01/2015    Type 2 diabetes mellitus with complication, with long-term current use of insulin ([address] 05/30/2014    Depression 05/30/2014    Sjogren's syndrome ([address] 05/30/2014    Hyperglycemia 05/30/2014    Depressive disorder 05/30/2014       Past Medical History:   Diagnosis Date    Anxiety     Arthritis     Depression     Diabetes mellitus ([address]     Hypertensive chronic kidney disease     Left shoulder pain     LIZABETH (obstructive sleep apnea)     Thyroid disease        Current Outpatient Medications on File Prior to Visit   Medication Sig Dispense Refill    Insulin Pen Needle 31G X 5 MM MISC 1 each by Does not apply route 3 times daily 100 each 3    furosemide (LASIX) 20 MG tablet Take 1 tablet by mouth daily for 5 days 5 tablet 0    melatonin (RA MELATONIN) 3 MG TABS tablet Take 1 tablet by mouth nightly as needed (insomnia) 30 tablet 0    Continuous Blood Gluc Sensor (FREESTYLE NIMISHA 2 SENSOR) MISC 1 each by Does not apply route continuous      NOVOLIN 70/30 FLEXPEN (70-30) 100 UNIT/ML injection pen       cetirizine (ZYRTEC) 10 MG tablet Take 1 tablet by mouth daily 30 tablet 0    hydrocortisone 2.5 % cream Apply topically 2 times daily.  45 g 0    famotidine (PEPCID) 20 MG tablet Take 1 tablet by mouth 2 times daily 60 tablet 3    Liraglutide (VICTOZA) 18 MG/3ML SOPN SC injection Inject 0.6 mg into the skin daily 6 mL 3    buPROPion (WELLBUTRIN SR) 100 MG extended release tablet TAKE 1 TABLET BY MOUTH EVERY MORNING      Cholecalciferol (VITAMIN D3) 125 MCG (5000 UT) CAPS TAKE 1 CAPSULE BY MOUTH EVERY DAY      clotrimazole-betamethasone (LOTRISONE) 1-0.05 % cream PLEASE SEE ATTACHED FOR DETAILED DIRECTIONS      vitamin B-12 (CYANOCOBALAMIN) 500 MCG tablet Take 500 mcg by mouth daily       DULoxetine (CYMBALTA) 60 MG extended release capsule TAKE 1 CAPSULE BY MOUTH EVERY MORNING 60MG IN AM   30MG IN PM   90MG/DAY      lamoTRIgine (LAMICTAL) 100 MG tablet TAKE 1 TABLET BY MOUTH EVERY DAY      metFORMIN (GLUCOPHAGE-XR) 500 MG extended release tablet TAKE 2 TABLETS BY MOUTH TWICE A DAY      calcium elemental (OSCAL) 500 MG TABS tablet TAKE 1 TABLET BY MOUTH TWICE A DAY WITH LUNCH AND DINNER      potassium chloride (KLOR-CON M) 20 MEQ extended release tablet Take 2 tablets by mouth daily for 10 days 5 tablet 0    lisinopril (PRINIVIL;ZESTRIL) 10 MG tablet Take 1 tablet by mouth daily 90 tablet 2    Blood Pressure KIT 1 box by Does not apply route 2 times daily 1 kit 0    naproxen (NAPROSYN) 250 MG tablet TAKE 1 TABLET BY MOUTH TWICE A DAY WITH MEALS 60 tablet 5    HUMALOG KWIKPEN 200 UNIT/ML SOPN pen INJECT 22 62 UNITS 2 3 TIMES A DAY WITH MEALS PER SLIDING SCALE UP  UNITS (Patient not taking: Reported on 7/12/2021)      atorvastatin (LIPITOR) 80 MG tablet TAKE 1 TABLET BY MOUTH EVERYDAY AT BEDTIME      DULoxetine (CYMBALTA) 30 MG extended release capsule TAKE 1 CAPSULE BY MOUTH EVERY EVENING 60MG IN AM   30MG IN PM   90MG/DAY      OZEMPIC, 1 MG/DOSE, 2 MG/1.5ML SOPN Inject 1 mg into the skin once a week       empagliflozin (JARDIANCE) 25 MG tablet Take 25 mg by mouth daily ACM verified with Dr Hernandez Ee office on 2/26/2020      hydroxychloroquine (PLAQUENIL) 200 MG tablet Take 200 mg by mouth 2 times daily      diclofenac sodium 1 % GEL Apply 4 g topically 4 times daily 4 Tube 1    levothyroxine (SYNTHROID) 88 MCG tablet Take 1 tablet by mouth daily      pregabalin (LYRICA) 100 MG capsule Take 100 mg by mouth 3 times daily.  Omega-3 Fatty Acids (FISH OIL) 1000 MG CAPS Take 1,000 mg by mouth 4 times daily      methotrexate (RHEUMATREX) 2.5 MG chemo tablet Take 15 mg by mouth once a week      folic acid (FOLVITE) 1 MG tablet Take 1 tablet by mouth daily 30 tablet 1    FREESTYLE LITE strip USE AS DIRECTED 100 strip 3    FREESTYLE LANCETS MISC CHECK BS 4 TIMES DAILY 100 each 3    Multiple Vitamins-Minerals (MULTIVITAMIN WITH MINERALS) tablet Take 1 tablet by mouth daily. 30 tablet 3    Insulin Pen Needle 31G X 8 MM MISC 1 each by Does not apply route daily. 100 each 5     No current facility-administered medications on file prior to visit.        Allergies   Allergen Reactions    Amoxicillin      Hives all over body, states that hives are severe     Sulfa Antibiotics     Cefdinir     Milk-Related Compounds     Tomato        Family History   Adopted: Yes   Family history unknown: Yes       Past Surgical History:   Procedure Laterality Date    SHOULDER ARTHROSCOPY Right 06/16/2017       Social History     Tobacco Use  Smoking status: Former Smoker     Packs/day: 2.00     Years: 0.50     Pack years: 1.00     Start date:      Quit date:      Years since quittin.5    Smokeless tobacco: Never Used   Vaping Use    Vaping Use: Never used   Substance Use Topics    Alcohol use: No    Drug use: No       ROS:    Review of Systems   Constitutional: Negative for appetite change, fevers, chills   HENT: Positive for congestion, rhinorrhea negative for sore throat     Respiratory: Negative for choking and chest tightness. Cardiovascular: Positive for chest pain, negative palpitations leg swelling  Gastrointestinal: Negative for abdominal distention and abdominal pain. Genitourinary: Negative for difficulty urinating and dysuria. Musculoskeletal: Positive for arthralgias, back pain unchanged from prior at baseline  Skin: Negative for color change and pallor. Neurological: Negative for facial asymmetry and headaches. Psychiatric/Behavioral: Denies anxiety or nervousness today      Objective:    VS:  Blood pressure (!) 173/94, pulse 64, temperature 98 °F (36.7 °C), temperature source Temporal, resp. rate 16, height 5' 1\" (1.549 m), weight 168 lb (76.2 kg), SpO2 97 %, not currently breastfeeding. Physical Exam   Constitutional: Pale appearing   HENT:   Head: Normocephalic and atraumatic. Eyes: EOM are normal.   Neck: Neck supple. Cardiovascular: Normal rate, regular rhythm and intact distal pulses. Exam reveals no gallop and no friction rub. No murmur heard. Pulmonary/Chest: Effort normal and breath sounds normal. No wheezes. No rhales. Abdominal: Soft. Bowel sounds are normal. No distension. No abdominal tenderness. Musculoskeletal: Normal range of motion. General: No edema. Neurological: Alert and oriented to person, place, and time. Skin: Skin is warm and dry. No rash noted. No erythema. Psychiatric: Normal mood and affect.  Behavior is normal.   Nursing note and vitals reviewed. Plans:  As above. Please see Patient Instructions for further counseling and information given. Advised to please be adherent to the treatment plans discussed today, and please call with any questions or concerns, letting the office know of any reasons that the plans may not be followed. The risks of untreated conditions include worsening illness, injury, disability, and possibly, death. Please call if symptoms change in any way, worsen, or fail to completely resolve, as this could necessitate a change to treatment plans. Patient and/or caregiver expressed understanding. Indications and proper use of medication(s) reviewed. Potential side-effects and risks of medication(s) also explained. Patient and/or caregiver was instructed to call if any new symptoms develop prior to next visit. Health risk factors discussed and addressed.

## 2021-07-29 NOTE — ED PROVIDER NOTES
HPI:  7/29/21, Time: 4:59 PM EDT         Paula Stone is a 49 y.o. female presenting to the ED for hyperglycemia. Came on gradually, nothing makes it better or worse, no associated pain. Patient states she checked her blood sugar, was over 500 at home. She has not been taking her insulin, she does have it at home. She has no other symptoms or complaints. She denies any fever, chills, nausea, vomiting, change in bowel or bladder, neck pain or stiffness, paresthesias, cough, sputum, lethargy, or any other symptoms or complaints. Review of Systems:   A complete review of systems was performed and pertinent positives and negatives are stated within HPI, all other systems reviewed and are negative.          --------------------------------------------- PAST HISTORY ---------------------------------------------  Past Medical History:  has a past medical history of Anxiety, Arthritis, Depression, Diabetes mellitus ([facility] HTN (hypertension), Hypertensive chronic kidney disease, Left shoulder pain, LIZABETH (obstructive sleep apnea), and Thyroid disease. Past Surgical History:  has a past surgical history that includes Shoulder arthroscopy (Right, 06/16/2017). Social History:  reports that she quit smoking about 23 years ago. She started smoking about 24 years ago. She has a 1.00 pack-year smoking history. She has never used smokeless tobacco. She reports that she does not drink alcohol and does not use drugs. Family History: She was adopted. Family history is unknown by patient. The patients home medications have been reviewed.     Allergies: Amoxicillin, Sulfa antibiotics, Cefdinir, Milk-related compounds, and Tomato    -------------------------------------------------- RESULTS -------------------------------------------------  All laboratory and radiology results have been personally reviewed by myself   LABS:  Results for orders placed or performed during the hospital encounter of 07/29/21   CBC Auto Differential   Result Value Ref Range    WBC 12.1 (H) 4.5 - 11.5 E9/L    RBC 4.16 3.50 - 5.50 E12/L    Hemoglobin 8.6 (L) 11.5 - 15.5 g/dL    Hematocrit 29.2 (L) 34.0 - 48.0 %    MCV 70.2 (L) 80.0 - 99.9 fL    MCH 20.7 (L) 26.0 - 35.0 pg    MCHC 29.5 (L) 32.0 - 34.5 %    RDW 14.6 11.5 - 15.0 fL    Platelets 345 873 - 325 E9/L    MPV 9.7 7.0 - 12.0 fL    Neutrophils % 63.7 43.0 - 80.0 %    Immature Granulocytes % 2.7 0.0 - 5.0 %    Lymphocytes % 26.6 20.0 - 42.0 %    Monocytes % 4.3 2.0 - 12.0 %    Eosinophils % 2.2 0.0 - 6.0 %    Basophils % 0.5 0.0 - 2.0 %    Neutrophils Absolute 7.67 (H) 1.80 - 7.30 E9/L    Immature Granulocytes # 0.32 E9/L    Lymphocytes Absolute 3.21 1.50 - 4.00 E9/L    Monocytes Absolute 0.52 0.10 - 0.95 E9/L    Eosinophils Absolute 0.27 0.05 - 0.50 E9/L    Basophils Absolute 0.06 0.00 - 0.20 E9/L   Comprehensive Metabolic Panel   Result Value Ref Range    Sodium 127 (L) 132 - 146 mmol/L    Potassium 3.7 3.5 - 5.0 mmol/L    Chloride 94 (L) 98 - 107 mmol/L    CO2 22 22 - 29 mmol/L    Anion Gap 11 7 - 16 mmol/L    Glucose 536 (HH) 74 - 99 mg/dL    BUN 12 6 - 20 mg/dL    CREATININE 0.7 0.5 - 1.0 mg/dL    GFR Non-African American >60 >=60 mL/min/1.73    GFR African American >60     Calcium 9.0 8.6 - 10.2 mg/dL    Total Protein 6.5 6.4 - 8.3 g/dL    Albumin 2.9 (L) 3.5 - 5.2 g/dL    Total Bilirubin 0.2 0.0 - 1.2 mg/dL    Alkaline Phosphatase 136 (H) 35 - 104 U/L    ALT 12 0 - 32 U/L    AST 15 0 - 31 U/L   Lipase   Result Value Ref Range    Lipase 45 13 - 60 U/L   pH, venous   Result Value Ref Range    pH, Harris 7.37 7.35 - 7.45   Beta-Hydroxybutyrate   Result Value Ref Range    Beta-Hydroxybutyrate 1.58 (H) 0.02 - 0.27 mmol/L   Urinalysis with Microscopic   Result Value Ref Range    Color, UA Yellow Straw/Yellow    Clarity, UA Clear Clear    Glucose, Ur >=1000 (A) Negative mg/dL    Bilirubin Urine Negative Negative    Ketones, Urine 15 (A) Negative mg/dL    Specific Gravity, UA 1.015 1.005 - 1.030 Blood, Urine MODERATE (A) Negative    pH, UA 6.0 5.0 - 9.0    Protein, UA >=300 (A) Negative mg/dL    Urobilinogen, Urine 0.2 <2.0 E.U./dL    Nitrite, Urine Negative Negative    Leukocyte Esterase, Urine Negative Negative    WBC, UA NONE 0 - 5 /HPF    RBC, UA 5-10 (A) 0 - 2 /HPF    Bacteria, UA NONE SEEN None Seen /HPF   POCT Glucose   Result Value Ref Range    Glucose 496 mg/dL   POCT Glucose   Result Value Ref Range    Meter Glucose 496 (H) 74 - 99 mg/dL   POCT Glucose   Result Value Ref Range    Meter Glucose 385 (H) 74 - 99 mg/dL       RADIOLOGY:  Interpreted by Radiologist.  No orders to display       ------------------------- NURSING NOTES AND VITALS REVIEWED ---------------------------   The nursing notes within the ED encounter and vital signs as below have been reviewed. BP (!) 163/95   Pulse 100   Resp 16   Ht 5' 1\" (1.549 m)   Wt 168 lb (76.2 kg)   SpO2 99%   BMI 31.74 kg/m²   Oxygen Saturation Interpretation: Normal      ---------------------------------------------------PHYSICAL EXAM--------------------------------------      Constitutional/General: Alert and oriented x3, well appearing, non toxic in NAD  Head: Normocephalic and atraumatic  Eyes: PERRL, EOMI  Mouth: Oropharynx clear, handling secretions, no trismus  Neck: Supple, full ROM,   Pulmonary: Lungs clear to auscultation bilaterally, no wheezes, rales, or rhonchi. Not in respiratory distress  Cardiovascular:  Regular rate and rhythm, no murmurs, gallops, or rubs. 2+ distal pulses  Abdomen: Soft, non tender, non distended,   Extremities: Moves all extremities x 4.  Warm and well perfused  Skin: warm and dry without rash  Neurologic: GCS 15,  Psych: Normal Affect      ------------------------------ ED COURSE/MEDICAL DECISION MAKING----------------------  Medications   0.9 % sodium chloride bolus (has no administration in time range)   0.9 % sodium chloride bolus (0 mLs Intravenous Stopped 7/29/21 1967)   insulin regular (HUMULIN R;NOVOLIN R) injection 10 Units (10 Units Intravenous Given 7/29/21 1556)         ED COURSE:       Medical Decision Making:    Patient received IV fluids and insulin. Labs reviewed. Not consistent with DKA. She was monitored. Reevaluation, she is resting comfortably. Blood sugar improved. No symptoms or complaints. Remains hemodynamically stable. Patient is discharged, she is instructed take her insulin at home, she is educated on signs and symptoms that require emergent evaluation. Counseling: The emergency provider has spoken with the patient and discussed todays results, in addition to providing specific details for the plan of care and counseling regarding the diagnosis and prognosis. Questions are answered at this time and they are agreeable with the plan.      --------------------------------- IMPRESSION AND DISPOSITION ---------------------------------    IMPRESSION  1. Hyperglycemia        DISPOSITION  Disposition: Discharge to home  Patient condition is stable      NOTE: This report was transcribed using voice recognition software.  Every effort was made to ensure accuracy; however, inadvertent computerized transcription errors may be present        Phillip Johnson MD  07/29/21 1162

## 2021-07-29 NOTE — PROGRESS NOTES
S: Lulu Ludwig 49 y.o. female  here for assessment of chest pain, cough, uncontrolled hypertension. Chest pain: Onset x10 days. Worse with coughing. Reports that the symptoms feel similar to a past episode of pneumonia. Associated symptoms to include cough. Denies fever/chills/sick contacts. Vaccinated against Covid with J&J vaccine. Cough: Onset x10 days. Dry nonproductive cough. Associated with clear rhinorrhea onset x4 days. No constitutional symptoms as described above. Uncontrolled hypertension: Noncompliant with chronic medications x10 days, citing concerns that the medications will make her cough. Only sign/symptom of hypertension includes the above chest pain; all other review is negative. O: VS: BP (!) 173/94   Pulse 64   Temp 98 °F (36.7 °C) (Temporal)   Resp 16   Ht 5' 1\" (1.549 m)   Wt 168 lb (76.2 kg)   SpO2 97%   BMI 31.74 kg/m²    General: NAD. Appears pale. HEENT: WDL              Neck: WDL   CV:  RRR, no gallops, rubs, or murmurs   Resp: CTAB no R/R/W   Abd:  Soft, nontender, no masses    Ext:  no C/C/E    Assessment/Plan     Patient was counseled on medication compliance. POCT blood glucose done in clinic, given history of not taking any meds for chronic conditions in the past 10 days with the exception of psychiatric meds.     POCT BG >500 mg/dl, patient was sent to ER for evaluation of hyperglycemia. Counseled and had conversation with patient prior to this and addressed her concerns or questions about going to the ER. Also explained the risks/benefits not taking medications.     1. Chest pain likely 2/2 viral URI versus ACS  EKG done in office. No remarkable findings. Work-up CXR, CBC to rule out pneumonia or other infectious etiology     2.   Uncontrolled hypertension  Risks occluding but not limited to CVA, coronary artery disease discussed with patient today  Addressed her distress and concern in swallowing pills  RTO 2 days for nursing visit BP recheck once taking meds     3. Cough, rhinorrhea  Pending above work-up  PRN robitussin     RTO 2 weeks for follow-up of symptoms, also to discuss adherence      Assessment/Plan:  1. Chest pain: Etiology unclear; differential diagnosis includes cardiac etiology, pneumonia, other respiratory/pulmonary etiology. Obtain EKG obtain lab work. Obtain chest x-ray. Stress test.  Check CBC to rule out infection. 2.  Cough: Differential diagnosis includes URI, pneumonia, GERD, CHF. Work-up to include CBC, chest x-ray, stress test to rule out underlying cardiac pathology. Trial of Robitussin cough medication as results are pending. 3.  Uncontrolled hypertension: Patient has been noncompliant with prescribed medications. Patient counseled to resume all her medications as directed. 4.  Uncontrolled diabetes: Due likely to noncompliance with prescribed medications. POCT glucose during today's visit. ADDENDUM:  Patient was counseled on medication compliance. POCT blood glucose done in clinic, given history of not taking any meds for chronic conditions in the past 10 days with the exception of psychiatric meds.     POCT BG >500 mg/dl, patient was sent to ER for evaluation of hyperglycemia. Counseled and had conversation with patient prior to this and addressed her concerns or questions about going to the ER. Also explained the risks/benefits not taking medications. Return in about 2 weeks (around 8/12/2021) for follow-up DM2/VIral URI symptoms. Nursing visit within the next 72 hours to recheck blood pressure; follow-up with PCP in 2 weeks to assess the above symptoms, review tests, discuss portance of compliance with medical care as prescribed. Attending Physician Statement  I have discussed the case, including pertinent history and exam findings with the resident. I agree with the documented assessment and plan.          Rica Irizarry MD

## 2021-08-17 ENCOUNTER — HOSPITAL ENCOUNTER (INPATIENT)
Dept: HOSPITAL 83 - ED | Age: 51
LOS: 2 days | Discharge: HOME | DRG: 420 | End: 2021-08-19
Attending: STUDENT IN AN ORGANIZED HEALTH CARE EDUCATION/TRAINING PROGRAM | Admitting: STUDENT IN AN ORGANIZED HEALTH CARE EDUCATION/TRAINING PROGRAM
Payer: COMMERCIAL

## 2021-08-17 VITALS — DIASTOLIC BLOOD PRESSURE: 94 MMHG

## 2021-08-17 VITALS — HEIGHT: 60.98 IN | BODY MASS INDEX: 29.27 KG/M2 | WEIGHT: 155 LBS

## 2021-08-17 VITALS — DIASTOLIC BLOOD PRESSURE: 78 MMHG | SYSTOLIC BLOOD PRESSURE: 167 MMHG

## 2021-08-17 DIAGNOSIS — E83.41: ICD-10-CM

## 2021-08-17 DIAGNOSIS — D72.829: ICD-10-CM

## 2021-08-17 DIAGNOSIS — D50.9: ICD-10-CM

## 2021-08-17 DIAGNOSIS — E11.65: Primary | ICD-10-CM

## 2021-08-17 DIAGNOSIS — F41.9: ICD-10-CM

## 2021-08-17 DIAGNOSIS — E78.2: ICD-10-CM

## 2021-08-17 DIAGNOSIS — E03.9: ICD-10-CM

## 2021-08-17 DIAGNOSIS — E43: ICD-10-CM

## 2021-08-17 DIAGNOSIS — B96.20: ICD-10-CM

## 2021-08-17 DIAGNOSIS — M79.7: ICD-10-CM

## 2021-08-17 DIAGNOSIS — F33.9: ICD-10-CM

## 2021-08-17 DIAGNOSIS — R45.851: ICD-10-CM

## 2021-08-17 DIAGNOSIS — I10: ICD-10-CM

## 2021-08-17 DIAGNOSIS — M35.00: ICD-10-CM

## 2021-08-17 DIAGNOSIS — Z88.8: ICD-10-CM

## 2021-08-17 DIAGNOSIS — F60.3: ICD-10-CM

## 2021-08-17 DIAGNOSIS — Z88.2: ICD-10-CM

## 2021-08-17 DIAGNOSIS — R31.9: ICD-10-CM

## 2021-08-17 DIAGNOSIS — N39.0: ICD-10-CM

## 2021-08-17 DIAGNOSIS — Z88.1: ICD-10-CM

## 2021-08-17 DIAGNOSIS — F43.21: ICD-10-CM

## 2021-08-17 LAB
ALBUMIN SERPL-MCNC: 2.6 GM/DL (ref 3.1–4.5)
ALP SERPL-CCNC: 130 U/L (ref 45–117)
ALT SERPL W P-5'-P-CCNC: 21 U/L (ref 12–78)
AMPHETAMINES UR QL SCN: < 1000
APAP SERPL-MCNC: < 5 UG/ML (ref 10–30)
AST SERPL-CCNC: 15 IU/L (ref 3–35)
BACTERIA #/AREA URNS HPF: (no result) /[HPF]
BARBITURATES UR QL SCN: < 200
BASOPHILS # BLD AUTO: 0.1 10*3/UL (ref 0–0.1)
BASOPHILS NFR BLD AUTO: 0.5 % (ref 0–1)
BENZODIAZ UR QL SCN: < 200
BUN SERPL-MCNC: 14 MG/DL (ref 7–24)
BUN SERPL-MCNC: 17 MG/DL (ref 7–24)
BZE UR QL SCN: < 300
CANNABINOIDS UR QL SCN: < 50
CHLORIDE SERPL-SCNC: 109 MMOL/L (ref 98–107)
CHLORIDE SERPL-SCNC: 99 MMOL/L (ref 98–107)
CREAT SERPL-MCNC: 0.69 MG/DL (ref 0.55–1.02)
CREAT SERPL-MCNC: 0.89 MG/DL (ref 0.55–1.02)
EOSINOPHIL # BLD AUTO: 0.1 10*3/UL (ref 0–0.4)
EOSINOPHIL # BLD AUTO: 1 % (ref 1–4)
EPI CELLS #/AREA URNS HPF: (no result) /[HPF]
ERYTHROCYTE [DISTWIDTH] IN BLOOD BY AUTOMATED COUNT: 15.5 % (ref 0–14.5)
ETHANOL SERPL-MCNC: < 3 MG/DL (ref ?–3)
GLUCOSE UR QL: (no result)
HCT VFR BLD AUTO: 30.6 % (ref 37–47)
HGB UR QL STRIP: (no result)
LEUKOCYTE ESTERASE UR QL STRIP: (no result)
LYMPHOCYTES # BLD AUTO: 2.8 10*3/UL (ref 1.3–4.4)
LYMPHOCYTES NFR BLD AUTO: 22.5 % (ref 27–41)
MCH RBC QN AUTO: 20.2 PG (ref 27–31)
MCHC RBC AUTO-ENTMCNC: 28.8 G/DL (ref 33–37)
MCV RBC AUTO: 70.3 FL (ref 81–99)
METHADONE UR QL SCN: < 300
MONOCYTES # BLD AUTO: 0.7 10*3/UL (ref 0.1–1)
MONOCYTES NFR BLD MANUAL: 5.5 % (ref 3–9)
NEUT #: 8.4 10*3/UL (ref 2.3–7.9)
NEUT %: 68.5 % (ref 47–73)
NRBC BLD QL AUTO: 0 10*3/UL (ref 0–0)
OPIATES UR QL SCN: < 300
PCP UR QL SCN: <  25
PH UR STRIP: 6 [PH] (ref 4.5–8)
PLATELET # BLD AUTO: 339 10*3/UL (ref 130–400)
PMV BLD AUTO: 10.6 FL (ref 9.6–12.3)
POTASSIUM SERPL-SCNC: 3.6 MMOL/L (ref 3.5–5.1)
POTASSIUM SERPL-SCNC: 4.5 MMOL/L (ref 3.5–5.1)
PROT SERPL-MCNC: 7 GM/DL (ref 6.4–8.2)
RBC # BLD AUTO: 4.35 10*6/UL (ref 4.1–5.1)
SODIUM SERPL-SCNC: 128 MMOL/L (ref 136–145)
SODIUM SERPL-SCNC: 135 MMOL/L (ref 136–145)
SP GR UR: >= 1.03 (ref 1–1.03)
UROBILINOGEN UR STRIP-MCNC: 0.2 E.U./DL (ref 0–1)
WBC #/AREA URNS HPF: (no result) WBC/HPF (ref 0–5)
WBC NRBC COR # BLD AUTO: 12.2 10*3/UL (ref 4.8–10.8)

## 2021-08-18 VITALS — DIASTOLIC BLOOD PRESSURE: 75 MMHG | SYSTOLIC BLOOD PRESSURE: 148 MMHG

## 2021-08-18 VITALS — DIASTOLIC BLOOD PRESSURE: 73 MMHG

## 2021-08-18 VITALS — DIASTOLIC BLOOD PRESSURE: 73 MMHG | SYSTOLIC BLOOD PRESSURE: 152 MMHG

## 2021-08-18 VITALS — DIASTOLIC BLOOD PRESSURE: 62 MMHG

## 2021-08-18 VITALS — DIASTOLIC BLOOD PRESSURE: 80 MMHG

## 2021-08-18 LAB
ALBUMIN SERPL-MCNC: 2.2 GM/DL (ref 3.1–4.5)
ALP SERPL-CCNC: 105 U/L (ref 45–117)
ALT SERPL W P-5'-P-CCNC: 16 U/L (ref 12–78)
AST SERPL-CCNC: 11 IU/L (ref 3–35)
BASOPHILS # BLD AUTO: 0.1 10*3/UL (ref 0–0.1)
BASOPHILS NFR BLD AUTO: 0.6 % (ref 0–1)
BUN SERPL-MCNC: 14 MG/DL (ref 7–24)
CHLORIDE SERPL-SCNC: 112 MMOL/L (ref 98–107)
CREAT SERPL-MCNC: 0.61 MG/DL (ref 0.55–1.02)
EOSINOPHIL # BLD AUTO: 0.3 10*3/UL (ref 0–0.4)
EOSINOPHIL # BLD AUTO: 2 % (ref 1–4)
ERYTHROCYTE [DISTWIDTH] IN BLOOD BY AUTOMATED COUNT: 15.4 % (ref 0–14.5)
HCT VFR BLD AUTO: 27.1 % (ref 37–47)
LYMPHOCYTES # BLD AUTO: 4 10*3/UL (ref 1.3–4.4)
LYMPHOCYTES NFR BLD AUTO: 31.5 % (ref 27–41)
MCH RBC QN AUTO: 20.4 PG (ref 27–31)
MCHC RBC AUTO-ENTMCNC: 29.2 G/DL (ref 33–37)
MCV RBC AUTO: 70 FL (ref 81–99)
MONOCYTES # BLD AUTO: 0.5 10*3/UL (ref 0.1–1)
MONOCYTES NFR BLD MANUAL: 3.9 % (ref 3–9)
NEUT #: 7.7 10*3/UL (ref 2.3–7.9)
NEUT %: 60.4 % (ref 47–73)
NRBC BLD QL AUTO: 0 % (ref 0–0)
PLATELET # BLD AUTO: 291 10*3/UL (ref 130–400)
PMV BLD AUTO: 9.5 FL (ref 9.6–12.3)
POTASSIUM SERPL-SCNC: 4.3 MMOL/L (ref 3.5–5.1)
PROT SERPL-MCNC: 5.3 GM/DL (ref 6.4–8.2)
RBC # BLD AUTO: 3.87 10*6/UL (ref 4.1–5.1)
SODIUM SERPL-SCNC: 138 MMOL/L (ref 136–145)
T4 FREE SERPL-MCNC: 0.69 NG/DL (ref 0.76–1.46)
TSH SERPL DL<=0.005 MIU/L-ACNC: 4.16 UIU/ML (ref 0.36–4.75)
VITAMIN B12: 441 PG/ML (ref 247–911)
WBC NRBC COR # BLD AUTO: 12.8 10*3/UL (ref 4.8–10.8)

## 2021-08-19 VITALS — SYSTOLIC BLOOD PRESSURE: 162 MMHG | DIASTOLIC BLOOD PRESSURE: 70 MMHG

## 2021-08-19 VITALS — SYSTOLIC BLOOD PRESSURE: 166 MMHG | DIASTOLIC BLOOD PRESSURE: 85 MMHG

## 2021-08-19 VITALS — DIASTOLIC BLOOD PRESSURE: 71 MMHG

## 2021-08-25 ENCOUNTER — TELEPHONE (OUTPATIENT)
Dept: FAMILY MEDICINE CLINIC | Age: 51
End: 2021-08-25

## 2021-08-26 NOTE — TELEPHONE ENCOUNTER
Received call from patient that she has been experiencing vomiting, 2 episodes in last 24 hours, non bloody. Recently treated for UTI at [facility]. Has been able to keep down most liquids today. Also reporting hip and knee pain. No known injury. Advised pt to stay well hydrated, conservative treatment for pain, to ER for any worsening symptoms. Call office next day if no better for same day visit. Verbalized understanding.      Electronically signed by Melly Blake MD PGY-3 on 8/25/2021 at 9:12 PM

## 2021-08-29 DIAGNOSIS — G47.9 SLEEPING DIFFICULTY: ICD-10-CM

## 2021-08-31 RX ORDER — LANOLIN ALCOHOL/MO/W.PET/CERES
3 CREAM (GRAM) TOPICAL NIGHTLY PRN
Qty: 30 TABLET | Refills: 0 | Status: SHIPPED
Start: 2021-08-31 | End: 2021-09-10 | Stop reason: SDUPTHER

## 2021-09-10 DIAGNOSIS — M25.552 PAIN OF BOTH HIP JOINTS: ICD-10-CM

## 2021-09-10 DIAGNOSIS — G47.9 SLEEPING DIFFICULTY: ICD-10-CM

## 2021-09-10 DIAGNOSIS — M25.551 PAIN OF BOTH HIP JOINTS: ICD-10-CM

## 2021-09-10 DIAGNOSIS — I10 HYPERTENSION, UNSPECIFIED TYPE: ICD-10-CM

## 2021-09-10 RX ORDER — CALCIUM CARBONATE 500(1250)
TABLET ORAL
Qty: 90 TABLET | Refills: 5 | Status: SHIPPED
Start: 2021-09-10 | End: 2021-09-28

## 2021-09-10 RX ORDER — LISINOPRIL 10 MG/1
10 TABLET ORAL DAILY
Qty: 90 TABLET | Refills: 2 | Status: SHIPPED
Start: 2021-09-10 | End: 2021-10-21 | Stop reason: SDUPTHER

## 2021-09-10 RX ORDER — CHOLECALCIFEROL (VITAMIN D3) 125 MCG
500 CAPSULE ORAL DAILY
Qty: 90 TABLET | Refills: 1 | Status: SHIPPED
Start: 2021-09-10 | End: 2021-09-28

## 2021-09-10 RX ORDER — LAMOTRIGINE 100 MG/1
TABLET ORAL
Qty: 90 TABLET | Refills: 1 | Status: SHIPPED
Start: 2021-09-10 | End: 2022-02-17

## 2021-09-10 RX ORDER — DULOXETIN HYDROCHLORIDE 60 MG/1
CAPSULE, DELAYED RELEASE ORAL
Qty: 30 CAPSULE | Refills: 1 | Status: SHIPPED
Start: 2021-09-10 | End: 2021-09-21

## 2021-09-10 RX ORDER — NAPROXEN 250 MG/1
TABLET ORAL
Qty: 60 TABLET | Refills: 5 | Status: SHIPPED
Start: 2021-09-10 | End: 2022-04-18

## 2021-09-10 RX ORDER — MULTIVIT-MIN/IRON FUM/FOLIC AC 7.5 MG-4
1 TABLET ORAL DAILY
Qty: 30 TABLET | Refills: 3 | Status: SHIPPED
Start: 2021-09-10 | End: 2021-09-28

## 2021-09-10 RX ORDER — PREGABALIN 100 MG/1
100 CAPSULE ORAL 3 TIMES DAILY
Qty: 90 CAPSULE | Refills: 2 | Status: SHIPPED
Start: 2021-09-10 | End: 2021-12-15

## 2021-09-10 RX ORDER — LANOLIN ALCOHOL/MO/W.PET/CERES
3 CREAM (GRAM) TOPICAL NIGHTLY PRN
Qty: 30 TABLET | Refills: 0 | Status: SHIPPED
Start: 2021-09-10 | Stop reason: SDUPTHER

## 2021-09-13 ENCOUNTER — OFFICE VISIT (OUTPATIENT)
Dept: FAMILY MEDICINE CLINIC | Age: 51
End: 2021-09-13
Payer: COMMERCIAL

## 2021-09-13 VITALS
HEART RATE: 103 BPM | BODY MASS INDEX: 30.21 KG/M2 | WEIGHT: 160 LBS | HEIGHT: 61 IN | TEMPERATURE: 97.9 F | RESPIRATION RATE: 18 BRPM | DIASTOLIC BLOOD PRESSURE: 76 MMHG | OXYGEN SATURATION: 99 % | SYSTOLIC BLOOD PRESSURE: 134 MMHG

## 2021-09-13 DIAGNOSIS — Z91.199 NON-COMPLIANCE: Primary | ICD-10-CM

## 2021-09-13 DIAGNOSIS — E11.65 UNCONTROLLED TYPE 2 DIABETES MELLITUS WITH HYPERGLYCEMIA (HCC): ICD-10-CM

## 2021-09-13 DIAGNOSIS — R11.2 NAUSEA AND VOMITING, INTRACTABILITY OF VOMITING NOT SPECIFIED, UNSPECIFIED VOMITING TYPE: ICD-10-CM

## 2021-09-13 LAB
BILIRUBIN, POC: NORMAL
BLOOD URINE, POC: NORMAL
CHP ED QC CHECK: NORMAL
CLARITY, POC: CLEAR
COLOR, POC: YELLOW
GLUCOSE BLD-MCNC: 116 MG/DL
GLUCOSE URINE, POC: NORMAL
HBA1C MFR BLD: 13.6 %
KETONES, POC: NORMAL
LEUKOCYTE EST, POC: NORMAL
NITRITE, POC: NORMAL
PH, POC: 6.5
PROTEIN, POC: >300
SPECIFIC GRAVITY, POC: >1.03
UROBILINOGEN, POC: 0.2

## 2021-09-13 PROCEDURE — 3017F COLORECTAL CA SCREEN DOC REV: CPT | Performed by: FAMILY MEDICINE

## 2021-09-13 PROCEDURE — 81002 URINALYSIS NONAUTO W/O SCOPE: CPT | Performed by: STUDENT IN AN ORGANIZED HEALTH CARE EDUCATION/TRAINING PROGRAM

## 2021-09-13 PROCEDURE — 99213 OFFICE O/P EST LOW 20 MIN: CPT | Performed by: STUDENT IN AN ORGANIZED HEALTH CARE EDUCATION/TRAINING PROGRAM

## 2021-09-13 PROCEDURE — 82962 GLUCOSE BLOOD TEST: CPT | Performed by: STUDENT IN AN ORGANIZED HEALTH CARE EDUCATION/TRAINING PROGRAM

## 2021-09-13 PROCEDURE — 99212 OFFICE O/P EST SF 10 MIN: CPT | Performed by: STUDENT IN AN ORGANIZED HEALTH CARE EDUCATION/TRAINING PROGRAM

## 2021-09-13 PROCEDURE — G8417 CALC BMI ABV UP PARAM F/U: HCPCS | Performed by: FAMILY MEDICINE

## 2021-09-13 PROCEDURE — G8427 DOCREV CUR MEDS BY ELIG CLIN: HCPCS | Performed by: FAMILY MEDICINE

## 2021-09-13 PROCEDURE — 2022F DILAT RTA XM EVC RTNOPTHY: CPT | Performed by: FAMILY MEDICINE

## 2021-09-13 PROCEDURE — 83036 HEMOGLOBIN GLYCOSYLATED A1C: CPT | Performed by: STUDENT IN AN ORGANIZED HEALTH CARE EDUCATION/TRAINING PROGRAM

## 2021-09-13 PROCEDURE — 1036F TOBACCO NON-USER: CPT | Performed by: FAMILY MEDICINE

## 2021-09-13 PROCEDURE — 3046F HEMOGLOBIN A1C LEVEL >9.0%: CPT | Performed by: FAMILY MEDICINE

## 2021-09-13 NOTE — PROGRESS NOTES
CC:  Type 2 DM/N,V  ------------------------------------------------------------------------------------------------------------------------  Assessment/Plan  1. Uncontrolled type 2 diabetes mellitus with hyperglycemia ([facility] [address]  Continue with follow-up [facility]  - POCT glycosylated hemoglobin (Hb A1C)      2. Non-compliance  Adherence contributing to frequent visits and hyper/hypoglycemic symptoms  Examined patient's reasoning behind not recording FBG consistently or adhering to diabetic diet. Affording food, and 'long list of meds' has left her feeling tired. Will consider contacting [facility] regarding worsening of parents mood to help her better manage symptoms    3. Nausea and vomiting, intractability of vomiting not specified, unspecified vomiting type  PRN gravol ordered  UA/Urine culture      RTO in 2 months for cervical cancer screening or sooner PRN      -------------------------------------------------------------------------------------------------------------------------    HPI:  49 y.o. woman presents for emesis (1-day onset) and for follow-up type 2 dm insulin dependent    Poorly controlled type 2 Diabetes (A1C: 13.6 today )  Poorly controlled glucose readings. Seeing Endocrinology at [facility]. Is willing to try alternate insulin/DM pharmacotherapy. Not always taking other medications. Non compliant with diet    Fasting Blood Glucose:  95 mg/dl this morning  Feels it is fluctuating    Is taking:  Humalog SS - 62 U up to before meals  Lantus 45 U BID  Metformin 1000 mg BID  Ozempic 1 mg 1x week    Is supposed to be on: as per patient, from last [facility] visit  [address]  Patient's reason for not taking novolin is wanting to finish humalog she had at home.     Diet:  Difficult time accessing fresh fruits/vegetables  High-Carb Diet    Hypo/Hyperglycemic Symptoms:  +neuropathy in feet  + eye exam from Opthlamologist- glaucoma    Nausea/Vomiting  \"Get this symptom and feeling when I have a UTI\"  Last 24 hours- 2 x episodes of emesis  Appetite okay, 8 PM peanut butter and jelly sandwich, 12 PM pretzels  Long-standing issue for patient; she thought it was a stress related issue  Nauseated intermittently  No abdominal pain  No dysuria, hematuria, flank pain, fevers, chills    Chronic Depression/Anxiety  [facility]  Has SI/HI at baseline (see prior clinic note)  \"getting a little worse\"    Colon Cancer Screening  Declining both FIT/Colonoscopy  \"never been considered about colon cancer\"    Cervical Cancer Screening  Not done in last 3 years    Patient Active Problem List    Diagnosis Date Noted    COVID-19 06/17/2021    Hypoglycemia 06/17/2021    Anemia 06/16/2021    Proteinuria 06/16/2021    Hypokalemia 03/10/2021    Encounter for long-term (current) use of insulin ([address] 03/08/2021    Anxiety disorder 03/08/2021    Benign hypertensive kidney disease with chronic kidney disease 03/08/2021    Chronic kidney disease, stage I 03/08/2021    Fibromyalgia 03/08/2021    Hyperlipidemia 03/08/2021    Nephrotic syndrome due to type 2 diabetes mellitus ([address] 03/08/2021    Hypothyroidism 03/08/2021    Type 2 diabetes mellitus with chronic kidney disease ([address] 03/08/2021    Mild depression ([address] 10/02/2020    Severe obstructive sleep apnea 07/16/2020    Gastroparesis 12/06/2018    Infective urethritis 06/28/2018    Acquired hypothyroidism 12/20/2016    Vitamin D deficiency 12/20/2016    Diabetic ketosis ([address] 09/01/2015    Type 2 diabetes mellitus with complication, with long-term current use of insulin ([address] 05/30/2014    Depression 05/30/2014    Sjogren's syndrome ([address] 05/30/2014    Hyperglycemia 05/30/2014    Depressive disorder 05/30/2014       Past Medical History:   Diagnosis Date    Anxiety     Arthritis     Depression     Diabetes mellitus ([address]     HTN (hypertension)     Hypertensive chronic kidney disease     Left shoulder pain     LIZABETH (obstructive sleep apnea)     Thyroid disease        Current Outpatient Medications on File Prior to Visit   Medication Sig Dispense Refill    DULoxetine (CYMBALTA) 60 MG extended release capsule TAKE 1 CAPSULE BY MOUTH EVERY MORNING 60MG IN AM   30MG IN PM   90MG/DAY 30 capsule 1    lisinopril (PRINIVIL;ZESTRIL) 10 MG tablet Take 1 tablet by mouth daily 90 tablet 2    lamoTRIgine (LAMICTAL) 100 MG tablet TAKE 1 TABLET BY MOUTH EVERY DAY 90 tablet 1    melatonin 3 MG TABS tablet Take 1 tablet by mouth nightly as needed (insomnia) 30 tablet 0    calcium elemental (OSCAL) 500 MG TABS tablet TAKE 1 TABLET BY MOUTH TWICE A DAY WITH LUNCH AND DINNER 90 tablet 5    vitamin B-12 (CYANOCOBALAMIN) 500 MCG tablet Take 1 tablet by mouth daily 90 tablet 1    Multiple Vitamins-Minerals (MULTIVITAMIN WITH MINERALS) tablet Take 1 tablet by mouth daily 30 tablet 3    naproxen (NAPROSYN) 250 MG tablet TAKE 1 TABLET BY MOUTH TWICE A DAY WITH MEALS 60 tablet 5    pregabalin (LYRICA) 100 MG capsule Take 1 capsule by mouth 3 times daily for 90 days. 90 capsule 2    guaiFENesin (ALTARUSSIN) 100 MG/5ML syrup Take 10 mLs by mouth every 4 hours as needed for Cough 355 mL 0    hydrocortisone 2.5 % cream Apply topically 2 times daily.  45 g 0    famotidine (PEPCID) 20 MG tablet Take 1 tablet by mouth 2 times daily 60 tablet 3    Liraglutide (VICTOZA) 18 MG/3ML SOPN SC injection Inject 0.6 mg into the skin daily 6 mL 3    buPROPion (WELLBUTRIN SR) 100 MG extended release tablet TAKE 1 TABLET BY MOUTH EVERY MORNING      Cholecalciferol (VITAMIN D3) 125 MCG (5000 UT) CAPS TAKE 1 CAPSULE BY MOUTH EVERY DAY      clotrimazole-betamethasone (LOTRISONE) 1-0.05 % cream PLEASE SEE ATTACHED FOR DETAILED DIRECTIONS      metFORMIN (GLUCOPHAGE-XR) 500 MG extended release tablet TAKE 2 TABLETS BY MOUTH TWICE A DAY      atorvastatin (LIPITOR) 80 MG tablet TAKE 1 TABLET BY MOUTH EVERYDAY AT BEDTIME  DULoxetine (CYMBALTA) 30 MG extended release capsule TAKE 1 CAPSULE BY MOUTH EVERY EVENING 60MG IN AM   30MG IN PM   90MG/DAY      empagliflozin (JARDIANCE) 25 MG tablet Take 25 mg by mouth daily ACM verified with Dr Stephan Greenwood office on 2/26/2020      hydroxychloroquine (PLAQUENIL) 200 MG tablet Take 200 mg by mouth 2 times daily      diclofenac sodium 1 % GEL Apply 4 g topically 4 times daily 4 Tube 1    levothyroxine (SYNTHROID) 88 MCG tablet Take 1 tablet by mouth daily      Omega-3 Fatty Acids (FISH OIL) 1000 MG CAPS Take 1,000 mg by mouth 4 times daily      methotrexate (RHEUMATREX) 2.5 MG chemo tablet Take 15 mg by mouth once a week      Insulin Pen Needle 31G X 5 MM MISC 1 each by Does not apply route 3 times daily 100 each 3    Continuous Blood Gluc Sensor (FREESTYLE NIMISHA 2 SENSOR) MISC 1 each by Does not apply route continuous      Blood Pressure KIT 1 box by Does not apply route 2 times daily 1 kit 0    HUMALOG KWIKPEN 200 UNIT/ML SOPN pen INJECT 22 62 UNITS 2 3 TIMES A DAY WITH MEALS PER SLIDING SCALE UP  UNITS      OZEMPIC, 1 MG/DOSE, 2 MG/1.5ML SOPN Inject 1 mg into the skin once a week       folic acid (FOLVITE) 1 MG tablet Take 1 tablet by mouth daily 30 tablet 1    FREESTYLE LITE strip USE AS DIRECTED 100 strip 3    FREESTYLE LANCETS MISC CHECK BS 4 TIMES DAILY 100 each 3     No current facility-administered medications on file prior to visit.        Allergies   Allergen Reactions    Amoxicillin      Hives all over body, states that hives are severe     Sulfa Antibiotics     Cefdinir     Milk-Related Compounds     Tomato        Family History   Adopted: Yes   Family history unknown: Yes       Past Surgical History:   Procedure Laterality Date    SHOULDER ARTHROSCOPY Right 06/16/2017       Social History     Tobacco Use    Smoking status: Former Smoker     Packs/day: 2.00     Years: 0.50     Pack years: 1.00     Start date: 1997     Quit date: 1998     Years since quittin.7    Smokeless tobacco: Never Used   Vaping Use    Vaping Use: Never used   Substance Use Topics    Alcohol use: No    Drug use: No       ROS: as mentioned in H&P      Objective:    VS:  Blood pressure 134/76, pulse 103, temperature 97.9 °F (36.6 °C), temperature source Temporal, resp. rate 18, height 5' 1\" (1.549 m), weight 160 lb (72.6 kg), SpO2 99 %, not currently breastfeeding. Physical Exam   Constitutional: Pale appearing. Oriented to person/place  HENT:   Head: Normocephalic and atraumatic. Eyes: EOM are normal.   Neck: Neck supple. Cardiovascular: Normal rate, regular rhythm and intact distal pulses. Exam reveals no gallop and no friction rub. No murmur heard. Pulmonary/Chest: Effort normal and breath sounds normal. No wheezes. No rhales. Abdominal: Soft. Bowel sounds are normal. No distension. No abdominal tenderness. Musculoskeletal: Normal range of motion. General: No edema. Neurological: Alert and oriented to person, place, and time. Skin: Skin is warm and dry. No rash noted. No erythema. Psychiatric: Normal mood and affect. Behavior is normal.   Nursing note and vitals reviewed. Plans:  As above. Please see Patient Instructions for further counseling and information given. Advised to please be adherent to the treatment plans discussed today, and please call with any questions or concerns, letting the office know of any reasons that the plans may not be followed. The risks of untreated conditions include worsening illness, injury, disability, and possibly, death. Please call if symptoms change in any way, worsen, or fail to completely resolve, as this could necessitate a change to treatment plans. Patient and/or caregiver expressed understanding. Indications and proper use of medication(s) reviewed. Potential side-effects and risks of medication(s) also explained.   Patient and/or caregiver was instructed to call if any new symptoms develop prior to next visit. Health risk factors discussed and addressed.

## 2021-09-13 NOTE — PROGRESS NOTES
S: 49 y.o. female here for n/v for one day. N/v since beginning of 2019, thought it might've been due to stress. Now feels it may be associated with UTI. Denies LUTS. No fever or flank pain. DM. Poor diet. Supposed to be taking novolin but taking humalog, lantus and ozempic. B/l foot neuropathy. Vaginal irritation for 5 yrs. No rash. Lab Results   Component Value Date    LABA1C 13.6 09/13/2021   told in August she has bleed in eye by eye doctor. O: VS: /76 (Site: Right Upper Arm, Position: Sitting, Cuff Size: Medium Adult)   Pulse 103   Temp 97.9 °F (36.6 °C) (Temporal)   Resp 18   Ht 5' 1\" (1.549 m)   Wt 160 lb (72.6 kg)   SpO2 99%   BMI 30.23 kg/m²    General: NAD, alert and interacting appropriately. CV:  RRR, no gallops, rubs, or murmurs    Resp: CTAB   Abd:  Soft, nontender   Ext:  No edema. FROM. Impression: n/v 2/2 gastritis vs GE vs hyperglycemia at home vs psychosomatic > UTI  Plan:   Zofran. Avoid nsaids, caffeine, EtOH  UCx. No Abx needed at this time. Declined cscope or FIT  Needs to schedule mammo  Got J+J in April  rtc 1 wk     Attending Physician Statement  I have discussed the case, including pertinent history and exam findings with the resident. I agree with the documented assessment and plan.

## 2021-09-16 DIAGNOSIS — R39.9 URINARY SYMPTOM OR SIGN: Primary | ICD-10-CM

## 2021-09-16 RX ORDER — NITROFURANTOIN 25; 75 MG/1; MG/1
100 CAPSULE ORAL 2 TIMES DAILY
Qty: 14 CAPSULE | Refills: 0 | Status: SHIPPED | OUTPATIENT
Start: 2021-09-16 | End: 2021-09-23

## 2021-09-20 ENCOUNTER — TELEPHONE (OUTPATIENT)
Dept: FAMILY MEDICINE CLINIC | Age: 51
End: 2021-09-20

## 2021-09-20 NOTE — TELEPHONE ENCOUNTER
----- Message from [employer] sent at 9/17/2021  4:52 PM EDT -----  Subject: Results Request    QUESTIONS  Which lab or imaging result is the patient calling about? Urine culture  Which provider ordered the test? Alysia Eldridge   At what location was the test performed? Date the test was performed? 2021-09-13  Additional Information for Provider? She would like to know uti infection   is a bladder infection. She said someone from the office did call her with   the results but they did not say what kind of infection. Please call   patient to clarify   ---------------------------------------------------------------------------  --------------  CALL BACK INFO  What is the best way for the office to contact you? OK to leave message on   voicemail  Preferred Call Back Phone Number?  [phone]

## 2021-09-21 ENCOUNTER — OFFICE VISIT (OUTPATIENT)
Dept: FAMILY MEDICINE CLINIC | Age: 51
End: 2021-09-21
Payer: COMMERCIAL

## 2021-09-21 VITALS
OXYGEN SATURATION: 99 % | WEIGHT: 157 LBS | RESPIRATION RATE: 16 BRPM | HEIGHT: 61 IN | SYSTOLIC BLOOD PRESSURE: 147 MMHG | BODY MASS INDEX: 29.64 KG/M2 | DIASTOLIC BLOOD PRESSURE: 83 MMHG | TEMPERATURE: 98.5 F | HEART RATE: 104 BPM

## 2021-09-21 DIAGNOSIS — E11.65 UNCONTROLLED TYPE 2 DIABETES MELLITUS WITH HYPERGLYCEMIA (HCC): Primary | ICD-10-CM

## 2021-09-21 DIAGNOSIS — I10 HYPERTENSION, UNSPECIFIED TYPE: ICD-10-CM

## 2021-09-21 DIAGNOSIS — M06.9 RHEUMATOID ARTHRITIS INVOLVING MULTIPLE SITES, UNSPECIFIED WHETHER RHEUMATOID FACTOR PRESENT (HCC): ICD-10-CM

## 2021-09-21 DIAGNOSIS — F32.A DEPRESSION, UNSPECIFIED DEPRESSION TYPE: ICD-10-CM

## 2021-09-21 DIAGNOSIS — F41.8 OTHER SPECIFIED ANXIETY DISORDERS: ICD-10-CM

## 2021-09-21 DIAGNOSIS — E03.9 HYPOTHYROIDISM, UNSPECIFIED TYPE: ICD-10-CM

## 2021-09-21 PROCEDURE — 1036F TOBACCO NON-USER: CPT | Performed by: STUDENT IN AN ORGANIZED HEALTH CARE EDUCATION/TRAINING PROGRAM

## 2021-09-21 PROCEDURE — G8427 DOCREV CUR MEDS BY ELIG CLIN: HCPCS | Performed by: STUDENT IN AN ORGANIZED HEALTH CARE EDUCATION/TRAINING PROGRAM

## 2021-09-21 PROCEDURE — 99214 OFFICE O/P EST MOD 30 MIN: CPT | Performed by: STUDENT IN AN ORGANIZED HEALTH CARE EDUCATION/TRAINING PROGRAM

## 2021-09-21 PROCEDURE — 3046F HEMOGLOBIN A1C LEVEL >9.0%: CPT | Performed by: STUDENT IN AN ORGANIZED HEALTH CARE EDUCATION/TRAINING PROGRAM

## 2021-09-21 PROCEDURE — G8417 CALC BMI ABV UP PARAM F/U: HCPCS | Performed by: STUDENT IN AN ORGANIZED HEALTH CARE EDUCATION/TRAINING PROGRAM

## 2021-09-21 PROCEDURE — 99212 OFFICE O/P EST SF 10 MIN: CPT | Performed by: STUDENT IN AN ORGANIZED HEALTH CARE EDUCATION/TRAINING PROGRAM

## 2021-09-21 PROCEDURE — 3017F COLORECTAL CA SCREEN DOC REV: CPT | Performed by: STUDENT IN AN ORGANIZED HEALTH CARE EDUCATION/TRAINING PROGRAM

## 2021-09-21 PROCEDURE — 2022F DILAT RTA XM EVC RTNOPTHY: CPT | Performed by: STUDENT IN AN ORGANIZED HEALTH CARE EDUCATION/TRAINING PROGRAM

## 2021-09-21 RX ORDER — METFORMIN HYDROCHLORIDE 500 MG/1
1000 TABLET, EXTENDED RELEASE ORAL 2 TIMES DAILY
Qty: 120 TABLET | Refills: 1 | Status: SHIPPED
Start: 2021-09-21 | End: 2021-09-21

## 2021-09-21 RX ORDER — SEMAGLUTIDE 1.34 MG/ML
1 INJECTION, SOLUTION SUBCUTANEOUS WEEKLY
Qty: 1 PEN | Refills: 1 | Status: SHIPPED
Start: 2021-09-21 | End: 2021-09-21

## 2021-09-21 RX ORDER — INSULIN GLARGINE 100 [IU]/ML
50 INJECTION, SOLUTION SUBCUTANEOUS 2 TIMES DAILY
Qty: 5 PEN | Refills: 3 | Status: SHIPPED | OUTPATIENT
Start: 2021-09-21 | End: 2021-09-28 | Stop reason: SDUPTHER

## 2021-09-21 RX ORDER — METFORMIN HYDROCHLORIDE 500 MG/1
1000 TABLET, EXTENDED RELEASE ORAL 2 TIMES DAILY
Qty: 120 TABLET | Refills: 1 | Status: SHIPPED | OUTPATIENT
Start: 2021-09-21 | End: 2021-10-26

## 2021-09-21 RX ORDER — DULOXETIN HYDROCHLORIDE 60 MG/1
CAPSULE, DELAYED RELEASE ORAL
Qty: 30 CAPSULE | Refills: 1 | Status: SHIPPED
Start: 2021-09-21 | End: 2022-05-16

## 2021-09-21 RX ORDER — DULOXETIN HYDROCHLORIDE 30 MG/1
30 CAPSULE, DELAYED RELEASE ORAL NIGHTLY
Qty: 30 CAPSULE | Refills: 2 | Status: SHIPPED
Start: 2021-09-21 | End: 2021-10-21 | Stop reason: DRUGHIGH

## 2021-09-21 RX ORDER — SEMAGLUTIDE 1.34 MG/ML
1 INJECTION, SOLUTION SUBCUTANEOUS WEEKLY
Qty: 1 PEN | Refills: 1 | Status: SHIPPED | OUTPATIENT
Start: 2021-09-21 | End: 2022-02-24

## 2021-09-21 RX ORDER — LEVOTHYROXINE SODIUM 88 UG/1
88 TABLET ORAL DAILY
Qty: 30 TABLET | Refills: 1 | Status: SHIPPED
Start: 2021-09-21 | End: 2021-12-23

## 2021-09-21 ASSESSMENT — ENCOUNTER SYMPTOMS
SHORTNESS OF BREATH: 0
NAUSEA: 0
ABDOMINAL PAIN: 0
VOMITING: 0
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
## 2021-09-21 NOTE — PROGRESS NOTES
[facility] RESIDENCY PROGRAM  DATE OF VISIT : 2021    Patient : Delfina Norris   Age : 49 y.o.  : 1970   MRN : [id number]   ______________________________________________________________________    Chief Complaint :   Chief Complaint   Patient presents with    Diabetes    Vaginitis    Hypertension     elevated x2        HPI : Delfina Norris is 49 y.o. female who presented to the clinic today for uncontrolled type 2 DM. Type 2 DM  - continues to take medication as prescribed. Lantus, Humalog, Metformin Jardiance and Ozempic.  - fasting blood glucose at home average in the 206-260.   -  Patient reports currently taking Lantus 45 units at breakfast and dinner; humalog over 40 units at meal time. - patient was previously found to have proteinuria and is following with with nephrology. - Of note patient was hospitalized at [facility] back in Aug for hyperglycemia and was instructed to start Novolin  70/30  80 U Breakfast /60 U Dinner but has held off on transitioning over due to wanting to finish her current prescription of Humalog  - she reports having an appoint with Dr. Ashley Funk in January.       Hypothyroidism  - reports not on levothyroxine due to being on so many medications already. - continues to have fatigue and depression at this time.      UTI  - continues to nitrofurantoin. - reports not having any symptoms at this time and does not recall having any UTI symptoms before such as dysuria, or hematuria. - continues to drink plenty of water      HTN  - currently prescribed lisinopril but reports currently not taking it. anxiety and depression  - currently see [facility] counseling where her Wellbutrin, and Cymbalta is prescribed. - Patient is unsure as to why she is currently on Lamictal.   - she reports taking all three of these medication at this time  - patient reports her depression has occurred for several years.  Believes it started when she had lost her job in 2015. Depressed of not finding work   - Patient reports chronic thoughts of her not being alive but has no plan. She reports last episode occurring 2 days ago. - She denies any support group such as friends or family at this time. Vaginitis  - denies any vaginal pain, discharge or itching      Past Medical History :  Past Medical History:   Diagnosis Date    Anxiety     Arthritis     Depression     Diabetes mellitus ([address]     HTN (hypertension)     Hypertensive chronic kidney disease     Left shoulder pain     LIZABETH (obstructive sleep apnea)     Thyroid disease      Past Surgical History:   Procedure Laterality Date    SHOULDER ARTHROSCOPY Right 06/16/2017       Allergies :    Allergies   Allergen Reactions    Amoxicillin      Hives all over body, states that hives are severe     Sulfa Antibiotics     Cefdinir     Milk-Related Compounds     Tomato        Medication List :    Current Outpatient Medications   Medication Sig Dispense Refill    levothyroxine (SYNTHROID) 88 MCG tablet Take 1 tablet by mouth daily 30 tablet 1    DULoxetine (CYMBALTA) 30 MG extended release capsule Take 1 capsule by mouth nightly 30 capsule 2    DULoxetine (CYMBALTA) 60 MG extended release capsule TAKE 1 CAPSULE BY MOUTH EVERY MORNING 60MG IN AM   30MG IN PM   90MG/DAY 30 capsule 1    insulin glargine (LANTUS SOLOSTAR) 100 UNIT/ML injection pen Inject 50 Units into the skin 2 times daily 5 pen 3    metFORMIN (GLUCOPHAGE-XR) 500 MG extended release tablet Take 2 tablets by mouth 2 times daily 120 tablet 1    OZEMPIC, 1 MG/DOSE, 2 MG/1.5ML SOPN Inject 1 mg into the skin once a week 1 pen 1    nitrofurantoin, macrocrystal-monohydrate, (MACROBID) 100 MG capsule Take 1 capsule by mouth 2 times daily for 7 days 14 capsule 0    lisinopril (PRINIVIL;ZESTRIL) 10 MG tablet Take 1 tablet by mouth daily 90 tablet 2    lamoTRIgine (LAMICTAL) 100 MG tablet TAKE 1 TABLET BY MOUTH EVERY DAY 90 tablet 1    melatonin 3 MG TABS tablet Take 1 tablet by mouth nightly as needed (insomnia) 30 tablet 0    calcium elemental (OSCAL) 500 MG TABS tablet TAKE 1 TABLET BY MOUTH TWICE A DAY WITH LUNCH AND DINNER 90 tablet 5    vitamin B-12 (CYANOCOBALAMIN) 500 MCG tablet Take 1 tablet by mouth daily 90 tablet 1    Multiple Vitamins-Minerals (MULTIVITAMIN WITH MINERALS) tablet Take 1 tablet by mouth daily 30 tablet 3    naproxen (NAPROSYN) 250 MG tablet TAKE 1 TABLET BY MOUTH TWICE A DAY WITH MEALS 60 tablet 5    pregabalin (LYRICA) 100 MG capsule Take 1 capsule by mouth 3 times daily for 90 days. 90 capsule 2    Insulin Pen Needle 31G X 5 MM MISC 1 each by Does not apply route 3 times daily 100 each 3    Continuous Blood Gluc Sensor (FREESTYLE NIMISHA 2 SENSOR) MISC 1 each by Does not apply route continuous      hydrocortisone 2.5 % cream Apply topically 2 times daily.  45 g 0    Cholecalciferol (VITAMIN D3) 125 MCG (5000 UT) CAPS TAKE 1 CAPSULE BY MOUTH EVERY DAY      clotrimazole-betamethasone (LOTRISONE) 1-0.05 % cream PLEASE SEE ATTACHED FOR DETAILED DIRECTIONS      Blood Pressure KIT 1 box by Does not apply route 2 times daily 1 kit 0    HUMALOG KWIKPEN 200 UNIT/ML SOPN pen INJECT 22 62 UNITS 2 3 TIMES A DAY WITH MEALS PER SLIDING SCALE UP  UNITS      atorvastatin (LIPITOR) 80 MG tablet TAKE 1 TABLET BY MOUTH EVERYDAY AT BEDTIME      empagliflozin (JARDIANCE) 25 MG tablet Take 25 mg by mouth daily ACM verified with Dr Zara Hansen office on 2/26/2020      hydroxychloroquine (PLAQUENIL) 200 MG tablet Take 200 mg by mouth 2 times daily      diclofenac sodium 1 % GEL Apply 4 g topically 4 times daily 4 Tube 1    Omega-3 Fatty Acids (FISH OIL) 1000 MG CAPS Take 1,000 mg by mouth 4 times daily      methotrexate (RHEUMATREX) 2.5 MG chemo tablet Take 15 mg by mouth once a week      folic acid (FOLVITE) 1 MG tablet Take 1 tablet by mouth daily 30 tablet 1    FREESTYLE LITE strip USE AS DIRECTED 100 strip 3    FREESTYLE LANCETS MISC CHECK BS 4 TIMES DAILY 100 each 3     No current facility-administered medications for this visit. Family History :    Family History   Adopted: Yes   Family history unknown: Yes       Surgical History :   Past Surgical History:   Procedure Laterality Date    SHOULDER ARTHROSCOPY Right 2017       Social History :   Social History     Tobacco Use    Smoking status: Former Smoker     Packs/day: 2.00     Years: 0.50     Pack years: 1.00     Start date:      Quit date:      Years since quittin.7    Smokeless tobacco: Never Used   Vaping Use    Vaping Use: Never used   Substance Use Topics    Alcohol use: No    Drug use: No       Review of Systems :  Review of Systems   Constitutional: Positive for fatigue. Negative for appetite change. Respiratory: Negative for shortness of breath. Cardiovascular: Negative for chest pain. Gastrointestinal: Negative for abdominal pain, nausea and vomiting. Genitourinary: Negative for dysuria, frequency and hematuria. Skin: Negative for rash and wound. Neurological: Negative for dizziness. Psychiatric/Behavioral: Positive for suicidal ideas. Currently does not have a suicide plan     ______________________________________________________________________    Physical Exam :    Vitals: BP (!) 147/83   Pulse 104   Temp 98.5 °F (36.9 °C) (Temporal)   Resp 16   Ht 5' 1\" (1.549 m)   Wt 157 lb (71.2 kg)   SpO2 99%   BMI 29.66 kg/m²   General Appearance: Well developed, awake, alert, oriented, and in NAD  HEENT: NCAT, MMM, no pallor or icterus. Neck: Supple, symmetrical, trachea midline. No JVD or carotid bruits. No thyroidmegaly noted. Chest wall/Lung: CTAB, respirations unlabored. No ronchi/wheezing/rales   Heart[de-identified] RRR, normal S1 and S2, no murmurs, rubs or gallops. Abdomen: SNTND, +BSx4, no HSM   Extremities: Extremities normal, atraumatic, no cyanosis, clubbing or edema.   Skin: Skin color, texture, turgor normal, no rashes or lesions   Psychiatric: depressed mood and affect.  ______________________________________________________________________    Assessment & Plan :    1. Hypothyroidism, unspecified type  - advised to restart levothyroxine medication at this time. - hold off on recheck tsh as patient has not been taking meds. - levothyroxine (SYNTHROID) 88 MCG tablet; Take 1 tablet by mouth daily  Dispense: 30 tablet; Refill: 1    2. Uncontrolled type 2 diabetes mellitus with hyperglycemia (HCC)  - will increase lantus to 50 units BID. - patient reports being on sliding scale but does not have a chart with her today stating how much insulin she takes. Patient reports on average taking 40 units of Humalog with meals. - patient advised to bring in blood glucose log and sliding scale   - patient continues to wish to switch over to Novolin once she completes finishes the Humalog that she currently has. Patient is set to see Dr. Jose Zamorano in January. - insulin glargine (LANTUS SOLOSTAR) 100 UNIT/ML injection pen; Inject 50 Units into the skin 2 times daily  Dispense: 5 pen; Refill: 3  - metFORMIN (GLUCOPHAGE-XR) 500 MG extended release tablet; Take 2 tablets by mouth 2 times daily  Dispense: 120 tablet; Refill: 1  - OZEMPIC, 1 MG/DOSE, 2 MG/1.5ML SOPN; Inject 1 mg into the skin once a week  Dispense: 1 pen; Refill: 1    3. Depression, unspecified depression type  - continue cymbalta at this time. Patient is to titrate off Wellbutrin. - DULoxetine (CYMBALTA) 30 MG extended release capsule; Take 1 capsule by mouth nightly  Dispense: 30 capsule; Refill: 2  - DULoxetine (CYMBALTA) 60 MG extended release capsule; TAKE 1 CAPSULE BY MOUTH EVERY MORNING 60MG IN AM   30MG IN PM   90MG/DAY  Dispense: 30 capsule; Refill: 1    4. Hypertension, unspecified type  - Patient is advised to restart taking Lisinopril given hx of HTN and Proteinuria. - will recheck blood pressure next week.  Patient is advised to check blood pressure daily. 5. Other specified anxiety disorders  - currently on Wellbutrin and Cymbalta. - patient is advised to taper off Wellbutrin.   - advised to discuss with [facility] counseling about reasoning behind Lamictal as no current diagnosis is attached to it. 6. UTI  - Advised to complete course of antibiotics. Currently no reason to start prophylactic antibiotics as patient has been asymptomatic.      Jose Alfredo Edmonds MD

## 2021-09-23 ENCOUNTER — TELEPHONE (OUTPATIENT)
Dept: FAMILY MEDICINE CLINIC | Age: 51
End: 2021-09-23

## 2021-09-23 DIAGNOSIS — Z91.199 NON-COMPLIANCE: Primary | ICD-10-CM

## 2021-09-23 DIAGNOSIS — E11.69 TYPE 2 DIABETES MELLITUS WITH OTHER SPECIFIED COMPLICATION, WITH LONG-TERM CURRENT USE OF INSULIN (HCC): ICD-10-CM

## 2021-09-23 DIAGNOSIS — Z79.4 TYPE 2 DIABETES MELLITUS WITH OTHER SPECIFIED COMPLICATION, WITH LONG-TERM CURRENT USE OF INSULIN (HCC): ICD-10-CM

## 2021-09-24 RX ORDER — EMPAGLIFLOZIN 25 MG/1
1 TABLET, FILM COATED ORAL DAILY
Qty: 90 TABLET | Refills: 1 | Status: SHIPPED
Start: 2021-09-24 | End: 2022-03-18

## 2021-09-24 RX ORDER — INSULIN GLARGINE 100 [IU]/ML
45 INJECTION, SOLUTION SUBCUTANEOUS 2 TIMES DAILY
Qty: 81 ML | Refills: 0 | Status: SHIPPED
Start: 2021-09-24 | End: 2021-09-28

## 2021-09-24 RX ORDER — MELATONIN
5000 DAILY
Qty: 30 TABLET | Refills: 0 | COMMUNITY
Start: 2021-09-24 | End: 2021-09-28

## 2021-09-24 NOTE — TELEPHONE ENCOUNTER
I called René Gonzales regarding annette's non-compliance and that at one of our last visits she endorsed her mood has worsened. René Gonzales is aware and seeing her Monday- and going to try and work on it.

## 2021-09-28 ENCOUNTER — OFFICE VISIT (OUTPATIENT)
Dept: FAMILY MEDICINE CLINIC | Age: 51
End: 2021-09-28
Payer: COMMERCIAL

## 2021-09-28 VITALS
HEIGHT: 61 IN | WEIGHT: 161 LBS | BODY MASS INDEX: 30.4 KG/M2 | OXYGEN SATURATION: 100 % | TEMPERATURE: 97.8 F | DIASTOLIC BLOOD PRESSURE: 90 MMHG | SYSTOLIC BLOOD PRESSURE: 164 MMHG | RESPIRATION RATE: 16 BRPM | HEART RATE: 98 BPM

## 2021-09-28 DIAGNOSIS — I10 HYPERTENSION, UNSPECIFIED TYPE: ICD-10-CM

## 2021-09-28 DIAGNOSIS — F32.A DEPRESSIVE DISORDER: ICD-10-CM

## 2021-09-28 DIAGNOSIS — E11.65 UNCONTROLLED TYPE 2 DIABETES MELLITUS WITH HYPERGLYCEMIA (HCC): Primary | ICD-10-CM

## 2021-09-28 PROCEDURE — 99213 OFFICE O/P EST LOW 20 MIN: CPT | Performed by: STUDENT IN AN ORGANIZED HEALTH CARE EDUCATION/TRAINING PROGRAM

## 2021-09-28 PROCEDURE — 1036F TOBACCO NON-USER: CPT | Performed by: STUDENT IN AN ORGANIZED HEALTH CARE EDUCATION/TRAINING PROGRAM

## 2021-09-28 PROCEDURE — 3017F COLORECTAL CA SCREEN DOC REV: CPT | Performed by: STUDENT IN AN ORGANIZED HEALTH CARE EDUCATION/TRAINING PROGRAM

## 2021-09-28 PROCEDURE — 99212 OFFICE O/P EST SF 10 MIN: CPT | Performed by: STUDENT IN AN ORGANIZED HEALTH CARE EDUCATION/TRAINING PROGRAM

## 2021-09-28 PROCEDURE — G8427 DOCREV CUR MEDS BY ELIG CLIN: HCPCS | Performed by: STUDENT IN AN ORGANIZED HEALTH CARE EDUCATION/TRAINING PROGRAM

## 2021-09-28 PROCEDURE — 2022F DILAT RTA XM EVC RTNOPTHY: CPT | Performed by: STUDENT IN AN ORGANIZED HEALTH CARE EDUCATION/TRAINING PROGRAM

## 2021-09-28 PROCEDURE — 3046F HEMOGLOBIN A1C LEVEL >9.0%: CPT | Performed by: STUDENT IN AN ORGANIZED HEALTH CARE EDUCATION/TRAINING PROGRAM

## 2021-09-28 PROCEDURE — G8417 CALC BMI ABV UP PARAM F/U: HCPCS | Performed by: STUDENT IN AN ORGANIZED HEALTH CARE EDUCATION/TRAINING PROGRAM

## 2021-09-28 RX ORDER — INSULIN LISPRO 200 [IU]/ML
40 INJECTION, SOLUTION SUBCUTANEOUS
Qty: 2 PEN | Refills: 2 | Status: SHIPPED
Start: 2021-09-28 | End: 2021-11-03

## 2021-09-28 RX ORDER — INSULIN GLARGINE 100 [IU]/ML
55 INJECTION, SOLUTION SUBCUTANEOUS 2 TIMES DAILY
Qty: 5 PEN | Refills: 3 | Status: SHIPPED
Start: 2021-09-28 | End: 2021-09-28

## 2021-09-28 ASSESSMENT — ENCOUNTER SYMPTOMS
SHORTNESS OF BREATH: 0
ABDOMINAL PAIN: 0
COUGH: 0

## 2021-09-28 NOTE — PROGRESS NOTES
[facility] RESIDENCY PROGRAM  DATE OF VISIT : 2021    Patient : Diego Lipscomb   Age : 49 y.o.  : 1970   MRN : [id number]   ______________________________________________________________________    Chief Complaint :   Chief Complaint   Patient presents with    Diabetes     f/u       HPI : Diego Lipscomb is 49 y.o. female who presented to the clinic today for type 2 DM    Type 2 DM  - history of uncontrolled type 2 DM on insulin  - no nausea, vomiting  - reports some dizziness and heacheache today  - schedule to see endocrinologist in January.     - fasting AM glucose of 160-311. Lunch time: 115-151. Dinner time: 56 and Bedtime 155-415.  - last Ha1c 13.6 2021  - current sliding scale of Humalog            HTN  - does not agree with hypertension  - currently does not take her medication  - denies any complaints at this time. Depression  - continues to feel depressed daily with wishing she was not alive  - denies any thought of suicide, Dr. Rober Marvin present at the end of encounter.   - please refer to her note from today. Past Medical History :  Past Medical History:   Diagnosis Date    Anxiety     Arthritis     Depression     Diabetes mellitus ([address]     HTN (hypertension)     Hypertensive chronic kidney disease     Left shoulder pain     LIAZBETH (obstructive sleep apnea)     Thyroid disease      Past Surgical History:   Procedure Laterality Date    SHOULDER ARTHROSCOPY Right 2017       Allergies :    Allergies   Allergen Reactions    Amoxicillin      Hives all over body, states that hives are severe     Sulfa Antibiotics     Cefdinir     Milk-Related Compounds     Tomato        Medication List :    Current Outpatient Medications   Medication Sig Dispense Refill    insulin glargine (LANTUS SOLOSTAR) 100 UNIT/ML injection pen Inject 55 Units into the skin 2 times daily 5 pen 3    HUMALOG KWIKPEN 200 UNIT/ML SOPN pen Inject 40 Units into the skin 3 times daily (with meals) 2 pen 2    empagliflozin (JARDIANCE) 25 MG tablet Take 1 tablet by mouth daily 90 tablet 1    levothyroxine (SYNTHROID) 88 MCG tablet Take 1 tablet by mouth daily 30 tablet 1    DULoxetine (CYMBALTA) 30 MG extended release capsule Take 1 capsule by mouth nightly 30 capsule 2    DULoxetine (CYMBALTA) 60 MG extended release capsule TAKE 1 CAPSULE BY MOUTH EVERY MORNING 60MG IN AM   30MG IN PM   90MG/DAY 30 capsule 1    metFORMIN (GLUCOPHAGE-XR) 500 MG extended release tablet Take 2 tablets by mouth 2 times daily 120 tablet 1    OZEMPIC, 1 MG/DOSE, 2 MG/1.5ML SOPN Inject 1 mg into the skin once a week 1 pen 1    lisinopril (PRINIVIL;ZESTRIL) 10 MG tablet Take 1 tablet by mouth daily 90 tablet 2    lamoTRIgine (LAMICTAL) 100 MG tablet TAKE 1 TABLET BY MOUTH EVERY DAY 90 tablet 1    melatonin 3 MG TABS tablet Take 1 tablet by mouth nightly as needed (insomnia) 30 tablet 0    naproxen (NAPROSYN) 250 MG tablet TAKE 1 TABLET BY MOUTH TWICE A DAY WITH MEALS 60 tablet 5    pregabalin (LYRICA) 100 MG capsule Take 1 capsule by mouth 3 times daily for 90 days. 90 capsule 2    hydrocortisone 2.5 % cream Apply topically 2 times daily.  45 g 0    clotrimazole-betamethasone (LOTRISONE) 1-0.05 % cream PLEASE SEE ATTACHED FOR DETAILED DIRECTIONS      hydroxychloroquine (PLAQUENIL) 200 MG tablet Take 200 mg by mouth 2 times daily      diclofenac sodium 1 % GEL Apply 4 g topically 4 times daily 4 Tube 1    methotrexate (RHEUMATREX) 2.5 MG chemo tablet Take 15 mg by mouth once a week      folic acid (FOLVITE) 1 MG tablet Take 1 tablet by mouth daily 30 tablet 1    Insulin Pen Needle 31G X 5 MM MISC 1 each by Does not apply route 3 times daily 100 each 3    Continuous Blood Gluc Sensor (FREESTYLE NIMISHA 2 SENSOR) MISC 1 each by Does not apply route continuous      Blood Pressure KIT 1 box by Does not apply route 2 times daily 1 kit 0    FREESTYLE LITE strip USE AS DIRECTED 100 strip 3    FREESTYLE LANCETS MISC CHECK BS 4 TIMES DAILY 100 each 3     No current facility-administered medications for this visit. Family History :    Family History   Adopted: Yes   Family history unknown: Yes       Surgical History :   Past Surgical History:   Procedure Laterality Date    SHOULDER ARTHROSCOPY Right 2017       Social History :   Social History     Tobacco Use    Smoking status: Former Smoker     Packs/day: 2.00     Years: 0.50     Pack years: 1.00     Start date:      Quit date:      Years since quittin.7    Smokeless tobacco: Never Used   Vaping Use    Vaping Use: Never used   Substance Use Topics    Alcohol use: No    Drug use: No       Review of Systems :  Review of Systems   Constitutional: Negative for fatigue. Respiratory: Negative for cough and shortness of breath. Gastrointestinal: Negative for abdominal pain. Neurological: Positive for dizziness and headaches. Psychiatric/Behavioral: Negative for suicidal ideas.     ______________________________________________________________________    Physical Exam :    Vitals: BP (!) 164/90 (Site: Left Upper Arm, Position: Sitting)   Pulse 98   Temp 97.8 °F (36.6 °C) (Temporal)   Resp 16   Ht 5' 1\" (1.549 m)   Wt 161 lb (73 kg)   SpO2 100%   BMI 30.42 kg/m²   General Appearance: Well developed, awake, alert, oriented, and in NAD  Chest wall/Lung: CTAB, respirations unlabored. No ronchi/wheezing/rales   Heart[de-identified] RRR, normal S1 and S2, no murmurs, rubs or gallops. Abdomen: SNTND, +BSx4, no HSM       ______________________________________________________________________    Assessment & Plan :    1. Uncontrolled type 2 diabetes mellitus with hyperglycemia (HCC)  - will increase lantus to 55 units BID, Will switch Humalog from Sliding scale to Humalog 40 with meals. - insulin glargine (LANTUS SOLOSTAR) 100 UNIT/ML injection pen;  Inject 55 Units into the skin 2 times daily  Dispense: 5 pen; Refill: 3  - HUMALOG KWIKPEN 200 UNIT/ML SOPN pen; Inject 40 Units into the skin 3 times daily (with meals)  Dispense: 2 pen; Refill: 2    2. Hypertension, unspecified type  - after speaking to patient about the important of an ACE inhibitor and Kidney protective factor, patient willing to restart Lisinopril at this time. 3. Depression  - please refer to Dr. Douglas Beverage note from today.      - will follow up in 2 months to check glucose logs                  Brayden Reilly MD

## 2021-09-28 NOTE — PROGRESS NOTES
Attending Physician Statement    S:   Chief Complaint   Patient presents with    Diabetes     f/u      DM - noncompliant (previously dismissed from endocrine). Takes Lantus and humalog sliding scale. Refuses to take lisinopril (says she doesn't think she has HTN)   Says her entire body hurts due to the weather change  O: Blood pressure (!) 164/90, pulse 98, temperature 97.8 °F (36.6 °C), temperature source Temporal, resp. rate 16, height 5' 1\" (1.549 m), weight 161 lb (73 kg), SpO2 100 %, not currently breastfeeding. Exam:   Heart - RRR   Lungs - clear   Abdomen- benign  A: As above  P:  Simplify regimen   Await new endocrinologist appointment   Sees gyn for pap/mammo    Refuses vaccinations   Follow-up as ordered    I have discussed the case, including pertinent history and exam findings with the resident. I agree with the documented assessment and plan.
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
## 2021-10-15 ENCOUNTER — HOSPITAL ENCOUNTER (OUTPATIENT)
Age: 51
Discharge: HOME | End: 2021-10-15
Payer: COMMERCIAL

## 2021-10-15 LAB
ANION GAP SERPL CALCULATED.3IONS-SCNC: 10 MMOL/L (ref 7–16)
BASOPHILS ABSOLUTE: 0.08 E9/L (ref 0–0.2)
BASOPHILS RELATIVE PERCENT: 0.7 % (ref 0–2)
BUN BLDV-MCNC: 11 MG/DL (ref 6–20)
C-REACTIVE PROTEIN: 0.3 MG/DL (ref 0–0.4)
C3 COMPLEMENT: 145 MG/DL (ref 90–180)
C4 COMPLEMENT: 29 MG/DL (ref 10–40)
CALCIUM SERPL-MCNC: 8.2 MG/DL (ref 8.6–10.2)
CHLORIDE BLD-SCNC: 99 MMOL/L (ref 98–107)
CO2: 22 MMOL/L (ref 22–29)
CREAT SERPL-MCNC: 0.8 MG/DL (ref 0.5–1)
EOSINOPHILS ABSOLUTE: 0.27 E9/L (ref 0.05–0.5)
EOSINOPHILS RELATIVE PERCENT: 2.3 % (ref 0–6)
GFR AFRICAN AMERICAN: >60
GFR NON-AFRICAN AMERICAN: >60 ML/MIN/1.73
GLUCOSE BLD-MCNC: 601 MG/DL (ref 74–99)
HCT VFR BLD CALC: 32.2 % (ref 34–48)
HEMOGLOBIN: 9.5 G/DL (ref 11.5–15.5)
IMMATURE GRANULOCYTES #: 0.08 E9/L
IMMATURE GRANULOCYTES %: 0.7 % (ref 0–5)
IRON SATURATION: 11 % (ref 15–50)
IRON: 22 MCG/DL (ref 37–145)
LYMPHOCYTES ABSOLUTE: 3.26 E9/L (ref 1.5–4)
LYMPHOCYTES RELATIVE PERCENT: 28.1 % (ref 20–42)
MCH RBC QN AUTO: 20.1 PG (ref 26–35)
MCHC RBC AUTO-ENTMCNC: 29.5 % (ref 32–34.5)
MCV RBC AUTO: 68.2 FL (ref 80–99.9)
MONOCYTES ABSOLUTE: 0.39 E9/L (ref 0.1–0.95)
MONOCYTES RELATIVE PERCENT: 3.4 % (ref 2–12)
NEUTROPHILS ABSOLUTE: 7.51 E9/L (ref 1.8–7.3)
NEUTROPHILS RELATIVE PERCENT: 64.8 % (ref 43–80)
PDW BLD-RTO: 16.1 FL (ref 11.5–15)
PLATELET # BLD: 289 E9/L (ref 130–450)
PMV BLD AUTO: 9.7 FL (ref 7–12)
POTASSIUM SERPL-SCNC: 4.1 MMOL/L (ref 3.5–5)
RBC # BLD: 4.72 E12/L (ref 3.5–5.5)
SEDIMENTATION RATE, ERYTHROCYTE: 70 MM/HR (ref 0–20)
SODIUM BLD-SCNC: 131 MMOL/L (ref 132–146)
TOTAL IRON BINDING CAPACITY: 201 MCG/DL (ref 250–450)
WBC # BLD: 11.6 E9/L (ref 4.5–11.5)

## 2021-10-18 LAB
ALBUMIN SERPL-MCNC: 2 G/DL (ref 3.5–4.7)
ALPHA-1-GLOBULIN: 0.2 G/DL (ref 0.2–0.4)
ALPHA-2-GLOBULIN: 1 G/FL (ref 0.5–1)
BETA GLOBULIN: 1 G/DL (ref 0.8–1.3)
ELECTROPHORESIS: ABNORMAL
GAMMA GLOBULIN: 0.7 G/DL (ref 0.7–1.6)
HEPATITIS B SURFACE ANTIGEN INTERPRETATION: NORMAL
IMMUNOFIXATION RESULT, SERUM: NORMAL
TOTAL PROTEIN: 4.9 G/DL (ref 6.4–8.3)

## 2021-10-19 LAB
ANA PATTERN: ABNORMAL
ANA TITER: ABNORMAL
ANTI-NUCLEAR ANTIBODY (ANA): POSITIVE

## 2021-10-21 ENCOUNTER — OFFICE VISIT (OUTPATIENT)
Dept: FAMILY MEDICINE CLINIC | Age: 51
End: 2021-10-21
Payer: COMMERCIAL

## 2021-10-21 VITALS
BODY MASS INDEX: 31.34 KG/M2 | HEART RATE: 94 BPM | SYSTOLIC BLOOD PRESSURE: 148 MMHG | DIASTOLIC BLOOD PRESSURE: 94 MMHG | HEIGHT: 61 IN | TEMPERATURE: 98.1 F | WEIGHT: 166 LBS | OXYGEN SATURATION: 97 % | RESPIRATION RATE: 16 BRPM

## 2021-10-21 DIAGNOSIS — G47.9 SLEEPING DIFFICULTY: ICD-10-CM

## 2021-10-21 DIAGNOSIS — R05.9 COUGH: ICD-10-CM

## 2021-10-21 DIAGNOSIS — I10 HYPERTENSION, UNSPECIFIED TYPE: ICD-10-CM

## 2021-10-21 DIAGNOSIS — I10 HYPERTENSION, UNSPECIFIED TYPE: Primary | ICD-10-CM

## 2021-10-21 DIAGNOSIS — E11.69 TYPE 2 DIABETES MELLITUS WITH OTHER SPECIFIED COMPLICATION, UNSPECIFIED WHETHER LONG TERM INSULIN USE (HCC): Primary | ICD-10-CM

## 2021-10-21 PROCEDURE — G8417 CALC BMI ABV UP PARAM F/U: HCPCS | Performed by: FAMILY MEDICINE

## 2021-10-21 PROCEDURE — 3046F HEMOGLOBIN A1C LEVEL >9.0%: CPT | Performed by: FAMILY MEDICINE

## 2021-10-21 PROCEDURE — G8427 DOCREV CUR MEDS BY ELIG CLIN: HCPCS | Performed by: FAMILY MEDICINE

## 2021-10-21 PROCEDURE — 1036F TOBACCO NON-USER: CPT | Performed by: FAMILY MEDICINE

## 2021-10-21 PROCEDURE — 99213 OFFICE O/P EST LOW 20 MIN: CPT | Performed by: STUDENT IN AN ORGANIZED HEALTH CARE EDUCATION/TRAINING PROGRAM

## 2021-10-21 PROCEDURE — G8484 FLU IMMUNIZE NO ADMIN: HCPCS | Performed by: FAMILY MEDICINE

## 2021-10-21 PROCEDURE — 99212 OFFICE O/P EST SF 10 MIN: CPT | Performed by: STUDENT IN AN ORGANIZED HEALTH CARE EDUCATION/TRAINING PROGRAM

## 2021-10-21 PROCEDURE — 2022F DILAT RTA XM EVC RTNOPTHY: CPT | Performed by: FAMILY MEDICINE

## 2021-10-21 PROCEDURE — 3017F COLORECTAL CA SCREEN DOC REV: CPT | Performed by: FAMILY MEDICINE

## 2021-10-21 RX ORDER — LISINOPRIL 10 MG/1
20 TABLET ORAL DAILY
Qty: 90 TABLET | Refills: 2 | Status: SHIPPED
Start: 2021-10-21 | End: 2022-03-14 | Stop reason: SDUPTHER

## 2021-10-21 RX ORDER — GUAIFENESIN 600 MG/1
600 TABLET, EXTENDED RELEASE ORAL 2 TIMES DAILY
Qty: 30 TABLET | Refills: 0 | Status: CANCELLED | OUTPATIENT
Start: 2021-10-21 | End: 2021-11-05

## 2021-10-21 RX ORDER — FLUTICASONE PROPIONATE 50 MCG
2 SPRAY, SUSPENSION (ML) NASAL DAILY
Qty: 16 G | Refills: 11 | Status: SHIPPED
Start: 2021-10-21 | End: 2022-02-24

## 2021-10-21 NOTE — PROGRESS NOTES
Attending Physician Statement    S:   Chief Complaint   Patient presents with    Diabetes    Health Maintenance     declines flu vaccine today     Hypertension     elevated BP x 2       Patient is a 49year old female here for cough , htn, diabetes    Cough since October 3. Requesting cough syrup. Dry cough. Feels it is random. Associated with runny nose, no fevers or chills. Has been vaccinated against covid. Ran out of flonase.     htn - is on lisinopril 10mg   Compliant with meds. No symptoms. Diabetes- on insulin. Only takes lantus. Does not take ozempic , jardiance, metformin. Fasting blood glucose has been in the 200s. Does not take mealtime insulin. Takes 50 units twice a day . Working on compliance. O: Blood pressure (!) 148/94, pulse 94, temperature 98.1 °F (36.7 °C), temperature source Temporal, resp. rate 16, height 5' 1\" (1.549 m), weight 166 lb (75.3 kg), SpO2 97 %, not currently breastfeeding. Exam:   Heart - RRR   Lungs - clear   HEENT - clear orophranyx   A: dry cough, diabetes, htn   P:  flonase for cough    htn- lisinopril 20mg. Diabetes - continue counseling. Continues lantus. Will encouraged to start ozempic. Repeat bmp in 2 weeks. Follow-up as ordered    Attending Attestation   I have discussed the case, including pertinent history and exam findings with the resident. I agree with the documented assessment and plan.

## 2021-10-21 NOTE — PROGRESS NOTES
CC:  Cough/HTN/DM2 Insulin Dependent  ------------------------------------------------------------------------------------------------------------------------  Assessment/Plan  1. Hypertension, unspecified type  Increase lisinopril to 20 mg- uncontrolled today  - lisinopril (PRINIVIL;ZESTRIL) 10 MG tablet; Take 2 tablets by mouth daily  Dispense: 90 tablet; Refill: 2    2. Type 2 diabetes mellitus with other specified complication, unspecified whether long term insulin use ([facility]  - Work on compliance with patient- she has made progress  - Plan to add 1x weekly shot ozempic along with lantus  - Monitor BG, will add to regime accordingly    3.  Cough  - Flonase  - Likely 2/2 seasonal allergies    Health Maintenance- getting Cervical [facility] Screening done at [facility], also colonoscopy done out-patient    RTO 1 month to follow-up type 2 dm and htn  -------------------------------------------------------------------------------------------------------------------------    HPI:  49year old woman presents for cough/htn/type 2 diabetes insulin dependent    Cough  10/3 started  Dry cough  Drinking water  Clear rhinorrhea  +sore throat  COVID-19 vaccinated    +seasonal allergies    Not using flonase- ran out  Interested in flonase  Also requesting cough syrup    No heartburn, abdominal pain, nausea, wheezing, shortness of breath, fevers, not smoking    HTN  Lisinopril 10 mg  No chest pain, lightheadedness, syncope    Type 2 DM- uncontrolled  Hx of non-compliance receiving counseling from [facility] both  Is sometimes taking Lantus 50 BID  Refusing to do jardiance, metformin or, ozempic weekly   \"trying to remember to do it\"  Does not feel like doing anything, due to depression    Not wearing the dexa to check BG readings  FBG in 200s as per patient    No polyuria, polydipsia, polyphagia, shortness of breath, chest pain, vision changes, emesis, nausea    Patient Active Problem List    Diagnosis Date Noted    Rheumatoid arthritis involving multiple sites ([facility] 09/21/2021    COVID-19 06/17/2021    Hypoglycemia 06/17/2021    Anemia 06/16/2021    Proteinuria 06/16/2021    Hypokalemia 03/10/2021    Encounter for long-term (current) use of insulin ([facility] 03/08/2021    Anxiety disorder 03/08/2021    Benign hypertensive kidney disease with chronic kidney disease 03/08/2021    Chronic kidney disease, stage I 03/08/2021    Fibromyalgia 03/08/2021    Hyperlipidemia 03/08/2021    Nephrotic syndrome due to type 2 diabetes mellitus ([facility] 03/08/2021    Hypothyroidism 03/08/2021    Type 2 diabetes mellitus with chronic kidney disease ([facility] 03/08/2021    Mild depression ([facility] 10/02/2020    Severe obstructive sleep apnea 07/16/2020    Gastroparesis 12/06/2018    Infective urethritis 06/28/2018    Acquired hypothyroidism 12/20/2016    Vitamin D deficiency 12/20/2016    Diabetic ketosis ([facility] 09/01/2015    Type 2 diabetes mellitus with complication, with long-term current use of insulin ([facility] 05/30/2014    Depression 05/30/2014    Sjogren's syndrome ([facility] 05/30/2014    Hyperglycemia 05/30/2014    Depressive disorder 05/30/2014       Past Medical History:   Diagnosis Date    Anxiety     Arthritis     Depression     Diabetes mellitus ([facility]     HTN (hypertension)     Hypertensive chronic kidney disease     Left shoulder pain     LIZABETH (obstructive sleep apnea)     Thyroid disease        Current Outpatient Medications on File Prior to Visit   Medication Sig Dispense Refill    HUMALOG KWIKPEN 200 UNIT/ML SOPN pen Inject 40 Units into the skin 3 times daily (with meals) 2 pen 2    LANTUS SOLOSTAR 100 UNIT/ML injection pen INJECT 55 UNITS INTO THE SKIN 2 TIMES DAILY  15 mL 0    empagliflozin (JARDIANCE) 25 MG tablet Take 1 tablet by mouth daily 90 tablet 1    levothyroxine (SYNTHROID) 88 MCG tablet Take 1 tablet by mouth daily 30 tablet 1    DULoxetine (CYMBALTA) 30 MG extended release capsule Cefdinir     Milk-Related Compounds     Tomato        Family History   Adopted: Yes   Family history unknown: Yes       Past Surgical History:   Procedure Laterality Date    SHOULDER ARTHROSCOPY Right 2017       Social History     Tobacco Use    Smoking status: Former Smoker     Packs/day: 2.00     Years: 0.50     Pack years: 1.00     Start date:      Quit date:      Years since quittin.8    Smokeless tobacco: Never Used   Vaping Use    Vaping Use: Never used   Substance Use Topics    Alcohol use: No    Drug use: No       ROS:    Review of Systems   Constitutional: Negative for activity change, appetite change, chills and fatigue. HENT: Negative for congestion and sore throat. Respiratory: Positive for cough, no shortness of breath, wheezing  Cardiovascular: Negative for chest pain, palpitations and leg swelling. Gastrointestinal: Negative for abdominal distention and abdominal pain. Genitourinary: Negative for difficulty urinating and dysuria. Musculoskeletal: Negative for arthralgias and back pain. Skin: Negative for color change and pallor. Neurological: Negative for facial asymmetry and headaches. Psychiatric/Behavioral: Negative for behavioral problems. The patient is not nervous/anxious. Objective:    VS:  Blood pressure (!) 148/94, pulse 94, temperature 98.1 °F (36.7 °C), temperature source Temporal, resp. rate 16, height 5' 1\" (1.549 m), weight 166 lb (75.3 kg), SpO2 97 %, not currently breastfeeding. Physical Exam   Constitutional: Oriented to person, place, and time. Well-developed and well-nourished. HENT:   Head: Normocephalic and atraumatic. Eyes: EOM are normal.   Neck: Neck supple. Cardiovascular: Normal rate, regular rhythm and intact distal pulses. Exam reveals no gallop and no friction rub. No murmur heard. Pulmonary/Chest: Effort normal and breath sounds normal. No wheezes. No rhales. Abdominal: Soft.  Bowel sounds are normal. No distension. No abdominal tenderness. Musculoskeletal: Normal range of motion. General: No edema. Neurological: Alert and oriented to person, place, and time. Skin: Skin is warm and dry. No rash noted. No erythema. Psychiatric: Normal mood and affect. Behavior is normal.   Nursing note and vitals reviewed. Plans:  As above. Please see Patient Instructions for further counseling and information given. Advised to please be adherent to the treatment plans discussed today, and please call with any questions or concerns, letting the office know of any reasons that the plans may not be followed. The risks of untreated conditions include worsening illness, injury, disability, and possibly, death. Please call if symptoms change in any way, worsen, or fail to completely resolve, as this could necessitate a change to treatment plans. Patient and/or caregiver expressed understanding. Indications and proper use of medication(s) reviewed. Potential side-effects and risks of medication(s) also explained. Patient and/or caregiver was instructed to call if any new symptoms develop prior to next visit. Health risk factors discussed and addressed.

## 2021-10-22 RX ORDER — LANOLIN ALCOHOL/MO/W.PET/CERES
3 CREAM (GRAM) TOPICAL NIGHTLY PRN
Qty: 30 TABLET | Refills: 0 | Status: SHIPPED
Start: 2021-10-22 | End: 2021-11-08

## 2021-10-22 NOTE — TELEPHONE ENCOUNTER
Last Appointment:  10/21/2021  Future Appointments   Date Time Provider Jenae Knag   12/22/2021  1:20 PM MD Cooper Tim [facility]   1/3/2022  9:00 AM Doc Burrell MD [facility]

## 2021-10-26 DIAGNOSIS — E11.65 UNCONTROLLED TYPE 2 DIABETES MELLITUS WITH HYPERGLYCEMIA (HCC): ICD-10-CM

## 2021-10-26 RX ORDER — SEMAGLUTIDE 1.34 MG/ML
INJECTION, SOLUTION SUBCUTANEOUS
Qty: 3 ML | Refills: 1 | Status: SHIPPED
Start: 2021-10-26 | End: 2021-12-23

## 2021-10-26 RX ORDER — METFORMIN HYDROCHLORIDE 500 MG/1
1000 TABLET, EXTENDED RELEASE ORAL 2 TIMES DAILY
Qty: 120 TABLET | Refills: 1 | Status: SHIPPED
Start: 2021-10-26 | End: 2021-12-23

## 2021-10-26 NOTE — TELEPHONE ENCOUNTER
Last Appointment:  9/28/2021  Future Appointments   Date Time Provider Jenae Kang   11/12/2021  8:45 AM [facility] RM 1 SEYZ ABDU [facility]   12/22/2021  1:20 PM MD Sushila Neves [facility]   1/3/2022  9:00 AM [facility]

## 2021-10-27 RX ORDER — FAMOTIDINE 20 MG/1
TABLET, FILM COATED ORAL
Qty: 60 TABLET | Refills: 1 | Status: SHIPPED
Start: 2021-10-27 | End: 2021-12-23

## 2021-10-27 NOTE — TELEPHONE ENCOUNTER
Last Appointment:  10/21/2021  Future Appointments   Date Time Provider Jenae Nazarioi   11/12/2021  8:45 AM [facility] RM 1 SEYZ ABDU [facility]   12/22/2021  1:20 PM MD Jessie Mayorga [facility]   1/3/2022  9:00 [facility]

## 2021-10-28 ENCOUNTER — TELEPHONE (OUTPATIENT)
Dept: FAMILY MEDICINE CLINIC | Age: 51
End: 2021-10-28

## 2021-10-28 DIAGNOSIS — E11.65 UNCONTROLLED TYPE 2 DIABETES MELLITUS WITH HYPERGLYCEMIA (HCC): Primary | ICD-10-CM

## 2021-10-28 DIAGNOSIS — N39.0 URINARY TRACT INFECTION WITHOUT HEMATURIA, SITE UNSPECIFIED: ICD-10-CM

## 2021-10-28 RX ORDER — NITROFURANTOIN 25; 75 MG/1; MG/1
100 CAPSULE ORAL 2 TIMES DAILY
Qty: 10 CAPSULE | Refills: 0 | Status: SHIPPED | OUTPATIENT
Start: 2021-10-28 | End: 2021-11-02

## 2021-10-28 NOTE — TELEPHONE ENCOUNTER
Patient declining treatment for high BG/low BG - is not checking glucose at home. Having symptoms of UTI. +nausea    No fevers, chills. Not willing to receive counseling on diabetes today.

## 2021-10-28 NOTE — TELEPHONE ENCOUNTER
The patient reports she has a uti but cannot get in to the doctor due to transportation til 11/5/21.   Can something be called in for her until she an be called in  Transportation cannot bring her   She is unable to do a virtual visit

## 2021-11-01 ENCOUNTER — TELEPHONE (OUTPATIENT)
Dept: FAMILY MEDICINE CLINIC | Age: 51
End: 2021-11-01

## 2021-11-01 NOTE — TELEPHONE ENCOUNTER
Phoned patient   Patient stated that her antibiotic was done today but still vomiting from time to time  Please advise  Thank you April

## 2021-11-01 NOTE — TELEPHONE ENCOUNTER
----- Message from Sissy Cervantes sent at 10/30/2021  1:42 PM EDT -----  Subject: Message to Provider    QUESTIONS  Information for Provider? Pt wanted the office to know she was prescribed   an antibiotic for a UTI , and have only 2 days left of the medication . Pt   is now experiencing vomiting and nausea. Unable to keep any food or   liquids down. Please advise  ---------------------------------------------------------------------------  --------------  CALL BACK INFO  What is the best way for the office to contact you? OK to leave message on   voicemail  Preferred Call Back Phone Number? [phone]  ---------------------------------------------------------------------------  --------------  SCRIPT ANSWERS  Relationship to Patient?  Self

## 2021-11-01 NOTE — TELEPHONE ENCOUNTER
I attempted to call patient. No answer. Extending the antibiotic would not alleviate or resolve her emesis.  Please advised the patient to stay well hydrated and to schedule an appoint to evaluate reason for emesis

## 2021-11-02 DIAGNOSIS — E11.65 UNCONTROLLED TYPE 2 DIABETES MELLITUS WITH HYPERGLYCEMIA (HCC): ICD-10-CM

## 2021-11-03 RX ORDER — INSULIN LISPRO 200 [IU]/ML
40 INJECTION, SOLUTION SUBCUTANEOUS
Qty: 18 ML | Refills: 0 | Status: SHIPPED
Start: 2021-11-03 | End: 2021-11-08

## 2021-11-03 NOTE — TELEPHONE ENCOUNTER
Last Appointment:  9/28/2021  Future Appointments   Date Time Provider Jenae Nazarioi   11/12/2021  8:45 AM [facility] RM 1 SEYZ ABDU [facility]   11/18/2021  9:00 AM [facility] CT SCAN 1 SEYZ CT [facility]   11/22/2021  1:45 PM ANA Madera - NP [facility]   12/22/2021  1:20 PM MD Abimael Ortiz [facility]   1/3/2022  9:00 AM Doc Ryan MD [facility]

## 2021-11-08 DIAGNOSIS — G47.9 SLEEPING DIFFICULTY: ICD-10-CM

## 2021-11-08 DIAGNOSIS — E11.65 UNCONTROLLED TYPE 2 DIABETES MELLITUS WITH HYPERGLYCEMIA (HCC): ICD-10-CM

## 2021-11-08 RX ORDER — LANOLIN ALCOHOL/MO/W.PET/CERES
3 CREAM (GRAM) TOPICAL NIGHTLY PRN
Qty: 30 TABLET | Refills: 0 | Status: SHIPPED
Start: 2021-11-08 | End: 2021-12-10

## 2021-11-08 RX ORDER — INSULIN LISPRO 200 [IU]/ML
INJECTION, SOLUTION SUBCUTANEOUS
Qty: 18 ML | Refills: 0 | Status: SHIPPED
Start: 2021-11-08 | End: 2021-12-23

## 2021-11-08 NOTE — TELEPHONE ENCOUNTER
Last Appointment:  10/21/2021  Future Appointments   Date Time Provider Jenae Nazarioi   11/12/2021  8:45 AM [facility] RM 1 SEYZ ABDU [facility]   11/18/2021  9:00 AM [facility] CT SCAN 1 SEYZ CT [facility]   11/22/2021  1:45 PM Alfred Katz, APRN - NP [facility]   12/22/2021  1:20 PM MD Shiela Randolph [facility]   1/3/2022  9:00 [facility]

## 2021-11-08 NOTE — TELEPHONE ENCOUNTER
Last Appointment:  9/28/2021  Future Appointments   Date Time Provider Jenae Nazarioi   11/12/2021  8:45 AM [facility] RM 1 SEYZ ABDU [facility]   11/18/2021  9:00 AM [facility] CT SCAN 1 SEYZ CT [facility]   11/22/2021  1:45 PM ANA Finney - NP [facility]   12/22/2021  1:20 PM MD Yumiko Prieto [facility]   1/3/2022  9:00 AM Doc Syed MD [facility]

## 2021-11-12 ENCOUNTER — HOSPITAL ENCOUNTER (OUTPATIENT)
Dept: GENERAL RADIOLOGY | Age: 51
Discharge: HOME | End: 2021-11-14
Payer: MEDICAID

## 2021-11-12 DIAGNOSIS — Z12.31 VISIT FOR SCREENING MAMMOGRAM: ICD-10-CM

## 2021-11-12 PROCEDURE — 77063 BREAST TOMOSYNTHESIS BI: CPT

## 2021-11-18 ENCOUNTER — HOSPITAL ENCOUNTER (OUTPATIENT)
Dept: CT IMAGING | Age: 51
Discharge: HOME | End: 2021-11-20
Payer: MEDICAID

## 2021-11-18 VITALS
OXYGEN SATURATION: 99 % | TEMPERATURE: 98.3 F | DIASTOLIC BLOOD PRESSURE: 83 MMHG | SYSTOLIC BLOOD PRESSURE: 133 MMHG | HEIGHT: 61 IN | HEART RATE: 100 BPM | RESPIRATION RATE: 18 BRPM | WEIGHT: 160 LBS | BODY MASS INDEX: 30.21 KG/M2

## 2021-11-18 DIAGNOSIS — E11.21 DIABETIC GLOMERULOPATHY (HCC): ICD-10-CM

## 2021-11-18 DIAGNOSIS — E11.65 UNCONTROLLED TYPE 2 DIABETES MELLITUS WITH HYPERGLYCEMIA (HCC): ICD-10-CM

## 2021-11-18 DIAGNOSIS — N18.6 TYPE 2 DIABETES MELLITUS WITH ESRD (END-STAGE RENAL DISEASE) (HCC): ICD-10-CM

## 2021-11-18 DIAGNOSIS — E11.22 TYPE 2 DIABETES MELLITUS WITH ESRD (END-STAGE RENAL DISEASE) (HCC): ICD-10-CM

## 2021-11-18 DIAGNOSIS — I12.9 RENAL HYPERTENSION: ICD-10-CM

## 2021-11-18 LAB
ALBUMIN SERPL-MCNC: 2.9 G/DL (ref 3.5–5.2)
ALP BLD-CCNC: 99 U/L (ref 35–104)
ALT SERPL-CCNC: 10 U/L (ref 0–32)
ANION GAP SERPL CALCULATED.3IONS-SCNC: 12 MMOL/L (ref 7–16)
AST SERPL-CCNC: 12 U/L (ref 0–31)
BASOPHILS ABSOLUTE: 0.1 E9/L (ref 0–0.2)
BASOPHILS RELATIVE PERCENT: 0.7 % (ref 0–2)
BILIRUB SERPL-MCNC: <0.2 MG/DL (ref 0–1.2)
BUN BLDV-MCNC: 17 MG/DL (ref 6–20)
CALCIUM SERPL-MCNC: 9.7 MG/DL (ref 8.6–10.2)
CHLORIDE BLD-SCNC: 109 MMOL/L (ref 98–107)
CO2: 23 MMOL/L (ref 22–29)
CREAT SERPL-MCNC: 0.9 MG/DL (ref 0.5–1)
EOSINOPHILS ABSOLUTE: 0.41 E9/L (ref 0.05–0.5)
EOSINOPHILS RELATIVE PERCENT: 2.9 % (ref 0–6)
GFR AFRICAN AMERICAN: >60
GFR NON-AFRICAN AMERICAN: >60 ML/MIN/1.73
GLUCOSE BLD-MCNC: 57 MG/DL (ref 74–99)
HCT VFR BLD CALC: 31.6 % (ref 34–48)
HEMOGLOBIN: 9.4 G/DL (ref 11.5–15.5)
IMMATURE GRANULOCYTES #: 0.14 E9/L
IMMATURE GRANULOCYTES %: 1 % (ref 0–5)
INR BLD: 0.9
LYMPHOCYTES ABSOLUTE: 5.14 E9/L (ref 1.5–4)
LYMPHOCYTES RELATIVE PERCENT: 36.3 % (ref 20–42)
MCH RBC QN AUTO: 20.5 PG (ref 26–35)
MCHC RBC AUTO-ENTMCNC: 29.7 % (ref 32–34.5)
MCV RBC AUTO: 68.8 FL (ref 80–99.9)
METER GLUCOSE: 117 MG/DL (ref 74–99)
METER GLUCOSE: 40 MG/DL (ref 74–99)
METER GLUCOSE: 66 MG/DL (ref 74–99)
METER GLUCOSE: 73 MG/DL (ref 74–99)
METER GLUCOSE: <40 MG/DL (ref 74–99)
MONOCYTES ABSOLUTE: 0.65 E9/L (ref 0.1–0.95)
MONOCYTES RELATIVE PERCENT: 4.6 % (ref 2–12)
NEUTROPHILS ABSOLUTE: 7.72 E9/L (ref 1.8–7.3)
NEUTROPHILS RELATIVE PERCENT: 54.5 % (ref 43–80)
PDW BLD-RTO: 18 FL (ref 11.5–15)
PLATELET # BLD: 362 E9/L (ref 130–450)
PMV BLD AUTO: 10.3 FL (ref 7–12)
POTASSIUM SERPL-SCNC: 3 MMOL/L (ref 3.5–5)
PROTHROMBIN TIME: 10.1 SEC (ref 9.3–12.4)
RBC # BLD: 4.59 E12/L (ref 3.5–5.5)
SODIUM BLD-SCNC: 144 MMOL/L (ref 132–146)
TOTAL PROTEIN: 6.1 G/DL (ref 6.4–8.3)
WBC # BLD: 14.2 E9/L (ref 4.5–11.5)

## 2021-11-18 PROCEDURE — 36415 COLL VENOUS BLD VENIPUNCTURE: CPT

## 2021-11-18 PROCEDURE — 85610 PROTHROMBIN TIME: CPT

## 2021-11-18 PROCEDURE — 77012 CT SCAN FOR NEEDLE BIOPSY: CPT | Performed by: RADIOLOGY

## 2021-11-18 PROCEDURE — 85025 COMPLETE CBC W/AUTO DIFF WBC: CPT

## 2021-11-18 PROCEDURE — 80053 COMPREHEN METABOLIC PANEL: CPT

## 2021-11-18 PROCEDURE — 2709999900 CT GUIDED NEEDLE PLACEMENT

## 2021-11-18 PROCEDURE — 6360000004 HC RX CONTRAST MEDICATION: Performed by: RADIOLOGY

## 2021-11-18 PROCEDURE — 50200 RENAL BIOPSY PERQ: CPT | Performed by: RADIOLOGY

## 2021-11-18 PROCEDURE — 7100000010 HC PHASE II RECOVERY - FIRST 15 MIN

## 2021-11-18 PROCEDURE — 36591 DRAW BLOOD OFF VENOUS DEVICE: CPT

## 2021-11-18 PROCEDURE — 82962 GLUCOSE BLOOD TEST: CPT

## 2021-11-18 PROCEDURE — 7100000011 HC PHASE II RECOVERY - ADDTL 15 MIN

## 2021-11-18 PROCEDURE — 2500000003 HC RX 250 WO HCPCS: Performed by: RADIOLOGY

## 2021-11-18 PROCEDURE — 50200 RENAL BIOPSY PERQ: CPT

## 2021-11-18 PROCEDURE — 6360000002 HC RX W HCPCS: Performed by: RADIOLOGY

## 2021-11-18 RX ORDER — HYDRALAZINE HYDROCHLORIDE 20 MG/ML
10 INJECTION INTRAMUSCULAR; INTRAVENOUS ONCE
Status: COMPLETED | OUTPATIENT
Start: 2021-11-18 | End: 2021-11-18

## 2021-11-18 RX ORDER — LIDOCAINE HYDROCHLORIDE 20 MG/ML
INJECTION, SOLUTION INFILTRATION; PERINEURAL
Status: COMPLETED | OUTPATIENT
Start: 2021-11-18 | End: 2021-11-18

## 2021-11-18 RX ORDER — MIDAZOLAM HYDROCHLORIDE 1 MG/ML
INJECTION INTRAMUSCULAR; INTRAVENOUS
Status: COMPLETED | OUTPATIENT
Start: 2021-11-18 | End: 2021-11-18

## 2021-11-18 RX ORDER — FENTANYL CITRATE 50 UG/ML
INJECTION, SOLUTION INTRAMUSCULAR; INTRAVENOUS
Status: COMPLETED | OUTPATIENT
Start: 2021-11-18 | End: 2021-11-18

## 2021-11-18 RX ORDER — HYDRALAZINE HYDROCHLORIDE 20 MG/ML
INJECTION INTRAMUSCULAR; INTRAVENOUS
Status: COMPLETED | OUTPATIENT
Start: 2021-11-18 | End: 2021-11-18

## 2021-11-18 RX ORDER — INSULIN GLARGINE 100 [IU]/ML
55 INJECTION, SOLUTION SUBCUTANEOUS 2 TIMES DAILY
Qty: 30 ML | Refills: 1 | Status: SHIPPED
Start: 2021-11-18 | End: 2022-02-01

## 2021-11-18 RX ADMIN — LIDOCAINE HYDROCHLORIDE 5 ML: 20 INJECTION, SOLUTION INFILTRATION; PERINEURAL at 11:32

## 2021-11-18 RX ADMIN — HYDRALAZINE HYDROCHLORIDE 10 MG: 20 INJECTION INTRAMUSCULAR; INTRAVENOUS at 11:26

## 2021-11-18 RX ADMIN — FENTANYL CITRATE 50 MCG: 50 INJECTION, SOLUTION INTRAMUSCULAR; INTRAVENOUS at 11:27

## 2021-11-18 RX ADMIN — MIDAZOLAM 1 MG: 1 INJECTION INTRAMUSCULAR; INTRAVENOUS at 11:28

## 2021-11-18 RX ADMIN — HYDRALAZINE HYDROCHLORIDE 10 MG: 20 INJECTION INTRAMUSCULAR; INTRAVENOUS at 11:08

## 2021-11-18 RX ADMIN — HYDRALAZINE HYDROCHLORIDE 10 MG: 20 INJECTION INTRAMUSCULAR; INTRAVENOUS at 10:25

## 2021-11-18 RX ADMIN — IOPAMIDOL 1 ML: 612 INJECTION, SOLUTION INTRAVENOUS at 11:36

## 2021-11-18 RX ADMIN — MIDAZOLAM 1 MG: 1 INJECTION INTRAMUSCULAR; INTRAVENOUS at 11:14

## 2021-11-18 RX ADMIN — FENTANYL CITRATE 50 MCG: 50 INJECTION, SOLUTION INTRAMUSCULAR; INTRAVENOUS at 11:17

## 2021-11-18 NOTE — POST SEDATION
POST SEDATION NOTE:  Time: 9:57 AM    Cardiopulmonary: Vitals Signs Stable: yes    Level of Consciousness: alert    Reversal Agent Used: No    Complications: none    Follow-up/Observations: none    Pain Score: 1    María Qiu MD

## 2021-11-18 NOTE — BRIEF OP NOTE
Brief Postoperative Note    Scot Lipoma  YOB: 1970  [id number]    Pre-operative Diagnosis and Procedure: Esrd, diabetes, plan renal biopsy    Post-operative Diagnosis: Same    Anesthesia: Local    Estimated Blood Loss: < 10 cc    Surgeon: Olivia LOVE     Complications: none    Specimen obtained: yes    Findings: none   Ever SARTHAK Marr MD   11/18/2021 9:57 AM

## 2021-11-18 NOTE — PROCEDURES
0823Patient arrived with to Radiology department for renal biopsy. Allergies, home medications, H&P and fasting instructions reviewed with patient. Vital signs taken. IV placed, blood obtained, IV flushed and prn adapter attached. Blood sample sent to lab for ordered tests. Procedural instructions given, questions answered, understanding expressed and consent signed.  Patient given fluoroscopy education, no questions at this time

## 2021-11-18 NOTE — H&P
Interventional Radiology  Attending Pre-operative History and Physical    DIAGNOSIS:    Patient Active Problem List   Diagnosis    Type 2 diabetes mellitus with complication, with long-term current use of insulin ([facility]    Depression    Sjogren's syndrome ([facility]    Diabetic ketosis (HCC)    Anemia    Hyperglycemia    Acquired hypothyroidism    Vitamin D deficiency    Encounter for long-term (current) use of insulin ([facility]    Infective urethritis    Gastroparesis    Severe obstructive sleep apnea    Mild depression ([facility]    Anxiety disorder    Benign hypertensive kidney disease with chronic kidney disease    Chronic kidney disease, stage I    COVID-19    Fibromyalgia    Hyperlipidemia    Hypoglycemia    Hypokalemia    Nephrotic syndrome due to type 2 diabetes mellitus (HCC)    Hypothyroidism    Proteinuria    Depressive disorder    Type 2 diabetes mellitus with chronic kidney disease (HCC)    Rheumatoid arthritis involving multiple sites ([facility]       CHIEF COMPLAINT: <principal problem not specified>          Current Outpatient Medications:     HUMALOG KWIKPEN 200 UNIT/ML SOPN pen, INJECT 40 UNITS SUB-Q THREE TIMES A DAY WITH MEALS, Disp: 18 mL, Rfl: 0    melatonin 3 MG TABS tablet, TAKE 1 TABLET BY MOUTH NIGHTLY AS NEEDED (INSOMNIA), Disp: 30 tablet, Rfl: 0    metFORMIN (GLUCOPHAGE-XR) 500 MG extended release tablet, TAKE 2 TABLETS BY MOUTH 2 TIMES DAILY , Disp: 120 tablet, Rfl: 1    lisinopril (PRINIVIL;ZESTRIL) 10 MG tablet, Take 2 tablets by mouth daily, Disp: 90 tablet, Rfl: 2    LANTUS SOLOSTAR 100 UNIT/ML injection pen, INJECT 55 UNITS INTO THE SKIN 2 TIMES DAILY , Disp: 15 mL, Rfl: 0    empagliflozin (JARDIANCE) 25 MG tablet, Take 1 tablet by mouth daily, Disp: 90 tablet, Rfl: 1    levothyroxine (SYNTHROID) 88 MCG tablet, Take 1 tablet by mouth daily, Disp: 30 tablet, Rfl: 1    DULoxetine (CYMBALTA) 60 MG extended release capsule, TAKE 1 CAPSULE BY MOUTH EVERY MORNING 60MG IN AM 30MG IN PM   90MG/DAY (Patient taking differently: 60 mg 2 times daily TAKE 1 CAPSULE BY MOUTH EVERY MORNING 60MG IN AM   30MG IN PM   90MG/DAY), Disp: 30 capsule, Rfl: 1    pregabalin (LYRICA) 100 MG capsule, Take 1 capsule by mouth 3 times daily for 90 days. , Disp: 90 capsule, Rfl: 2    hydrocortisone 2.5 % cream, Apply topically 2 times daily. , Disp: 45 g, Rfl: 0    hydroxychloroquine (PLAQUENIL) 200 MG tablet, Take 200 mg by mouth 2 times daily, Disp: , Rfl:     diclofenac sodium 1 % GEL, Apply 4 g topically 4 times daily, Disp: 4 Tube, Rfl: 1    folic acid (FOLVITE) 1 MG tablet, Take 1 tablet by mouth daily, Disp: 30 tablet, Rfl: 1    famotidine (PEPCID) 20 MG tablet, TAKE 1 TABLET BY MOUTH TWICE DAILY , Disp: 60 tablet, Rfl: 1    OZEMPIC, 1 MG/DOSE, 4 MG/3ML SOPN, INJECT 1 MG INTO THE SKIN ONCE A WEEK , Disp: 3 mL, Rfl: 1    fluticasone (FLONASE) 50 MCG/ACT nasal spray, 2 sprays by Nasal route daily, Disp: 16 g, Rfl: 11    OZEMPIC, 1 MG/DOSE, 2 MG/1.5ML SOPN, Inject 1 mg into the skin once a week, Disp: 1 pen, Rfl: 1    lamoTRIgine (LAMICTAL) 100 MG tablet, TAKE 1 TABLET BY MOUTH EVERY DAY, Disp: 90 tablet, Rfl: 1    naproxen (NAPROSYN) 250 MG tablet, TAKE 1 TABLET BY MOUTH TWICE A DAY WITH MEALS, Disp: 60 tablet, Rfl: 5    Insulin Pen Needle 31G X 5 MM MISC, 1 each by Does not apply route 3 times daily, Disp: 100 each, Rfl: 3    Continuous Blood Gluc Sensor (FREESTYLE NIMISHA 2 SENSOR) MISC, 1 each by Does not apply route continuous, Disp: , Rfl:     Blood Pressure KIT, 1 box by Does not apply route 2 times daily, Disp: 1 kit, Rfl: 0    methotrexate (RHEUMATREX) 2.5 MG chemo tablet, Take 15 mg by mouth once a week, Disp: , Rfl:     FREESTYLE LITE strip, USE AS DIRECTED, Disp: 100 strip, Rfl: 3    FREESTYLE LANCETS MISC, CHECK BS 4 TIMES DAILY, Disp: 100 each, Rfl: 3    Allergies   Allergen Reactions    Amoxicillin      Hives all over body, states that hives are severe     Sulfa Antibiotics  Cefdinir     Milk-Related Compounds     Tomato        Past Medical History:   Diagnosis Date    Anxiety     Arthritis     Depression     Diabetes mellitus (HCC)     Fibromyalgia     HTN (hypertension)     Hypertensive chronic kidney disease     Left shoulder pain     LIZABETH (obstructive sleep apnea)     [facility]'s disease ([facility]     Thyroid disease        Past Surgical History:   Procedure Laterality Date    SHOULDER ARTHROSCOPY Right 2017       Family History   Adopted: Yes   Family history unknown: Yes       Social History     Socioeconomic History    Marital status: Single     Spouse name: Not on file    Number of children: Not on file    Years of education: Not on file    Highest education level:  Bachelor's degree (e.g., BA, AB, BS)   Occupational History    Occupation: unemployed    Tobacco Use    Smoking status: Former Smoker     Packs/day: 2.00     Years: 0.50     Pack years: 1.00     Start date:      Quit date:      Years since quittin.8    Smokeless tobacco: Never Used   Vaping Use    Vaping Use: Never used   Substance and Sexual Activity    Alcohol use: No    Drug use: No    Sexual activity: Never   Other Topics Concern    Not on file   Social History Narrative    2/3/2020-Financial Resource strain-states very hard-pt receives [employer] benefits for food, states that food doesn't run out as she also utilizes the [employer] the  of every month    2/3/2020-Stress-states she is very much stressed and reports attending [place] counseling for her depression and receives medications as well     Social Determinants of Health     Financial Resource Strain: High Risk    Difficulty of Paying Living Expenses: Very hard   Food Insecurity: No Food Insecurity    Worried About Running Out of Food in the Last Year: Never true    Kai of Food in the Last Year: Never true   Transportation Needs: Unmet Transportation Needs    Lack of Transportation (Medical):  Yes    Lack of Transportation (Non-Medical): Yes   Physical Activity:     Days of Exercise per Week: Not on file    Minutes of Exercise per Session: Not on file   Stress:     Feeling of Stress : Not on file   Social Connections:     Frequency of Communication with Friends and Family: Not on file    Frequency of Social Gatherings with Friends and Family: Not on file    Attends Islam Services: Not on file    Active Member of Clubs or Organizations: Not on file    Attends Club or Organization Meetings: Not on file    Marital Status: Not on file   Intimate Partner Violence:     Fear of Current or Ex-Partner: Not on file    Emotionally Abused: Not on file    Physically Abused: Not on file    Sexually Abused: Not on file   Housing Stability:     Unable to Pay for Housing in the Last Year: Not on file    Number of Places Lived in the Last Year: Not on file    Unstable Housing in the Last Year: Not on file       ROS: Non-contributory other than as noted above    PHYSICAL EXAM:      Heart and Lungs:  demonstrate no contraindications to proceed    DATA:  CBC:   Lab Results   Component Value Date    WBC 14.2 11/18/2021    RBC 4.59 11/18/2021    HGB 9.4 11/18/2021    HCT 31.6 11/18/2021    MCV 68.8 11/18/2021    MCH 20.5 11/18/2021    MCHC 29.7 11/18/2021    RDW 18.0 11/18/2021     11/18/2021    MPV 10.3 11/18/2021     CBC with Differential:    Lab Results   Component Value Date    WBC 14.2 11/18/2021    RBC 4.59 11/18/2021    HGB 9.4 11/18/2021    HCT 31.6 11/18/2021     11/18/2021    MCV 68.8 11/18/2021    MCH 20.5 11/18/2021    MCHC 29.7 11/18/2021    RDW 18.0 11/18/2021    NRBC 1 09/08/2015    BANDSPCT 4 09/08/2015    METASPCT 3 09/08/2015    LYMPHOPCT 36.3 11/18/2021    MONOPCT 4.6 11/18/2021    BASOPCT 0.7 11/18/2021    MONOSABS 0.65 11/18/2021    LYMPHSABS 5.14 11/18/2021    EOSABS 0.41 11/18/2021    BASOSABS 0.10 11/18/2021     Platelets:    Lab Results   Component Value Date     11/18/2021     BUN/Creatinine:    Lab Results   Component Value Date    BUN 17 11/18/2021    CREATININE 0.9 11/18/2021       ASSESSMENT AND PLAN:  1. Esrd, diabetes, plan renal biopsy  2. Procedure options, risks and benefits reviewed with patient. Patient expresses understanding.     Electronically signed by Carmita Armijo II, MD on 11/18/2021 at 9:56 AM

## 2021-11-18 NOTE — TELEPHONE ENCOUNTER
Last Appointment:  9/28/2021  Future Appointments   Date Time Provider Jenae Nazarioi   11/22/2021  1:45 PM ANA Alvarez - NP [facility]   12/22/2021  1:20 PM MD Jonnathan Norris [facility]   1/3/2022  9:00 AM [facility]

## 2021-11-18 NOTE — PROCEDURES
1144Patient returned from procedure. Dressing checked, clean, dry, and intact. Patient stable. No s/s of complications noted or reported. Vitals will be checked q 15min, see flow sheets. 1215Patient eating and drinking well with no s/s of complications noted or reported. 1230 pt BS re checked. Bs 40 after eating sandwich, cookies  and  juice. Pt asymptomatic at this time. Given additional juice and jello. 1245 bs 66 after more juice/jello/candy. 1310 bs 73 after sandwich/juice cookie  1330 bs 117 .    1340Patient discharged, site was checked with every set of vitals. Site clean dry and intact. Discharge papers reviewed with patient, questions answered, discharge paper signed. Patient taken to door via wheelchair. Patient in stable condition, no s/s of complications noted or reported.

## 2021-11-18 NOTE — PRE SEDATION
Fili Barrios II, MD  11/18/2021  9:57 AM        PRE-SEDATION PHYSICIAN ASSESSMENT:      1. HISTORY & PHYSICAL EXAMINATION:  Comments: none    Vitals:    11/18/21 0923   BP: (!) 157/74   Pulse: 102   Resp: 20   Temp:    SpO2:        Allergies: Amoxicillin, Sulfa antibiotics, Cefdinir, Milk-related compounds, and Tomato    2. Heart and Lungs immediately prior to procedure demonstrate no contraindications to proceed      Chief Complaint: <principal problem not specified>    Drug: unknown  Tobacco: unknown    3. PAST ANESTHESIA EXPERIENCE:  unknown. 4. AIRWAY/TEETH/HEAD & NECK(Mallampati Classification):  II (soft palate, uvula, fauces visible)    5: NORMAL RANGE OF MOTION OF NECK: No    6. PATIENT WILL LIKELY TOLERATE PLAN OF MODERATE SEDATION    7. ASA 2.     Mateo Rojo MD

## 2021-12-02 ENCOUNTER — HOSPITAL ENCOUNTER (OUTPATIENT)
Age: 51
Discharge: HOME | End: 2021-12-02
Payer: MEDICAID

## 2021-12-02 LAB
ANION GAP SERPL CALCULATED.3IONS-SCNC: 13 MMOL/L (ref 7–16)
BUN BLDV-MCNC: 21 MG/DL (ref 6–20)
CALCIUM SERPL-MCNC: 9 MG/DL (ref 8.6–10.2)
CHLORIDE BLD-SCNC: 97 MMOL/L (ref 98–107)
CO2: 21 MMOL/L (ref 22–29)
CREAT SERPL-MCNC: 0.8 MG/DL (ref 0.5–1)
GFR AFRICAN AMERICAN: >60
GFR NON-AFRICAN AMERICAN: >60 ML/MIN/1.73
GLUCOSE BLD-MCNC: 679 MG/DL (ref 74–99)
POTASSIUM SERPL-SCNC: 4.2 MMOL/L (ref 3.5–5)
SODIUM BLD-SCNC: 131 MMOL/L (ref 132–146)

## 2021-12-02 PROCEDURE — 86618 LYME DISEASE ANTIBODY: CPT

## 2021-12-02 PROCEDURE — 80048 BASIC METABOLIC PNL TOTAL CA: CPT

## 2021-12-02 PROCEDURE — 36415 COLL VENOUS BLD VENIPUNCTURE: CPT

## 2021-12-07 LAB — LYME, EIA: 0.31 LIV (ref 0–1.2)

## 2021-12-09 DIAGNOSIS — G47.9 SLEEPING DIFFICULTY: ICD-10-CM

## 2021-12-10 RX ORDER — BLOOD SUGAR DIAGNOSTIC
STRIP MISCELLANEOUS
Qty: 100 EACH | Refills: 2 | Status: SHIPPED
Start: 2021-12-10 | End: 2022-03-18

## 2021-12-10 RX ORDER — LANOLIN ALCOHOL/MO/W.PET/CERES
CREAM (GRAM) TOPICAL
Qty: 30 TABLET | Refills: 0 | Status: SHIPPED
Start: 2021-12-10 | End: 2021-12-23

## 2021-12-10 NOTE — TELEPHONE ENCOUNTER
Last Appointment:  10/21/2021  Future Appointments   Date Time Provider Jenae Kang   12/15/2021  2:00 PM MD Atilio Mcintosh [facility]   12/22/2021  1:20 PM MD Atilio Mcintosh [facility]   1/3/2022  9:00 AM Doc Joya MD [facility]

## 2021-12-10 NOTE — TELEPHONE ENCOUNTER
Last Appointment:  Visit date not found  Future Appointments   Date Time Provider Jenae Nazarioi   12/15/2021  2:00 PM MD Jerrod Beck [facility]   12/22/2021  1:20 PM [facility]   1/3/2022  9:00 AM Doc Khan MD [facility]

## 2021-12-15 ENCOUNTER — OFFICE VISIT (OUTPATIENT)
Dept: FAMILY MEDICINE CLINIC | Age: 51
End: 2021-12-15
Payer: MEDICAID

## 2021-12-15 VITALS
SYSTOLIC BLOOD PRESSURE: 134 MMHG | BODY MASS INDEX: 29.45 KG/M2 | TEMPERATURE: 97.7 F | OXYGEN SATURATION: 100 % | HEIGHT: 61 IN | RESPIRATION RATE: 18 BRPM | HEART RATE: 94 BPM | WEIGHT: 156 LBS | DIASTOLIC BLOOD PRESSURE: 83 MMHG

## 2021-12-15 DIAGNOSIS — R25.2 CRAMPS, MUSCLE, GENERAL: ICD-10-CM

## 2021-12-15 DIAGNOSIS — Z91.199 NON-COMPLIANCE: ICD-10-CM

## 2021-12-15 DIAGNOSIS — E11.649 UNCONTROLLED TYPE 2 DIABETES MELLITUS WITH HYPOGLYCEMIA, UNSPECIFIED HYPOGLYCEMIA COMA STATUS (HCC): Primary | ICD-10-CM

## 2021-12-15 PROBLEM — N39.0 URINARY TRACT INFECTION: Status: ACTIVE | Noted: 2021-10-27

## 2021-12-15 PROBLEM — E11.21 DIABETIC RENAL DISEASE (HCC): Status: ACTIVE | Noted: 2021-12-15

## 2021-12-15 PROBLEM — E78.2 MIXED HYPERLIPIDEMIA: Status: ACTIVE | Noted: 2021-03-08

## 2021-12-15 PROBLEM — I10 ESSENTIAL HYPERTENSION: Status: ACTIVE | Noted: 2021-12-15

## 2021-12-15 PROBLEM — K31.84 GASTROPARESIS: Status: RESOLVED | Noted: 2018-12-06 | Resolved: 2021-12-15

## 2021-12-15 PROBLEM — F32.9 MAJOR DEPRESSION, SINGLE EPISODE: Status: ACTIVE | Noted: 2021-12-15

## 2021-12-15 PROBLEM — E11.65 HYPERGLYCEMIA DUE TO TYPE 2 DIABETES MELLITUS (HCC): Status: ACTIVE | Noted: 2021-12-15

## 2021-12-15 PROBLEM — G93.32 CHRONIC FATIGUE SYNDROME: Status: ACTIVE | Noted: 2021-12-15

## 2021-12-15 PROBLEM — E87.20 METABOLIC ACIDOSIS: Status: ACTIVE | Noted: 2021-12-10

## 2021-12-15 PROBLEM — G62.9 NEUROPATHY: Status: ACTIVE | Noted: 2021-12-15

## 2021-12-15 LAB
ANION GAP SERPL CALCULATED.3IONS-SCNC: 13 MMOL/L (ref 7–16)
BUN BLDV-MCNC: 26 MG/DL (ref 6–20)
CALCIUM SERPL-MCNC: 9.6 MG/DL (ref 8.6–10.2)
CHLORIDE BLD-SCNC: 104 MMOL/L (ref 98–107)
CO2: 21 MMOL/L (ref 22–29)
CREAT SERPL-MCNC: 0.8 MG/DL (ref 0.5–1)
GFR AFRICAN AMERICAN: >60
GFR NON-AFRICAN AMERICAN: >60 ML/MIN/1.73
GLUCOSE BLD-MCNC: 272 MG/DL (ref 74–99)
HBA1C MFR BLD: 14.5 % (ref 4–5.6)
MAGNESIUM: 2.3 MG/DL (ref 1.6–2.6)
POTASSIUM SERPL-SCNC: 4.7 MMOL/L (ref 3.5–5)
SODIUM BLD-SCNC: 138 MMOL/L (ref 132–146)

## 2021-12-15 PROCEDURE — 1036F TOBACCO NON-USER: CPT | Performed by: FAMILY MEDICINE

## 2021-12-15 PROCEDURE — 99212 OFFICE O/P EST SF 10 MIN: CPT | Performed by: STUDENT IN AN ORGANIZED HEALTH CARE EDUCATION/TRAINING PROGRAM

## 2021-12-15 PROCEDURE — 3046F HEMOGLOBIN A1C LEVEL >9.0%: CPT | Performed by: FAMILY MEDICINE

## 2021-12-15 PROCEDURE — 3017F COLORECTAL CA SCREEN DOC REV: CPT | Performed by: FAMILY MEDICINE

## 2021-12-15 PROCEDURE — G8427 DOCREV CUR MEDS BY ELIG CLIN: HCPCS | Performed by: FAMILY MEDICINE

## 2021-12-15 PROCEDURE — 99213 OFFICE O/P EST LOW 20 MIN: CPT | Performed by: STUDENT IN AN ORGANIZED HEALTH CARE EDUCATION/TRAINING PROGRAM

## 2021-12-15 PROCEDURE — 36415 COLL VENOUS BLD VENIPUNCTURE: CPT | Performed by: FAMILY MEDICINE

## 2021-12-15 PROCEDURE — 2022F DILAT RTA XM EVC RTNOPTHY: CPT | Performed by: FAMILY MEDICINE

## 2021-12-15 PROCEDURE — G8484 FLU IMMUNIZE NO ADMIN: HCPCS | Performed by: FAMILY MEDICINE

## 2021-12-15 PROCEDURE — G8417 CALC BMI ABV UP PARAM F/U: HCPCS | Performed by: FAMILY MEDICINE

## 2021-12-15 RX ORDER — FLASH GLUCOSE SENSOR
1 KIT MISCELLANEOUS CONTINUOUS
Qty: 2 EACH | Refills: 3 | Status: SHIPPED
Start: 2021-12-15 | End: 2022-04-20

## 2021-12-15 ASSESSMENT — LIFESTYLE VARIABLES: HOW OFTEN DO YOU HAVE A DRINK CONTAINING ALCOHOL: NEVER

## 2021-12-15 NOTE — PROGRESS NOTES
CC: Uncontrolled type 2 diabetes/muscle cramps  ------------------------------------------------------------------------------------------------------------------------  Assessment/Plan  1. Uncontrolled type 2 diabetes mellitus with hypoglycemia, unspecified hypoglycemia coma status ([facility]  Plan for freestyle beltran use  Call in 2 days with full glucose logs, will titrate Lantus, sliding scale accordingly  Continue to provide encouragement to patient    2. Non-compliance  Patient is making efforts to take better care of herself, however with her longstanding history of uncontrolled sugars. 3. Cramps, muscle, general  Labs  - BASIC METABOLIC PANEL; Future  - MAGNESIUM; Future  - HEMOGLOBIN A1C; Future  - Multiple Vitamins-Minerals (HM MULTIVITAMIN ADULT GUMMY) CHEW; Take 1 Package by mouth daily for 14 days  Dispense: 14 tablet;  Refill: 3    Patient is declined flu shot, tetanus shot today    RTO 1 month for follow-up of symptoms  -------------------------------------------------------------------------------------------------------------------------    HPI:  49 y.o. woman presents for follow-up of     Uncontrolled type 2 diabetes with insulin dependence  Humalog Sliding Scale (unsure of scale; 54 units morning)  Lantus 50 BID  Ozempic 1mg   Jardiaance 25 daily- recently started  Metformin 1000 U BID  Has a freestyle beltran device at home, interested in reusing it    FB mg/dl ~ stop eating at 6 PM                                 Not checked morning sugars    No glucose logs to present today      Cannot afford food  Eating cereal, pasta, similar for dinner  Not interested in SW consult; or help with groceries and affording food  Not interested in diabetes education feels it has been useless in the past    Denying hypo or hyperglycemic symptoms    Muscle Cramps  1 week onset  30 minutes lasts, thumb, feet  Affects them at the same time  Unable to identify an inciting factor  No loss of sensation under the tongue 3 times daily as needed for Nausea or Vomiting 30 tablet 0    LANTUS SOLOSTAR 100 UNIT/ML injection pen INJECT 55 UNITS INTO THE SKIN 2 TIMES DAILY 30 mL 1    HUMALOG KWIKPEN 200 UNIT/ML SOPN pen INJECT 40 UNITS SUB-Q THREE TIMES A DAY WITH MEALS 18 mL 0    famotidine (PEPCID) 20 MG tablet TAKE 1 TABLET BY MOUTH TWICE DAILY  60 tablet 1    metFORMIN (GLUCOPHAGE-XR) 500 MG extended release tablet TAKE 2 TABLETS BY MOUTH 2 TIMES DAILY  120 tablet 1    OZEMPIC, 1 MG/DOSE, 4 MG/3ML SOPN INJECT 1 MG INTO THE SKIN ONCE A WEEK  3 mL 1    fluticasone (FLONASE) 50 MCG/ACT nasal spray 2 sprays by Nasal route daily 16 g 11    lisinopril (PRINIVIL;ZESTRIL) 10 MG tablet Take 2 tablets by mouth daily 90 tablet 2    empagliflozin (JARDIANCE) 25 MG tablet Take 1 tablet by mouth daily 90 tablet 1    levothyroxine (SYNTHROID) 88 MCG tablet Take 1 tablet by mouth daily 30 tablet 1    DULoxetine (CYMBALTA) 60 MG extended release capsule TAKE 1 CAPSULE BY MOUTH EVERY MORNING 60MG IN AM   30MG IN PM   90MG/DAY (Patient taking differently: 60 mg 2 times daily TAKE 1 CAPSULE BY MOUTH EVERY MORNING 60MG IN AM   30MG IN PM   90MG/DAY) 30 capsule 1    lamoTRIgine (LAMICTAL) 100 MG tablet TAKE 1 TABLET BY MOUTH EVERY DAY 90 tablet 1    naproxen (NAPROSYN) 250 MG tablet TAKE 1 TABLET BY MOUTH TWICE A DAY WITH MEALS 60 tablet 5    Insulin Pen Needle 31G X 5 MM MISC 1 each by Does not apply route 3 times daily 100 each 3    Continuous Blood Gluc Sensor (FREESTYLE NIMISHA 2 SENSOR) MISC 1 each by Does not apply route continuous      hydrocortisone 2.5 % cream Apply topically 2 times daily.  45 g 0    hydroxychloroquine (PLAQUENIL) 200 MG tablet Take 200 mg by mouth 2 times daily      diclofenac sodium 1 % GEL Apply 4 g topically 4 times daily 4 Tube 1    methotrexate (RHEUMATREX) 2.5 MG chemo tablet Take 15 mg by mouth once a week      folic acid (FOLVITE) 1 MG tablet Take 1 tablet by mouth daily 30 tablet 1    FREESTYLE LANCETS MISC CHECK BS 4 TIMES DAILY 100 each 3    OZEMPIC, 1 MG/DOSE, 2 MG/1.5ML SOPN Inject 1 mg into the skin once a week 1 pen 1    [DISCONTINUED] melatonin 3 MG TABS tablet Take 1 tablet by mouth nightly as needed (insomnia) 30 tablet 0    Blood Pressure KIT 1 box by Does not apply route 2 times daily 1 kit 0     No current facility-administered medications on file prior to visit. Allergies   Allergen Reactions    Amoxicillin      Hives all over body, states that hives are severe     Sulfa Antibiotics     Cefdinir     Milk-Related Compounds     Tomato        Family History   Adopted: Yes   Family history unknown: Yes       Past Surgical History:   Procedure Laterality Date    CT BIOPSY RENAL  2021    CT BIOPSY RENAL 2021 Mackenzie Palacio MD [facility]    SHOULDER ARTHROSCOPY Right 2017       Social History     Tobacco Use    Smoking status: Former Smoker     Packs/day: 2.00     Years: 0.50     Pack years: 1.00     Start date:      Quit date:      Years since quittin.9    Smokeless tobacco: Never Used   Vaping Use    Vaping Use: Never used   Substance Use Topics    Alcohol use: No    Drug use: No       ROS:    Review of Systems   Constitutional: Negative for activity change, appetite change, chills and fatigue. HENT: Negative for congestion and sore throat. Respiratory: Negative for choking and chest tightness. Cardiovascular: Negative for chest pain, palpitations and leg swelling. Gastrointestinal: Negative for abdominal distention and abdominal pain. Genitourinary: Negative for difficulty urinating and dysuria. Musculoskeletal: Negative for arthralgias and back pain. Skin: Negative for color change and pallor. Neurological: Negative for facial asymmetry and headaches. Psychiatric/Behavioral: Negative for behavioral problems. The patient is not nervous/anxious.         Objective:    VS:  Blood pressure 134/83, pulse 94, temperature 97.7 °F (36.5 °C), temperature source Temporal, resp. rate 18, height 5' 1\" (1.549 m), weight 156 lb (70.8 kg), SpO2 100 %, not currently breastfeeding. Physical Exam   Constitutional: Oriented to person, place, and time. Well-developed and well-nourished. HENT:   Head: Normocephalic and atraumatic. Eyes: EOM are normal.   Neck: Neck supple. Cardiovascular: Normal rate, regular rhythm and intact distal pulses. Exam reveals no gallop and no friction rub. No murmur heard. Pulmonary/Chest: Effort normal and breath sounds normal. No wheezes. No rhales. Abdominal: Soft. Bowel sounds are normal. No distension. No abdominal tenderness. Musculoskeletal: Normal range of motion. General: No edema. Neurological: Alert and oriented to person, place, and time. Skin: Skin is warm and dry. No rash noted. No erythema. Psychiatric: Normal mood and affect. Behavior is normal.   Nursing note and vitals reviewed. Plans:  As above. Please see Patient Instructions for further counseling and information given. Advised to please be adherent to the treatment plans discussed today, and please call with any questions or concerns, letting the office know of any reasons that the plans may not be followed. The risks of untreated conditions include worsening illness, injury, disability, and possibly, death. Please call if symptoms change in any way, worsen, or fail to completely resolve, as this could necessitate a change to treatment plans. Patient and/or caregiver expressed understanding. Indications and proper use of medication(s) reviewed. Potential side-effects and risks of medication(s) also explained. Patient and/or caregiver was instructed to call if any new symptoms develop prior to next visit. Health risk factors discussed and addressed.

## 2021-12-15 NOTE — PROGRESS NOTES
S: 49 y.o. female presents today for:   DM 2 uncontrolled. Has been discharged from endocrinology due to noncompliance. Is now interested in increasing compliance and better control. Is on several medications states she takes them as indicated but is unaware of her sliding scale. Access to food is an issue, does eat a lot of carbohydrates because of ease and affordability. Still cramps intermittently for 1 week. CMP shows sodium was low, potassium was normal sugar was 679. O: VS: /83 (Site: Left Upper Arm, Position: Sitting, Cuff Size: Medium Adult)   Pulse 94   Temp 97.7 °F (36.5 °C) (Temporal)   Resp 18   Ht 5' 1\" (1.549 m)   Wt 156 lb (70.8 kg)   SpO2 100%   BMI 29.48 kg/m²   AAO/NAD, appropriate affect for mood  CV:  RRR, no murmur  Resp: CTAB  Ext- DPP-B, no clubbing, cyanosis, edema    Impression/Plan:   1) DM2- try Dexcom. Discharged from Endocrine. Refuse diabetes education   2) non-compliance   3) muscle cramps- BMP  4) HM- refuse    Attending Physician Statement  I have discussed the case, including pertinent history and exam findings with the resident. I agree with the documented assessment and plan.       Eleni Molina,

## 2021-12-16 ENCOUNTER — TELEPHONE (OUTPATIENT)
Dept: FAMILY MEDICINE CLINIC | Age: 51
End: 2021-12-16

## 2021-12-16 NOTE — TELEPHONE ENCOUNTER
The patient called to let the Dr know her Free style beltran alerts are set at   79  Low  350  High    If you are ok with those settings, I will let her know  thanks

## 2021-12-22 ENCOUNTER — OFFICE VISIT (OUTPATIENT)
Dept: FAMILY MEDICINE CLINIC | Age: 51
End: 2021-12-22
Payer: MEDICAID

## 2021-12-22 VITALS
SYSTOLIC BLOOD PRESSURE: 164 MMHG | BODY MASS INDEX: 31.15 KG/M2 | TEMPERATURE: 97.7 F | OXYGEN SATURATION: 97 % | DIASTOLIC BLOOD PRESSURE: 101 MMHG | HEART RATE: 105 BPM | HEIGHT: 61 IN | WEIGHT: 165 LBS | RESPIRATION RATE: 16 BRPM

## 2021-12-22 DIAGNOSIS — R11.0 NAUSEA: ICD-10-CM

## 2021-12-22 DIAGNOSIS — E11.69 TYPE 2 DIABETES MELLITUS WITH OTHER SPECIFIED COMPLICATION, UNSPECIFIED WHETHER LONG TERM INSULIN USE (HCC): Primary | ICD-10-CM

## 2021-12-22 DIAGNOSIS — I10 HYPERTENSION, UNSPECIFIED TYPE: ICD-10-CM

## 2021-12-22 DIAGNOSIS — M25.569 KNEE PAIN, UNSPECIFIED CHRONICITY, UNSPECIFIED LATERALITY: ICD-10-CM

## 2021-12-22 LAB
CHP ED QC CHECK: NORMAL
GLUCOSE BLD-MCNC: 98 MG/DL

## 2021-12-22 PROCEDURE — G8484 FLU IMMUNIZE NO ADMIN: HCPCS | Performed by: FAMILY MEDICINE

## 2021-12-22 PROCEDURE — G8417 CALC BMI ABV UP PARAM F/U: HCPCS | Performed by: FAMILY MEDICINE

## 2021-12-22 PROCEDURE — 1036F TOBACCO NON-USER: CPT | Performed by: FAMILY MEDICINE

## 2021-12-22 PROCEDURE — G8427 DOCREV CUR MEDS BY ELIG CLIN: HCPCS | Performed by: FAMILY MEDICINE

## 2021-12-22 PROCEDURE — 3017F COLORECTAL CA SCREEN DOC REV: CPT | Performed by: FAMILY MEDICINE

## 2021-12-22 PROCEDURE — 82962 GLUCOSE BLOOD TEST: CPT | Performed by: STUDENT IN AN ORGANIZED HEALTH CARE EDUCATION/TRAINING PROGRAM

## 2021-12-22 PROCEDURE — 99212 OFFICE O/P EST SF 10 MIN: CPT | Performed by: STUDENT IN AN ORGANIZED HEALTH CARE EDUCATION/TRAINING PROGRAM

## 2021-12-22 PROCEDURE — 99213 OFFICE O/P EST LOW 20 MIN: CPT | Performed by: STUDENT IN AN ORGANIZED HEALTH CARE EDUCATION/TRAINING PROGRAM

## 2021-12-22 PROCEDURE — 2022F DILAT RTA XM EVC RTNOPTHY: CPT | Performed by: FAMILY MEDICINE

## 2021-12-22 NOTE — PROGRESS NOTES
Pain  Bilateral  No swelling, warmth, fevers/chills  Achy and worsens with prolonged walking  Did fall on knee 1 month ago; but able to ambulate    Seeing Rheumatology for RA    Patient Active Problem List    Diagnosis Date Noted    Chronic fatigue syndrome 12/15/2021    Diabetic renal disease ([facility] 12/15/2021    Essential hypertension 12/15/2021    Hyperglycemia due to type 2 diabetes mellitus ([address] 12/15/2021    Major depression, single episode 12/15/2021    Neuropathy 50/75/8314    Metabolic acidosis 21/25/5604    Urinary tract infection 10/27/2021    Rheumatoid arthritis involving multiple sites ([address] 09/21/2021    COVID-19 06/17/2021    Hypoglycemia 06/17/2021    Anemia 06/16/2021    Proteinuria 06/16/2021    Hypokalemia 03/10/2021    Encounter for long-term (current) use of insulin ([facility] 03/08/2021    Anxiety disorder 03/08/2021    Benign hypertensive kidney disease with chronic kidney disease 03/08/2021    Chronic kidney disease, stage I 03/08/2021    Fibromyalgia 03/08/2021    Mixed hyperlipidemia 03/08/2021    Nephrotic syndrome due to type 2 diabetes mellitus ([address] 03/08/2021    Hypothyroidism 03/08/2021    Type 2 diabetes mellitus with chronic kidney disease ([facility] 03/08/2021    Mild depression ([facility] 10/02/2020    Severe obstructive sleep apnea 07/16/2020    Infective urethritis 06/28/2018    Acquired hypothyroidism 12/20/2016    Vitamin D deficiency 12/20/2016    Diabetic ketosis ([address] 09/01/2015    Type 2 diabetes mellitus with complication, with long-term current use of insulin ([facility] 05/30/2014    Depression 05/30/2014    Sjogren's syndrome ([address] 05/30/2014    Hyperglycemia 05/30/2014    Depressive disorder 05/30/2014       Past Medical History:   Diagnosis Date    Anxiety     Arthritis     Depression     Diabetes mellitus (HCC)     Fibromyalgia     HTN (hypertension)     Hypertensive chronic kidney disease     Left shoulder pain     LIZABETH (obstructive sleep apnea)     [facility] sites [facility]     [facility]og[facility]'s disease ([facility]     Thyroid disease        Current Outpatient Medications on File Prior to Visit   Medication Sig Dispense Refill    Multiple Vitamins-Minerals (HM MULTIVITAMIN ADULT GUMMY) CHEW Take 1 Package by mouth daily for 14 days 14 tablet 3    melatonin 3 MG TABS tablet TAKE ONE TABLET BY MOUTH EVERY NIGHT AT BEDTIME FOR INSOMNIA 30 tablet 0    ondansetron (ZOFRAN-ODT) 4 MG disintegrating tablet Place 1 tablet under the tongue 3 times daily as needed for Nausea or Vomiting 30 tablet 0    LANTUS SOLOSTAR 100 UNIT/ML injection pen INJECT 55 UNITS INTO THE SKIN 2 TIMES DAILY 30 mL 1    HUMALOG KWIKPEN 200 UNIT/ML SOPN pen INJECT 40 UNITS SUB-Q THREE TIMES A DAY WITH MEALS 18 mL 0    metFORMIN (GLUCOPHAGE-XR) 500 MG extended release tablet TAKE 2 TABLETS BY MOUTH 2 TIMES DAILY  120 tablet 1    OZEMPIC, 1 MG/DOSE, 4 MG/3ML SOPN INJECT 1 MG INTO THE SKIN ONCE A WEEK  3 mL 1    fluticasone (FLONASE) 50 MCG/ACT nasal spray 2 sprays by Nasal route daily 16 g 11    lisinopril (PRINIVIL;ZESTRIL) 10 MG tablet Take 2 tablets by mouth daily 90 tablet 2    empagliflozin (JARDIANCE) 25 MG tablet Take 1 tablet by mouth daily 90 tablet 1    levothyroxine (SYNTHROID) 88 MCG tablet Take 1 tablet by mouth daily 30 tablet 1    DULoxetine (CYMBALTA) 60 MG extended release capsule TAKE 1 CAPSULE BY MOUTH EVERY MORNING 60MG IN AM   30MG IN PM   90MG/DAY (Patient taking differently: 60 mg 2 times daily TAKE 1 CAPSULE BY MOUTH EVERY MORNING 60MG IN AM   30MG IN PM   90MG/DAY) 30 capsule 1    OZEMPIC, 1 MG/DOSE, 2 MG/1.5ML SOPN Inject 1 mg into the skin once a week 1 pen 1    lamoTRIgine (LAMICTAL) 100 MG tablet TAKE 1 TABLET BY MOUTH EVERY DAY 90 tablet 1    naproxen (NAPROSYN) 250 MG tablet TAKE 1 TABLET BY MOUTH TWICE A DAY WITH MEALS 60 tablet 5    hydroxychloroquine (PLAQUENIL) 200 MG tablet Take 200 mg by mouth 2 times daily      diclofenac sodium 1 % GEL Apply 4 g topically 4 times daily 4 Tube 1    methotrexate (RHEUMATREX) 2.5 MG chemo tablet Take 15 mg by mouth once a week      folic acid (FOLVITE) 1 MG tablet Take 1 tablet by mouth daily 30 tablet 1    Insulin Pen Needle 31G X 5 MM MISC 1 each by Does not apply route 3 times daily 100 each 3    Continuous Blood Gluc Sensor (FREESTYLE NIMISHA 2 SENSOR) MISC 1 each by Does not apply route continuous 2 each 3    ONETOUCH ULTRA strip USE TO TEST THREE TIMES A  each 2    famotidine (PEPCID) 20 MG tablet TAKE 1 TABLET BY MOUTH TWICE DAILY  (Patient not taking: Reported on 2021) 60 tablet 1    [DISCONTINUED] melatonin 3 MG TABS tablet Take 1 tablet by mouth nightly as needed (insomnia) 30 tablet 0    hydrocortisone 2.5 % cream Apply topically 2 times daily. (Patient not taking: Reported on 2021) 45 g 0    Blood Pressure KIT 1 box by Does not apply route 2 times daily 1 kit 0    FREESTYLE LANCETS MISC CHECK BS 4 TIMES DAILY 100 each 3     No current facility-administered medications on file prior to visit. Allergies   Allergen Reactions    Amoxicillin Other (See Comments)     Hives all over body, states that hives are severe     Sulfa Antibiotics     Cefdinir     Doxycycline Other (See Comments)    Other Other (See Comments)    Milk-Related Compounds     Tomato        Family History   Adopted: Yes   Family history unknown: Yes       Past Surgical History:   Procedure Laterality Date    CT BIOPSY RENAL  2021    CT BIOPSY RENAL 2021 Giovany Wall MD [facility]    SHOULDER ARTHROSCOPY Right 2017       Social History     Tobacco Use    Smoking status: Former Smoker     Packs/day: 2.00     Years: 0.50     Pack years: 1.00     Start date:      Quit date:      Years since quittin.9    Smokeless tobacco: Never Used   Vaping Use    Vaping Use: Never used   Substance Use Topics    Alcohol use: No    Drug use:  No ROS:    Review of Systems   Constitutional: Negative for activity change, appetite change, chills and fatigue. HENT: Negative for congestion and sore throat. Respiratory: Negative for choking and chest tightness. Cardiovascular: Negative for chest pain, palpitations and leg swelling. Gastrointestinal: Positive for Nausea Negative for abdominal distention and abdominal pain. Genitourinary: Negative for difficulty urinating and dysuria. Musculoskeletal: Knee pain, back pain  Skin: Negative for color change and pallor. Neurological: Negative for facial asymmetry and headaches. Psychiatric/Behavioral: Negative for behavioral problems. The patient is not nervous/anxious. Objective:    VS:  Blood pressure (!) 164/101, pulse 105, temperature 97.7 °F (36.5 °C), temperature source Temporal, resp. rate 16, height 5' 1\" (1.549 m), weight 165 lb (74.8 kg), SpO2 97 %, not currently breastfeeding. Physical Exam   Constitutional: Oriented to person, place, and time. Well-developed and well-nourished. HENT:   Head: Normocephalic and atraumatic. Eyes: EOM are normal.   Neck: Neck supple. Cardiovascular: Normal rate, regular rhythm and intact distal pulses. Exam reveals no gallop and no friction rub. No murmur heard. Pulmonary/Chest: Effort normal and breath sounds normal. No wheezes. No rhales. Abdominal: Soft. Bowel sounds are normal. No distension. No abdominal tenderness. Musculoskeletal: Normal range of motion. General: No edema. Knee Exam- no joint laxity on varus/valgus maneuver on knee. Knee flexion and extension without pain. No effusion seen. Gait normal. +TTP suprapatellar and lateral joint line. No crepitus with movement  Neurological: Alert and oriented to person, place, and time. Skin: Skin is warm and dry. No rash noted. No erythema. Psychiatric: Normal mood and affect. Behavior is normal.   Nursing note and vitals reviewed. Plans:  As above. Please see Patient Instructions for further counseling and information given. Advised to please be adherent to the treatment plans discussed today, and please call with any questions or concerns, letting the office know of any reasons that the plans may not be followed. The risks of untreated conditions include worsening illness, injury, disability, and possibly, death. Please call if symptoms change in any way, worsen, or fail to completely resolve, as this could necessitate a change to treatment plans. Patient and/or caregiver expressed understanding. Indications and proper use of medication(s) reviewed. Potential side-effects and risks of medication(s) also explained. Patient and/or caregiver was instructed to call if any new symptoms develop prior to next visit. Health risk factors discussed and addressed.

## 2021-12-22 NOTE — PROGRESS NOTES
Attending Physician Statement    S:   Chief Complaint   Patient presents with    Nausea & Vomiting     for 2 days    Diabetes     low blood sugar in the 40s can not get it up       DM. Has had N/V for several days   Sugars uncontrolled with history of insulin and noncompliance. Takes 100 units of long-acting and about 50 units of short acting daily. Also on Ozempic and jardiance. Sugars are between 50 and 350, with episodes of hypoglycemia during morning hours   Sees rheumatology for RA. Does have some knee pain  O: Blood pressure (!) 164/101, pulse 105, temperature 97.7 °F (36.5 °C), temperature source Temporal, resp. rate 16, height 5' 1\" (1.549 m), weight 165 lb (74.8 kg), SpO2 97 %, not currently breastfeeding. Exam:   Heart - RRR   Lungs - clear   Abdomen - benign   Knee- no joint laxity, effusion. Tenderness suprapatellar and lateral  A: As above  P:  Knee xrays   Adjust insulin accordingly, schedule meals   Emphasize importance of compliance and routine scheduling for meds/meals   Follow-up as ordered    I have discussed the case, including pertinent history and exam findings with the resident. I agree with the documented assessment and plan.

## 2021-12-23 DIAGNOSIS — Z79.4 TYPE 2 DIABETES MELLITUS WITH OTHER SPECIFIED COMPLICATION, WITH LONG-TERM CURRENT USE OF INSULIN (HCC): ICD-10-CM

## 2021-12-23 DIAGNOSIS — G47.9 SLEEPING DIFFICULTY: ICD-10-CM

## 2021-12-23 DIAGNOSIS — Z91.199 NON-COMPLIANCE: ICD-10-CM

## 2021-12-23 DIAGNOSIS — E03.9 HYPOTHYROIDISM, UNSPECIFIED TYPE: ICD-10-CM

## 2021-12-23 DIAGNOSIS — E11.65 UNCONTROLLED TYPE 2 DIABETES MELLITUS WITH HYPERGLYCEMIA (HCC): ICD-10-CM

## 2021-12-23 DIAGNOSIS — E11.69 TYPE 2 DIABETES MELLITUS WITH OTHER SPECIFIED COMPLICATION, WITH LONG-TERM CURRENT USE OF INSULIN (HCC): ICD-10-CM

## 2021-12-23 RX ORDER — INSULIN LISPRO 200 [IU]/ML
INJECTION, SOLUTION SUBCUTANEOUS
Qty: 18 ML | Refills: 0 | Status: SHIPPED
Start: 2021-12-23 | End: 2022-02-02

## 2021-12-23 RX ORDER — LANOLIN ALCOHOL/MO/W.PET/CERES
CREAM (GRAM) TOPICAL
Qty: 30 TABLET | Refills: 0 | Status: SHIPPED
Start: 2021-12-23 | End: 2022-06-17 | Stop reason: SDUPTHER

## 2021-12-23 RX ORDER — FAMOTIDINE 20 MG/1
TABLET, FILM COATED ORAL
Qty: 60 TABLET | Refills: 1 | Status: SHIPPED
Start: 2021-12-23 | End: 2022-02-17

## 2021-12-23 RX ORDER — HUMAN INSULIN 100 [IU]/ML
INJECTION, SUSPENSION SUBCUTANEOUS
Qty: 30 ML | Refills: 3 | Status: SHIPPED
Start: 2021-12-23 | End: 2022-02-24

## 2021-12-23 RX ORDER — LEVOTHYROXINE SODIUM 88 UG/1
88 TABLET ORAL DAILY
Qty: 30 TABLET | Refills: 2 | Status: SHIPPED
Start: 2021-12-23 | End: 2022-03-18

## 2021-12-23 RX ORDER — MV,CAL,MIN/IRON/FOLIC ACID/LUT 18-500-300
TABLET ORAL
Qty: 30 TABLET | Refills: 3 | Status: SHIPPED
Start: 2021-12-23 | End: 2022-02-24

## 2021-12-23 RX ORDER — METFORMIN HYDROCHLORIDE 500 MG/1
1000 TABLET, EXTENDED RELEASE ORAL 2 TIMES DAILY
Qty: 120 TABLET | Refills: 1 | Status: SHIPPED
Start: 2021-12-23 | End: 2022-02-17

## 2021-12-23 RX ORDER — SEMAGLUTIDE 1.34 MG/ML
INJECTION, SOLUTION SUBCUTANEOUS
Qty: 3 ML | Refills: 1 | Status: SHIPPED
Start: 2021-12-23 | End: 2022-01-03 | Stop reason: SDUPTHER

## 2021-12-23 NOTE — TELEPHONE ENCOUNTER
Last Appointment:  Visit date not found  Future Appointments   Date Time Provider Jenae Pearl   12/29/2021  2:00 PM MD Tyrell Tamayo [facility]   1/3/2022  9:00 AM Saúl Daniel MD [facility]   1/25/2022  1:00 PM MD Tyrell Tamayo [facility]

## 2021-12-23 NOTE — TELEPHONE ENCOUNTER
Last Appointment:  12/22/2021  Future Appointments   Date Time Provider Jenae Kang   12/29/2021  2:00 PM MD Andrea Ambrose [facility]   1/3/2022  9:00 AM Breezy Silva MD [facility]   1/25/2022  1:00 PM MD Andrea Ambrose [facility]

## 2021-12-29 ENCOUNTER — OFFICE VISIT (OUTPATIENT)
Dept: FAMILY MEDICINE CLINIC | Age: 51
End: 2021-12-29
Payer: MEDICAID

## 2021-12-29 VITALS
WEIGHT: 157 LBS | OXYGEN SATURATION: 99 % | DIASTOLIC BLOOD PRESSURE: 87 MMHG | TEMPERATURE: 98.2 F | BODY MASS INDEX: 29.64 KG/M2 | RESPIRATION RATE: 16 BRPM | SYSTOLIC BLOOD PRESSURE: 162 MMHG | HEIGHT: 61 IN | HEART RATE: 105 BPM

## 2021-12-29 DIAGNOSIS — I10 HYPERTENSION, UNSPECIFIED TYPE: Primary | ICD-10-CM

## 2021-12-29 DIAGNOSIS — Z76.0 MEDICATION REFILL: ICD-10-CM

## 2021-12-29 DIAGNOSIS — Z91.199 NON-COMPLIANCE: ICD-10-CM

## 2021-12-29 DIAGNOSIS — R11.11 VOMITING WITHOUT NAUSEA, INTRACTABILITY OF VOMITING NOT SPECIFIED, UNSPECIFIED VOMITING TYPE: ICD-10-CM

## 2021-12-29 PROCEDURE — 1036F TOBACCO NON-USER: CPT | Performed by: FAMILY MEDICINE

## 2021-12-29 PROCEDURE — G8417 CALC BMI ABV UP PARAM F/U: HCPCS | Performed by: FAMILY MEDICINE

## 2021-12-29 PROCEDURE — 3017F COLORECTAL CA SCREEN DOC REV: CPT | Performed by: FAMILY MEDICINE

## 2021-12-29 PROCEDURE — 99213 OFFICE O/P EST LOW 20 MIN: CPT | Performed by: STUDENT IN AN ORGANIZED HEALTH CARE EDUCATION/TRAINING PROGRAM

## 2021-12-29 PROCEDURE — G8427 DOCREV CUR MEDS BY ELIG CLIN: HCPCS | Performed by: FAMILY MEDICINE

## 2021-12-29 PROCEDURE — G8484 FLU IMMUNIZE NO ADMIN: HCPCS | Performed by: FAMILY MEDICINE

## 2021-12-29 PROCEDURE — 99212 OFFICE O/P EST SF 10 MIN: CPT | Performed by: STUDENT IN AN ORGANIZED HEALTH CARE EDUCATION/TRAINING PROGRAM

## 2021-12-29 NOTE — PROGRESS NOTES
60-year-old woman presents for a follow-up of hypertension. Complains she has had emesis, this resolved on 12/26 lasted about a week. Hypertensionnot taking meds. Refuses to take meds. Feels is on too many meds and we will make her sick. No chest pain lightheadedness dizziness. Is aware of the risks of uncontrolled blood pressure. Feels blood pressure is really not that high    Emesishas episode 1X 1 month, lasting about a week. Not willing to get a general surgery referral, for possible endoscopy. Not interested in scopolamine patches, Zofran to help control emesis. Feels all of these are too invasive and not worth it. No nausea. No hemoptysis, no emesis with blood, no abdominal pain, no unintentional weight loss, no diarrhea constipation black tarry stools    Noncomplianceappointment with Dr. Ramila Henriquez, scheduled for January. Today patient is not willing to get help for social economic situation. Also not interested in taking any medication or doing work-up. Shows full understanding of benefits/risks    Blood pressure (!) 162/87, pulse 105, temperature 98.2 °F (36.8 °C), temperature source Temporal, resp. rate 16, height 5' 1\" (1.549 m), weight 157 lb (71.2 kg), SpO2 99 %, not currently breastfeeding. HEENT WNL     Heart regular    Lungs clear    abd non-tender      No edema    Pulses intact         Assessmentplan    1. HTN uncontrolled. Try to work with patient to start meds. Not willing to today. 2.  Noncompliancecontinue with cotreatment session with Dr. Ramila Henriquez, and PCP in January 2022. Continue to be supportive with patient while informing about benefits and risks    Attending Physician Statement  I have discussed the case, including pertinent history and exam findings with the resident. I agree with the documented assessment and plan.

## 2021-12-29 NOTE — PROGRESS NOTES
CC:  HTN follow-up  ------------------------------------------------------------------------------------------------------------------------  Assessment/Plan  1. Hypertension, uncontrolled  Denying medication for treatment today  Is able to verbalize the risks of untreated hypertension  Further counseling given    2. Vomiting without nausea  Likely 2/2 uncontrolled glucose/diabetic gastroparesis/cyclical vomiting syndrome  Counseled patient on treatment options-medication compliance, take glycemic controlled, option of taking Zofran for vomiting. Patient has declined any treatment today  Patient states may be willing to discuss at future visits  Patient is able to verbalize benefits/risks of leaving symptoms untreated    3. Non-compliance  Plans as above  Cotreatment session in January with Dr. Serg Goodman and PCP    4. Medication refill  Insulin needles      RTO 1 month for cotreatment session  -------------------------------------------------------------------------------------------------------------------------    HPI:  49 y.o. woman presents for    HTN  Under a lot of stress  \"Dad is being a jerk\"  Refusing to cash a cheque for a patient   Also conflicts about money    Not taking BP Meds  Feels she is on too many medications  Feels medications may make her more ill. Denying syncope, headache, chest pain, chest tightness, leg swelling, vision changes    Vomiting Episodes  Vomits for 1x week every month  Unable to identify trigger  Feels medications she is on, may be causing this. Not associated with nausea, abdominal pain, diarrhea, fevers, chills, heartburn  Not willing to try to take medication or scopolamine patch to help with emesis  Not willing to undergo labs, or possible referral for endoscopy if symptoms persist    Non-compliance medications  Not willing to get help from social work, to afford medication, or to help with social economic stresses she is facing currently and financially.     Patient Active Problem List    Diagnosis Date Noted    Chronic fatigue syndrome 12/15/2021    Diabetic renal disease ([address] 12/15/2021    Essential hypertension 12/15/2021    Hyperglycemia due to type 2 diabetes mellitus ([address] 12/15/2021    Major depression, single episode 12/15/2021    Neuropathy 17/56/1849    Metabolic acidosis 47/58/6623    Urinary tract infection 10/27/2021    Rheumatoid arthritis involving multiple sites ([address] 09/21/2021    COVID-19 06/17/2021    Hypoglycemia 06/17/2021    Anemia 06/16/2021    Proteinuria 06/16/2021    Hypokalemia 03/10/2021    Encounter for long-term (current) use of insulin ([address] 03/08/2021    Anxiety disorder 03/08/2021    Benign hypertensive kidney disease with chronic kidney disease 03/08/2021    Chronic kidney disease, stage I 03/08/2021    Fibromyalgia 03/08/2021    Mixed hyperlipidemia 03/08/2021    Nephrotic syndrome due to type 2 diabetes mellitus ([address] 03/08/2021    Hypothyroidism 03/08/2021    Type 2 diabetes mellitus with chronic kidney disease ([address] 03/08/2021    Mild depression ([address] 10/02/2020    Severe obstructive sleep apnea 07/16/2020    Infective urethritis 06/28/2018    Acquired hypothyroidism 12/20/2016    Vitamin D deficiency 12/20/2016    Diabetic ketosis ([address] 09/01/2015    Type 2 diabetes mellitus with complication, with long-term current use of insulin ([address] 05/30/2014    Depression 05/30/2014    Sjogren's syndrome ([address] 05/30/2014    Hyperglycemia 05/30/2014    Depressive disorder 05/30/2014       Past Medical History:   Diagnosis Date    Anxiety     Arthritis     Depression     Diabetes mellitus ([address]     Fibromyalgia     HTN (hypertension)     Hypertensive chronic kidney disease     Left shoulder pain     LIZABETH (obstructive sleep apnea)     Rheumatoid arthritis involving multiple sites ([address]     Sjogren's disease ([address]     Thyroid disease        Current Outpatient Medications on File Prior to Visit   Medication Sig Dispense Refill    levothyroxine (SYNTHROID) 88 MCG tablet TAKE 1 TABLET BY MOUTH DAILY 30 tablet 2    famotidine (PEPCID) 20 MG tablet TAKE 1 TABLET BY MOUTH TWICE DAILY 60 tablet 1    HUMALOG KWIKPEN 200 UNIT/ML SOPN pen INJECT 40 UNITS SUB-Q THREE TIMES A DAY WITH MEALS 18 mL 0    melatonin 3 MG TABS tablet TAKE ONE TABLET BY MOUTH EVERY NIGHT AT BEDTIME 30 tablet 0    metFORMIN (GLUCOPHAGE-XR) 500 MG extended release tablet TAKE 2 TABLETS BY MOUTH 2 TIMES DAILY 120 tablet 1    NOVOLIN 70/30 FLEXPEN (70-30) 100 UNIT/ML injection pen INJECT 80 UNITS INTO THE SKIN EVERY MORNING (BEFORE BREAKFAST) 30 mL 3    OZEMPIC, 1 MG/DOSE, 4 MG/3ML SOPN INJECT 1 MG INTO THE SKIN ONCE A WEEK 3 mL 1    Multiple Vitamins-Minerals (SENTRY) TABS TAKE 1 TABLET BY MOUTH DAILY 30 tablet 3    Insulin Pen Needle 31G X 5 MM MISC 1 each by Does not apply route 3 times daily 100 each 3    Continuous Blood Gluc Sensor (FREESTYLE NIMISHA 2 SENSOR) MISC 1 each by Does not apply route continuous 2 each 3    Multiple Vitamins-Minerals (HM MULTIVITAMIN ADULT GUMMY) CHEW Take 1 Package by mouth daily for 14 days 14 tablet 3    ONETOUCH ULTRA strip USE TO TEST THREE TIMES A  each 2    ondansetron (ZOFRAN-ODT) 4 MG disintegrating tablet Place 1 tablet under the tongue 3 times daily as needed for Nausea or Vomiting 30 tablet 0    LANTUS SOLOSTAR 100 UNIT/ML injection pen INJECT 55 UNITS INTO THE SKIN 2 TIMES DAILY 30 mL 1    fluticasone (FLONASE) 50 MCG/ACT nasal spray 2 sprays by Nasal route daily 16 g 11    lisinopril (PRINIVIL;ZESTRIL) 10 MG tablet Take 2 tablets by mouth daily 90 tablet 2    empagliflozin (JARDIANCE) 25 MG tablet Take 1 tablet by mouth daily 90 tablet 1    DULoxetine (CYMBALTA) 60 MG extended release capsule TAKE 1 CAPSULE BY MOUTH EVERY MORNING 60MG IN AM   30MG IN PM   90MG/DAY (Patient taking differently: 60 mg 2 times daily TAKE 1 CAPSULE BY MOUTH EVERY MORNING 60MG IN AM   30MG IN PM   90MG/DAY) 30 capsule 1    OZEMPIC, 1 MG/DOSE, 2 MG/1.5ML SOPN Inject 1 mg into the skin once a week 1 pen 1    lamoTRIgine (LAMICTAL) 100 MG tablet TAKE 1 TABLET BY MOUTH EVERY DAY 90 tablet 1    naproxen (NAPROSYN) 250 MG tablet TAKE 1 TABLET BY MOUTH TWICE A DAY WITH MEALS 60 tablet 5    [DISCONTINUED] melatonin 3 MG TABS tablet Take 1 tablet by mouth nightly as needed (insomnia) 30 tablet 0    hydrocortisone 2.5 % cream Apply topically 2 times daily. (Patient not taking: Reported on 2021) 45 g 0    Blood Pressure KIT 1 box by Does not apply route 2 times daily 1 kit 0    hydroxychloroquine (PLAQUENIL) 200 MG tablet Take 200 mg by mouth 2 times daily      diclofenac sodium 1 % GEL Apply 4 g topically 4 times daily 4 Tube 1    methotrexate (RHEUMATREX) 2.5 MG chemo tablet Take 15 mg by mouth once a week      folic acid (FOLVITE) 1 MG tablet Take 1 tablet by mouth daily 30 tablet 1    FREESTYLE LANCETS MISC CHECK BS 4 TIMES DAILY 100 each 3     No current facility-administered medications on file prior to visit.        Allergies   Allergen Reactions    Amoxicillin Other (See Comments)     Hives all over body, states that hives are severe     Sulfa Antibiotics     Cefdinir     Doxycycline Other (See Comments)    Other Other (See Comments)    Milk-Related Compounds     Tomato        Family History   Adopted: Yes   Family history unknown: Yes       Past Surgical History:   Procedure Laterality Date    CT BIOPSY RENAL  2021    CT BIOPSY RENAL 2021 Lynda Magana MD [facility]    SHOULDER ARTHROSCOPY Right 2017       Social History     Tobacco Use    Smoking status: Former Smoker     Packs/day: 2.00     Years: 0.50     Pack years: 1.00     Start date:      Quit date:      Years since quittin.0    Smokeless tobacco: Never Used   Vaping Use    Vaping Use: Never used   Substance Use Topics    Alcohol use: No    Drug use: No       ROS:    Review of Systems   Constitutional: may not be followed. The risks of untreated conditions include worsening illness, injury, disability, and possibly, death. Please call if symptoms change in any way, worsen, or fail to completely resolve, as this could necessitate a change to treatment plans. Patient and/or caregiver expressed understanding. Indications and proper use of medication(s) reviewed. Potential side-effects and risks of medication(s) also explained. Patient and/or caregiver was instructed to call if any new symptoms develop prior to next visit. Health risk factors discussed and addressed.

## 2022-01-03 ENCOUNTER — OFFICE VISIT (OUTPATIENT)
Dept: ENDOCRINOLOGY | Age: 52
End: 2022-01-03
Payer: MEDICAID

## 2022-01-03 VITALS
WEIGHT: 155 LBS | SYSTOLIC BLOOD PRESSURE: 126 MMHG | BODY MASS INDEX: 29.27 KG/M2 | HEART RATE: 84 BPM | HEIGHT: 61 IN | DIASTOLIC BLOOD PRESSURE: 84 MMHG | RESPIRATION RATE: 16 BRPM

## 2022-01-03 DIAGNOSIS — E11.65 TYPE 2 DIABETES MELLITUS WITH HYPERGLYCEMIA, WITH LONG-TERM CURRENT USE OF INSULIN (HCC): Primary | ICD-10-CM

## 2022-01-03 DIAGNOSIS — E03.9 HYPOTHYROIDISM, UNSPECIFIED TYPE: ICD-10-CM

## 2022-01-03 DIAGNOSIS — Z79.4 TYPE 2 DIABETES MELLITUS WITH HYPERGLYCEMIA, WITH LONG-TERM CURRENT USE OF INSULIN (HCC): Primary | ICD-10-CM

## 2022-01-03 DIAGNOSIS — Z91.119 DIETARY NONCOMPLIANCE: ICD-10-CM

## 2022-01-03 PROCEDURE — G8428 CUR MEDS NOT DOCUMENT: HCPCS | Performed by: INTERNAL MEDICINE

## 2022-01-03 PROCEDURE — 2022F DILAT RTA XM EVC RTNOPTHY: CPT | Performed by: INTERNAL MEDICINE

## 2022-01-03 PROCEDURE — 3046F HEMOGLOBIN A1C LEVEL >9.0%: CPT | Performed by: INTERNAL MEDICINE

## 2022-01-03 PROCEDURE — G8484 FLU IMMUNIZE NO ADMIN: HCPCS | Performed by: INTERNAL MEDICINE

## 2022-01-03 PROCEDURE — 3017F COLORECTAL CA SCREEN DOC REV: CPT | Performed by: INTERNAL MEDICINE

## 2022-01-03 PROCEDURE — 99204 OFFICE O/P NEW MOD 45 MIN: CPT | Performed by: INTERNAL MEDICINE

## 2022-01-03 PROCEDURE — 1036F TOBACCO NON-USER: CPT | Performed by: INTERNAL MEDICINE

## 2022-01-03 PROCEDURE — G8417 CALC BMI ABV UP PARAM F/U: HCPCS | Performed by: INTERNAL MEDICINE

## 2022-01-03 NOTE — PATIENT INSTRUCTIONS
Recommendations for today's visit  · Continue Ozempic, Metformin and Jardiance   · Change lantus 50 units twice a day   · Take Humalog 25 units with meals + sliding scale   Blood sugar 150-200: add 3 Units of Humalog  Blood sugar 201-250 : Add 6 Units of  Humalog  Blood Sugar 251 - 300: Add 9 Units of Humalog  Blood Sugar 301-250: Add 12 Units of Humalog  Blood Sugar 351-400 : Add 15 Units of Humalog  Blood sugar  >450 : Add 12 Units of Humalog    · Check Blood sugar 4 times/day before meals and at bedtime and send us sugar log in a week      These are your blood sugar, blood pressure, cholesterol and A1c goals:  · Blood sugar fastin mg/dl to 130 mg/dl  · Blood sugar before meals: <150 mg/dl  · Peak blood sugar lower than 180 mg/dl  · A1c: between 6.5 - 7.5%    I you have any questions please call Dr. Esmer Rm office     Claudeen Chaco MD  Endocrinologist, [facility])   [address] [place] [address], [address] [place] [address],[facility] [address] [id number]   Phone: [phone]  Fax: [phone]  Email: [email or web address]

## 2022-01-03 NOTE — PROGRESS NOTES
[address] [place] [address] Department of Endocrinology Diabetes and Metabolism   [address] [facility] [address]   Phone: [phone]  Fax: [phone]    Date of Service: 1/3/2022  Primary Care Physician: Bakari Cary MD  Referring physician: Nirali Preston,*  Provider: Mohit Zhang MD    Reason for the visit:      History of Present Illness: The history is provided by the patient. No  was used. Accuracy of the patient data is excellent.   Marissa Baker is a very pleasant 49 y.o. female seen today for diabetes management     Marissa Baker was diagnosed with diabetes in 2010 and currently on Lantus 50U BID, humalog per sliding scale, Metformin 1000 mg BID, Ozempic 1 mg/wk, Jardiance 25 mg daily   The patient has been checking blood sugar 1-2 times/day   Most recent A1c results summarized below  Lab Results   Component Value Date    LABA1C 14.5 12/15/2021    LABA1C 13.6 09/13/2021    LABA1C 12.7 06/18/2021     Patient has had no hypoglycemic episodes   The patient hasn't been mindful of what has been eating and wasn't following diabetes diet    I reviewed current medications and the patient has no issues with diabetes medications  Marissa Baker is up to date with eye exam. + history of diabetic retinopathy   The patient seeing podiatrist every   And also performs  own feet care  Microvascular complications:  + Retinopathy, + Nephropathy + Neuropathy   Macrovascular complications: no CAD,  or Stroke    PAST MEDICAL HISTORY   Past Medical History:   Diagnosis Date    Anxiety     Arthritis     Depression     Diabetes mellitus ([address]     Fibromyalgia     HTN (hypertension)     Hypertensive chronic kidney disease     Left shoulder pain     LIZABETH (obstructive sleep apnea)     Rheumatoid arthritis involving multiple sites ([address]     [facility]ren's disease ([facility]     Thyroid disease        PAST SURGICAL HISTORY   Past Surgical History:   Procedure Laterality Date    LANTUS SOLOSTAR 100 UNIT/ML injection pen INJECT 55 UNITS INTO THE SKIN 2 TIMES DAILY (Patient taking differently: Inject 50 Units into the skin 2 times daily ) 30 mL 1    fluticasone (FLONASE) 50 MCG/ACT nasal spray 2 sprays by Nasal route daily 16 g 11    lisinopril (PRINIVIL;ZESTRIL) 10 MG tablet Take 2 tablets by mouth daily 90 tablet 2    empagliflozin (JARDIANCE) 25 MG tablet Take 1 tablet by mouth daily 90 tablet 1    DULoxetine (CYMBALTA) 60 MG extended release capsule TAKE 1 CAPSULE BY MOUTH EVERY MORNING 60MG IN AM   30MG IN PM   90MG/DAY (Patient taking differently: 60 mg 2 times daily TAKE 1 CAPSULE BY MOUTH EVERY MORNING 60MG IN AM   30MG IN PM   90MG/DAY) 30 capsule 1    OZEMPIC, 1 MG/DOSE, 2 MG/1.5ML SOPN Inject 1 mg into the skin once a week 1 pen 1    lamoTRIgine (LAMICTAL) 100 MG tablet TAKE 1 TABLET BY MOUTH EVERY DAY 90 tablet 1    naproxen (NAPROSYN) 250 MG tablet TAKE 1 TABLET BY MOUTH TWICE A DAY WITH MEALS 60 tablet 5    hydrocortisone 2.5 % cream Apply topically 2 times daily. 45 g 0    Blood Pressure KIT 1 box by Does not apply route 2 times daily 1 kit 0    hydroxychloroquine (PLAQUENIL) 200 MG tablet Take 200 mg by mouth 2 times daily      diclofenac sodium 1 % GEL Apply 4 g topically 4 times daily 4 Tube 1    methotrexate (RHEUMATREX) 2.5 MG chemo tablet Take 15 mg by mouth once a week      folic acid (FOLVITE) 1 MG tablet Take 1 tablet by mouth daily 30 tablet 1    FREESTYLE LANCETS MISC CHECK BS 4 TIMES DAILY 100 each 3    NOVOLIN 70/30 FLEXPEN (70-30) 100 UNIT/ML injection pen INJECT 80 UNITS INTO THE SKIN EVERY MORNING (BEFORE BREAKFAST) (Patient not taking: Reported on 1/3/2022) 30 mL 3    Multiple Vitamins-Minerals (HM MULTIVITAMIN ADULT GUMMY) CHEW Take 1 Package by mouth daily for 14 days 14 tablet 3     No current facility-administered medications for this visit.        Review of Systems  Constitutional: No fever, no chills, no diaphoresis, no generalized weakness. HEENT: No blurred vision, No sore throat, no ear pain, no hair loss  Neck: denied any neck swelling, difficulty swallowing,   Cardio-pulmonary: No CP, SOB or palpitation, No orthopnea or PND. No cough or wheezing. GI: No N/V/D, no constipation, No abdominal pain, no melena or hematochezia   : Denied any dysuria, hematuria, flank pain, discharge, or incontinence. Skin: denied any rash, ulcer, Hirsute, or hyperpigmentation. MSK: denied any joint deformity, joint pain/swelling, muscle pain, or back pain. Neuro: no numbness, no tingling, no weakness, _    OBJECTIVE    /84   Pulse 84   Resp 16   Ht 5' 1\" (1.549 m)   Wt 155 lb (70.3 kg)   BMI 29.29 kg/m²   BP Readings from Last 4 Encounters:   01/03/22 126/84   12/29/21 (!) 162/87   12/22/21 (!) 164/101   12/15/21 134/83     Wt Readings from Last 6 Encounters:   01/03/22 155 lb (70.3 kg)   12/29/21 157 lb (71.2 kg)   12/22/21 165 lb (74.8 kg)   12/15/21 156 lb (70.8 kg)   11/18/21 160 lb (72.6 kg)   10/21/21 166 lb (75.3 kg)       Physical examination:  General: awake alert, oriented x3, no abnormal position or movements. HEENT: normocephalic non-traumatic, no exophthalmos   Neck: supple, no LN enlargement, no thyromegaly, no thyroid tenderness, no JVD. Pulm: Clear equal air entry no added sounds, no wheezing or rhonchi    CVS: S1 + S2, no murmur, no heave. Dorsalis pedis pulse palpable   Abd: soft lax, no tenderness, no organomegaly, audible bowel sounds. Skin: warm, no lesions, no rash.  No callus, no Ulcers, No acanthosis nigricans  Musculoskeletal: No back tenderness, no kyphosis/scoliosis    Neuro: CN intact, Monofilament sensation decreased bilateral , muscle power normal  Psych: normal mood, and affect    Review of Laboratory Data:  I personally reviewed the following lab:  Lab Results   Component Value Date/Time    WBC 14.2 (H) 11/18/2021 08:43 AM    RBC 4.59 11/18/2021 08:43 AM    HGB 9.4 (L) 11/18/2021 08:43 AM    HCT 31.6 (L) 11/18/2021 08:43 AM    MCV 68.8 (L) 11/18/2021 08:43 AM    MCH 20.5 (L) 11/18/2021 08:43 AM    MCHC 29.7 (L) 11/18/2021 08:43 AM    RDW 18.0 (H) 11/18/2021 08:43 AM     11/18/2021 08:43 AM    MPV 10.3 11/18/2021 08:43 AM      Lab Results   Component Value Date/Time     12/15/2021 02:00 PM    K 4.7 12/15/2021 02:00 PM    CO2 21 (L) 12/15/2021 02:00 PM    BUN 26 (H) 12/15/2021 02:00 PM    CREATININE 0.8 12/15/2021 02:00 PM    CALCIUM 9.6 12/15/2021 02:00 PM    LABGLOM >60 12/15/2021 02:00 PM    GFRAA >60 12/15/2021 02:00 PM      Lab Results   Component Value Date/Time    TSH 6.940 (H) 06/18/2021 06:33 AM    T4FREE 0.91 (L) 06/18/2021 06:33 AM    TPOABS <0.3 04/18/2019 08:55 AM     Lab Results   Component Value Date    LABA1C 14.5 12/15/2021    GLUCOSE 98 12/22/2021    MALBCR 3235.3 06/18/2021    LABMICR >4400.0 06/18/2021    LABCREA 136 06/18/2021     Lab Results   Component Value Date    LABA1C 14.5 12/15/2021    LABA1C 13.6 09/13/2021    LABA1C 12.7 06/18/2021     Lab Results   Component Value Date    TRIG 271 06/18/2021    HDL 31 06/18/2021    LDLCALC 108 06/18/2021    CHOL 193 06/18/2021     Lab Results   Component Value Date    VITD25 10 06/18/2021    VITD25 13 06/07/2021       ASSESSMENT & RECOMMENDATIONS   Lulu Cerrato Jose Antonio, a 49 y.o.-old female seen in for the following issues     Diabetes Mellitus Type 2     · Patient's diabetes is uncontrol   · Will change DM regimen to Metformin 1000 mg BID, Ozempic 1 mg/wk, Jardiance 25 mg, Lantus 50u BID, Humalog 25u with meals + ss 3:50>150   · The patient was advised to check blood sugars 4 times a day before meals and at bedtime and send BS readings to our office in a week.   · Discussed with patient A1c and blood sugar goals   · Patient will need routine diabetes maintenance and prevention  · The patient was referred to diabetic educator for further teaching   · Diabetes labs before next visit     Dietary noncompliance   Discussed with patient the importance of eating consistent carbohydrate meals, avoiding high glycemic index food. Also, discussed with patient the risk and negative consequences of dietary noncompliance on blood glucose control, blood pressure and weight    Primary Hypothyroidism    · Continue levothyroxine 88 mcg daily     Obesity   Discussed lifestyle changes including diet and exercise with patient in depth. Also discussed with patient cardiovascular risk associated with obesity   On GLP-1 agonist     I personally reviewed external notes from PCP and other patient's care team providers, and personally interpreted labs associated with the above diagnosis. I also ordered labs to further assess and manage the above addressed medical conditions. Return in about 4 months (around 5/3/2022) for DM type 2, VitD deficiency. The above issues were reviewed with the patient who understood and agreed with the plan. Thank you for allowing us to participate in the care of this patient. Please do not hesitate to contact us with any additional questions. Diagnosis Orders   1. Type 2 diabetes mellitus with hyperglycemia, with long-term current use of insulin (HCC)  Basic Metabolic Panel    Hemoglobin A1C   2. Hypothyroidism, unspecified type  TSH without Reflex    T4, Free   3. Dietary noncompliance       Boris Hernandez MD  Endocrinologist, [facility] Diabetes Care and Endocrinology   [address] [place] [address]   Phone: [phone]  Fax: [phone]  --------------------------------------------  An electronic signature was used to authenticate this note.  Daryl Alejandro MD on 1/3/2022 at 9:13 AM

## 2022-01-08 PROBLEM — Z91.119 DIETARY NONCOMPLIANCE: Status: ACTIVE | Noted: 2022-01-08

## 2022-01-14 PROBLEM — N39.0 URINARY TRACT INFECTION: Status: RESOLVED | Noted: 2021-10-27 | Resolved: 2022-01-14

## 2022-01-25 ENCOUNTER — OFFICE VISIT (OUTPATIENT)
Dept: FAMILY MEDICINE CLINIC | Age: 52
End: 2022-01-25
Payer: MEDICAID

## 2022-01-25 VITALS
SYSTOLIC BLOOD PRESSURE: 163 MMHG | HEART RATE: 98 BPM | HEIGHT: 61 IN | DIASTOLIC BLOOD PRESSURE: 94 MMHG | WEIGHT: 160 LBS | OXYGEN SATURATION: 98 % | RESPIRATION RATE: 16 BRPM | TEMPERATURE: 98.1 F | BODY MASS INDEX: 30.21 KG/M2

## 2022-01-25 DIAGNOSIS — R11.0 NAUSEA: Primary | ICD-10-CM

## 2022-01-25 DIAGNOSIS — I10 ESSENTIAL HYPERTENSION: ICD-10-CM

## 2022-01-25 DIAGNOSIS — Z91.199 NON-COMPLIANCE: ICD-10-CM

## 2022-01-25 PROCEDURE — G8484 FLU IMMUNIZE NO ADMIN: HCPCS | Performed by: FAMILY MEDICINE

## 2022-01-25 PROCEDURE — 99213 OFFICE O/P EST LOW 20 MIN: CPT | Performed by: STUDENT IN AN ORGANIZED HEALTH CARE EDUCATION/TRAINING PROGRAM

## 2022-01-25 PROCEDURE — G8427 DOCREV CUR MEDS BY ELIG CLIN: HCPCS | Performed by: FAMILY MEDICINE

## 2022-01-25 PROCEDURE — G8417 CALC BMI ABV UP PARAM F/U: HCPCS | Performed by: FAMILY MEDICINE

## 2022-01-25 PROCEDURE — 1036F TOBACCO NON-USER: CPT | Performed by: FAMILY MEDICINE

## 2022-01-25 PROCEDURE — 99212 OFFICE O/P EST SF 10 MIN: CPT | Performed by: STUDENT IN AN ORGANIZED HEALTH CARE EDUCATION/TRAINING PROGRAM

## 2022-01-25 PROCEDURE — 3017F COLORECTAL CA SCREEN DOC REV: CPT | Performed by: FAMILY MEDICINE

## 2022-01-25 RX ORDER — ONDANSETRON 4 MG/1
4 TABLET, ORALLY DISINTEGRATING ORAL 3 TIMES DAILY PRN
Qty: 30 TABLET | Refills: 0 | Status: CANCELLED | OUTPATIENT
Start: 2022-01-25

## 2022-01-25 RX ORDER — BUPROPION HYDROCHLORIDE 100 MG/1
100 TABLET ORAL DAILY
COMMUNITY
End: 2022-04-29

## 2022-01-25 ASSESSMENT — PATIENT HEALTH QUESTIONNAIRE - PHQ9
10. IF YOU CHECKED OFF ANY PROBLEMS, HOW DIFFICULT HAVE THESE PROBLEMS MADE IT FOR YOU TO DO YOUR WORK, TAKE CARE OF THINGS AT HOME, OR GET ALONG WITH OTHER PEOPLE: 1
1. LITTLE INTEREST OR PLEASURE IN DOING THINGS: 3
SUM OF ALL RESPONSES TO PHQ QUESTIONS 1-9: 18
SUM OF ALL RESPONSES TO PHQ9 QUESTIONS 1 & 2: 6
9. THOUGHTS THAT YOU WOULD BE BETTER OFF DEAD, OR OF HURTING YOURSELF: 3
8. MOVING OR SPEAKING SO SLOWLY THAT OTHER PEOPLE COULD HAVE NOTICED. OR THE OPPOSITE, BEING SO FIGETY OR RESTLESS THAT YOU HAVE BEEN MOVING AROUND A LOT MORE THAN USUAL: 0
4. FEELING TIRED OR HAVING LITTLE ENERGY: 3
SUM OF ALL RESPONSES TO PHQ QUESTIONS 1-9: 18
6. FEELING BAD ABOUT YOURSELF - OR THAT YOU ARE A FAILURE OR HAVE LET YOURSELF OR YOUR FAMILY DOWN: 3
7. TROUBLE CONCENTRATING ON THINGS, SUCH AS READING THE NEWSPAPER OR WATCHING TELEVISION: 0
5. POOR APPETITE OR OVEREATING: 0
SUM OF ALL RESPONSES TO PHQ QUESTIONS 1-9: 18
2. FEELING DOWN, DEPRESSED OR HOPELESS: 3
SUM OF ALL RESPONSES TO PHQ QUESTIONS 1-9: 15
3. TROUBLE FALLING OR STAYING ASLEEP: 3

## 2022-01-25 ASSESSMENT — COLUMBIA-SUICIDE SEVERITY RATING SCALE - C-SSRS
5. HAVE YOU STARTED TO WORK OUT OR WORKED OUT THE DETAILS OF HOW TO KILL YOURSELF? DO YOU INTEND TO CARRY OUT THIS PLAN?: NO
3. HAVE YOU BEEN THINKING ABOUT HOW YOU MIGHT KILL YOURSELF?: YES
2. HAVE YOU ACTUALLY HAD ANY THOUGHTS OF KILLING YOURSELF?: YES
1. WITHIN THE PAST MONTH, HAVE YOU WISHED YOU WERE DEAD OR WISHED YOU COULD GO TO SLEEP AND NOT WAKE UP?: YES
6. HAVE YOU EVER DONE ANYTHING, STARTED TO DO ANYTHING, OR PREPARED TO DO ANYTHING TO END YOUR LIFE?: NO
4. HAVE YOU HAD THESE THOUGHTS AND HAD SOME INTENTION OF ACTING ON THEM?: YES

## 2022-01-25 NOTE — PROGRESS NOTES
CC:  Nausea  ------------------------------------------------------------------------------------------------------------------------  Assessment/Plan  1. Essential hypertension  Uncontrolled  Lisinopril    2. Nausea  GI consult    3. Non-compliance  Progress made with patient today, reorganize medication schedule  Patient willing to take Synthroid and lisinopril in addition to other meds she started taking  Use motivational interviewing  Continue to work with patient to make sure she adheres to medication       RTO 1 month for follow-up  -------------------------------------------------------------------------------------------------------------------------    HPI: 50-year-old woman with an extensive past medical history presents for    Nausea  Asking zofran refill- 6 pills  Using 1-2x a week  Nausea is triggered by a feeling in throat  Unable to identify triggers  Had 1x emesis- 1 week- yellow-green colored  Denying any chest pain, abdominal pain, constipation/diarrhea    Willing to see GI for an opinion    Noncompliance with medications  Endorses taking these meds for the last 2 weeks:  Farxiga  Metformin  Ozaempic 1x  Lantus, sliding scale  Cymbalta  Gabapentin    Got pill packets recently. Is amenable to adding lisinopril to her daily medications  Not willing to be on other medications at this time  Is amenable to rearranging pills, with mealtimes, and in between to minimize discomfort patient has from taking pills.   Patient also is believed that medications she is on are causing the nausea      Patient Active Problem List    Diagnosis Date Noted    Dietary noncompliance 01/08/2022    Chronic fatigue syndrome 12/15/2021    Diabetic renal disease ([facility] 12/15/2021    Essential hypertension 12/15/2021    Hyperglycemia due to type 2 diabetes mellitus ([facility] 12/15/2021    Major depression, single episode 12/15/2021    Neuropathy 12/55/4894    Metabolic acidosis 49/83/2452    Rheumatoid arthritis involving multiple sites ([place] [address] 09/21/2021    COVID-19 06/17/2021    Hypoglycemia 06/17/2021    Anemia 06/16/2021    Proteinuria 06/16/2021    Hypokalemia 03/10/2021    Encounter for long-term (current) use of insulin ([facility] 03/08/2021    Anxiety disorder 03/08/2021    Benign hypertensive kidney disease with chronic kidney disease 03/08/2021    Chronic kidney disease, stage I 03/08/2021    Fibromyalgia 03/08/2021    Mixed hyperlipidemia 03/08/2021    Nephrotic syndrome due to type 2 diabetes mellitus ([facility] 03/08/2021    Hypothyroidism 03/08/2021    Type 2 diabetes mellitus with chronic kidney disease ([facility] 03/08/2021    Mild depression ([facility] 10/02/2020    Severe obstructive sleep apnea 07/16/2020    Infective urethritis 06/28/2018    Acquired hypothyroidism 12/20/2016    Vitamin D deficiency 12/20/2016    Diabetic ketosis ([facility] 09/01/2015    Type 2 diabetes mellitus with complication, with long-term current use of insulin ([facility] 05/30/2014    Depression 05/30/2014    Sjogren's syndrome ([facility] 05/30/2014    Hyperglycemia 05/30/2014    Depressive disorder 05/30/2014       Past Medical History:   Diagnosis Date    Anxiety     Arthritis     Depression     Diabetes mellitus (HCC)     Fibromyalgia     HTN (hypertension)     Hypertensive chronic kidney disease     Left shoulder pain     LIZABETH (obstructive sleep apnea)     Rheumatoid arthritis involving multiple sites ([place] [address]     Sjogren's disease ([place] [address]     Thyroid disease        Current Outpatient Medications on File Prior to Visit   Medication Sig Dispense Refill    buPROPion (WELLBUTRIN) 100 MG tablet Take 100 mg by mouth daily      HUMALOG KWIKPEN 200 UNIT/ML SOPN pen INJECT 40 UNITS SUB-Q THREE TIMES A DAY WITH MEALS (Patient taking differently: Per scale.  A max of 100 units/day) 18 mL 0    levothyroxine (SYNTHROID) 88 MCG tablet TAKE 1 TABLET BY MOUTH DAILY 30 tablet 2    melatonin 3 MG TABS tablet TAKE ONE TABLET BY MOUTH EVERY NIGHT AT BEDTIME 30 tablet 0    metFORMIN (GLUCOPHAGE-XR) 500 MG extended release tablet TAKE 2 TABLETS BY MOUTH 2 TIMES DAILY 120 tablet 1    Multiple Vitamins-Minerals (SENTRY) TABS TAKE 1 TABLET BY MOUTH DAILY 30 tablet 3    ondansetron (ZOFRAN-ODT) 4 MG disintegrating tablet Place 1 tablet under the tongue 3 times daily as needed for Nausea or Vomiting 30 tablet 0    LANTUS SOLOSTAR 100 UNIT/ML injection pen INJECT 55 UNITS INTO THE SKIN 2 TIMES DAILY (Patient taking differently: Inject 50 Units into the skin 2 times daily ) 30 mL 1    fluticasone (FLONASE) 50 MCG/ACT nasal spray 2 sprays by Nasal route daily 16 g 11    empagliflozin (JARDIANCE) 25 MG tablet Take 1 tablet by mouth daily 90 tablet 1    DULoxetine (CYMBALTA) 60 MG extended release capsule TAKE 1 CAPSULE BY MOUTH EVERY MORNING 60MG IN AM   30MG IN PM   90MG/DAY (Patient taking differently: 60 mg 2 times daily TAKE 1 CAPSULE BY MOUTH EVERY MORNING 60MG IN AM   30MG IN PM   90MG/DAY) 30 capsule 1    OZEMPIC, 1 MG/DOSE, 2 MG/1.5ML SOPN Inject 1 mg into the skin once a week 1 pen 1    lamoTRIgine (LAMICTAL) 100 MG tablet TAKE 1 TABLET BY MOUTH EVERY DAY 90 tablet 1    naproxen (NAPROSYN) 250 MG tablet TAKE 1 TABLET BY MOUTH TWICE A DAY WITH MEALS 60 tablet 5    hydrocortisone 2.5 % cream Apply topically 2 times daily.  45 g 0    hydroxychloroquine (PLAQUENIL) 200 MG tablet Take 200 mg by mouth 2 times daily      diclofenac sodium 1 % GEL Apply 4 g topically 4 times daily 4 Tube 1    methotrexate (RHEUMATREX) 2.5 MG chemo tablet Take 15 mg by mouth once a week      folic acid (FOLVITE) 1 MG tablet Take 1 tablet by mouth daily 30 tablet 1    Insulin Pen Needle 31G X 5 MM MISC 1 each by Does not apply route 3 times daily 100 each 3    famotidine (PEPCID) 20 MG tablet TAKE 1 TABLET BY MOUTH TWICE DAILY (Patient not taking: Reported on 1/25/2022) 60 tablet 1    NOVOLIN 70/30 FLEXPEN (70-30) 100 UNIT/ML injection pen INJECT 80 UNITS INTO THE SKIN EVERY MORNING (BEFORE BREAKFAST) (Patient not taking: Reported on 1/3/2022) 30 mL 3    Continuous Blood Gluc Sensor (FREESTYLE NIMISHA 2 SENSOR) MISC 1 each by Does not apply route continuous 2 each 3    Multiple Vitamins-Minerals (HM MULTIVITAMIN ADULT GUMMY) CHEW Take 1 Package by mouth daily for 14 days 14 tablet 3    ONETOUCH ULTRA strip USE TO TEST THREE TIMES A  each 2    lisinopril (PRINIVIL;ZESTRIL) 10 MG tablet Take 2 tablets by mouth daily 90 tablet 2    [DISCONTINUED] melatonin 3 MG TABS tablet Take 1 tablet by mouth nightly as needed (insomnia) 30 tablet 0    Blood Pressure KIT 1 box by Does not apply route 2 times daily 1 kit 0    FREESTYLE LANCETS MISC CHECK BS 4 TIMES DAILY 100 each 3     No current facility-administered medications on file prior to visit. Allergies   Allergen Reactions    Amoxicillin Other (See Comments)     Hives all over body, states that hives are severe     Sulfa Antibiotics     Cefdinir     Doxycycline Other (See Comments)    Other Other (See Comments)    Milk-Related Compounds     Tomato        Family History   Adopted: Yes   Family history unknown: Yes       Past Surgical History:   Procedure Laterality Date    CT BIOPSY RENAL  2021    CT BIOPSY RENAL 2021 Jackson Ding MD [facility]    SHOULDER ARTHROSCOPY Right 2017       Social History     Tobacco Use    Smoking status: Former Smoker     Packs/day: 2.00     Years: 0.50     Pack years: 1.00     Start date:      Quit date:      Years since quittin.0    Smokeless tobacco: Never Used   Vaping Use    Vaping Use: Never used   Substance Use Topics    Alcohol use: No    Drug use: No       ROS:    Review of Systems   Constitutional: Negative for activity change, appetite change, chills and fatigue. HENT: Negative for congestion and sore throat. Respiratory: Negative for choking and chest tightness.     Cardiovascular: Negative for chest pain, palpitations and leg swelling. Gastrointestinal: Positive nausea negative abdominal pain diarrhea constipation   genitourinary: Negative for difficulty urinating and dysuria. Musculoskeletal: Negative for arthralgias and back pain. Skin: Negative for color change and pallor. Neurological: Negative for facial asymmetry and headaches. Psychiatric/Behavioral: Negative for behavioral problems. The patient is not nervous/anxious. Objective:    VS:  Blood pressure (!) 163/94, pulse 98, temperature 98.1 °F (36.7 °C), temperature source Temporal, resp. rate 16, height 5' 1\" (1.549 m), weight 160 lb (72.6 kg), SpO2 98 %, not currently breastfeeding. Physical Exam   Constitutional: Oriented to person, place, and time. Well-developed and well-nourished. HENT:   Head: Normocephalic and atraumatic. Eyes: EOM are normal.   Neck: Neck supple. Cardiovascular: Normal rate, regular rhythm and intact distal pulses. Exam reveals no gallop and no friction rub. No murmur heard. Pulmonary/Chest: Effort normal and breath sounds normal. No wheezes. No rhales. Abdominal: Soft. Bowel sounds are normal. No distension. No abdominal tenderness. Musculoskeletal: Normal range of motion. General: No edema. Neurological: Alert and oriented to person, place, and time. Skin: Skin is warm and dry. No rash noted. No erythema. Psychiatric: Normal mood and affect. Behavior is normal.   Nursing note and vitals reviewed. Plans:  As above. Please see Patient Instructions for further counseling and information given. Advised to please be adherent to the treatment plans discussed today, and please call with any questions or concerns, letting the office know of any reasons that the plans may not be followed. The risks of untreated conditions include worsening illness, injury, disability, and possibly, death.  Please call if symptoms change in any way, worsen, or fail to completely resolve, as this could necessitate a change to treatment plans. Patient and/or caregiver expressed understanding. Indications and proper use of medication(s) reviewed. Potential side-effects and risks of medication(s) also explained. Patient and/or caregiver was instructed to call if any new symptoms develop prior to next visit. Health risk factors discussed and addressed.

## 2022-01-25 NOTE — PROGRESS NOTES
Attending Physician Statement    S:   Chief Complaint   Patient presents with    Hypertension      HTN, nausea. Sees endocrine for DM. Doesn't want to take pills   Nausea 1-2 times a week (dissolvable zofran prn)  Not interested in further evaluation   Does take her DM meds, but refusing BP meds. Believes that pills give her nausea  O: Blood pressure (!) 163/94, pulse 98, temperature 98.1 °F (36.7 °C), temperature source Temporal, resp. rate 16, height 5' 1\" (1.549 m), weight 160 lb (72.6 kg), SpO2 98 %, not currently breastfeeding. Exam:   Heart - RRR   Lungs - clear   Normal affect  A: As above  P:  Attempt to convince her to take lisinopril   Follow-up as ordered    I have discussed the case, including pertinent history and exam findings with the resident. I agree with the documented assessment and plan.

## 2022-02-01 DIAGNOSIS — E11.65 UNCONTROLLED TYPE 2 DIABETES MELLITUS WITH HYPERGLYCEMIA (HCC): ICD-10-CM

## 2022-02-01 RX ORDER — INSULIN GLARGINE 100 [IU]/ML
55 INJECTION, SOLUTION SUBCUTANEOUS 2 TIMES DAILY
Qty: 30 ML | Refills: 1 | Status: SHIPPED
Start: 2022-02-01 | End: 2022-04-01

## 2022-02-01 RX ORDER — ONDANSETRON 4 MG/1
4 TABLET, ORALLY DISINTEGRATING ORAL 3 TIMES DAILY PRN
Qty: 30 TABLET | Refills: 0 | Status: SHIPPED
Start: 2022-02-01 | End: 2022-02-23

## 2022-02-01 NOTE — TELEPHONE ENCOUNTER
Last Appointment:  1/25/2022  Future Appointments   Date Time Provider Jenae Kang   2/9/2022  9:00 AM ANA Amaro - CNP HOW [facility]STR[facility]   2/24/2022  1:00 PM MD Angie Bledsoe [facility]   3/31/2022  1:00 PM MD Angie Bledsoe [facility]   5/3/2022  8:30 AM Joana Fong MD [facility] ENDO [facility]

## 2022-02-02 DIAGNOSIS — E11.65 UNCONTROLLED TYPE 2 DIABETES MELLITUS WITH HYPERGLYCEMIA (HCC): ICD-10-CM

## 2022-02-02 RX ORDER — INSULIN LISPRO 200 [IU]/ML
INJECTION, SOLUTION SUBCUTANEOUS
Qty: 18 ML | Refills: 0 | Status: SHIPPED
Start: 2022-02-02 | End: 2022-04-29

## 2022-02-02 NOTE — TELEPHONE ENCOUNTER
Last Appointment:  1/25/2022  Future Appointments   Date Time Provider Jenae Pearl   2/9/2022  9:00 AM ANA Camilo - CNP HOW [facility]   2/24/2022  1:00 PM Doc MD Lara Kothari [facility]   3/31/2022  1:00 PM MD Lara Earl [facility]   5/3/2022  8:30 AM Cong Baires MD [facility]
yes

## 2022-02-09 ENCOUNTER — OFFICE VISIT (OUTPATIENT)
Dept: GASTROENTEROLOGY | Age: 52
End: 2022-02-09
Payer: MEDICAID

## 2022-02-09 VITALS
WEIGHT: 164 LBS | DIASTOLIC BLOOD PRESSURE: 96 MMHG | HEART RATE: 104 BPM | SYSTOLIC BLOOD PRESSURE: 179 MMHG | HEIGHT: 61 IN | BODY MASS INDEX: 30.96 KG/M2

## 2022-02-09 DIAGNOSIS — R11.10 VOMITING, INTRACTABILITY OF VOMITING NOT SPECIFIED, PRESENCE OF NAUSEA NOT SPECIFIED, UNSPECIFIED VOMITING TYPE: Primary | ICD-10-CM

## 2022-02-09 PROCEDURE — 99202 OFFICE O/P NEW SF 15 MIN: CPT | Performed by: NURSE PRACTITIONER

## 2022-02-09 NOTE — PROGRESS NOTES
Laura Smoker (:  1970) is a 49 y.o. female, here for evaluation of the following chief complaint(s):  Nausea (ref from dr Carmina Danielle for nausea)      SUBJECTIVE/OBJECTIVE:  HPI:  Peter Gamez is a very pleasant 49year old female that presents today with complaints of vomiting since 2019. Felt it was related to stress. Worsened in . It is intermittent and will last 1 week. The vomiting is random. No weight loss or fever. Not a smoker. No heartburn. No routine NSAID use. Denies alcohol intake. Patient is taking famotidine twice daily but this does not satisfy. Uses ondansetron as needed. Most recent HGA1C was 14.5%. Blood sugars are usually around 200 or above. Checks them about 3 to 4 times a day. Has a daily BM. Never had a colonoscopy. Refusing this today. Patient is adopted. ROS:  General: Patient denies n/v/f/c or weight loss. HEENT: Patient denies persistent postnasal drip, scleral icterus, drooling, persistent bleeding from nose/mouth. Resp: Patient denies SOB, wheezing, productive cough. Cards: Patient denies CP, palpitations, significant edema  GI: As above. Derm: Patient denies jaundice/rashes. Musc: Patient denies diffuse/irregular joint swelling or myalgias. Objective   Wt Readings from Last 3 Encounters:   22 164 lb (74.4 kg)   22 160 lb (72.6 kg)   22 155 lb (70.3 kg)     Temp Readings from Last 3 Encounters:   22 98.1 °F (36.7 °C) (Temporal)   21 98.2 °F (36.8 °C) (Temporal)   21 97.7 °F (36.5 °C) (Temporal)     BP Readings from Last 3 Encounters:   22 (!) 179/96   22 (!) 163/94   22 126/84     Pulse Readings from Last 3 Encounters:   22 104   22 98   22 84        Physical Exam  Cardiovascular:      Heart sounds: Normal heart sounds. Pulmonary:      Breath sounds: Normal breath sounds. Abdominal:      General: Bowel sounds are normal.      Palpations: Abdomen is soft.          Past Medical History:   Diagnosis Date    Anxiety     Arthritis     Depression     Diabetes mellitus (HCC)     Fibromyalgia     HTN (hypertension)     Hypertensive chronic kidney disease     Left shoulder pain     LIZABETH (obstructive sleep apnea)     [facility]umatoid [facility] involving multiple sites [facility]     Sjogren's disease ([facility]     Thyroid disease       Past Surgical History:   Procedure Laterality Date    CT BIOPSY RENAL  11/18/2021    CT BIOPSY RENAL 11/18/2021 Thony Norton MD [facility]    SHOULDER ARTHROSCOPY Right 06/16/2017      Family History   Adopted: Yes   Family history unknown: Yes        Lab Results   Component Value Date    WBC 14.2 (H) 11/18/2021    HGB 9.4 (L) 11/18/2021    HCT 31.6 (L) 11/18/2021    MCV 68.8 (L) 11/18/2021     11/18/2021      Lab Results   Component Value Date     12/15/2021    K 4.7 12/15/2021     12/15/2021    CO2 21 (L) 12/15/2021    BUN 26 (H) 12/15/2021    CREATININE 0.8 12/15/2021    GLUCOSE 98 12/22/2021    CALCIUM 9.6 12/15/2021    PROT 6.1 (L) 11/18/2021    LABALBU 2.9 (L) 11/18/2021    BILITOT <0.2 11/18/2021    ALKPHOS 99 11/18/2021    AST 12 11/18/2021    ALT 10 11/18/2021    LABGLOM >60 12/15/2021    GFRAA >60 12/15/2021                       ASSESSMENT/PLAN:    1. Vomiting, intractability of vomiting not specified, presence of nausea not specified, unspecified vomiting type      The diagnosis of diabetic gastroparesis discussed today. Upon direct questioning, the patient states that she is not having nausea, only vomiting. Advised small frequent low fat meals. Patient states she has tried this in the past but it causes worsening of blood sugars. The importance of controlling blood sugars discussed today. The patient is aware that her A1C    Will proceed with EGD to rule out gastropathy. Patient is agreeable. Schedule EGD under MAC anesthesia. Procedure literature given.   The risks and alternatives were explained as per ASGE guidelines (I.  E.  Perforation, 3% chance of bleeding, reaction to medication, infection, and death). Return for Follow up with Dr. Ruddy Lincoln post procedure. An electronic signature was used to authenticate this note.     --ANA Ramos - CNP

## 2022-02-10 ENCOUNTER — TELEPHONE (OUTPATIENT)
Dept: GASTROENTEROLOGY | Age: 52
End: 2022-02-10

## 2022-02-10 NOTE — TELEPHONE ENCOUNTER
Spoke to patient in detail about upcoming EGD on 05/06/2022 @ 11:30am @ [address]. Cyril verbalized understanding.    Electronically signed by Halima Jones MA on 2/10/2022 at 10:35 AM

## 2022-02-11 ENCOUNTER — TELEPHONE (OUTPATIENT)
Dept: GASTROENTEROLOGY | Age: 52
End: 2022-02-11

## 2022-02-16 DIAGNOSIS — E11.65 UNCONTROLLED TYPE 2 DIABETES MELLITUS WITH HYPERGLYCEMIA (HCC): ICD-10-CM

## 2022-02-17 RX ORDER — LAMOTRIGINE 100 MG/1
TABLET ORAL
Qty: 30 TABLET | Refills: 5 | Status: ON HOLD
Start: 2022-02-17 | End: 2022-05-31 | Stop reason: HOSPADM

## 2022-02-17 RX ORDER — FAMOTIDINE 20 MG/1
TABLET, FILM COATED ORAL
Qty: 60 TABLET | Refills: 1 | Status: SHIPPED
Start: 2022-02-17 | End: 2022-02-24

## 2022-02-17 RX ORDER — SEMAGLUTIDE 1.34 MG/ML
INJECTION, SOLUTION SUBCUTANEOUS
Qty: 3 ML | Refills: 1 | Status: ON HOLD
Start: 2022-02-17 | End: 2022-05-31 | Stop reason: HOSPADM

## 2022-02-17 RX ORDER — METFORMIN HYDROCHLORIDE 500 MG/1
1000 TABLET, EXTENDED RELEASE ORAL 2 TIMES DAILY
Qty: 120 TABLET | Refills: 1 | Status: SHIPPED
Start: 2022-02-17 | End: 2022-04-18

## 2022-02-17 NOTE — TELEPHONE ENCOUNTER
Last Appointment:  1/25/2022  Future Appointments   Date Time Provider Jenae Nazarioi   2/24/2022  1:00 PM Real Wong MD [facility]   5/3/2022  8:30 AM Ellyn Collins MD [facility]

## 2022-02-23 RX ORDER — ONDANSETRON 4 MG/1
4 TABLET, ORALLY DISINTEGRATING ORAL 3 TIMES DAILY PRN
Qty: 30 TABLET | Refills: 0 | Status: SHIPPED
Start: 2022-02-23 | End: 2022-04-29

## 2022-02-24 ENCOUNTER — TELEPHONE (OUTPATIENT)
Dept: GASTROENTEROLOGY | Age: 52
End: 2022-02-24

## 2022-02-24 ENCOUNTER — OFFICE VISIT (OUTPATIENT)
Dept: FAMILY MEDICINE CLINIC | Age: 52
End: 2022-02-24
Payer: MEDICAID

## 2022-02-24 VITALS
SYSTOLIC BLOOD PRESSURE: 145 MMHG | BODY MASS INDEX: 29.64 KG/M2 | HEART RATE: 96 BPM | OXYGEN SATURATION: 100 % | HEIGHT: 61 IN | TEMPERATURE: 97.9 F | WEIGHT: 157 LBS | DIASTOLIC BLOOD PRESSURE: 84 MMHG

## 2022-02-24 DIAGNOSIS — R11.2 NAUSEA AND VOMITING, INTRACTABILITY OF VOMITING NOT SPECIFIED, UNSPECIFIED VOMITING TYPE: Primary | ICD-10-CM

## 2022-02-24 PROCEDURE — 3017F COLORECTAL CA SCREEN DOC REV: CPT | Performed by: FAMILY MEDICINE

## 2022-02-24 PROCEDURE — G8417 CALC BMI ABV UP PARAM F/U: HCPCS | Performed by: FAMILY MEDICINE

## 2022-02-24 PROCEDURE — 99213 OFFICE O/P EST LOW 20 MIN: CPT | Performed by: STUDENT IN AN ORGANIZED HEALTH CARE EDUCATION/TRAINING PROGRAM

## 2022-02-24 PROCEDURE — G8484 FLU IMMUNIZE NO ADMIN: HCPCS | Performed by: FAMILY MEDICINE

## 2022-02-24 PROCEDURE — 99212 OFFICE O/P EST SF 10 MIN: CPT | Performed by: STUDENT IN AN ORGANIZED HEALTH CARE EDUCATION/TRAINING PROGRAM

## 2022-02-24 PROCEDURE — G8427 DOCREV CUR MEDS BY ELIG CLIN: HCPCS | Performed by: FAMILY MEDICINE

## 2022-02-24 PROCEDURE — 1036F TOBACCO NON-USER: CPT | Performed by: FAMILY MEDICINE

## 2022-02-24 ASSESSMENT — ENCOUNTER SYMPTOMS
EYE ITCHING: 0
CONSTIPATION: 0
COUGH: 0
VOMITING: 1
BLOOD IN STOOL: 0
DIARRHEA: 0
EYE PAIN: 0
NAUSEA: 1
SHORTNESS OF BREATH: 0
BACK PAIN: 0
SORE THROAT: 0

## 2022-02-24 NOTE — TELEPHONE ENCOUNTER
Prior Authorization Form:      DEMOGRAPHICS:                     Patient Name:  Sofía Marquez  Patient :  1970            Insurance:  Payor: [employer] [facility] / Plan: [employer] / Product Type: *No Product type* /   Insurance ID Number:    Payor/Plan Subscr  Sex Relation Sub.  Ins. ID Effective Group Num   1. 66 Alma Flood 1970 Female Self [id number]                                   [address]         DIAGNOSIS & PROCEDURE:                       Procedure/Operation: EGD           CPT Code: 30146    Diagnosis:  Vomiting    ICD10 Code: R11.10    Location:  [facility]     Surgeon:  Dr. Sachi Moya     SCHEDULING INFORMATION:                          Date: 2022    Time: 1130              Anesthesia:  MAC/TIVA                                                       Status:  Outpatient          Electronically signed by Landon Sanchez MA on 2022 at 11:11 AM

## 2022-02-24 NOTE — PROGRESS NOTES
CC: Nausea and vomiting  ------------------------------------------------------------------------------------------------------------------------  Assessment/Plan  1. Nausea and vomiting, intractability of vomiting not specified, unspecified vomiting type  Likely 2/2 gastroparesis  Motivational interviewing used for patient to keep a tight glycemic control  Motivational interviewing used for patient to understand the connection between uncontrolled type 2 diabetes, gastroparesis, and effect of gastroparesis on the GI system in digestion and nausea and vomiting. Patient verbalized an understanding, is not willing to set a goal at this time but does want to get her EGD and further investigations done as soon as possible. Advised patient to contact GI for any sooner appointments for her scope. Meanwhile we will continue to provide ongoing counseling, can use Zofran as needed      RTO 2 months for cervical cancer screening  -------------------------------------------------------------------------------------------------------------------------    HPI: 52-year-old woman with a past medical history of uncontrolled type 2 diabetes, anxiety, depression, unspecified mood disorder, hypothyroidism, rheumatoid arthritis presents for concerns of emesis and nausea    Nausea/Emesis  Chronic problem more than 3 months. Patient has been counseled on eating habits extensively in prior notes. Referred to GI. Recently seen by GI plan for EGD May 2022. Patient reports increased frequency of emesis now happening 2 times a week. Happens several times daily when this happens, nonbilious, nonbloody. Sometimes associated with nausea prior to emesis, not always. Is eating milk and cereal frequently. Not routinely checking blood glucose. Denying any abdominal pain hematochezia flatus chest discomfort heartburn fevers chills. GIs discussed possibility of gastroparesis with patient.   Endocrinology has also discussed the importance of controlling blood glucose with patient. Patient feels this may be connected to her symptoms, but also may not be.     Patient Active Problem List    Diagnosis Date Noted    Dietary noncompliance 01/08/2022    Chronic fatigue syndrome 12/15/2021    Diabetic renal disease ([address] 12/15/2021    Essential hypertension 12/15/2021    Hyperglycemia due to type 2 diabetes mellitus ([address] 12/15/2021    Major depression, single episode 12/15/2021    Neuropathy 66/19/4600    Metabolic acidosis 51/98/1410    Rheumatoid arthritis involving multiple sites ([address] 09/21/2021    COVID-19 06/17/2021    Hypoglycemia 06/17/2021    Anemia 06/16/2021    Proteinuria 06/16/2021    Hypokalemia 03/10/2021    Encounter for long-term (current) use of insulin ([address] 03/08/2021    Anxiety disorder 03/08/2021    Benign hypertensive kidney disease with chronic kidney disease 03/08/2021    Chronic kidney disease, stage I 03/08/2021    Fibromyalgia 03/08/2021    Mixed hyperlipidemia 03/08/2021    Nephrotic syndrome due to type 2 diabetes mellitus ([address] 03/08/2021    Hypothyroidism 03/08/2021    Type 2 diabetes mellitus with chronic kidney disease ([address] 03/08/2021    Mild depression ([address] 10/02/2020    Severe obstructive sleep apnea 07/16/2020    Infective urethritis 06/28/2018    Acquired hypothyroidism 12/20/2016    Vitamin D deficiency 12/20/2016    Diabetic ketosis ([address] 09/01/2015    Type 2 diabetes mellitus with complication, with long-term current use of insulin ([address] 05/30/2014    Depression 05/30/2014    Sjogren's syndrome ([address] 05/30/2014    Hyperglycemia 05/30/2014    Depressive disorder 05/30/2014       Past Medical History:   Diagnosis Date    Anxiety     Arthritis     Depression     Diabetes mellitus (HCC)     Fibromyalgia     HTN (hypertension)     Hypertensive chronic kidney disease     Left shoulder pain     LIZABETH (obstructive sleep apnea)     Rheumatoid arthritis involving multiple sites ([address]  [facility]'s disease [facility]     Thyroid disease        Current Outpatient Medications on File Prior to Visit   Medication Sig Dispense Refill    ondansetron (ZOFRAN-ODT) 4 MG disintegrating tablet PLACE 1 TABLET UNDER THE TONGUE 3 TIMES DAILY AS NEEDED FOR NAUSEA OR VOMITING 30 tablet 0    lamoTRIgine (LAMICTAL) 100 MG tablet TAKE 1 TABLET BY MOUTH EVERY DAY 30 tablet 5    metFORMIN (GLUCOPHAGE-XR) 500 MG extended release tablet TAKE 2 TABLETS BY MOUTH 2 TIMES DAILY 120 tablet 1    OZEMPIC, 1 MG/DOSE, 4 MG/3ML SOPN INJECT 1 MG UNDER THE SKIN ONCE WEEKLY 3 mL 1    HUMALOG KWIKPEN 200 UNIT/ML SOPN pen INJECT 40 UNITS SUB-Q THREE TIMES A DAY WITH MEALS 18 mL 0    LANTUS SOLOSTAR 100 UNIT/ML injection pen INJECT 55 UNITS INTO THE SKIN 2 TIMES DAILY 30 mL 1    buPROPion (WELLBUTRIN) 100 MG tablet Take 100 mg by mouth daily      Insulin Pen Needle 31G X 5 MM MISC 1 each by Does not apply route 3 times daily 100 each 3    levothyroxine (SYNTHROID) 88 MCG tablet TAKE 1 TABLET BY MOUTH DAILY 30 tablet 2    melatonin 3 MG TABS tablet TAKE ONE TABLET BY MOUTH EVERY NIGHT AT BEDTIME 30 tablet 0    Continuous Blood Gluc Sensor (FREESTYLE NIMISHA 2 SENSOR) MISC 1 each by Does not apply route continuous 2 each 3    Multiple Vitamins-Minerals (HM MULTIVITAMIN ADULT GUMMY) CHEW Take 1 Package by mouth daily for 14 days 14 tablet 3    ONETOUCH ULTRA strip USE TO TEST THREE TIMES A  each 2    lisinopril (PRINIVIL;ZESTRIL) 10 MG tablet Take 2 tablets by mouth daily 90 tablet 2    empagliflozin (JARDIANCE) 25 MG tablet Take 1 tablet by mouth daily 90 tablet 1    DULoxetine (CYMBALTA) 60 MG extended release capsule TAKE 1 CAPSULE BY MOUTH EVERY MORNING 60MG IN AM   30MG IN PM   90MG/DAY (Patient taking differently: 60 mg 2 times daily TAKE 1 CAPSULE BY MOUTH EVERY MORNING 60MG IN AM   30MG IN PM   90MG/DAY) 30 capsule 1    naproxen (NAPROSYN) 250 MG tablet TAKE 1 TABLET BY MOUTH TWICE A DAY WITH MEALS 60 tablet 5  hydrocortisone 2.5 % cream Apply topically 2 times daily. 45 g 0    Blood Pressure KIT 1 box by Does not apply route 2 times daily 1 kit 0    hydroxychloroquine (PLAQUENIL) 200 MG tablet Take 200 mg by mouth 2 times daily      diclofenac sodium 1 % GEL Apply 4 g topically 4 times daily 4 Tube 1    methotrexate (RHEUMATREX) 2.5 MG chemo tablet Take 15 mg by mouth once a week       folic acid (FOLVITE) 1 MG tablet Take 1 tablet by mouth daily 30 tablet 1    FREESTYLE LANCETS MISC CHECK BS 4 TIMES DAILY 100 each 3    [DISCONTINUED] melatonin 3 MG TABS tablet Take 1 tablet by mouth nightly as needed (insomnia) 30 tablet 0     No current facility-administered medications on file prior to visit. Allergies   Allergen Reactions    Amoxicillin Other (See Comments)     Hives all over body, states that hives are severe     Sulfa Antibiotics     Cefdinir     Doxycycline Other (See Comments)    Other Other (See Comments)    Milk-Related Compounds     Tomato        Family History   Adopted: Yes   Family history unknown: Yes       Past Surgical History:   Procedure Laterality Date    CT BIOPSY RENAL  2021    CT BIOPSY RENAL 2021 Jaydon Escobedo MD [facility]    SHOULDER ARTHROSCOPY Right 2017       Social History     Tobacco Use    Smoking status: Former Smoker     Packs/day: 2.00     Years: 0.50     Pack years: 1.00     Start date:      Quit date:      Years since quittin.1    Smokeless tobacco: Never Used   Vaping Use    Vaping Use: Never used   Substance Use Topics    Alcohol use: No    Drug use: No       ROS:    Review of Systems   Constitutional: Negative for activity change, appetite change, chills and fever. HENT: Negative for congestion and sore throat. Eyes: Negative for pain and itching. Respiratory: Negative for cough and shortness of breath. Gastrointestinal: Positive for nausea and vomiting. Negative for blood in stool, constipation and diarrhea. Endocrine: Negative for cold intolerance and heat intolerance. Genitourinary: Negative for difficulty urinating and dysuria. Musculoskeletal: Negative for arthralgias and back pain. Neurological: Negative for light-headedness and headaches. Psychiatric/Behavioral: Negative for self-injury, sleep disturbance and suicidal ideas. The patient is nervous/anxious. Objective:    VS:  Blood pressure (!) 145/84, pulse 96, temperature 97.9 °F (36.6 °C), temperature source Temporal, height 5' 1\" (1.549 m), weight 157 lb (71.2 kg), SpO2 100 %, not currently breastfeeding. Physical Exam  Constitutional:       Appearance: Normal appearance. HENT:      Head: Normocephalic and atraumatic. Cardiovascular:      Rate and Rhythm: Normal rate and regular rhythm. Pulses: Normal pulses. Heart sounds: Normal heart sounds. No murmur heard. No friction rub. No gallop. Abdominal:      General: Abdomen is flat. Bowel sounds are normal. There is no distension. Palpations: Abdomen is soft. Tenderness: There is no abdominal tenderness. Musculoskeletal:         General: Normal range of motion. Cervical back: Normal range of motion. Lymphadenopathy:      Cervical: No cervical adenopathy. Skin:     General: Skin is warm. Capillary Refill: Capillary refill takes less than 2 seconds. Coloration: Skin is not jaundiced. Neurological:      General: No focal deficit present. Mental Status: She is alert. Plans:  As above. Please see Patient Instructions for further counseling and information given. Advised to please be adherent to the treatment plans discussed today, and please call with any questions or concerns, letting the office know of any reasons that the plans may not be followed. The risks of untreated conditions include worsening illness, injury, disability, and possibly, death.  Please call if symptoms change in any way, worsen, or fail to completely resolve, as this could necessitate a change to treatment plans. Patient and/or caregiver expressed understanding. Indications and proper use of medication(s) reviewed. Potential side-effects and risks of medication(s) also explained. Patient and/or caregiver was instructed to call if any new symptoms develop prior to next visit. Health risk factors discussed and addressed.

## 2022-02-24 NOTE — PROGRESS NOTES
S: 47 y.o. female with chronic nausea and emesis, more than 3 months. T2DM, not controlled. History of RA, Sjogren's, hypothyroidism, anxiety/depression/mood disorder followed by [facility]. Nausea and vomiting. Feels nauseated, has several episodes of vomiting per week. Worse after meal.  Tried Zofran. GI saw recently. Recommended endoscopy and DM control. Patient does not believe that symptoms are not due to DM. Took no medications over the past two days. Had cereal and milk. No F/C. No CP or SOB. No abdominal pain. No D/C. Last A1C is 14.5. Has seen endocrine. No nausea at this time. O: VS: BP (!) 145/84 (Site: Right Upper Arm, Position: Sitting, Cuff Size: Medium Adult)   Pulse 96   Temp 97.9 °F (36.6 °C) (Temporal)   Ht 5' 1\" (1.549 m)   Wt 157 lb (71.2 kg)   SpO2 100%   BMI 29.66 kg/m²    General: NAD, appropriate affect and grooming   CV:  RRR, no gallops, rubs, or murmurs   Resp: CTAB   Abd:  Soft, nontender, ND. Ext:  No edema  Impression: Nausea/emesis, likely secondary to DM gastropathy. Uncontrolled DM. Hypothyroidism. Plan: Motivational interviewing, address possible connection of symptoms to poorly controlled diabetes. Labs, recheck TSH. Close follow up. Encouraged follow up with GI for scopes as directed. Dietary counseling. Attending Physician Statement  I have discussed the case, including pertinent history and exam findings with the resident. I agree with the documented assessment and plan.

## 2022-02-28 ENCOUNTER — TELEPHONE (OUTPATIENT)
Dept: FAMILY MEDICINE CLINIC | Age: 52
End: 2022-02-28

## 2022-02-28 NOTE — TELEPHONE ENCOUNTER
If possible, please let [facility] know to contact patient endocrinologist  Endocrinology.  Has been handling patience diabetes

## 2022-03-03 DIAGNOSIS — E11.69 TYPE 2 DIABETES MELLITUS WITH OTHER SPECIFIED COMPLICATION, WITH LONG-TERM CURRENT USE OF INSULIN (HCC): ICD-10-CM

## 2022-03-03 DIAGNOSIS — Z79.4 TYPE 2 DIABETES MELLITUS WITH OTHER SPECIFIED COMPLICATION, WITH LONG-TERM CURRENT USE OF INSULIN (HCC): ICD-10-CM

## 2022-03-03 DIAGNOSIS — Z91.199 NON-COMPLIANCE: ICD-10-CM

## 2022-03-03 NOTE — TELEPHONE ENCOUNTER
Last Appointment:  2/24/2022  Future Appointments   Date Time Provider Jenae Nazarioi   4/19/2022  1:00 PM MD Andrew Grider [facility]   5/3/2022  8:30 AM Kyle Kunz MD [facility]   5/26/2022  9:45 AM Yasmin Roblero MD [address]

## 2022-03-04 DIAGNOSIS — R11.2 NAUSEA AND VOMITING, INTRACTABILITY OF VOMITING NOT SPECIFIED, UNSPECIFIED VOMITING TYPE: Primary | ICD-10-CM

## 2022-03-04 RX ORDER — ONDANSETRON 4 MG/1
4 TABLET, FILM COATED ORAL 2 TIMES DAILY PRN
Qty: 14 TABLET | Refills: 0 | Status: SHIPPED | OUTPATIENT
Start: 2022-03-04 | End: 2022-04-29

## 2022-03-04 RX ORDER — EMPAGLIFLOZIN 25 MG/1
1 TABLET, FILM COATED ORAL DAILY
Qty: 30 TABLET | Refills: 5 | OUTPATIENT
Start: 2022-03-04

## 2022-03-14 DIAGNOSIS — I10 HYPERTENSION, UNSPECIFIED TYPE: ICD-10-CM

## 2022-03-14 RX ORDER — LISINOPRIL 10 MG/1
20 TABLET ORAL DAILY
Qty: 90 TABLET | Refills: 2 | Status: ON HOLD
Start: 2022-03-14 | End: 2022-05-18

## 2022-03-18 ENCOUNTER — TELEPHONE (OUTPATIENT)
Dept: FAMILY MEDICINE CLINIC | Age: 52
End: 2022-03-18

## 2022-03-18 DIAGNOSIS — Z91.199 NON-COMPLIANCE: ICD-10-CM

## 2022-03-18 DIAGNOSIS — E03.9 HYPOTHYROIDISM, UNSPECIFIED TYPE: ICD-10-CM

## 2022-03-18 DIAGNOSIS — E11.69 TYPE 2 DIABETES MELLITUS WITH OTHER SPECIFIED COMPLICATION, WITH LONG-TERM CURRENT USE OF INSULIN (HCC): ICD-10-CM

## 2022-03-18 DIAGNOSIS — Z79.4 TYPE 2 DIABETES MELLITUS WITH OTHER SPECIFIED COMPLICATION, WITH LONG-TERM CURRENT USE OF INSULIN (HCC): ICD-10-CM

## 2022-03-18 RX ORDER — LEVOTHYROXINE SODIUM 88 UG/1
88 TABLET ORAL DAILY
Qty: 30 TABLET | Refills: 2 | Status: ON HOLD
Start: 2022-03-18 | End: 2022-05-31 | Stop reason: SDUPTHER

## 2022-03-18 RX ORDER — BLOOD SUGAR DIAGNOSTIC
STRIP MISCELLANEOUS
Qty: 100 EACH | Refills: 2 | Status: SHIPPED
Start: 2022-03-18 | End: 2022-06-16

## 2022-03-18 RX ORDER — CALCIUM CARBONATE 500(1250)
TABLET ORAL
Qty: 60 TABLET | Refills: 5 | Status: SHIPPED | OUTPATIENT
Start: 2022-03-18

## 2022-03-18 RX ORDER — EMPAGLIFLOZIN 25 MG/1
1 TABLET, FILM COATED ORAL DAILY
Qty: 30 TABLET | Refills: 5 | Status: ON HOLD
Start: 2022-03-18 | End: 2022-05-20 | Stop reason: HOSPADM

## 2022-03-18 NOTE — TELEPHONE ENCOUNTER
Last Appointment:  2/24/2022  Future Appointments   Date Time Provider Jenae Nazarioi   4/19/2022  1:00 PM [facility]   5/3/2022  8:30 AM Kosta Casey MD [facility]   5/26/2022  9:45 AM John Beard MD [address]

## 2022-03-31 ENCOUNTER — HOSPITAL ENCOUNTER (EMERGENCY)
Age: 52
Discharge: HOME | End: 2022-03-31
Attending: STUDENT IN AN ORGANIZED HEALTH CARE EDUCATION/TRAINING PROGRAM
Payer: MEDICAID

## 2022-03-31 VITALS
SYSTOLIC BLOOD PRESSURE: 124 MMHG | RESPIRATION RATE: 16 BRPM | HEART RATE: 80 BPM | DIASTOLIC BLOOD PRESSURE: 74 MMHG | TEMPERATURE: 97.8 F | HEIGHT: 61 IN | OXYGEN SATURATION: 100 % | WEIGHT: 156 LBS | BODY MASS INDEX: 29.45 KG/M2

## 2022-03-31 DIAGNOSIS — R11.2 NAUSEA AND VOMITING, INTRACTABILITY OF VOMITING NOT SPECIFIED, UNSPECIFIED VOMITING TYPE: Primary | ICD-10-CM

## 2022-03-31 LAB
ALBUMIN SERPL-MCNC: 2.5 G/DL (ref 3.5–5.2)
ALP BLD-CCNC: 113 U/L (ref 35–104)
ALT SERPL-CCNC: 8 U/L (ref 0–32)
ANION GAP SERPL CALCULATED.3IONS-SCNC: 12 MMOL/L (ref 7–16)
ANION GAP SERPL CALCULATED.3IONS-SCNC: 17 MMOL/L (ref 7–16)
AST SERPL-CCNC: 10 U/L (ref 0–31)
BASOPHILS ABSOLUTE: 0.07 E9/L (ref 0–0.2)
BASOPHILS RELATIVE PERCENT: 0.5 % (ref 0–2)
BILIRUB SERPL-MCNC: 0.3 MG/DL (ref 0–1.2)
BUN BLDV-MCNC: 21 MG/DL (ref 6–20)
BUN BLDV-MCNC: 25 MG/DL (ref 6–20)
CALCIUM SERPL-MCNC: 8.2 MG/DL (ref 8.6–10.2)
CALCIUM SERPL-MCNC: 9.5 MG/DL (ref 8.6–10.2)
CHLORIDE BLD-SCNC: 100 MMOL/L (ref 98–107)
CHLORIDE BLD-SCNC: 103 MMOL/L (ref 98–107)
CO2: 20 MMOL/L (ref 22–29)
CO2: 21 MMOL/L (ref 22–29)
CREAT SERPL-MCNC: 1 MG/DL (ref 0.5–1)
CREAT SERPL-MCNC: 1 MG/DL (ref 0.5–1)
EOSINOPHILS ABSOLUTE: 0.03 E9/L (ref 0.05–0.5)
EOSINOPHILS RELATIVE PERCENT: 0.2 % (ref 0–6)
GFR AFRICAN AMERICAN: >60
GFR AFRICAN AMERICAN: >60
GFR NON-AFRICAN AMERICAN: 58 ML/MIN/1.73
GFR NON-AFRICAN AMERICAN: 58 ML/MIN/1.73
GLUCOSE BLD-MCNC: 358 MG/DL (ref 74–99)
GLUCOSE BLD-MCNC: 401 MG/DL (ref 74–99)
HCT VFR BLD CALC: 37.7 % (ref 34–48)
HEMOGLOBIN: 11.8 G/DL (ref 11.5–15.5)
IMMATURE GRANULOCYTES #: 0.1 E9/L
IMMATURE GRANULOCYTES %: 0.8 % (ref 0–5)
LIPASE: 23 U/L (ref 13–60)
LYMPHOCYTES ABSOLUTE: 3.15 E9/L (ref 1.5–4)
LYMPHOCYTES RELATIVE PERCENT: 24.2 % (ref 20–42)
MAGNESIUM: 1.9 MG/DL (ref 1.6–2.6)
MCH RBC QN AUTO: 23.3 PG (ref 26–35)
MCHC RBC AUTO-ENTMCNC: 31.3 % (ref 32–34.5)
MCV RBC AUTO: 74.5 FL (ref 80–99.9)
MONOCYTES ABSOLUTE: 0.7 E9/L (ref 0.1–0.95)
MONOCYTES RELATIVE PERCENT: 5.4 % (ref 2–12)
NEUTROPHILS ABSOLUTE: 8.97 E9/L (ref 1.8–7.3)
NEUTROPHILS RELATIVE PERCENT: 68.9 % (ref 43–80)
PDW BLD-RTO: 16.2 FL (ref 11.5–15)
PH VENOUS: 7.44 (ref 7.35–7.45)
PLATELET # BLD: 316 E9/L (ref 130–450)
PMV BLD AUTO: 10.1 FL (ref 7–12)
POTASSIUM SERPL-SCNC: 3.3 MMOL/L (ref 3.5–5)
POTASSIUM SERPL-SCNC: 3.3 MMOL/L (ref 3.5–5)
RBC # BLD: 5.06 E12/L (ref 3.5–5.5)
SODIUM BLD-SCNC: 135 MMOL/L (ref 132–146)
SODIUM BLD-SCNC: 138 MMOL/L (ref 132–146)
TOTAL PROTEIN: 5.6 G/DL (ref 6.4–8.3)
WBC # BLD: 13 E9/L (ref 4.5–11.5)

## 2022-03-31 PROCEDURE — 85025 COMPLETE CBC W/AUTO DIFF WBC: CPT

## 2022-03-31 PROCEDURE — A4216 STERILE WATER/SALINE, 10 ML: HCPCS | Performed by: STUDENT IN AN ORGANIZED HEALTH CARE EDUCATION/TRAINING PROGRAM

## 2022-03-31 PROCEDURE — 96375 TX/PRO/DX INJ NEW DRUG ADDON: CPT

## 2022-03-31 PROCEDURE — 83735 ASSAY OF MAGNESIUM: CPT

## 2022-03-31 PROCEDURE — 82800 BLOOD PH: CPT

## 2022-03-31 PROCEDURE — 99285 EMERGENCY DEPT VISIT HI MDM: CPT

## 2022-03-31 PROCEDURE — 2580000003 HC RX 258: Performed by: STUDENT IN AN ORGANIZED HEALTH CARE EDUCATION/TRAINING PROGRAM

## 2022-03-31 PROCEDURE — 6360000002 HC RX W HCPCS: Performed by: STUDENT IN AN ORGANIZED HEALTH CARE EDUCATION/TRAINING PROGRAM

## 2022-03-31 PROCEDURE — 80048 BASIC METABOLIC PNL TOTAL CA: CPT

## 2022-03-31 PROCEDURE — 80053 COMPREHEN METABOLIC PANEL: CPT

## 2022-03-31 PROCEDURE — 96374 THER/PROPH/DIAG INJ IV PUSH: CPT

## 2022-03-31 PROCEDURE — 83690 ASSAY OF LIPASE: CPT

## 2022-03-31 PROCEDURE — 6370000000 HC RX 637 (ALT 250 FOR IP): Performed by: STUDENT IN AN ORGANIZED HEALTH CARE EDUCATION/TRAINING PROGRAM

## 2022-03-31 PROCEDURE — 2500000003 HC RX 250 WO HCPCS: Performed by: STUDENT IN AN ORGANIZED HEALTH CARE EDUCATION/TRAINING PROGRAM

## 2022-03-31 RX ORDER — POTASSIUM CHLORIDE 20 MEQ/1
40 TABLET, EXTENDED RELEASE ORAL ONCE
Status: COMPLETED | OUTPATIENT
Start: 2022-03-31 | End: 2022-03-31

## 2022-03-31 RX ORDER — PROCHLORPERAZINE MALEATE 10 MG
10 TABLET ORAL EVERY 6 HOURS PRN
Qty: 16 TABLET | Refills: 0 | Status: ON HOLD | OUTPATIENT
Start: 2022-03-31 | End: 2022-05-31 | Stop reason: HOSPADM

## 2022-03-31 RX ORDER — 0.9 % SODIUM CHLORIDE 0.9 %
1000 INTRAVENOUS SOLUTION INTRAVENOUS ONCE
Status: COMPLETED | OUTPATIENT
Start: 2022-03-31 | End: 2022-03-31

## 2022-03-31 RX ORDER — METOCLOPRAMIDE HYDROCHLORIDE 5 MG/ML
10 INJECTION INTRAMUSCULAR; INTRAVENOUS ONCE
Status: COMPLETED | OUTPATIENT
Start: 2022-03-31 | End: 2022-03-31

## 2022-03-31 RX ORDER — DIPHENHYDRAMINE HYDROCHLORIDE 50 MG/ML
25 INJECTION INTRAMUSCULAR; INTRAVENOUS ONCE
Status: COMPLETED | OUTPATIENT
Start: 2022-03-31 | End: 2022-03-31

## 2022-03-31 RX ORDER — ONDANSETRON 4 MG/1
4 TABLET, ORALLY DISINTEGRATING ORAL 3 TIMES DAILY PRN
Qty: 12 TABLET | Refills: 0 | Status: SHIPPED | OUTPATIENT
Start: 2022-03-31 | End: 2022-05-22

## 2022-03-31 RX ORDER — ONDANSETRON 2 MG/ML
4 INJECTION INTRAMUSCULAR; INTRAVENOUS ONCE
Status: COMPLETED | OUTPATIENT
Start: 2022-03-31 | End: 2022-03-31

## 2022-03-31 RX ADMIN — POTASSIUM CHLORIDE 40 MEQ: 20 TABLET, EXTENDED RELEASE ORAL at 14:53

## 2022-03-31 RX ADMIN — ONDANSETRON HYDROCHLORIDE 4 MG: 2 SOLUTION INTRAMUSCULAR; INTRAVENOUS at 16:38

## 2022-03-31 RX ADMIN — DIPHENHYDRAMINE HYDROCHLORIDE 25 MG: 50 INJECTION, SOLUTION INTRAMUSCULAR; INTRAVENOUS at 12:12

## 2022-03-31 RX ADMIN — SODIUM CHLORIDE 1000 ML: 9 INJECTION, SOLUTION INTRAVENOUS at 12:08

## 2022-03-31 RX ADMIN — METOCLOPRAMIDE HYDROCHLORIDE 10 MG: 5 INJECTION INTRAMUSCULAR; INTRAVENOUS at 12:12

## 2022-03-31 RX ADMIN — INSULIN HUMAN 10 UNITS: 100 INJECTION, SOLUTION PARENTERAL at 14:53

## 2022-03-31 RX ADMIN — FAMOTIDINE 20 MG: 10 INJECTION INTRAVENOUS at 12:12

## 2022-03-31 NOTE — ED NOTES
Patient states that she does not have a ride home that she usually get a taxi voucher. Charge nurse asked for a voucher, stated that there was none to give and that patient can wait in the waiting room.      [address] Joaquin RN  03/31/22 1951

## 2022-03-31 NOTE — ED PROVIDER NOTES
Department of Emergency Medicine   ED  Provider Note  Admit Date/RoomTime: 3/31/2022 11:22 AM  ED Room: [address] [place] [address]          History of Present Illness:  3/31/22, Time: 11:44 AM EDT  Chief Complaint   Patient presents with    Emesis     \"olive green\" since SAT none stop cant hold anything down, with HA, dizziness, ems zofran, DM , \" thinks Gastroparesis, scope in May\"  no pain         Chanda Arango is a 47 y.o. female presenting to the ED for Nausea with emesis. This is been present for the past several days since Saturday. Nothing to better or worse. The patient is an uncontrolled diabetic. She states she is currently being set up with gastroenterology for upper and lower scope. She has never had a colonoscopy in the past.  Denies any diarrhea. There is no melena or hematochezia that the patient describes. The patient states that she has been diagnosed with gastroparesis likely in the past but she states she is skeptical and feels she does not have gastroparesis. Patient denies any abdominal pain at all. She is eating ice chips and not tolerating it. She states that she is unable to tolerate anything ever but states she is very hungry at this moment. Review of Systems:  Review of systems obtained and negative unless stated otherwise above in the HPI.    --------------------------------------------- PAST HISTORY ---------------------------------------------  Past Medical History:  has a past medical history of Anxiety, Arthritis, Depression, Diabetes mellitus ([facility] Fibromyalgia, HTN (hypertension), Hypertensive chronic kidney disease, Left shoulder pain, LIZABETH (obstructive sleep apnea), Rheumatoid arthritis involving multiple sites ([facility] S[facility]'s [facility] ([facility] and Thyroid disease. Past Surgical History:  has a past surgical history that includes Shoulder arthroscopy (Right, 06/16/2017) and CT BIOPSY RENAL (11/18/2021).     Social History:  reports that she quit smoking about 24 years ago. She started smoking about 25 years ago. She has a 1.00 pack-year smoking history. She has never used smokeless tobacco. She reports that she does not drink alcohol and does not use drugs. Family History: She was adopted. Family history is unknown by patient. . Unless otherwise noted, family history is non contributory    The patients home medications have been reviewed. Allergies: Amoxicillin, Sulfa antibiotics, Cefdinir, Doxycycline, Other, Milk-related compounds, and Tomato    I have reviewed the past medical history, past surgical history, social history, and family history    ---------------------------------------------------PHYSICAL EXAM--------------------------------------    Constitutional: Appears in no distress  Head: Normocephalic, atraumatic  Eyes: Non-icteric slcera, no conjunctival injection  ENT: Dry mucous membranes  Neck: Trachea midline, no JVD  Respiratory: Nonlabored respirations. Lungs clear to auscultation bilaterally, no wheezes, rales, or rhonchi. Cardiovascular: Regular rate. Regular rhythm. No murmurs, no gallops, no rubs. Gastrointestinal: Abdomen Soft, Non tender, Non distended. No rebound tenderness, guarding, or rigidity. Extremities: No lower extremity edema  Genitourinary: No CVA tenderness, no suprapubic tenderness  Musculoskeletal: Moves all extremities, no deformity  Skin: Pink, warm, dry without rash. Neurologic: Alert, symmetric facies, no aphasia    -------------------------------------------------- RESULTS -------------------------------------------------  I have personally reviewed all laboratory and imaging results for this patient. Results are listed below.      LABS: (Lab results interpreted by me)  Results for orders placed or performed during the hospital encounter of 03/31/22   CBC with Auto Differential   Result Value Ref Range    WBC 13.0 (H) 4.5 - 11.5 E9/L    RBC 5.06 3.50 - 5.50 E12/L    Hemoglobin 11.8 11.5 - 15.5 g/dL    Hematocrit 37.7 34.0 - 48.0 %    MCV 74.5 (L) 80.0 - 99.9 fL    MCH 23.3 (L) 26.0 - 35.0 pg    MCHC 31.3 (L) 32.0 - 34.5 %    RDW 16.2 (H) 11.5 - 15.0 fL    Platelets 233 140 - 112 E9/L    MPV 10.1 7.0 - 12.0 fL    Neutrophils % 68.9 43.0 - 80.0 %    Immature Granulocytes % 0.8 0.0 - 5.0 %    Lymphocytes % 24.2 20.0 - 42.0 %    Monocytes % 5.4 2.0 - 12.0 %    Eosinophils % 0.2 0.0 - 6.0 %    Basophils % 0.5 0.0 - 2.0 %    Neutrophils Absolute 8.97 (H) 1.80 - 7.30 E9/L    Immature Granulocytes # 0.10 E9/L    Lymphocytes Absolute 3.15 1.50 - 4.00 E9/L    Monocytes Absolute 0.70 0.10 - 0.95 E9/L    Eosinophils Absolute 0.03 (L) 0.05 - 0.50 E9/L    Basophils Absolute 0.07 0.00 - 0.20 E9/L   Comprehensive Metabolic Panel   Result Value Ref Range    Sodium 138 132 - 146 mmol/L    Potassium 3.3 (L) 3.5 - 5.0 mmol/L    Chloride 100 98 - 107 mmol/L    CO2 21 (L) 22 - 29 mmol/L    Anion Gap 17 (H) 7 - 16 mmol/L    Glucose 358 (H) 74 - 99 mg/dL    BUN 25 (H) 6 - 20 mg/dL    CREATININE 1.0 0.5 - 1.0 mg/dL    GFR Non-African American 58 >=60 mL/min/1.73    GFR African American >60     Calcium 9.5 8.6 - 10.2 mg/dL    Total Protein 5.6 (L) 6.4 - 8.3 g/dL    Albumin 2.5 (L) 3.5 - 5.2 g/dL    Total Bilirubin 0.3 0.0 - 1.2 mg/dL    Alkaline Phosphatase 113 (H) 35 - 104 U/L    ALT 8 0 - 32 U/L    AST 10 0 - 31 U/L   Lipase   Result Value Ref Range    Lipase 23 13 - 60 U/L   Magnesium   Result Value Ref Range    Magnesium 1.9 1.6 - 2.6 mg/dL   PH, VENOUS   Result Value Ref Range    pH, Harris 7.44 7.35 - 6.80   Basic Metabolic Panel   Result Value Ref Range    Sodium 135 132 - 146 mmol/L    Potassium 3.3 (L) 3.5 - 5.0 mmol/L    Chloride 103 98 - 107 mmol/L    CO2 20 (L) 22 - 29 mmol/L    Anion Gap 12 7 - 16 mmol/L    Glucose 401 (H) 74 - 99 mg/dL    BUN 21 (H) 6 - 20 mg/dL    CREATININE 1.0 0.5 - 1.0 mg/dL    GFR Non-African American 58 >=60 mL/min/1.73    GFR African American >60     Calcium 8.2 (L) 8.6 - 10.2 mg/dL   ,       RADIOLOGY:  Interpreted by Radiologist unless otherwise specified  No orders to display         ------------------------- NURSING NOTES AND VITALS REVIEWED ---------------------------   The nursing notes within the ED encounter and vital signs as below have been reviewed by myself  BP (!) 138/101   Pulse 99   Temp 97.8 °F (36.6 °C) (Oral)   Resp 16   Ht 5' 1\" (1.549 m)   Wt 156 lb (70.8 kg)   SpO2 100%   BMI 29.48 kg/m²     Oxygen Saturation Interpretation: Normal    The patients available past medical records and past encounters were reviewed. ------------------------------ ED COURSE/MEDICAL DECISION MAKING----------------------  Medications   famotidine (PEPCID) 20 mg in sodium chloride (PF) 10 mL injection (20 mg IntraVENous Given 3/31/22 1212)   0.9 % sodium chloride bolus (0 mLs IntraVENous Stopped 3/31/22 1355)   metoclopramide (REGLAN) injection 10 mg (10 mg IntraVENous Given 3/31/22 1212)   diphenhydrAMINE (BENADRYL) injection 25 mg (25 mg IntraVENous Given 3/31/22 1212)   potassium chloride (KLOR-CON M) extended release tablet 40 mEq (40 mEq Oral Given 3/31/22 1453)   insulin regular (HUMULIN R;NOVOLIN R) injection 10 Units (10 Units IntraVENous Given 3/31/22 1453)   ondansetron (ZOFRAN) injection 4 mg (4 mg IntraVENous Given 3/31/22 1638)        Re-Evaluations: This patient's ED course included:a personal history and physicial examination and re-evaluation prior to disposition    This patient has remained hemodynamically stable during their ED course. Consultations:  None    Medical Decision Making:   Patient presents emerge department nausea vomiting. Vital signs interpreted by myself as mildly hypertensive otherwise normal.    Patient physical examination findings consistent with likely gastroparesis. Patient is a completely benign soft nonsurgical nontender abdomen at this time and I am reassured by this. Patient was given antiemetics.   Do not feel that a CT will be necessary at this time given the patient's benign abdomen. This is a chronic issue has been going on for several months. Labs reviewed: The patient has a mild anion gap metabolic acidosis. This is likely multifactorial but possibly early DKA. Most likely is related to his acute starvation ketosis and dehydration. Patient is hydrated and subsequently her anion gap resolves. She tolerated p.o. challenge. She will be discharged with outpatient follow-up. She already has a follow-up is scheduled for an endoscopy with GI. Counseling: The emergency provider has spoken with the patient and discussed todays results, in addition to providing specific details for the plan of care and counseling regarding the diagnosis and prognosis. Questions are answered at this time and they are agreeable with the plan.       --------------------------------- IMPRESSION AND DISPOSITION ---------------------------------    IMPRESSION  1. Nausea and vomiting, intractability of vomiting not specified, unspecified vomiting type        DISPOSITION  Disposition: Discharge to home  Patient condition is stable    Dr. Claus JUDD, am the primary provider of record    Claus Zavala DO  Emergency Medicine    NOTE: This report was transcribed using voice recognition software.  Every effort was made to ensure accuracy; however, inadvertent computerized transcription errors may be present         Yoselin Poole DO  03/31/22 [id number]

## 2022-04-01 DIAGNOSIS — E11.65 UNCONTROLLED TYPE 2 DIABETES MELLITUS WITH HYPERGLYCEMIA (HCC): ICD-10-CM

## 2022-04-01 RX ORDER — INSULIN GLARGINE 100 [IU]/ML
INJECTION, SOLUTION SUBCUTANEOUS
Qty: 30 ML | Refills: 1 | Status: SHIPPED
Start: 2022-04-01 | End: 2022-04-29

## 2022-04-01 NOTE — TELEPHONE ENCOUNTER
Last Appointment:  2/24/2022  Future Appointments   Date Time Provider Jenae Nazarioi   4/19/2022  1:00 PM [facility]   5/3/2022  8:30 AM Tish Bravo MD [facility]   5/26/2022  9:45 AM Milind Giron MD [address]

## 2022-04-06 ENCOUNTER — HOSPITAL ENCOUNTER (OUTPATIENT)
Age: 52
Discharge: HOME | End: 2022-04-06

## 2022-04-06 DIAGNOSIS — E03.9 HYPOTHYROIDISM, UNSPECIFIED TYPE: ICD-10-CM

## 2022-04-06 DIAGNOSIS — E11.65 TYPE 2 DIABETES MELLITUS WITH HYPERGLYCEMIA, WITH LONG-TERM CURRENT USE OF INSULIN (HCC): ICD-10-CM

## 2022-04-06 DIAGNOSIS — Z79.4 TYPE 2 DIABETES MELLITUS WITH HYPERGLYCEMIA, WITH LONG-TERM CURRENT USE OF INSULIN (HCC): ICD-10-CM

## 2022-04-18 DIAGNOSIS — M25.552 PAIN OF BOTH HIP JOINTS: ICD-10-CM

## 2022-04-18 DIAGNOSIS — E11.65 UNCONTROLLED TYPE 2 DIABETES MELLITUS WITH HYPERGLYCEMIA (HCC): ICD-10-CM

## 2022-04-18 DIAGNOSIS — M25.551 PAIN OF BOTH HIP JOINTS: ICD-10-CM

## 2022-04-18 RX ORDER — METFORMIN HYDROCHLORIDE 500 MG/1
1000 TABLET, EXTENDED RELEASE ORAL 2 TIMES DAILY
Qty: 120 TABLET | Refills: 1 | Status: ON HOLD
Start: 2022-04-18 | End: 2022-05-31 | Stop reason: HOSPADM

## 2022-04-18 RX ORDER — NAPROXEN 250 MG/1
TABLET ORAL
Qty: 60 TABLET | Refills: 5 | Status: SHIPPED | OUTPATIENT
Start: 2022-04-18

## 2022-04-18 RX ORDER — FAMOTIDINE 20 MG/1
TABLET, FILM COATED ORAL
Qty: 60 TABLET | Refills: 1 | Status: ON HOLD
Start: 2022-04-18 | End: 2022-05-31 | Stop reason: HOSPADM

## 2022-04-18 NOTE — TELEPHONE ENCOUNTER
Last Appointment:  2/24/2022  Future Appointments   Date Time Provider Jenae Nazarioi   4/19/2022  1:00 PM MD Tiago Mueller [place] [facility]   5/3/2022  8:30 AM Forest Chau MD [facility]   5/26/2022  9:45 AM Ramon Yee MD [address]

## 2022-04-19 ENCOUNTER — OFFICE VISIT (OUTPATIENT)
Dept: FAMILY MEDICINE CLINIC | Age: 52
End: 2022-04-19
Payer: MEDICAID

## 2022-04-19 VITALS
HEART RATE: 89 BPM | OXYGEN SATURATION: 98 % | DIASTOLIC BLOOD PRESSURE: 104 MMHG | TEMPERATURE: 98.4 F | SYSTOLIC BLOOD PRESSURE: 167 MMHG | RESPIRATION RATE: 18 BRPM | HEIGHT: 61 IN | BODY MASS INDEX: 30.21 KG/M2 | WEIGHT: 160 LBS

## 2022-04-19 DIAGNOSIS — Z76.0 MEDICATION REFILL: ICD-10-CM

## 2022-04-19 DIAGNOSIS — M54.50 LOW BACK PAIN, UNSPECIFIED BACK PAIN LATERALITY, UNSPECIFIED CHRONICITY, UNSPECIFIED WHETHER SCIATICA PRESENT: Primary | ICD-10-CM

## 2022-04-19 DIAGNOSIS — I10 HYPERTENSION, UNSPECIFIED TYPE: ICD-10-CM

## 2022-04-19 PROCEDURE — 99212 OFFICE O/P EST SF 10 MIN: CPT | Performed by: STUDENT IN AN ORGANIZED HEALTH CARE EDUCATION/TRAINING PROGRAM

## 2022-04-19 PROCEDURE — 1036F TOBACCO NON-USER: CPT | Performed by: FAMILY MEDICINE

## 2022-04-19 PROCEDURE — G8427 DOCREV CUR MEDS BY ELIG CLIN: HCPCS | Performed by: FAMILY MEDICINE

## 2022-04-19 PROCEDURE — 3017F COLORECTAL CA SCREEN DOC REV: CPT | Performed by: FAMILY MEDICINE

## 2022-04-19 PROCEDURE — G8417 CALC BMI ABV UP PARAM F/U: HCPCS | Performed by: FAMILY MEDICINE

## 2022-04-19 PROCEDURE — 99213 OFFICE O/P EST LOW 20 MIN: CPT | Performed by: STUDENT IN AN ORGANIZED HEALTH CARE EDUCATION/TRAINING PROGRAM

## 2022-04-19 RX ORDER — LIDOCAINE 50 MG/G
1 PATCH TOPICAL DAILY
Qty: 30 PATCH | Refills: 2 | Status: ON HOLD
Start: 2022-04-19 | End: 2022-05-20

## 2022-04-19 RX ORDER — BUPROPION HYDROCHLORIDE 100 MG/1
TABLET, EXTENDED RELEASE ORAL
COMMUNITY
Start: 2022-02-16 | End: 2022-04-29

## 2022-04-19 NOTE — PROGRESS NOTES
CC: LBP  ------------------------------------------------------------------------------------------------------------------------  Assessment/Plan  1. Low back pain, unspecified back pain laterality, unspecified chronicity, unspecified whether sciatica present  Physical therapy course    2. Hypertension, unspecified type  Return in 1 week for hypertension  Elevated states patient is in a moderate amount of pain, also needs a BMP recheck his potassium was low  3. Medication refill  - lidocaine (LIDODERM) 5 %; Place 1 patch onto the skin daily 12 hours on, 12 hours off. Dispense: 30 patch; Refill: 2  - diclofenac sodium (VOLTAREN) 1 % GEL; Apply 4 g topically 4 times daily for 7 days  Dispense: 112 g; Refill: 0  - Multiple Vitamins-Minerals (HM MULTIVITAMIN ADULT GUMMY) CHEW; Take 1 Package by mouth daily for 14 days  Dispense: 14 tablet; Refill: 3      RTO week for blood pressure check  -------------------------------------------------------------------------------------------------------------------------    HPI:  47 y.o. woman with an extensive past medical history including insulin-dependent type 2 diabetes uncontrolled, fibromyalgia hypertensive chronic kidney disease, LIZABETH noncompliant with CPAP, thyroid disease, rheumatoid arthritis presents for follow-up of hypertension and concerns about acute on chronic low back pain    Low Back Pain  Recently got x ray done at [facility] plain film in today  Worsening over the last 1 month \"since I started with a Chiropractor\"    Does get some relief from Chiropractor treatments 1x weekly  Howeverthis relief is brief and returns within 1 to 2 days.     Pain is worse getting up from sitting in a chair  Sharp mid back pain when she gets up from the chair, makes her pause, and has to use her arms to pull up  Is easy for patient to get up from the floor  Alleviation of pain with leaning forward    Prior to this only had back pain with 'weather changing'     Some relief with lidocaine patch    Not tried heat ice ibuprofen or NSAIDs  No bowel bladder incontinence numbness tingling in her saddle area    HTN  Taking meds  BP elevated today even on recheck  No chest pain shortness of breath lightheadedness syncope        Patient Active Problem List    Diagnosis Date Noted    Dietary noncompliance 01/08/2022    Chronic fatigue syndrome 12/15/2021    Diabetic renal disease ([address] 12/15/2021    Essential hypertension 12/15/2021    Hyperglycemia due to type 2 diabetes mellitus ([address] 12/15/2021    Major depression, single episode 12/15/2021    Neuropathy 70/67/1522    Metabolic acidosis 45/75/4514    Rheumatoid arthritis involving multiple sites ([address] 09/21/2021    COVID-19 06/17/2021    Hypoglycemia 06/17/2021    Anemia 06/16/2021    Proteinuria 06/16/2021    Hypokalemia 03/10/2021    Encounter for long-term (current) use of insulin ([facility] 03/08/2021    Anxiety disorder 03/08/2021    Benign hypertensive kidney disease with chronic kidney disease 03/08/2021    Chronic kidney disease, stage I 03/08/2021    Fibromyalgia 03/08/2021    Mixed hyperlipidemia 03/08/2021    Nephrotic syndrome due to type 2 diabetes mellitus ([address] 03/08/2021    Hypothyroidism 03/08/2021    Type 2 diabetes mellitus with chronic kidney disease ([address] 03/08/2021    Mild depression ([address] 10/02/2020    Severe obstructive sleep apnea 07/16/2020    Infective urethritis 06/28/2018    Acquired hypothyroidism 12/20/2016    Vitamin D deficiency 12/20/2016    Diabetic ketosis ([address] 09/01/2015    Type 2 diabetes mellitus with complication, with long-term current use of insulin ([address] 05/30/2014    Depression 05/30/2014    Sjogren's syndrome ([address] 05/30/2014    Hyperglycemia 05/30/2014    Depressive disorder 05/30/2014       Past Medical History:   Diagnosis Date    Anxiety     Arthritis     Depression     Diabetes mellitus (HCC)     Fibromyalgia     HTN (hypertension)     Hypertensive chronic kidney disease     Left shoulder pain     LIZABETH (obstructive sleep apnea)     Rheumatoid arthritis involving multiple sites ([facility]     Sjogren's disease ([facility]     Thyroid disease        Current Outpatient Medications on File Prior to Visit   Medication Sig Dispense Refill    famotidine (PEPCID) 20 MG tablet TAKE 1 TABLET BY MOUTH TWICE DAILY 60 tablet 1    metFORMIN (GLUCOPHAGE-XR) 500 MG extended release tablet TAKE 2 TABLETS BY MOUTH 2 TIMES DAILY 120 tablet 1    naproxen (NAPROSYN) 250 MG tablet TAKE 1 TABLET BY MOUTH TWICE A DAY WITH MEALS 60 tablet 5    LANTUS SOLOSTAR 100 UNIT/ML injection pen INJECT 55 UNITS SUB-Q TWICE A DAY 30 mL 1    prochlorperazine (COMPAZINE) 10 MG tablet Take 1 tablet by mouth every 6 hours as needed (nausea) 16 tablet 0    levothyroxine (SYNTHROID) 88 MCG tablet TAKE 1 TABLET BY MOUTH DAILY 30 tablet 2    JARDIANCE 25 MG tablet TAKE 1 TABLET BY MOUTH DAILY 30 tablet 5    ONETOUCH ULTRA strip USE TO TEST THREE TIMES A  each 2    calcium elemental (OSCAL) 500 MG TABS tablet TAKE 1 TABLET BY MOUTH TWICE A DAY WITH LUNCH AND DINNER 60 tablet 5    lisinopril (PRINIVIL;ZESTRIL) 10 MG tablet Take 2 tablets by mouth daily 90 tablet 2    ondansetron (ZOFRAN) 4 MG tablet Take 1 tablet by mouth 2 times daily as needed for Nausea or Vomiting 14 tablet 0    lamoTRIgine (LAMICTAL) 100 MG tablet TAKE 1 TABLET BY MOUTH EVERY DAY 30 tablet 5    OZEMPIC, 1 MG/DOSE, 4 MG/3ML SOPN INJECT 1 MG UNDER THE SKIN ONCE WEEKLY 3 mL 1    HUMALOG KWIKPEN 200 UNIT/ML SOPN pen INJECT 40 UNITS SUB-Q THREE TIMES A DAY WITH MEALS 18 mL 0    buPROPion (WELLBUTRIN) 100 MG tablet Take 100 mg by mouth daily      Insulin Pen Needle 31G X 5 MM MISC 1 each by Does not apply route 3 times daily 100 each 3    melatonin 3 MG TABS tablet TAKE ONE TABLET BY MOUTH EVERY NIGHT AT BEDTIME 30 tablet 0    Continuous Blood Gluc Sensor (FREESTYLE NIMISHA 2 SENSOR) MISC 1 each by Does not apply route continuous 2 each 3    DULoxetine (CYMBALTA) 60 MG extended release capsule TAKE 1 CAPSULE BY MOUTH EVERY MORNING 60MG IN AM   30MG IN PM   90MG/DAY (Patient taking differently: 60 mg 2 times daily TAKE 1 CAPSULE BY MOUTH EVERY MORNING 60MG IN AM   30MG IN PM   90MG/DAY) 30 capsule 1    hydrocortisone 2.5 % cream Apply topically 2 times daily. 45 g 0    Blood Pressure KIT 1 box by Does not apply route 2 times daily 1 kit 0    hydroxychloroquine (PLAQUENIL) 200 MG tablet Take 200 mg by mouth 2 times daily      diclofenac sodium 1 % GEL Apply 4 g topically 4 times daily 4 Tube 1    methotrexate (RHEUMATREX) 2.5 MG chemo tablet Take 15 mg by mouth once a week       folic acid (FOLVITE) 1 MG tablet Take 1 tablet by mouth daily 30 tablet 1    FREESTYLE LANCETS MISC CHECK BS 4 TIMES DAILY 100 each 3    ondansetron (ZOFRAN-ODT) 4 MG disintegrating tablet Take 1 tablet by mouth 3 times daily as needed for Nausea or Vomiting 12 tablet 0    ondansetron (ZOFRAN-ODT) 4 MG disintegrating tablet PLACE 1 TABLET UNDER THE TONGUE 3 TIMES DAILY AS NEEDED FOR NAUSEA OR VOMITING 30 tablet 0    Multiple Vitamins-Minerals (HM MULTIVITAMIN ADULT GUMMY) CHEW Take 1 Package by mouth daily for 14 days 14 tablet 3    [DISCONTINUED] melatonin 3 MG TABS tablet Take 1 tablet by mouth nightly as needed (insomnia) 30 tablet 0     No current facility-administered medications on file prior to visit.        Allergies   Allergen Reactions    Amoxicillin Other (See Comments)     Hives all over body, states that hives are severe     Sulfa Antibiotics     Cefdinir     Doxycycline Other (See Comments)     Pt states no allergy to this med    Other Other (See Comments)    Milk-Related Compounds     Tomato        Family History   Adopted: Yes   Family history unknown: Yes       Past Surgical History:   Procedure Laterality Date    CT BIOPSY RENAL  11/18/2021    CT BIOPSY RENAL 11/18/2021 Bryon Flores MD [facility]    SHOULDER ARTHROSCOPY Right 2017       Social History     Tobacco Use    Smoking status: Former Smoker     Packs/day: 2.00     Years: 0.50     Pack years: 1.00     Start date:      Quit date:      Years since quittin.3    Smokeless tobacco: Never Used   Vaping Use    Vaping Use: Never used   Substance Use Topics    Alcohol use: No    Drug use: No       ROS:    Review of Systems   Constitutional: Negative for activity change, appetite change, chills and fatigue. HENT: Negative for congestion and sore throat. Respiratory: Negative for choking and chest tightness. Cardiovascular: Negative for chest pain, palpitations and leg swelling. Gastrointestinal: Negative for abdominal distention and abdominal pain. Genitourinary: Negative for difficulty urinating and dysuria. Musculoskeletal: Back pain negative for arthralgias   skin: Negative for color change and pallor. Neurological: Negative for facial asymmetry and headaches. Psychiatric/Behavioral: Negative for behavioral problems. The patient is not nervous/anxious. Objective:    VS:  Blood pressure (!) 157/94, pulse 89, temperature 98.4 °F (36.9 °C), temperature source Temporal, resp. rate 18, height 5' 1\" (1.549 m), weight 160 lb (72.6 kg), SpO2 98 %, not currently breastfeeding. Physical Exam   Constitutional: Oriented to person, place, and time. Well-developed and well-nourished. HENT:   Head: Normocephalic and atraumatic. Eyes: EOM are normal.   Neck: Neck supple. Cardiovascular: Normal rate, regular rhythm and intact distal pulses. Exam reveals no gallop and no friction rub. No murmur heard. Pulmonary/Chest: Effort normal and breath sounds normal. No wheezes. No rhales. Abdominal: Soft. Bowel sounds are normal. No distension. No abdominal tenderness. Musculoskeletal: Paraspinal tenderness ROM of low back intact. No pain with internal or external rotation of the hip.   Gait assessed able to walk freely without a walker  Neurological: Alert and oriented to person, place, and time. Skin: Skin is warm and dry. No rash noted. No erythema. Psychiatric: Normal mood and affect. Behavior is normal.   Nursing note and vitals reviewed. Plans:  As above. Please see Patient Instructions for further counseling and information given. Advised to please be adherent to the treatment plans discussed today, and please call with any questions or concerns, letting the office know of any reasons that the plans may not be followed. The risks of untreated conditions include worsening illness, injury, disability, and possibly, death. Please call if symptoms change in any way, worsen, or fail to completely resolve, as this could necessitate a change to treatment plans. Patient and/or caregiver expressed understanding. Indications and proper use of medication(s) reviewed. Potential side-effects and risks of medication(s) also explained. Patient and/or caregiver was instructed to call if any new symptoms develop prior to next visit. Health risk factors discussed and addressed.

## 2022-04-19 NOTE — PROGRESS NOTES
Attending Physician Statement    S:   Chief Complaint   Patient presents with    Back Pain    Medication Refill      52/F who presents to clinic today for follow-up of hypertension and back pain. HTN - Compliant with medications without reported side effects. Asymptomatic in the office today. Patient is stressed about her back pain. Back pain - Acute exacerbation of chronic pain after recent evaluation with chiropractor. Patient denies fevers, chills, point tenderness over the spine, saddle anesthesia, loss of bowel or bladder function, or decreased muscle strength/sensation in the lower extremities. Declining oral medications. O: Blood pressure (!) 167/104, pulse 89, temperature 98.4 °F (36.9 °C), temperature source Temporal, resp. rate 18, height 5' 1\" (1.549 m), weight 160 lb (72.6 kg), SpO2 98 %, not currently breastfeeding. Exam:   Heart - RRR   Lungs - clear   MSK - TTP paraspinal muscles in the lumbar region, ROM of lumbar spine grossly intact, negative straight leg raise bilaterally. A: HTN, Back Pain  P:  Lidocaine patches, topical diclofenac   Referral to sports medicine vs pain management    Follow-up as ordered    I have discussed the case, including pertinent history and exam findings with the resident. I agree with the documented assessment and plan.

## 2022-04-20 DIAGNOSIS — E11.649 UNCONTROLLED TYPE 2 DIABETES MELLITUS WITH HYPOGLYCEMIA, UNSPECIFIED HYPOGLYCEMIA COMA STATUS (HCC): ICD-10-CM

## 2022-04-20 DIAGNOSIS — Z91.199 NON-COMPLIANCE: ICD-10-CM

## 2022-04-20 RX ORDER — FLASH GLUCOSE SENSOR
KIT MISCELLANEOUS
Qty: 2 EACH | Refills: 3 | Status: SHIPPED | OUTPATIENT
Start: 2022-04-20

## 2022-04-20 NOTE — TELEPHONE ENCOUNTER
Last Appointment:  4/19/2022  Future Appointments   Date Time Provider Jenae Kang   5/2/2022  1:00 PM MD Carol Teresa [facility]   5/3/2022  8:30 AM Talia Irizarry MD [facility]   5/26/2022  9:45 AM Renetta Linda MD [address]

## 2022-04-26 ENCOUNTER — TELEPHONE (OUTPATIENT)
Dept: GASTROENTEROLOGY | Age: 52
End: 2022-04-26

## 2022-04-26 NOTE — TELEPHONE ENCOUNTER
Spoke to patient and notified her that the appointment scheduled for 5/26 with Dr Steph Barnes has been changed to 5/16 with NP

## 2022-04-29 RX ORDER — INSULIN GLARGINE 100 [IU]/ML
50 INJECTION, SOLUTION SUBCUTANEOUS 2 TIMES DAILY
Status: ON HOLD | COMMUNITY
End: 2022-05-31 | Stop reason: SDUPTHER

## 2022-04-29 NOTE — PROGRESS NOTES
[address] PRE-ADMISSION TESTING   ENDOSCOPY/ COLONSCOPY INSTRUCTIONS  PAT- Phone Number: [phone]    ENDOSCOPY/ COLONSCOPY INSTRUCTIONS:     [x] Bowel Prep instructions reviewed. [] Colonoscopy- The day prior: No solid foods. Clear liquids only. [x] Nothing by mouth after midnight. Including no gum, candy, mints, or water. [x] You may brush your teeth, gargle, but do NOT swallow water. [x] Do not wear makeup, lotions, powders, deodorant. [x] Arrange transportation with a responsible adult  to and from the hospital. If you do not have a responsible adult  to transport you, you will need to make arrangements with a medical transportation company. Arrange for someone to be with you for the remainder of the day and for 24 hours after your procedure due to having had anesthesia. -Who will be your  for transportation? _Anne_______   -Who will be staying with you for 24 hrs after your procedure?__________________    PARKING INSTRUCTIONS:     [x] ARRIVAL TIME: ____1030___   · [x] Enter into the [facility] [facility]. Two people may accompany you. Masks are required. · [x] Parking Lot \"I\" is where you will park. It is located on the corner of [address] and [address]. The entrance is on [address]. · To enter, press the button and the gate will lift. A free token will be provided to exit the lot. EDUCATION INSTRUCTIONS:    [x] Bring a complete list of your medications, please write the last time you took the medicine, give this list to the nurse. [x] Take the following medications the morning of surgery with 1-2 ounces of water: Lamictal, Cymbalta & Pepcid   [x] Stop herbal supplements and vitamins 5 days before your surgery. [x] DO NOT take any diabetic medicine the morning of surgery. Follow instructions for insulin the day before surgery.  TAKE 1/2 PM BEFORE SURGERY  [x] If you are diabetic and your blood sugar is low or you feel symptomatic, you may drink 1-2 ounces of apple juice or take a glucose tablet. The morning of your procedure, you may call the pre-op area if you have concerns about your blood sugar [phone]. [] Use your inhalers the morning of surgery. Bring your emergency inhaler with you day of surgery. [x] Follow physician instructions regarding any blood thinners you may be taking. WHAT TO EXPECT:    [x] The day of your procedure you will be greeted and checked in by the [employer].  In addition, you will be registered in the [place] by a Patient Access Representative. Please bring your photo ID and insurance card. A nurse will greet you in accordance to the time you are needed in the pre-op area to prepare you for surgery. Please do not be discouraged if you are not greeted in the order you arrive as there are many variables that are involved in patient preparation. Your patience is greatly appreciated as you wait for your nurse. Please bring in items such as: books, magazines, newspapers, electronics, or any other items  to occupy your time in the waiting area. [x]  Delays may occur. Staff will make a sincere effort to keep you informed of delays. If any delays occur with your procedure, we apologize ahead of time for your inconvenience as we recognize the value of your time.

## 2022-05-02 ENCOUNTER — OFFICE VISIT (OUTPATIENT)
Dept: FAMILY MEDICINE CLINIC | Age: 52
End: 2022-05-02
Payer: MEDICAID

## 2022-05-02 VITALS
WEIGHT: 152 LBS | HEIGHT: 61 IN | DIASTOLIC BLOOD PRESSURE: 105 MMHG | BODY MASS INDEX: 28.7 KG/M2 | OXYGEN SATURATION: 97 % | RESPIRATION RATE: 18 BRPM | HEART RATE: 94 BPM | SYSTOLIC BLOOD PRESSURE: 158 MMHG | TEMPERATURE: 98.4 F

## 2022-05-02 DIAGNOSIS — Z91.199 NONCOMPLIANCE: ICD-10-CM

## 2022-05-02 DIAGNOSIS — I10 HYPERTENSION, UNSPECIFIED TYPE: Primary | ICD-10-CM

## 2022-05-02 PROCEDURE — 99212 OFFICE O/P EST SF 10 MIN: CPT | Performed by: STUDENT IN AN ORGANIZED HEALTH CARE EDUCATION/TRAINING PROGRAM

## 2022-05-02 PROCEDURE — G8427 DOCREV CUR MEDS BY ELIG CLIN: HCPCS | Performed by: FAMILY MEDICINE

## 2022-05-02 PROCEDURE — G8417 CALC BMI ABV UP PARAM F/U: HCPCS | Performed by: FAMILY MEDICINE

## 2022-05-02 PROCEDURE — 1036F TOBACCO NON-USER: CPT | Performed by: FAMILY MEDICINE

## 2022-05-02 PROCEDURE — 99213 OFFICE O/P EST LOW 20 MIN: CPT | Performed by: STUDENT IN AN ORGANIZED HEALTH CARE EDUCATION/TRAINING PROGRAM

## 2022-05-02 PROCEDURE — 3017F COLORECTAL CA SCREEN DOC REV: CPT | Performed by: FAMILY MEDICINE

## 2022-05-02 RX ORDER — LISINOPRIL 20 MG/1
40 TABLET ORAL DAILY
Qty: 60 TABLET | Refills: 0 | Status: ON HOLD
Start: 2022-05-02 | End: 2022-05-31 | Stop reason: HOSPADM

## 2022-05-02 RX ORDER — DAPAGLIFLOZIN 5 MG/1
TABLET, FILM COATED ORAL
Status: ON HOLD | COMMUNITY
Start: 2022-04-18 | End: 2022-05-16

## 2022-05-02 NOTE — PROGRESS NOTES
CC:  HTN follow up  ------------------------------------------------------------------------------------------------------------------------  Assessment/Plan   Diagnosis Orders   1. Hypertension, unspecified type  Basic Metabolic Panel    Magnesium    lisinopril (PRINIVIL;ZESTRIL) 20 MG tablet   2. Noncompliance         RTO 1 month HTN follow up  -------------------------------------------------------------------------------------------------------------------------    HPI:    47 y.o. woman with an extensive past medical history including insulin-dependent type 2 diabetes uncontrolled, fibromyalgia hypertensive chronic kidney disease, LIZABETH noncompliant with CPAP, thyroid disease, rheumatoid arthritis presents for follow-up of hypertension and concerns about acute on chronic low back pain       HTN- compliant with meds no chest pain sob n/v syncope.  Lisinopril 40 mg      Patient Active Problem List    Diagnosis Date Noted    Dietary noncompliance 01/08/2022    Chronic fatigue syndrome 12/15/2021    Diabetic renal disease ([facility] 12/15/2021    Essential hypertension 12/15/2021    Hyperglycemia due to type 2 diabetes mellitus ([facility] 12/15/2021    Major depression, single episode 12/15/2021    Neuropathy 58/11/2861    Metabolic acidosis 73/70/8347    Rheumatoid arthritis involving multiple sites ([facility] 09/21/2021    COVID-19 06/17/2021    Hypoglycemia 06/17/2021    Anemia 06/16/2021    Proteinuria 06/16/2021    Hypokalemia 03/10/2021    Encounter for long-term (current) use of insulin ([facility] 03/08/2021    Anxiety disorder 03/08/2021    Benign hypertensive kidney disease with chronic kidney disease 03/08/2021    Chronic kidney disease, stage I 03/08/2021    Fibromyalgia 03/08/2021    Mixed hyperlipidemia 03/08/2021    Nephrotic syndrome due to type 2 diabetes mellitus ([facility] 03/08/2021    Hypothyroidism 03/08/2021    Type 2 diabetes mellitus with chronic kidney disease ([facility] 03/08/2021    Mild depression ([facility] 10/02/2020    Severe obstructive sleep apnea 07/16/2020    Infective urethritis 06/28/2018    Acquired hypothyroidism 12/20/2016    Vitamin D deficiency 12/20/2016    Diabetic ketosis ([facility] 09/01/2015    Type 2 diabetes mellitus with complication, with long-term current use of insulin ([facility] 05/30/2014    Depression 05/30/2014    Sjogren's syndrome ([facility] 05/30/2014    Hyperglycemia 05/30/2014    Depressive disorder 05/30/2014       Past Medical History:   Diagnosis Date    Anxiety     Arthritis     Depression     Diabetes mellitus (HCC)     Fibromyalgia     HTN (hypertension)     Hypertensive chronic kidney disease     Left shoulder pain     LIZABETH (obstructive sleep apnea)     Rheumatoid arthritis involving multiple sites ([facility]     Sjogren's disease ([facility]     Thyroid disease        Current Outpatient Medications on File Prior to Visit   Medication Sig Dispense Refill    Insulin Lispro (HUMALOG KWIKPEN SC) Inject 25 Units into the skin 3 times daily (before meals) Plus sliding scale      insulin glargine (LANTUS) 100 UNIT/ML injection vial Inject 50 Units into the skin 2 times daily      Continuous Blood Gluc Sensor (FREESTYLE NIMISHA 2 SENSOR) [facility] USE TO TEST SUGAR CONTINUOUSLY AS DIRECTED 2 each 3    lidocaine (LIDODERM) 5 % Place 1 patch onto the skin daily 12 hours on, 12 hours off.  30 patch 2    diclofenac sodium (VOLTAREN) 1 % GEL Apply 4 g topically 4 times daily for 7 days 112 g 0    famotidine (PEPCID) 20 MG tablet TAKE 1 TABLET BY MOUTH TWICE DAILY 60 tablet 1    metFORMIN (GLUCOPHAGE-XR) 500 MG extended release tablet TAKE 2 TABLETS BY MOUTH 2 TIMES DAILY 120 tablet 1    naproxen (NAPROSYN) 250 MG tablet TAKE 1 TABLET BY MOUTH TWICE A DAY WITH MEALS 60 tablet 5    ondansetron (ZOFRAN-ODT) 4 MG disintegrating tablet Take 1 tablet by mouth 3 times daily as needed for Nausea or Vomiting 12 tablet 0    prochlorperazine (COMPAZINE) 10 MG tablet Take 1 tablet by mouth every 6 hours as needed (nausea) 16 tablet 0    levothyroxine (SYNTHROID) 88 MCG tablet TAKE 1 TABLET BY MOUTH DAILY 30 tablet 2    JARDIANCE 25 MG tablet TAKE 1 TABLET BY MOUTH DAILY 30 tablet 5    ONETOUCH ULTRA strip USE TO TEST THREE TIMES A  each 2    calcium elemental (OSCAL) 500 MG TABS tablet TAKE 1 TABLET BY MOUTH TWICE A DAY WITH LUNCH AND DINNER 60 tablet 5    lisinopril (PRINIVIL;ZESTRIL) 10 MG tablet Take 2 tablets by mouth daily 90 tablet 2    lamoTRIgine (LAMICTAL) 100 MG tablet TAKE 1 TABLET BY MOUTH EVERY DAY 30 tablet 5    OZEMPIC, 1 MG/DOSE, 4 MG/3ML SOPN INJECT 1 MG UNDER THE SKIN ONCE WEEKLY 3 mL 1    Insulin Pen Needle 31G X 5 MM MISC 1 each by Does not apply route 3 times daily 100 each 3    melatonin 3 MG TABS tablet TAKE ONE TABLET BY MOUTH EVERY NIGHT AT BEDTIME 30 tablet 0    DULoxetine (CYMBALTA) 60 MG extended release capsule TAKE 1 CAPSULE BY MOUTH EVERY MORNING 60MG IN AM   30MG IN PM   90MG/DAY (Patient taking differently: 60 mg 2 times daily TAKE 1 CAPSULE BY MOUTH EVERY MORNING 60MG IN AM   30MG IN PM   90MG/DAY) 30 capsule 1    [DISCONTINUED] melatonin 3 MG TABS tablet Take 1 tablet by mouth nightly as needed (insomnia) 30 tablet 0    hydrocortisone 2.5 % cream Apply topically 2 times daily. 45 g 0    Blood Pressure KIT 1 box by Does not apply route 2 times daily 1 kit 0    hydroxychloroquine (PLAQUENIL) 200 MG tablet Take 200 mg by mouth 2 times daily      methotrexate (RHEUMATREX) 2.5 MG chemo tablet Take 2.5 mg by mouth once a week Sundays      folic acid (FOLVITE) 1 MG tablet Take 1 tablet by mouth daily 30 tablet 1    FREESTYLE LANCETS MISC CHECK BS 4 TIMES DAILY 100 each 3     No current facility-administered medications on file prior to visit.        Allergies   Allergen Reactions    Amoxicillin Other (See Comments)     Hives all over body, states that hives are severe     Sulfa Antibiotics     Cefdinir     Doxycycline Other (See Comments)     Pt states no allergy to this med    Other Other (See Comments)    Milk-Related Compounds     Tomato        Family History   Adopted: Yes   Family history unknown: Yes       Past Surgical History:   Procedure Laterality Date    CT BIOPSY RENAL  2021    CT BIOPSY RENAL 2021 Lucius Xiong MD [facility]    SHOULDER ARTHROSCOPY Right 2017       Social History     Tobacco Use    Smoking status: Former Smoker     Packs/day: 2.00     Years: 0.50     Pack years: 1.00     Start date:      Quit date:      Years since quittin.3    Smokeless tobacco: Never Used   Vaping Use    Vaping Use: Never used   Substance Use Topics    Alcohol use: No    Drug use: No       ROS:    Review of Systems   Constitutional: Negative for activity change, appetite change, chills and fatigue. HENT: Negative for congestion and sore throat. Respiratory: Negative for choking and chest tightness. Cardiovascular: Negative for chest pain, palpitations and leg swelling. Gastrointestinal: Negative for abdominal distention and abdominal pain. Genitourinary: Negative for difficulty urinating and dysuria. Musculoskeletal: Negative for arthralgias and back pain. Skin: Negative for color change and pallor. Neurological: Negative for facial asymmetry and headaches. Psychiatric/Behavioral: Negative for behavioral problems. The patient is not nervous/anxious. Objective:    VS:  Blood pressure (!) 158/105, pulse 94, temperature 98.4 °F (36.9 °C), temperature source Temporal, resp. rate 18, height 5' 1\" (1.549 m), weight 152 lb (68.9 kg), SpO2 97 %, not currently breastfeeding. Physical Exam   Constitutional: Oriented to person, place, and time. Well-developed and well-nourished. HENT:   Head: Normocephalic and atraumatic. Eyes: EOM are normal.   Neck: Neck supple. Cardiovascular: Normal rate, regular rhythm and intact distal pulses. Exam reveals no gallop and no friction rub.    No murmur heard.  Pulmonary/Chest: Effort normal and breath sounds normal. No wheezes. No rhales. Abdominal: Soft. Bowel sounds are normal. No distension. No abdominal tenderness. Musculoskeletal: Normal range of motion. General: No edema. Neurological: Alert and oriented to person, place, and time. Skin: Skin is warm and dry. No rash noted. No erythema. Psychiatric: Normal mood and affect. Behavior is normal.   Nursing note and vitals reviewed. Plans:  As above. Please see Patient Instructions for further counseling and information given. Advised to please be adherent to the treatment plans discussed today, and please call with any questions or concerns, letting the office know of any reasons that the plans may not be followed. The risks of untreated conditions include worsening illness, injury, disability, and possibly, death. Please call if symptoms change in any way, worsen, or fail to completely resolve, as this could necessitate a change to treatment plans. Patient and/or caregiver expressed understanding. Indications and proper use of medication(s) reviewed. Potential side-effects and risks of medication(s) also explained. Patient and/or caregiver was instructed to call if any new symptoms develop prior to next visit. Health risk factors discussed and addressed.

## 2022-05-02 NOTE — PROGRESS NOTES
S: 47 y.o. female here for HTN. Not controlled. O: VS: BP (!) 158/105 (Site: Left Upper Arm, Position: Sitting, Cuff Size: Medium Adult)   Pulse 94   Temp 98.4 °F (36.9 °C) (Temporal)   Resp 18   Ht 5' 1\" (1.549 m)   Wt 152 lb (68.9 kg)   SpO2 97%   BMI 28.72 kg/m²    General: NAD, alert and interacting appropriately. CV:  RRR, no gallops, rubs, or murmurs    Resp: CTAB   Abd:  Soft, nontender   Ext:  No edema    Impression: HTN  Plan:   Increase lisinopril  rtc 1 mo for HTN    Attending Physician Statement  I have discussed the case, including pertinent history and exam findings with the resident. I agree with the documented assessment and plan.

## 2022-05-03 ENCOUNTER — OFFICE VISIT (OUTPATIENT)
Dept: ENDOCRINOLOGY | Age: 52
End: 2022-05-03
Payer: MEDICAID

## 2022-05-03 VITALS
OXYGEN SATURATION: 98 % | HEIGHT: 61 IN | BODY MASS INDEX: 28.51 KG/M2 | SYSTOLIC BLOOD PRESSURE: 163 MMHG | WEIGHT: 151 LBS | HEART RATE: 96 BPM | DIASTOLIC BLOOD PRESSURE: 97 MMHG

## 2022-05-03 DIAGNOSIS — E11.65 TYPE 2 DIABETES MELLITUS WITH HYPERGLYCEMIA, WITH LONG-TERM CURRENT USE OF INSULIN (HCC): Primary | ICD-10-CM

## 2022-05-03 DIAGNOSIS — R80.9 MICROALBUMINURIA: ICD-10-CM

## 2022-05-03 DIAGNOSIS — E55.9 VITAMIN D DEFICIENCY: ICD-10-CM

## 2022-05-03 DIAGNOSIS — Z91.119 DIETARY NONCOMPLIANCE: ICD-10-CM

## 2022-05-03 DIAGNOSIS — E03.9 HYPOTHYROIDISM, UNSPECIFIED TYPE: ICD-10-CM

## 2022-05-03 DIAGNOSIS — Z79.4 TYPE 2 DIABETES MELLITUS WITH HYPERGLYCEMIA, WITH LONG-TERM CURRENT USE OF INSULIN (HCC): Primary | ICD-10-CM

## 2022-05-03 LAB — HBA1C MFR BLD: 15 %

## 2022-05-03 PROCEDURE — 83036 HEMOGLOBIN GLYCOSYLATED A1C: CPT | Performed by: NURSE PRACTITIONER

## 2022-05-03 PROCEDURE — 99214 OFFICE O/P EST MOD 30 MIN: CPT | Performed by: NURSE PRACTITIONER

## 2022-05-03 PROCEDURE — 3046F HEMOGLOBIN A1C LEVEL >9.0%: CPT | Performed by: NURSE PRACTITIONER

## 2022-05-03 NOTE — PROGRESS NOTES
[address] [place] [address] Department of Endocrinology Diabetes and Metabolism   [address] [facility] [address]   Phone: [phone]  Fax: [phone]    Date of Service: 5/3/2022  Primary Care Physician: Trinity Garibay MD  Referring physician: No ref. provider found  Provider: ANA Laboy NP    Reason for the visit:    Uncontrolled DM     History of Present Illness: The history is provided by the patient. No  was used. Accuracy of the patient data is excellent. Cole Ontiveros is a very pleasant 47 y.o. female seen today for diabetes management     Cole Ontiveros was diagnosed with diabetes in 2010 and currently on Metformin 1000 mg BID, Ozempic 1 mg/wk, Jardiance 25 mg, Lantus 50u BID, Humalog 25u with meals + ss 3:50>150     The patient has been checking blood sugar 1-2 times/day via fingersticks  Her glucometer download was reviewed No BS logged    She is noncompliant with her DM regimen     Hba1C > 15 % today     Most recent A1c results summarized below  Lab Results   Component Value Date    LABA1C 15.0 05/03/2022    LABA1C 14.5 12/15/2021    LABA1C 13.6 09/13/2021     Patient has had no hypoglycemic episodes   The patient hasn't been mindful of what has been eating and wasn't following diabetes diet   She has possible gastroparesis, suspicious per pt. Has upcoming [facility] 5/6/22. If gastroparesis is dx need to stop Ozempic.   I reviewed current medications and the patient has no issues with diabetes medications  Cole Ontiveros is up to date with eye exam. + history of diabetic retinopathy   The patient seeing podiatrist every   performs  own feet care  Microvascular complications:  + Retinopathy, + Nephropathy + Neuropathy   Macrovascular complications: no CAD,  or Stroke    PAST MEDICAL HISTORY   Past Medical History:   Diagnosis Date    Anxiety     Arthritis     Depression     Diabetes mellitus ([address]     Fibromyalgia     HTN (hypertension)  Hypertensive chronic kidney disease     Left shoulder pain     LIZABETH (obstructive sleep apnea)     [facility]umatoid arthritis involving multiple sites [facility]     Sjogren's disease ([facility]     Thyroid disease        PAST SURGICAL HISTORY   Past Surgical History:   Procedure Laterality Date    CT BIOPSY RENAL  11/18/2021    CT BIOPSY RENAL 11/18/2021 Geovany Ge MD [facility]    SHOULDER ARTHROSCOPY Right 06/16/2017       SOCIAL HISTORY   Tobacco:   reports that she quit smoking about 24 years ago. She started smoking about 25 years ago. She has a 1.00 pack-year smoking history. She has never used smokeless tobacco.  Alcohol:   reports no history of alcohol use. Drugs:   reports no history of drug use. FAMILY HISTORY   Family History   Adopted: Yes   Family history unknown:  Yes       ALLERGIES AND DRUG REACTIONS   Allergies   Allergen Reactions    Amoxicillin Other (See Comments)     Hives all over body, states that hives are severe     Sulfa Antibiotics     Cefdinir     Doxycycline Other (See Comments)     Pt states no allergy to this med    Other Other (See Comments)    Milk-Related Compounds     Tomato        CURRENT MEDICATIONS   Current Outpatient Medications   Medication Sig Dispense Refill    FARXIGA 5 MG tablet       Insulin Lispro (HUMALOG KWIKPEN SC) Inject 25 Units into the skin 3 times daily (before meals) Plus sliding scale      insulin glargine (LANTUS) 100 UNIT/ML injection vial Inject 50 Units into the skin 2 times daily      Continuous Blood Gluc Sensor (FREESTYLE NIMISHA 2 SENSOR) [facility] USE TO TEST SUGAR CONTINUOUSLY AS DIRECTED 2 each 3    metFORMIN (GLUCOPHAGE-XR) 500 MG extended release tablet TAKE 2 TABLETS BY MOUTH 2 TIMES DAILY 120 tablet 1    levothyroxine (SYNTHROID) 88 MCG tablet TAKE 1 TABLET BY MOUTH DAILY 30 tablet 2    JARDIANCE 25 MG tablet TAKE 1 TABLET BY MOUTH DAILY 30 tablet 5    OZEMPIC, 1 MG/DOSE, 4 MG/3ML SOPN INJECT 1 MG UNDER THE SKIN ONCE WEEKLY 3 mL 1    dapagliflozin (FARXIGA) 5 MG tablet Take 5 mg by mouth daily      lisinopril (PRINIVIL;ZESTRIL) 20 MG tablet Take 2 tablets by mouth daily 60 tablet 0    lidocaine (LIDODERM) 5 % Place 1 patch onto the skin daily 12 hours on, 12 hours off. 30 patch 2    diclofenac sodium (VOLTAREN) 1 % GEL Apply 4 g topically 4 times daily for 7 days 112 g 0    famotidine (PEPCID) 20 MG tablet TAKE 1 TABLET BY MOUTH TWICE DAILY 60 tablet 1    naproxen (NAPROSYN) 250 MG tablet TAKE 1 TABLET BY MOUTH TWICE A DAY WITH MEALS 60 tablet 5    ondansetron (ZOFRAN-ODT) 4 MG disintegrating tablet Take 1 tablet by mouth 3 times daily as needed for Nausea or Vomiting 12 tablet 0    prochlorperazine (COMPAZINE) 10 MG tablet Take 1 tablet by mouth every 6 hours as needed (nausea) 16 tablet 0    ONETOUCH ULTRA strip USE TO TEST THREE TIMES A  each 2    calcium elemental (OSCAL) 500 MG TABS tablet TAKE 1 TABLET BY MOUTH TWICE A DAY WITH LUNCH AND DINNER 60 tablet 5    lisinopril (PRINIVIL;ZESTRIL) 10 MG tablet Take 2 tablets by mouth daily 90 tablet 2    lamoTRIgine (LAMICTAL) 100 MG tablet TAKE 1 TABLET BY MOUTH EVERY DAY 30 tablet 5    Insulin Pen Needle 31G X 5 MM MISC 1 each by Does not apply route 3 times daily 100 each 3    melatonin 3 MG TABS tablet TAKE ONE TABLET BY MOUTH EVERY NIGHT AT BEDTIME 30 tablet 0    DULoxetine (CYMBALTA) 60 MG extended release capsule TAKE 1 CAPSULE BY MOUTH EVERY MORNING 60MG IN AM   30MG IN PM   90MG/DAY (Patient taking differently: 60 mg 2 times daily TAKE 1 CAPSULE BY MOUTH EVERY MORNING 60MG IN AM   30MG IN PM   90MG/DAY) 30 capsule 1    hydrocortisone 2.5 % cream Apply topically 2 times daily.  45 g 0    Blood Pressure KIT 1 box by Does not apply route 2 times daily 1 kit 0    hydroxychloroquine (PLAQUENIL) 200 MG tablet Take 200 mg by mouth 2 times daily      methotrexate (RHEUMATREX) 2.5 MG chemo tablet Take 2.5 mg by mouth once a week Sundays      folic acid (FOLVITE) 1 MG tablet Take 1 tablet by mouth daily 30 tablet 1    FREESTYLE LANCETS MISC CHECK BS 4 TIMES DAILY 100 each 3     No current facility-administered medications for this visit. Review of Systems  Constitutional: No fever, no chills, no diaphoresis, no generalized weakness. HEENT: No blurred vision, No sore throat, no ear pain, no hair loss  Neck: denied any neck swelling, difficulty swallowing,   Cardio-pulmonary: No CP, SOB or palpitation, No orthopnea or PND. No cough or wheezing. GI: No N/V/D, no constipation, No abdominal pain, no melena or hematochezia + upset stomach on occasion   : Denied any dysuria, hematuria, flank pain, discharge, or incontinence. Skin: denied any rash, ulcer, Hirsute, or hyperpigmentation. MSK: denied any joint deformity, joint pain/swelling, muscle pain, or back pain. Neuro: no numbness, no tingling, no weakness    OBJECTIVE    BP (!) 163/97   Pulse 96   Ht 5' 1\" (1.549 m)   Wt 151 lb (68.5 kg)   SpO2 98%   BMI 28.53 kg/m²   BP Readings from Last 4 Encounters:   05/03/22 (!) 163/97   05/02/22 (!) 158/105   04/19/22 (!) 167/104   03/31/22 124/74     Wt Readings from Last 6 Encounters:   05/03/22 151 lb (68.5 kg)   05/02/22 152 lb (68.9 kg)   04/19/22 160 lb (72.6 kg)   03/31/22 156 lb (70.8 kg)   02/24/22 157 lb (71.2 kg)   02/09/22 164 lb (74.4 kg)       Physical examination:  General: awake alert, oriented x3, no abnormal position or movements. HEENT: normocephalic non-traumatic, no exophthalmos   Neck: supple, no LN enlargement, no thyromegaly, no thyroid tenderness, no JVD. Pulm: Clear equal air entry no added sounds, no wheezing or rhonchi    CVS: S1 + S2, no murmur, no heave. Dorsalis pedis pulse palpable   Abd: soft lax, no tenderness, no organomegaly, audible bowel sounds. Skin: warm, no lesions, no rash.  No callus, no Ulcers, No acanthosis nigricans  Musculoskeletal: No back tenderness, no kyphosis/scoliosis    Neuro: CN intact,sensation decreased bilateral , muscle power normal  Psych: normal mood, and affect    Review of Laboratory Data:  I personally reviewed the following lab:  Lab Results   Component Value Date/Time    WBC 13.0 (H) 03/31/2022 11:59 AM    RBC 5.06 03/31/2022 11:59 AM    HGB 11.8 03/31/2022 11:59 AM    HCT 37.7 03/31/2022 11:59 AM    MCV 74.5 (L) 03/31/2022 11:59 AM    MCH 23.3 (L) 03/31/2022 11:59 AM    MCHC 31.3 (L) 03/31/2022 11:59 AM    RDW 16.2 (H) 03/31/2022 11:59 AM     03/31/2022 11:59 AM    MPV 10.1 03/31/2022 11:59 AM      Lab Results   Component Value Date/Time     03/31/2022 06:10 PM    K 3.3 (L) 03/31/2022 06:10 PM    CO2 20 (L) 03/31/2022 06:10 PM    BUN 21 (H) 03/31/2022 06:10 PM    CREATININE 1.0 03/31/2022 06:10 PM    CALCIUM 8.2 (L) 03/31/2022 06:10 PM    LABGLOM 58 03/31/2022 06:10 PM    GFRAA >60 03/31/2022 06:10 PM      Lab Results   Component Value Date/Time    TSH 6.940 (H) 06/18/2021 06:33 AM    T4FREE 0.91 (L) 06/18/2021 06:33 AM    TPOABS <0.3 04/18/2019 08:55 AM     Lab Results   Component Value Date    LABA1C 15.0 05/03/2022    GLUCOSE 401 03/31/2022    MALBCR 3235.3 06/18/2021    LABMICR >4400.0 06/18/2021    LABCREA 136 06/18/2021     Lab Results   Component Value Date    LABA1C 15.0 05/03/2022    LABA1C 14.5 12/15/2021    LABA1C 13.6 09/13/2021     Lab Results   Component Value Date    TRIG 271 06/18/2021    HDL 31 06/18/2021    LDLCALC 108 06/18/2021    CHOL 193 06/18/2021     Lab Results   Component Value Date    VITD25 10 06/18/2021    VITD25 13 06/07/2021       ASSESSMENT & RECOMMENDATIONS   Lulu Molly Monaco, a 47 y.o.-old female seen in for the following issues     Diabetes Mellitus Type 2     · Patient's diabetes is uncontrolled in the setting of noncompliance  · No BS logs to review as she is not checking  · Will make no changes to DM regimen of Metformin 1000 mg BID, Ozempic 1 mg/wk, Jardiance 25 mg, Lantus 50u BID, Humalog 25u with meals + ss 3:50>150   · The patient was advised to check blood sugars 4 times a day before meals and at bedtime and send BS readings to our office in a week. · Encouraged to restart Dio - skin patch given to pt to trial  · D/w pt insulin pump therapy, [employer] pump RN to speak with pt. · Discussed with patient A1c and blood sugar goals   · Patient will need routine diabetes maintenance and prevention  · Diabetes labs before next visit    · Refused to have labs drawn today - ordered to have completed prior to next OV    Dietary noncompliance   Discussed with patient the importance of eating consistent carbohydrate meals, avoiding high glycemic index food. Also, discussed with patient the risk and negative consequences of dietary noncompliance on blood glucose control, blood pressure and weight    Primary Hypothyroidism    · Continue levothyroxine 88 mcg daily   · Needs to have TFT but refused today  · Ordered to be completed prior to next OV    Microalbunemia  · Reinforced glycemic control  · On Ace-I    I personally reviewed external notes from PCP and other patient's care team providers, and personally interpreted labs associated with the above diagnosis. I also ordered labs to further assess and manage the above addressed medical conditions. Return in about 2 months (around 7/3/2022) for DM type 2. The above issues were reviewed with the patient who understood and agreed with the plan. Thank you for allowing us to participate in the care of this patient. Please do not hesitate to contact us with any additional questions. Diagnosis Orders   1. Type 2 diabetes mellitus with hyperglycemia, with long-term current use of insulin (HCC)  POCT glycosylated hemoglobin (Hb A1C)    MICROALBUMIN / CREATININE URINE RATIO    Basic Metabolic Panel    LIPID PANEL   2. Dietary noncompliance     3. Hypothyroidism, unspecified type  TSH    T4, Free   4. Microalbuminuria     5.  Vitamin D deficiency  Vitamin D 25 Hydroxy          ANA Nam - CORNELIO    [facility]   [address] [facility] [address]   Phone: [phone]  Fax: [phone]  --------------------------------------------  An electronic signature was used to authenticate this note.  [address], APRN - NP on 5/3/2022 at 9:05 AM

## 2022-05-05 ENCOUNTER — HOSPITAL ENCOUNTER (OUTPATIENT)
Dept: PHYSICAL THERAPY | Age: 52
Setting detail: THERAPIES SERIES
Discharge: HOME | End: 2022-05-05
Payer: MEDICAID

## 2022-05-05 PROCEDURE — 97161 PT EVAL LOW COMPLEX 20 MIN: CPT | Performed by: PHYSICAL THERAPIST

## 2022-05-05 ASSESSMENT — PAIN SCALES - GENERAL: PAINLEVEL_OUTOF10: 8

## 2022-05-05 ASSESSMENT — PAIN DESCRIPTION - DESCRIPTORS: DESCRIPTORS: ACHING

## 2022-05-05 ASSESSMENT — PAIN DESCRIPTION - FREQUENCY: FREQUENCY: CONTINUOUS

## 2022-05-05 ASSESSMENT — PAIN - FUNCTIONAL ASSESSMENT: PAIN_FUNCTIONAL_ASSESSMENT: PREVENTS OR INTERFERES WITH MANY ACTIVE NOT PASSIVE ACTIVITIES

## 2022-05-05 ASSESSMENT — PAIN DESCRIPTION - LOCATION: LOCATION: BACK

## 2022-05-05 ASSESSMENT — PAIN DESCRIPTION - PAIN TYPE: TYPE: CHRONIC PAIN

## 2022-05-05 ASSESSMENT — PAIN DESCRIPTION - ORIENTATION: ORIENTATION: RIGHT;LEFT

## 2022-05-05 NOTE — PROGRESS NOTES
Physical Therapy      Physical Therapy: Initial Evaluation    Patient: Felicia Banks (15 y.o. female)   Examination Date:   Plan of Care Certification Period: 2022 to        :  1970 ;    Confirmed: Yes MRN: [id number]  CSN: [id number]   Insurance: [place] [facility] [address] / Plan: [facility] [address] / Product Type: *No Product type* /   Insurance ID: [id number] - (Medicaid Managed) Secondary Insurance (if applicable):    Referring Physician: MD Torin Helton   PCP: Elmer Cheng MD Visits to Date/Visits Approved:     No Show/Cancelled Appts:   /       Medical Diagnosis: Low back pain, unspecified [M54.50] chronic LBP  Treatment Diagnosis:       PERTINENT MEDICAL HISTORY           Medical History: Chart Reviewed: Yes   Past Medical History:   Diagnosis Date    Anxiety     Arthritis     Depression     [facility]     Fibromyalgia     HTN (hypertension)     Hypertensive chronic kidney disease     Left shoulder pain     LIZABETH (obstructive sleep apnea)     Rheumatoid arthritis involving multiple sites ([facility]     [facility] ([facility]     Thyroid disease      Surgical History:   Past Surgical History:   Procedure Laterality Date    CT BIOPSY RENAL  2021    CT BIOPSY RENAL 2021 Carol Archer MD [facility]    SHOULDER ARTHROSCOPY Right 2017       Medications:   Current Outpatient Medications:     dapagliflozin (FARXIGA) 5 MG tablet, Take 5 mg by mouth daily, Disp: , Rfl:     FARXIGA 5 MG tablet, , Disp: , Rfl:     lisinopril (PRINIVIL;ZESTRIL) 20 MG tablet, Take 2 tablets by mouth daily, Disp: 60 tablet, Rfl: 0    Insulin Lispro (HUMALOG KWIKPEN SC), Inject 25 Units into the skin 3 times daily (before meals) Plus sliding scale, Disp: , Rfl:     insulin glargine (LANTUS) 100 UNIT/ML injection vial, Inject 50 Units into the skin 2 times daily, Disp: , Rfl:     Continuous Blood Gluc Sensor (FREESTYLE NIMISHA 2 SENSOR) MISC, USE TO TEST SUGAR CONTINUOUSLY AS DIRECTED, Disp: 2 each, Rfl: 3    lidocaine (LIDODERM) 5 %, Place 1 patch onto the skin daily 12 hours on, 12 hours off., Disp: 30 patch, Rfl: 2    diclofenac sodium (VOLTAREN) 1 % GEL, Apply 4 g topically 4 times daily for 7 days, Disp: 112 g, Rfl: 0    famotidine (PEPCID) 20 MG tablet, TAKE 1 TABLET BY MOUTH TWICE DAILY, Disp: 60 tablet, Rfl: 1    metFORMIN (GLUCOPHAGE-XR) 500 MG extended release tablet, TAKE 2 TABLETS BY MOUTH 2 TIMES DAILY, Disp: 120 tablet, Rfl: 1    naproxen (NAPROSYN) 250 MG tablet, TAKE 1 TABLET BY MOUTH TWICE A DAY WITH MEALS, Disp: 60 tablet, Rfl: 5    ondansetron (ZOFRAN-ODT) 4 MG disintegrating tablet, Take 1 tablet by mouth 3 times daily as needed for Nausea or Vomiting, Disp: 12 tablet, Rfl: 0    prochlorperazine (COMPAZINE) 10 MG tablet, Take 1 tablet by mouth every 6 hours as needed (nausea), Disp: 16 tablet, Rfl: 0    levothyroxine (SYNTHROID) 88 MCG tablet, TAKE 1 TABLET BY MOUTH DAILY, Disp: 30 tablet, Rfl: 2    JARDIANCE 25 MG tablet, TAKE 1 TABLET BY MOUTH DAILY, Disp: 30 tablet, Rfl: 5    ONETOUCH ULTRA strip, USE TO TEST THREE TIMES A DAY, Disp: 100 each, Rfl: 2    calcium elemental (OSCAL) 500 MG TABS tablet, TAKE 1 TABLET BY MOUTH TWICE A DAY WITH LUNCH AND DINNER, Disp: 60 tablet, Rfl: 5    lisinopril (PRINIVIL;ZESTRIL) 10 MG tablet, Take 2 tablets by mouth daily, Disp: 90 tablet, Rfl: 2    lamoTRIgine (LAMICTAL) 100 MG tablet, TAKE 1 TABLET BY MOUTH EVERY DAY, Disp: 30 tablet, Rfl: 5    OZEMPIC, 1 MG/DOSE, 4 MG/3ML SOPN, INJECT 1 MG UNDER THE SKIN ONCE WEEKLY, Disp: 3 mL, Rfl: 1    Insulin Pen Needle 31G X 5 MM MISC, 1 each by Does not apply route 3 times daily, Disp: 100 each, Rfl: 3    melatonin 3 MG TABS tablet, TAKE ONE TABLET BY MOUTH EVERY NIGHT AT BEDTIME, Disp: 30 tablet, Rfl: 0    DULoxetine (CYMBALTA) 60 MG extended release capsule, TAKE 1 CAPSULE BY MOUTH EVERY MORNING 60MG IN AM   30MG IN PM 90MG/DAY (Patient taking differently: 60 mg 2 times daily TAKE 1 CAPSULE BY MOUTH EVERY MORNING 60MG IN AM   30MG IN PM   90MG/DAY), Disp: 30 capsule, Rfl: 1    hydrocortisone 2.5 % cream, Apply topically 2 times daily. , Disp: 45 g, Rfl: 0    Blood Pressure KIT, 1 box by Does not apply route 2 times daily, Disp: 1 kit, Rfl: 0    hydroxychloroquine (PLAQUENIL) 200 MG tablet, Take 200 mg by mouth 2 times daily, Disp: , Rfl:     methotrexate (RHEUMATREX) 2.5 MG chemo tablet, Take 2.5 mg by mouth once a week Sundays, Disp: , Rfl:     folic acid (FOLVITE) 1 MG tablet, Take 1 tablet by mouth daily, Disp: 30 tablet, Rfl: 1    FREESTYLE LANCETS MISC, CHECK BS 4 TIMES DAILY, Disp: 100 each, Rfl: 3  Allergies: Amoxicillin, Sulfa antibiotics, Cefdinir, Doxycycline, Other, Milk-related compounds, and Tomato      SUBJECTIVE EXAMINATION      ,           Subjective History:  Onset Date: 04/19/22  Subjective: pt presents to therapy with c/o LBP for several yrs of insidious onset; no PMH for LB/LE injury/sx per pt; no testing on file for LB over last six months; MEDS of little help; tells PT that she had therapy in the past with resegunf noted; no neuro/pain management consults at this time; no c/o N/T or buckling B LE's; sleep seems fine; RTD for follow-up 6/17/22  Additional Pertinent Hx (if applicable):            Learning/Language: Learning  Does the patient/guardian have any barriers to learning?: No barriers  What is the preferred language of the patient/guardian?: English     Pain Screening    Pain Screening  Patient Currently in Pain: Yes  Pain Assessment: 0-10  Pain Level: 8  Pain Type: Chronic pain  Pain Location: Back  Pain Orientation: Right,Left  Pain Descriptors: Aching  Pain Frequency: Continuous  Functional Pain Assessment: Prevents or interferes with many active not passive activities      OBJECTIVE EXAMINATION     Observations:   General Observations  General Observations: Yes  Description: level ASIS/PSIS/iliacs; ant pelvic tilt noted    Palpation:   discomfort noted across B sides LB paraspinals L1-5 into B SI lines        Ambulation/Gait (if applicable):   Ambulation  Surface: level tile  Device: No Device  Assistance: Independent  Gait Deviations: None    Balance Screen:   Balance  Comments: static/dynamic balance is GOOD+    Left AROM  Right AROM         AROM LUE (degrees)  LUE AROM : WNL  AROM LLE (degrees)  LLE AROM : WNL    AROM RUE (degrees)  RUE AROM : WNL  AROM RLE (degrees)  RLE AROM: WNL     Left PROM  Right PROM                    Left Strength  Right Strength      Strength Other  Other: grossly 5/5 for all ranges B UE/LE's  Other: grossly 5/5 for all ranges B UE/LE's    Strength Other  Other: grossly 5/5 for all ranges B UE/LE's  Other: grossly 5/5 for all ranges B UE/LE's          Additional Finding(s) (if applicable): Spine  Lumbar: grossly limited ~ 50% WNL for all ranges with no c/o radiculopathy noted  Special Tests: hyperflex/rot   +/+; slump  - B LE's  Other: endurance for all prolonged activities is GOOD-         ASSESSMENT     Impression: Assessment: pain noted across B sides LB with all prolonged activities, 8/10    Body Structures, Functions, Activity Limitations Requiring Skilled Therapeutic Intervention: Decreased ROM,Decreased endurance    Statement of Medical Necessity: Physical Therapy is both indicated and medically necessary as outlined in the POC to increase the likelihood of meeting the functionally related goals stated below.      Patient's Activity Tolerance: Patient tolerated evaluation without incident      Patient's rehabilitation potential/prognosis is considered to be: Fair,Good    Factors which may impact rehabilitation potential include:          GOALS   Patient Goal(s):    Goals Completed by 3 weeks Goal Status   decrease pain across B sides LB with all prolonged activities, 0-4/10 New   restore LB AROM to WNL for all ranges New   improve endurance for all prolonged activities to [address]   assure I with HEP for home management of condition New             TREATMENT PLAN                Treatment may include any combination of the following: ROM,Strengthening,Endurance training,Modalities,Home exercise program     Frequency / Duration:  Patient to be seen pt to be seen 2x/week/3 weeks for   weeks      Eval Complexity:    Decision Making: Low Complexity        Therapist Signature: Jessica Estrada, PT    Date: 7/8/0362     I certify that the above Therapy Services are being furnished while the patient is under my care. I agree with the treatment plan and certify that this therapy is necessary. Physician's Signature:  ___________________________   Date:_______                                                                   Marge Fofana MD        Physician Comments: _______________________________________________    Please sign and return to [facility]. Please fax to the location listed below.  Claudia Caballero for this referral!    [address] PHYSICAL THERAPY  [address] [facility]  Dept: [phone]         POC NOTE

## 2022-05-05 NOTE — PROGRESS NOTES
[address]                Phone: [phone]   Fax: [phone]    Physical Therapy Daily Treatment Note  Date:  2022    Patient Name:  Moises Castellon    :  1970  MRN: [id number]    Evaluating therapist:  JONNY Peters                    (22)  Restrictions/Precautions:    Diagnosis:  chronic LBP   Treatment Diagnosis:    Insurance/Certification information:  [address]  Referring Physician:  Nigel Ayala. Plan of care signed (Y/N):    Visit# / total visits:    Pain level: 8/10   Time In:  Time Out:    Subjective:      Exercises:  Exercise/Equipment Resistance/Repetitions Other comments   StepOne              tball flex/rot            rot st       SKTC     piriformis st            pelvic tilts               bridges                                                                        Other Therapeutic Activities:      Home Exercise Program:  provided 22    Manual Treatments:      Modalities:  IFC/MH to LB     Timed Code Treatment Minutes: Total Treatment Minutes:      Treatment/Activity Tolerance:  [] Patient tolerated treatment well [] Patient limited by fatique  [] Patient limited by pain  [] Patient limited by other medical complications  [] Other:     Prognosis: [] Good [] Fair  [] Poor    Patient Requires Follow-up: [] Yes  [] No    Plan:   [] Continue per plan of care [] Alter current plan (see comments)  [] Plan of care initiated [] Hold pending MD visit [] Discharge  Plan for Next Session:      See Weekly Progress Note: []  Yes  []  No  Next due:        Electronically signed by:   Abner Izaguirre PT

## 2022-05-06 ENCOUNTER — ANESTHESIA EVENT (OUTPATIENT)
Dept: ENDOSCOPY | Age: 52
End: 2022-05-06
Payer: MEDICAID

## 2022-05-06 ENCOUNTER — ANESTHESIA (OUTPATIENT)
Dept: ENDOSCOPY | Age: 52
End: 2022-05-06
Payer: MEDICAID

## 2022-05-06 ENCOUNTER — HOSPITAL ENCOUNTER (OUTPATIENT)
Age: 52
Setting detail: OUTPATIENT SURGERY
Discharge: HOME | End: 2022-05-06
Attending: STUDENT IN AN ORGANIZED HEALTH CARE EDUCATION/TRAINING PROGRAM | Admitting: STUDENT IN AN ORGANIZED HEALTH CARE EDUCATION/TRAINING PROGRAM
Payer: MEDICAID

## 2022-05-06 VITALS
RESPIRATION RATE: 16 BRPM | HEART RATE: 98 BPM | HEIGHT: 61 IN | TEMPERATURE: 96.9 F | BODY MASS INDEX: 28.7 KG/M2 | WEIGHT: 152 LBS | SYSTOLIC BLOOD PRESSURE: 138 MMHG | DIASTOLIC BLOOD PRESSURE: 89 MMHG | OXYGEN SATURATION: 98 %

## 2022-05-06 VITALS — DIASTOLIC BLOOD PRESSURE: 55 MMHG | OXYGEN SATURATION: 96 % | SYSTOLIC BLOOD PRESSURE: 95 MMHG

## 2022-05-06 DIAGNOSIS — Z01.812 PRE-OPERATIVE LABORATORY EXAMINATION: Primary | ICD-10-CM

## 2022-05-06 LAB
HCG, URINE, POC: NEGATIVE
Lab: ABNORMAL
METER GLUCOSE: 221 MG/DL (ref 74–99)
METER GLUCOSE: 473 MG/DL (ref 74–99)
NEGATIVE QC PASS/FAIL: ABNORMAL
POSITIVE QC PASS/FAIL: ABNORMAL

## 2022-05-06 PROCEDURE — 3609012400 HC EGD TRANSORAL BIOPSY SINGLE/MULTIPLE: Performed by: STUDENT IN AN ORGANIZED HEALTH CARE EDUCATION/TRAINING PROGRAM

## 2022-05-06 PROCEDURE — 2709999900 HC NON-CHARGEABLE SUPPLY: Performed by: STUDENT IN AN ORGANIZED HEALTH CARE EDUCATION/TRAINING PROGRAM

## 2022-05-06 PROCEDURE — 6360000002 HC RX W HCPCS: Performed by: NURSE ANESTHETIST, CERTIFIED REGISTERED

## 2022-05-06 PROCEDURE — 3700000001 HC ADD 15 MINUTES (ANESTHESIA): Performed by: STUDENT IN AN ORGANIZED HEALTH CARE EDUCATION/TRAINING PROGRAM

## 2022-05-06 PROCEDURE — 2580000003 HC RX 258: Performed by: STUDENT IN AN ORGANIZED HEALTH CARE EDUCATION/TRAINING PROGRAM

## 2022-05-06 PROCEDURE — 88342 IMHCHEM/IMCYTCHM 1ST ANTB: CPT

## 2022-05-06 PROCEDURE — 88305 TISSUE EXAM BY PATHOLOGIST: CPT

## 2022-05-06 PROCEDURE — 43251 EGD REMOVE LESION SNARE: CPT | Performed by: STUDENT IN AN ORGANIZED HEALTH CARE EDUCATION/TRAINING PROGRAM

## 2022-05-06 PROCEDURE — 88341 IMHCHEM/IMCYTCHM EA ADD ANTB: CPT

## 2022-05-06 PROCEDURE — 6370000000 HC RX 637 (ALT 250 FOR IP): Performed by: ANESTHESIOLOGY

## 2022-05-06 PROCEDURE — 43239 EGD BIOPSY SINGLE/MULTIPLE: CPT | Performed by: STUDENT IN AN ORGANIZED HEALTH CARE EDUCATION/TRAINING PROGRAM

## 2022-05-06 PROCEDURE — 7100000010 HC PHASE II RECOVERY - FIRST 15 MIN: Performed by: STUDENT IN AN ORGANIZED HEALTH CARE EDUCATION/TRAINING PROGRAM

## 2022-05-06 PROCEDURE — 7100000011 HC PHASE II RECOVERY - ADDTL 15 MIN: Performed by: STUDENT IN AN ORGANIZED HEALTH CARE EDUCATION/TRAINING PROGRAM

## 2022-05-06 PROCEDURE — 3700000000 HC ANESTHESIA ATTENDED CARE: Performed by: STUDENT IN AN ORGANIZED HEALTH CARE EDUCATION/TRAINING PROGRAM

## 2022-05-06 PROCEDURE — 82962 GLUCOSE BLOOD TEST: CPT

## 2022-05-06 RX ORDER — SODIUM CHLORIDE 0.9 % (FLUSH) 0.9 %
5-40 SYRINGE (ML) INJECTION PRN
Status: CANCELLED | OUTPATIENT
Start: 2022-05-06

## 2022-05-06 RX ORDER — SODIUM CHLORIDE 0.9 % (FLUSH) 0.9 %
5-40 SYRINGE (ML) INJECTION EVERY 12 HOURS SCHEDULED
Status: CANCELLED | OUTPATIENT
Start: 2022-05-06

## 2022-05-06 RX ORDER — SODIUM CHLORIDE 0.9 % (FLUSH) 0.9 %
5-40 SYRINGE (ML) INJECTION PRN
Status: DISCONTINUED | OUTPATIENT
Start: 2022-05-06 | End: 2022-05-06 | Stop reason: HOSPADM

## 2022-05-06 RX ORDER — ONDANSETRON 2 MG/ML
INJECTION INTRAMUSCULAR; INTRAVENOUS PRN
Status: DISCONTINUED | OUTPATIENT
Start: 2022-05-06 | End: 2022-05-06 | Stop reason: SDUPTHER

## 2022-05-06 RX ORDER — ONDANSETRON 4 MG/1
4 TABLET, ORALLY DISINTEGRATING ORAL EVERY 8 HOURS PRN
Status: CANCELLED | OUTPATIENT
Start: 2022-05-06

## 2022-05-06 RX ORDER — SODIUM CHLORIDE 9 MG/ML
25 INJECTION, SOLUTION INTRAVENOUS PRN
Status: DISCONTINUED | OUTPATIENT
Start: 2022-05-06 | End: 2022-05-06 | Stop reason: HOSPADM

## 2022-05-06 RX ORDER — PROPOFOL 10 MG/ML
INJECTION, EMULSION INTRAVENOUS PRN
Status: DISCONTINUED | OUTPATIENT
Start: 2022-05-06 | End: 2022-05-06 | Stop reason: SDUPTHER

## 2022-05-06 RX ORDER — ONDANSETRON 2 MG/ML
4 INJECTION INTRAMUSCULAR; INTRAVENOUS EVERY 6 HOURS PRN
Status: CANCELLED | OUTPATIENT
Start: 2022-05-06

## 2022-05-06 RX ORDER — SODIUM CHLORIDE 0.9 % (FLUSH) 0.9 %
5-40 SYRINGE (ML) INJECTION EVERY 12 HOURS SCHEDULED
Status: DISCONTINUED | OUTPATIENT
Start: 2022-05-06 | End: 2022-05-06 | Stop reason: HOSPADM

## 2022-05-06 RX ORDER — OMEPRAZOLE 40 MG/1
40 CAPSULE, DELAYED RELEASE ORAL
Qty: 30 CAPSULE | Refills: 5 | Status: ON HOLD | OUTPATIENT
Start: 2022-05-06 | End: 2022-05-31 | Stop reason: HOSPADM

## 2022-05-06 RX ORDER — SODIUM CHLORIDE 9 MG/ML
INJECTION, SOLUTION INTRAVENOUS PRN
Status: CANCELLED | OUTPATIENT
Start: 2022-05-06

## 2022-05-06 RX ADMIN — INSULIN HUMAN 18 UNITS: 100 INJECTION, SOLUTION PARENTERAL at 11:00

## 2022-05-06 RX ADMIN — PROPOFOL 300 MG: 10 INJECTION, EMULSION INTRAVENOUS at 14:21

## 2022-05-06 RX ADMIN — ONDANSETRON 4 MG: 2 INJECTION INTRAMUSCULAR; INTRAVENOUS at 14:13

## 2022-05-06 RX ADMIN — SODIUM CHLORIDE: 9 INJECTION, SOLUTION INTRAVENOUS at 14:09

## 2022-05-06 ASSESSMENT — LIFESTYLE VARIABLES: SMOKING_STATUS: 0

## 2022-05-06 ASSESSMENT — PAIN - FUNCTIONAL ASSESSMENT: PAIN_FUNCTIONAL_ASSESSMENT: 0-10

## 2022-05-06 NOTE — PROGRESS NOTES
Blood glucose 473. 18 units of regular insulin was ordered by Dr. Gabriela Osman. Orders entered in [address] [place] [address] Rd.

## 2022-05-06 NOTE — H&P
History and Physical      ASSESSMENT AND PLAN:    52y/F presents for endoscopic evaluation of persistent nausea and vomiting. PLAN:  EGD today        HISTORY OF PRESENT ILLNESS:      Ms. Harriett Leal is a 52y/F that presents with complaints of vomiting since 2019. Felt it was related to stress.       Worsened in 2021. It is intermittent and will last 1 week. The vomiting is random. No weight loss or fever. Not a smoker. No heartburn. No routine NSAID use. Denies alcohol intake.     Patient is taking famotidine twice daily but this does not satisfy. Uses ondansetron as needed.      Most recent HGA1C was 14.5%. Blood sugars are usually around 200 or above. Checks them about 3 to 4 times a day. Has a daily BM. Never had a colonoscopy. Refusing this today. Patient is adopted.       Past Medical History:        Diagnosis Date    Anxiety     Arthritis     Depression     Diabetes mellitus ([address]     Fibromyalgia     HTN (hypertension)     Hypertensive chronic kidney disease     Left shoulder pain     LIZABETH (obstructive sleep apnea)     [facility] involving multiple sites [facility]     Sjogren's disease ([facility]     Thyroid disease      Past Surgical History:        Procedure Laterality Date    CT BIOPSY RENAL  11/18/2021    CT BIOPSY RENAL 11/18/2021 Julia Braden MD [facility]    SHOULDER ARTHROSCOPY Right 06/16/2017     Social History:    TOBACCO:   reports that she quit smoking about 24 years ago. She started smoking about 25 years ago. She has a 1.00 pack-year smoking history. She has never used smokeless tobacco.  ETOH:   reports no history of alcohol use. DRUGS:   reports no history of drug use.   Family History:       Adopted: Yes   Family history unknown: Yes         Current Facility-Administered Medications:     sodium chloride flush 0.9 % injection 5-40 mL, 5-40 mL, IntraVENous, 2 times per day, Celestina Esparza MD    sodium chloride flush 0.9 % injection 5-40 mL, 5-40 mL, Patient BIBA from home for numbness to all extremities since waking up. Patient reports numbness and heavy feeling in bilateral arms and legs. IntraVENous, PRN, Sina Chacon MD    0.9 % sodium chloride infusion, 25 mL, IntraVENous, PRN, Sina Chacon MD     Allergies:  Amoxicillin, Sulfa antibiotics, Cefdinir, Doxycycline, Other, Milk-related compounds, and Tomato    ROS:  General: Patient denies n/v/f/c or weight loss. HEENT: Patient denies persistent postnasal drip, scleral icterus, drooling, persistent bleeding from nose/mouth. Resp: Patient denies SOB, wheezing, productive cough. Cards: Patient denies CP, palpitations, significant edema  GI: As above. Derm: Patient denies jaundice/rashes. Musc: Patient denies diffuse/irregular joint swelling or myalgias. PHYSICAL EXAM:  BP (!) 135/91   Pulse 99   Temp 96.9 °F (36.1 °C) (Temporal)   Resp 17   Ht 5' 1\" (1.549 m)   Wt 152 lb (68.9 kg)   LMP 08/31/2010 Comment: Depo  SpO2 96%   BMI 28.72 kg/m²   Physical Exam:  General: Overall well-appearing, NAD  HEENT: PERRLA, EOMI, Anicteric sclera, MMM, no rhinorrhea  Cards: RRR, no LE edema  Resp: Breathing comfortably on room air, good air movement, no use of accessory muscles, no audible wheezing  Abdomen: soft, NT, ND. Extremities: Moves all extremities, no effusions or bruising.   Skin: No rashes or jaundice  Neuro: A&O x 3, CN grossly intact, non-focal exam              Electronically signed by Sina Chacon MD on 5/6/2022 at 2:10 PM

## 2022-05-06 NOTE — PROGRESS NOTES
Patient tolerating oral intake     Person waiting for patient called  At  bedside    Discharge instructions provided to patient, written and verbal, with significant other at bedside, patient verbalized understanding. Iv site removed.

## 2022-05-06 NOTE — ANESTHESIA POSTPROCEDURE EVALUATION
Department of Anesthesiology  Postprocedure Note    Patient: Dinesh Molina  MRN: [id number]  YOB: 1970  Date of evaluation: 5/6/2022  Time:  3:52 PM     Procedure Summary     Date: 05/06/22 Room / Location: [place] [facility] / [facility]    Anesthesia Start: 5255 Anesthesia Stop: 6579    Procedure: EGD BIOPSY (N/A ) Diagnosis: (VOMITING)    Surgeons: Sada Tse MD Responsible Provider: Christie Schilling MD    Anesthesia Type: MAC ASA Status: 3          Anesthesia Type: No value filed. Ho Phase I: Ho Score: 10    Ho Phase II: Ho Score: 10    Last vitals: Reviewed and per EMR flowsheets.        Anesthesia Post Evaluation    Patient location during evaluation: PACU  Patient participation: complete - patient participated  Level of consciousness: awake and alert  Airway patency: patent  Nausea & Vomiting: no nausea and no vomiting  Complications: no  Cardiovascular status: hemodynamically stable and blood pressure returned to baseline  Respiratory status: acceptable  Hydration status: euvolemic

## 2022-05-06 NOTE — OP NOTE
Operative Note      Patient: Hanane Rawls  YOB: 1970  MRN: [id number]    Date of Procedure: 5/6/2022    Pre-Op Diagnosis: Nausea/Vomiting    Post-Op Diagnosis: Gastritis, Duodenitis       Procedure(s):  EGD BIOPSY    Surgeon(s):  Celestina Esparza MD    Assistant:   * No surgical staff found *    Anesthesia: Monitor Anesthesia Care    Estimated Blood Loss (mL): Minimal    Complications: None    Specimens:   ID Type Source Tests Collected by Time Destination   A : Duodenum R/O Celiac Gastric Gastric SURGICAL PATHOLOGY Celestina Esparza MD 5/6/2022 1425    B : Antrum R/O H Pylori Gastric Gastric SURGICAL PATHOLOGY Celestina Esparza MD 5/6/2022 1431    C : Gastric Nodule Gastric Gastric SURGICAL PATHOLOGY Celestina Esparza MD 5/6/2022 1432        Implants:  * No implants in log *      Drains: * No LDAs found *    Indications:  52y/F presents for endoscopic evaluation of persistent nausea and vomiting. Findings:     EGD:  Normal esophagus. One region of tissue in the distal esophagus did not flatten with insufflation, favor edema over single column of varices or submucosal lesion. Mild antral gastritis. Biopsied. Small nodule, possibly submucosal, in antrum. Resected with cold snare. Mild duodenitis with erosion, edema, and erythema in bulb. Biopsies obtained. Detailed Description of Procedure:   Pre-Anesthesia Assessment:  Prior to the procedure, a history and physical was performed and patient medications and allergies were reviewed. The risks, benefits, complications, treatment options and expected outcomes were discussed with the patient. The possibilities of reaction to medication, pulmonary aspiration, perforation of the gastrointestinal tract, bleeding requiring transfusion or operation, respiratory failure requiring placement on a ventilator, and failure to diagnose a condition or identify lesoins were discussed with the patient. All questions were answered and informed consent was obtained.   Patient identification and proposed procedure were verified by the physician, the nurse, the anesthesiologist, the anesthetist, and the technician in the preprocedure area. Please see accompanying documentation. All staff in attendance for the performed procedure act in the role of the Chaperone. After I obtained informed consent, the scope was passed under direct vision. Throughout the procedure, the patient's blood pressure, pulse, and oxygen saturations were monitored continuously. The gastroscope was introduced through the mouth and advanced to the duodenum. The procedure was performed without difficulty. The patient tolerated the procedure well. EGD:    The mucosa of the esophagus appeared normal.  No stricture, ulceration, or inflammation was appreciated. In the distal esophagus, a single area of uninflamed, benign appearing, raised mucosa was appreciated most consistent with focal edema and less likely a single varix. It was not consistent with a submucosal lesion or nodule. The Z-line was regular and found at 35cm. No hiatal hernia was appreciated. The mucosa of the body of the stomach appeared normal.  Mild antral gastritis with erythema and granularity was appreciated. No erosions or ulcerations were appreciated. H pylori biopsies obtained. Also in the gastric antrum, a raised nodular lesion, possibly submucosal, was appreciated. The lesion was resected with a cold snare. The fundus and cardia were carefully inspected in retroflexion and showed normal tissue. The mucosa of the duodenum appeared overall normal.  In the duodenal bulb, mild inflammation with erosion, edema, and erythema was appreciated. Biopsies obtained. The remainder of the duodenal mucosa was normal in appearance. No stenosis, ulceration, or gastric outlet obstruction was appreciated. Recommendations:  -The patient will be observed post procedure until all discharge criteria are met.     -Patient has a contact number available for emergencies. The signs and symptoms of potential delayed complications were discussed with the patient.    -Return to normal activities tomorrow. -Written discharge instructions were provided to the patient.    -Resume anticoagulation tomorrow.  -Begin daily Omeprazole 40mg.   -Consider polypharmacy as cause of nausea. -Await pathology results.  -Timeframe until next colonoscopy will be discussed in clinic and based on Clinical Guidelines regarding size, number, and pathology of polyps removed as well as adequacy of bowel prep.   -Clinic appointment scheduled 5/16/22.     Electronically signed by Alena Mcgregor MD on 5/6/2022 at 2:41 PM

## 2022-05-06 NOTE — ANESTHESIA PRE PROCEDURE
Department of Anesthesiology  Preprocedure Note       Name:  Anabelle Lacy   Age:  47 y.o.  :  1970                                          MRN:  [id number]         Date:  2022      Surgeon: Fredrick Murphy):  Jayla Queen MD    Procedure: Procedure(s):  EGD ESOPHAGOGASTRODUODENOSCOPY    Medications prior to admission:   Prior to Admission medications    Medication Sig Start Date End Date Taking?  Authorizing Provider   Insulin Lispro (HUMALOG KWIKPEN SC) Inject 25 Units into the skin 3 times daily (before meals) Plus sliding scale   Yes Historical Provider, MD   insulin glargine (LANTUS) 100 UNIT/ML injection vial Inject 50 Units into the skin 2 times daily   Yes Historical Provider, MD   dapagliflozin (FARXIGA) 5 MG tablet Take 5 mg by mouth daily 12/10/21   Historical Provider, MD   FARXIGA 5 MG tablet  22   Historical Provider, MD   lisinopril (PRINIVIL;ZESTRIL) 20 MG tablet Take 2 tablets by mouth daily 22  Candis Hodge MD   Continuous Blood Gluc Sensor (FREESTYLE NIMISHA 2 SENSOR) [facility] USE TO TEST SUGAR CONTINUOUSLY AS DIRECTED 22   Candis Hodge MD   lidocaine (LIDODERM) 5 % Place 1 patch onto the skin daily 12 hours on, 12 hours off. 22  Candis Hodge MD   diclofenac sodium (VOLTAREN) 1 % GEL Apply 4 g topically 4 times daily for 7 days 22  Candis Hodge MD   famotidine (PEPCID) 20 MG tablet TAKE 1 TABLET BY MOUTH TWICE DAILY 22   Candis Hodge MD   metFORMIN (GLUCOPHAGE-XR) 500 MG extended release tablet TAKE 2 TABLETS BY MOUTH 2 TIMES DAILY 22  Candis Hodge MD   naproxen (NAPROSYN) 250 MG tablet TAKE 1 TABLET BY MOUTH TWICE A DAY WITH MEALS 22   Candis Hodge MD   ondansetron (ZOFRAN-ODT) 4 MG disintegrating tablet Take 1 tablet by mouth 3 times daily as needed for Nausea or Vomiting 3/31/22   Kerline Red DO   prochlorperazine (COMPAZINE) 10 MG tablet Take 1 tablet by mouth every 6 hours as needed (nausea) 3/31/22   Maryuri Donis DO   levothyroxine (SYNTHROID) 88 MCG tablet TAKE 1 TABLET BY MOUTH DAILY 3/18/22   Shelbi Cosme MD   JARDIANCE 25 MG tablet TAKE 1 TABLET BY MOUTH DAILY 3/18/22   Shelbi Cosme MD   ONETOUCH ULTRA strip USE TO TEST THREE TIMES A DAY 3/18/22   Shelbi Cosme MD   calcium elemental (OSCAL) 500 MG TABS tablet TAKE 1 TABLET BY MOUTH TWICE A DAY WITH LUNCH AND DINNER  Patient not taking: Reported on 5/6/2022 3/18/22   Shelbi Cosme MD   lisinopril (PRINIVIL;ZESTRIL) 10 MG tablet Take 2 tablets by mouth daily 3/14/22 6/12/22  Shelbi Cosme MD   lamoTRIgine (LAMICTAL) 100 MG tablet TAKE 1 TABLET BY MOUTH EVERY DAY 2/17/22   Katherine Osman MD   OZEMPIC, 1 MG/DOSE, 4 MG/3ML SOPN INJECT 1 MG UNDER THE SKIN ONCE WEEKLY 2/17/22   Shelbi Cosme MD   Insulin Pen Needle 31G X 5 MM MISC 1 each by Does not apply route 3 times daily 12/29/21   Katherine Osman MD   melatonin 3 MG TABS tablet TAKE ONE TABLET BY MOUTH EVERY NIGHT AT BEDTIME 12/23/21   Shelbi Cosme MD   DULoxetine (CYMBALTA) 60 MG extended release capsule TAKE 1 CAPSULE BY MOUTH EVERY MORNING 60MG IN AM   30MG IN PM   90MG/DAY  Patient taking differently: 60 mg 2 times daily TAKE 1 CAPSULE BY MOUTH EVERY MORNING 60MG IN AM   30MG IN PM   90MG/DAY 9/21/21   Brian Jackson MD   melatonin 3 MG TABS tablet Take 1 tablet by mouth nightly as needed (insomnia) 9/10/21   Shelbi Cosme MD   hydrocortisone 2.5 % cream Apply topically 2 times daily.  7/12/21   Shelbi Cosme MD   Blood Pressure KIT 1 box by Does not apply route 2 times daily 3/24/21   Shelbi Cosme MD   hydroxychloroquine (PLAQUENIL) 200 MG tablet Take 200 mg by mouth 2 times daily    Historical Provider, MD   methotrexate (RHEUMATREX) 2.5 MG chemo tablet Take 2.5 mg by mouth once a week Sundays    Historical Provider, MD   folic acid (FOLVITE) 1 MG tablet Take 1 tablet by mouth daily 11/14/16   Srinivas Ruelas MD   FREEYLE LANCETS [facility] CHECK BS 4 TIMES DAILY 7/23/15   Dieter Pabon MD       Current medications:    Current Facility-Administered Medications   Medication Dose Route Frequency Provider Last Rate Last Admin    sodium chloride flush 0.9 % injection 5-40 mL  5-40 mL IntraVENous 2 times per day Lamont Cai MD        sodium chloride flush 0.9 % injection 5-40 mL  5-40 mL IntraVENous PRN Lamont Cai MD        0.9 % sodium chloride infusion  25 mL IntraVENous PRN Lamont Cai MD           Allergies: Allergies   Allergen Reactions    Amoxicillin Other (See Comments)     Hives all over body, states that hives are severe     Sulfa Antibiotics     Cefdinir     Doxycycline Other (See Comments)     Pt states no allergy to this med    Other Other (See Comments)    Milk-Related Compounds     Tomato        Problem List:    Patient Active Problem List   Diagnosis Code    Type 2 diabetes mellitus with complication, with long-term current use of insulin ([facility]) E11.8, Z79.4    Depression F32. A    Sjogren's syndrome ([facility]) M35.00    Diabetic ketosis ([facility]) E13.10    Anemia D64.9    Hyperglycemia R73.9    Acquired hypothyroidism E03.9    Vitamin D deficiency E55.9    Encounter for long-term (current) use of insulin ([facility]) Z79.4    Infective urethritis N34.2    Severe obstructive sleep apnea G47.33    Mild depression ([facility]) F32.0    Anxiety disorder F41.9    Benign hypertensive kidney disease with chronic kidney disease I12.9    Chronic kidney disease, stage I N18.1    COVID-19 U07.1    Fibromyalgia M79.7    Mixed hyperlipidemia E78.2    Hypoglycemia E16.2    Hypokalemia E87.6    Nephrotic syndrome due to type 2 diabetes mellitus ([facility]) E11.21    Hypothyroidism E03.9    Proteinuria R80.9    Depressive disorder F32.9    Type 2 diabetes mellitus with chronic kidney disease ([facility]) E11.22    Rheumatoid arthritis involving multiple sites ([facility]) M06.9    Chronic fatigue syndrome R53.82    Diabetic renal disease ([facility]) E11.21    Essential hypertension I10    Hyperglycemia due to type 2 diabetes mellitus (HCC) E11.65    Major depression, single episode Q51.8    Metabolic acidosis E88.8    Neuropathy G62.9    Dietary noncompliance Z91.11       Past Medical History:        Diagnosis Date    Anxiety     Arthritis     Depression     Diabetes mellitus (HCC)     Fibromyalgia     HTN (hypertension)     Hypertensive chronic kidney disease     Left shoulder pain     LIZABETH (obstructive sleep apnea)     [facility]oid arthritis involving multiple sites [facility]     Sjogren's disease ([facility]     Thyroid disease        Past Surgical History:        Procedure Laterality Date    CT BIOPSY RENAL  2021    CT BIOPSY RENAL 2021 Will Diaz MD [facility] CT    SHOULDER ARTHROSCOPY Right 2017       Social History:    Social History     Tobacco Use    Smoking status: Former Smoker     Packs/day: 2.00     Years: 0.50     Pack years: 1.00     Start date:      Quit date:      Years since quittin.3    Smokeless tobacco: Never Used   Substance Use Topics    Alcohol use:  No                                Counseling given: Not Answered      Vital Signs (Current):   Vitals:    22 1600 22 0946   BP:  (!) 135/91   Pulse:  99   Resp:  17   Temp:  36.1 °C (96.9 °F)   TempSrc:  Temporal   SpO2:  96%   Weight: 165 lb (74.8 kg) 152 lb (68.9 kg)   Height: 5' 1\" (1.549 m) 5' 1\" (1.549 m)                                              BP Readings from Last 3 Encounters:   22 (!) 135/91   22 (!) 163/97   22 (!) 158/105       NPO Status: Time of last liquid consumption: 600                        Time of last solid consumption: 600                        Date of last liquid consumption: 22                        Date of last solid food consumption: 22    BMI:   Wt Readings from Last 3 Encounters:   22 152 lb (68.9 kg)   22 151 lb (68.5 kg)   22 152 lb (68.9 kg)     Body mass index is 28.72 kg/m². CBC:   Lab Results   Component Value Date    WBC 13.0 03/31/2022    RBC 5.06 03/31/2022    HGB 11.8 03/31/2022    HCT 37.7 03/31/2022    MCV 74.5 03/31/2022    RDW 16.2 03/31/2022     03/31/2022       CMP:   Lab Results   Component Value Date     03/31/2022    K 3.3 03/31/2022     03/31/2022    CO2 20 03/31/2022    BUN 21 03/31/2022    CREATININE 1.0 03/31/2022    GFRAA >60 03/31/2022    LABGLOM 58 03/31/2022    GLUCOSE 401 03/31/2022    PROT 5.6 03/31/2022    CALCIUM 8.2 03/31/2022    BILITOT 0.3 03/31/2022    ALKPHOS 113 03/31/2022    AST 10 03/31/2022    ALT 8 03/31/2022       POC Tests: No results for input(s): POCGLU, POCNA, POCK, POCCL, POCBUN, POCHEMO, POCHCT in the last 72 hours. Coags:   Lab Results   Component Value Date    PROTIME 10.1 11/18/2021    INR 0.9 11/18/2021       HCG (If Applicable):   Lab Results   Component Value Date    PREGTESTUR NEGATIVE 06/28/2018        ABGs: No results found for: PHART, PO2ART, BAH9ORE, UEW3RKF, BEART, R9HYPOFR     Type & Screen (If Applicable):  No results found for: LABABO, LABRH    Drug/Infectious Status (If Applicable):  No results found for: HIV, HEPCAB    COVID-19 Screening (If Applicable):   Lab Results   Component Value Date    COVID19 NOT DETECTED 06/05/2020       EKG 7/29/2021:    Sinus  Rhythm   -Old inferior infarct  -Poor R-wave progression -nonspecific -consider old anterior infarct. ABNORMAL     ECHO 9/8/2015:    Conclusions      Summary   Normal left ventricular systolic function. Ejection fraction is visually estimated at 65%. Normal right ventricular size and function. No evidence of a left atrial appendage thrombus. No evidence of interatrial shunting on bubble study. No evidence of hemodynamically significant valve disease. No evidence of a vegetation.       Signature      ----------------------------------------------------------------   Electronically signed by Tristen Garcia MD(Interpreting physician) on 09/08/2015 03:33 PM        Chest Xray 5/16/2021:    No pneumonia, pneumothorax or overt CHF.       Chronic findings, as described. Anesthesia Evaluation  Patient summary reviewed history of anesthetic complications (trouble waking up): Airway: Mallampati: III  TM distance: >3 FB   Neck ROM: full  Mouth opening: > = 3 FB Dental:      Comment: Denies loose or chipped teeth    Pulmonary: breath sounds clear to auscultation  (+) sleep apnea: on noncompliant,      (-) not a current smoker (quit smoking in 1996)                           Cardiovascular:    (+) hypertension:,       ECG reviewed  Rhythm: regular    Echocardiogram reviewed         Beta Blocker:  Not on Beta Blocker         Neuro/Psych:   (+) neuromuscular disease (FIbromyalgia):, psychiatric history:depression/anxiety              ROS comment: neuropathy GI/Hepatic/Renal:   (+) renal disease (diabetic renal disease. Chronic kidney disease, stage I):,           Endo/Other:    (+) DiabetesType II DM, poorly controlled, using insulin, hypothyroidism: arthritis: rheumatoid. , electrolyte abnormalities (VIt D deficiency, Hx of hypokalemia), . Abdominal:             Vascular: negative vascular ROS. Other Findings:           Anesthesia Plan      MAC     ASA 3       Induction: intravenous. Anesthetic plan and risks discussed with patient. Use of blood products discussed with patient whom.                    Any Viramontes RN   5/6/2022

## 2022-05-10 ENCOUNTER — HOSPITAL ENCOUNTER (OUTPATIENT)
Dept: PHYSICAL THERAPY | Age: 52
Setting detail: THERAPIES SERIES
Discharge: HOME | End: 2022-05-10
Payer: MEDICAID

## 2022-05-10 PROCEDURE — G0283 ELEC STIM OTHER THAN WOUND: HCPCS | Performed by: PHYSICAL THERAPIST

## 2022-05-10 PROCEDURE — 97110 THERAPEUTIC EXERCISES: CPT | Performed by: PHYSICAL THERAPIST

## 2022-05-10 NOTE — PROGRESS NOTES
[address] [facility]                Phone: [phone]   Fax: [phone]    Physical Therapy Daily Treatment Note  Date:  5/10/2022    Patient Name:  Vito Serrano    :  1970  MRN: [id number]    Evaluating therapist:  JONNY Castillo                    (22)  Restrictions/Precautions:    Diagnosis:  chronic LBP   Treatment Diagnosis:    Insurance/Certification information:  [address]  Referring Physician:  Dwayne Gayle. Plan of care signed (Y/N):    Visit# / total visits:    Pain level: 8/10   Time In:  930  Time Out:  1035    Subjective:      Exercises:  Exercise/Equipment Resistance/Repetitions Other comments   StepOne   10 min            tball flex/rot 10x10s           rot st   3x20s    SKTC 3x20s    piriformis st 3x20s           pelvic tilts 15x3s              bridges  15x3s                                                                      Other Therapeutic Activities:      Home Exercise Program:  provided 22    Manual Treatments:      Modalities:  IFC/MH to LB x 15 min     Timed Code Treatment Minutes: Total Treatment Minutes:      Treatment/Activity Tolerance:  [] Patient tolerated treatment well [] Patient limited by fatique  [] Patient limited by pain  [] Patient limited by other medical complications  [] Other:     Prognosis: [] Good [] Fair  [] Poor    Patient Requires Follow-up: [] Yes  [] No    Plan:   [] Continue per plan of care [] Alter current plan (see comments)  [] Plan of care initiated [] Hold pending MD visit [] Discharge  Plan for Next Session:      See Weekly Progress Note: []  Yes  []  No  Next due:        Electronically signed by:   Christopher Emanuel PT

## 2022-05-11 ENCOUNTER — APPOINTMENT (OUTPATIENT)
Dept: GENERAL RADIOLOGY | Age: 52
End: 2022-05-11
Payer: MEDICAID

## 2022-05-11 ENCOUNTER — APPOINTMENT (OUTPATIENT)
Dept: CT IMAGING | Age: 52
End: 2022-05-11
Payer: MEDICAID

## 2022-05-11 ENCOUNTER — HOSPITAL ENCOUNTER (EMERGENCY)
Age: 52
Discharge: HOME | End: 2022-05-12
Attending: STUDENT IN AN ORGANIZED HEALTH CARE EDUCATION/TRAINING PROGRAM
Payer: MEDICAID

## 2022-05-11 DIAGNOSIS — E87.6 HYPOKALEMIA: ICD-10-CM

## 2022-05-11 DIAGNOSIS — E87.20 LACTIC ACIDOSIS: ICD-10-CM

## 2022-05-11 DIAGNOSIS — E86.0 DEHYDRATION: Primary | ICD-10-CM

## 2022-05-11 DIAGNOSIS — N17.9 AKI (ACUTE KIDNEY INJURY) (HCC): ICD-10-CM

## 2022-05-11 DIAGNOSIS — N30.01 ACUTE CYSTITIS WITH HEMATURIA: ICD-10-CM

## 2022-05-11 LAB
ALBUMIN SERPL-MCNC: 2.8 G/DL (ref 3.5–5.2)
ALP BLD-CCNC: 121 U/L (ref 35–104)
ALT SERPL-CCNC: 11 U/L (ref 0–32)
ANION GAP SERPL CALCULATED.3IONS-SCNC: 16 MMOL/L (ref 7–16)
ANISOCYTOSIS: ABNORMAL
AST SERPL-CCNC: 12 U/L (ref 0–31)
BASOPHILS ABSOLUTE: 0.13 E9/L (ref 0–0.2)
BASOPHILS RELATIVE PERCENT: 0.8 % (ref 0–2)
BETA-HYDROXYBUTYRATE: 0.1 MMOL/L (ref 0.02–0.27)
BILIRUB SERPL-MCNC: <0.2 MG/DL (ref 0–1.2)
BUN BLDV-MCNC: 24 MG/DL (ref 6–20)
BURR CELLS: ABNORMAL
CALCIUM SERPL-MCNC: 8.7 MG/DL (ref 8.6–10.2)
CHLORIDE BLD-SCNC: 96 MMOL/L (ref 98–107)
CO2: 22 MMOL/L (ref 22–29)
CREAT SERPL-MCNC: 1.3 MG/DL (ref 0.5–1)
EOSINOPHILS ABSOLUTE: 0.19 E9/L (ref 0.05–0.5)
EOSINOPHILS RELATIVE PERCENT: 1.2 % (ref 0–6)
GFR AFRICAN AMERICAN: 52
GFR NON-AFRICAN AMERICAN: 43 ML/MIN/1.73
GLUCOSE BLD-MCNC: 218 MG/DL (ref 74–99)
GLUCOSE BLD-MCNC: 255 MG/DL
HCT VFR BLD CALC: 38 % (ref 34–48)
HEMOGLOBIN: 11.8 G/DL (ref 11.5–15.5)
IMMATURE GRANULOCYTES #: 0.2 E9/L
IMMATURE GRANULOCYTES %: 1.2 % (ref 0–5)
LACTIC ACID: 3.5 MMOL/L (ref 0.5–2.2)
LACTIC ACID: 5.9 MMOL/L (ref 0.5–2.2)
LYMPHOCYTES ABSOLUTE: 6.16 E9/L (ref 1.5–4)
LYMPHOCYTES RELATIVE PERCENT: 38 % (ref 20–42)
MAGNESIUM: 2.1 MG/DL (ref 1.6–2.6)
MCH RBC QN AUTO: 23.9 PG (ref 26–35)
MCHC RBC AUTO-ENTMCNC: 31.1 % (ref 32–34.5)
MCV RBC AUTO: 77.1 FL (ref 80–99.9)
METER GLUCOSE: 255 MG/DL (ref 74–99)
MONOCYTES ABSOLUTE: 1.18 E9/L (ref 0.1–0.95)
MONOCYTES RELATIVE PERCENT: 7.3 % (ref 2–12)
NEUTROPHILS ABSOLUTE: 8.33 E9/L (ref 1.8–7.3)
NEUTROPHILS RELATIVE PERCENT: 51.5 % (ref 43–80)
PDW BLD-RTO: 15.2 FL (ref 11.5–15)
PH VENOUS: 7.4 (ref 7.35–7.45)
PLATELET # BLD: 258 E9/L (ref 130–450)
PMV BLD AUTO: 10.6 FL (ref 7–12)
POIKILOCYTES: ABNORMAL
POLYCHROMASIA: ABNORMAL
POTASSIUM REFLEX MAGNESIUM: 3.2 MMOL/L (ref 3.5–5)
RBC # BLD: 4.93 E12/L (ref 3.5–5.5)
SODIUM BLD-SCNC: 134 MMOL/L (ref 132–146)
TOTAL PROTEIN: 5.6 G/DL (ref 6.4–8.3)
TROPONIN, HIGH SENSITIVITY: 119 NG/L (ref 0–9)
WBC # BLD: 16.2 E9/L (ref 4.5–11.5)

## 2022-05-11 PROCEDURE — 82010 KETONE BODYS QUAN: CPT

## 2022-05-11 PROCEDURE — 96365 THER/PROPH/DIAG IV INF INIT: CPT

## 2022-05-11 PROCEDURE — 80053 COMPREHEN METABOLIC PANEL: CPT

## 2022-05-11 PROCEDURE — 83735 ASSAY OF MAGNESIUM: CPT

## 2022-05-11 PROCEDURE — 81001 URINALYSIS AUTO W/SCOPE: CPT

## 2022-05-11 PROCEDURE — 6370000000 HC RX 637 (ALT 250 FOR IP): Performed by: STUDENT IN AN ORGANIZED HEALTH CARE EDUCATION/TRAINING PROGRAM

## 2022-05-11 PROCEDURE — 71045 X-RAY EXAM CHEST 1 VIEW: CPT

## 2022-05-11 PROCEDURE — 83605 ASSAY OF LACTIC ACID: CPT

## 2022-05-11 PROCEDURE — 96361 HYDRATE IV INFUSION ADD-ON: CPT

## 2022-05-11 PROCEDURE — 85025 COMPLETE CBC W/AUTO DIFF WBC: CPT

## 2022-05-11 PROCEDURE — 96366 THER/PROPH/DIAG IV INF ADDON: CPT

## 2022-05-11 PROCEDURE — 96375 TX/PRO/DX INJ NEW DRUG ADDON: CPT

## 2022-05-11 PROCEDURE — 82962 GLUCOSE BLOOD TEST: CPT

## 2022-05-11 PROCEDURE — 6360000002 HC RX W HCPCS: Performed by: STUDENT IN AN ORGANIZED HEALTH CARE EDUCATION/TRAINING PROGRAM

## 2022-05-11 PROCEDURE — 81025 URINE PREGNANCY TEST: CPT

## 2022-05-11 PROCEDURE — 84484 ASSAY OF TROPONIN QUANT: CPT

## 2022-05-11 PROCEDURE — 2580000003 HC RX 258: Performed by: STUDENT IN AN ORGANIZED HEALTH CARE EDUCATION/TRAINING PROGRAM

## 2022-05-11 PROCEDURE — 93005 ELECTROCARDIOGRAM TRACING: CPT

## 2022-05-11 PROCEDURE — 99285 EMERGENCY DEPT VISIT HI MDM: CPT

## 2022-05-11 PROCEDURE — 82800 BLOOD PH: CPT

## 2022-05-11 PROCEDURE — 70450 CT HEAD/BRAIN W/O DYE: CPT

## 2022-05-11 RX ORDER — POTASSIUM CHLORIDE 7.45 MG/ML
10 INJECTION INTRAVENOUS ONCE
Status: COMPLETED | OUTPATIENT
Start: 2022-05-11 | End: 2022-05-12

## 2022-05-11 RX ORDER — 0.9 % SODIUM CHLORIDE 0.9 %
1000 INTRAVENOUS SOLUTION INTRAVENOUS ONCE
Status: COMPLETED | OUTPATIENT
Start: 2022-05-11 | End: 2022-05-11

## 2022-05-11 RX ADMIN — POTASSIUM BICARBONATE 40 MEQ: 782 TABLET, EFFERVESCENT ORAL at 23:41

## 2022-05-11 RX ADMIN — POTASSIUM CHLORIDE 10 MEQ: 7.46 INJECTION, SOLUTION INTRAVENOUS at 23:45

## 2022-05-11 RX ADMIN — SODIUM CHLORIDE 1000 ML: 9 INJECTION, SOLUTION INTRAVENOUS at 21:59

## 2022-05-11 RX ADMIN — SODIUM CHLORIDE 1000 ML: 9 INJECTION, SOLUTION INTRAVENOUS at 22:02

## 2022-05-11 RX ADMIN — SODIUM CHLORIDE 1000 ML: 9 INJECTION, SOLUTION INTRAVENOUS at 20:28

## 2022-05-11 NOTE — ED PROVIDER NOTES
Department of Emergency Medicine   ED  Provider Note  Admit Date/RoomTime: 5/11/2022  7:04 PM  ED Room: 01/01          History of Present Illness:  5/11/22, Time: 7:21 PM EDT  Chief Complaint   Patient presents with    Hyperglycemia     579 at home, 408 for EMS, c/o left hand and leg cramping      Vito Serrano is a 47 y.o. female presenting to the ED for hyperglycemia, beginning bhavya. The complaint has been persistent, moderate in severity, and worsened by nothing. The patient is a 21-year-old female who presents to the emergency department complaining of hyperglycemia. She arrives to the emergency department via EMS from home for elevated blood sugars. Her blood sugar was 570 at home. For EMS it was 408. She complains of left hand and left leg cramping. However, she states that actually both of her hands and legs are cramping. She states that she had a near syncopal event when EMS was placing her on the cot. She states that she has been some of her medications but she did take her nightly insulin this evening. She denies any fever, chills, chest pain, shortness of breath, abdominal pain, unilateral weakness, recent hospitalization, recent illness, or other acute symptoms or concerns. Review of Systems:   A complete review of systems was performed and pertinent positives and negatives are stated within HPI, all other systems reviewed and are negative.        --------------------------------------------- PAST HISTORY ---------------------------------------------  Past Medical History:  has a past medical history of Anxiety, Arthritis, Depression, Diabetes mellitus ([facility] [address] Fibromyalgia, HTN (hypertension), Hypertensive chronic kidney disease, Left shoulder pain, LIZABETH (obstructive sleep apnea), Rheumatoid arthritis involving multiple sites ([facility] [address] [facility] and Thyroid disease.     Past Surgical History:  has a past surgical history that includes Shoulder arthroscopy (Right, 06/16/2017); CT BIOPSY RENAL (11/18/2021); and Upper gastrointestinal endoscopy (N/A, 5/6/2022). Social History:  reports that she quit smoking about 24 years ago. She started smoking about 25 years ago. She has a 1.00 pack-year smoking history. She has never used smokeless tobacco. She reports that she does not drink alcohol and does not use drugs. Family History: She was adopted. Family history is unknown by patient. . Unless otherwise noted, family history is non contributory    The patients home medications have been reviewed. Allergies: Amoxicillin, Sulfa antibiotics, Cefdinir, Doxycycline, Other, Milk-related compounds, and Tomato    I have reviewed the past medical history, past surgical history, social history, and family history    ---------------------------------------------------PHYSICAL EXAM--------------------------------------    Constitutional/General: Alert and oriented x3, patient sitting up in bed resting comfortably no acute distress  Head: Normocephalic and atraumatic  Eyes:  EOMI, sclera non icteric  ENT: Oropharynx clear, handling secretions, no trismus, no asymmetry of the posterior oropharynx or uvular edema  Neck: Supple, full ROM, no stridor, no meningeal signs  Respiratory: Lungs clear to auscultation bilaterally, no wheezes, rales, or rhonchi. Not in respiratory distress  Cardiovascular:  Regular rate. Regular rhythm. No murmurs, no gallops, no rubs. 2+ distal pulses. Equal extremity pulses. Gastrointestinal:  Abdomen Soft, Non tender, Non distended. No rebound, guarding, or rigidity. No pulsatile masses. Musculoskeletal: Moves all extremities x 4. Warm and well perfused, no clubbing, no cyanosis, no edema. Capillary refill <3 seconds  Skin: skin warm and dry. No rashes.    Neurologic: GCS 15, no focal deficits, symmetric strength 5/5 in the upper and lower extremities bilaterally  Psychiatric: Normal Affect    -------------------------------------------------- RESULTS -------------------------------------------------  I have personally reviewed all laboratory and imaging results for this patient. Results are listed below.      LABS: (Lab results interpreted by me)  Results for orders placed or performed during the hospital encounter of 05/11/22   CBC with Auto Differential   Result Value Ref Range    WBC 16.2 (H) 4.5 - 11.5 E9/L    RBC 4.93 3.50 - 5.50 E12/L    Hemoglobin 11.8 11.5 - 15.5 g/dL    Hematocrit 38.0 34.0 - 48.0 %    MCV 77.1 (L) 80.0 - 99.9 fL    MCH 23.9 (L) 26.0 - 35.0 pg    MCHC 31.1 (L) 32.0 - 34.5 %    RDW 15.2 (H) 11.5 - 15.0 fL    Platelets 834 355 - 184 E9/L    MPV 10.6 7.0 - 12.0 fL    Neutrophils % 51.5 43.0 - 80.0 %    Immature Granulocytes % 1.2 0.0 - 5.0 %    Lymphocytes % 38.0 20.0 - 42.0 %    Monocytes % 7.3 2.0 - 12.0 %    Eosinophils % 1.2 0.0 - 6.0 %    Basophils % 0.8 0.0 - 2.0 %    Neutrophils Absolute 8.33 (H) 1.80 - 7.30 E9/L    Immature Granulocytes # 0.20 E9/L    Lymphocytes Absolute 6.16 (H) 1.50 - 4.00 E9/L    Monocytes Absolute 1.18 (H) 0.10 - 0.95 E9/L    Eosinophils Absolute 0.19 0.05 - 0.50 E9/L    Basophils Absolute 0.13 0.00 - 0.20 E9/L    Anisocytosis 1+     Polychromasia 1+     Poikilocytes 1+     Isaac Cells 1+    Comprehensive Metabolic Panel w/ Reflex to MG   Result Value Ref Range    Sodium 134 132 - 146 mmol/L    Potassium reflex Magnesium 3.2 (L) 3.5 - 5.0 mmol/L    Chloride 96 (L) 98 - 107 mmol/L    CO2 22 22 - 29 mmol/L    Anion Gap 16 7 - 16 mmol/L    Glucose 218 (H) 74 - 99 mg/dL    BUN 24 (H) 6 - 20 mg/dL    CREATININE 1.3 (H) 0.5 - 1.0 mg/dL    GFR Non-African American 43 >=60 mL/min/1.73    GFR African American 52     Calcium 8.7 8.6 - 10.2 mg/dL    Total Protein 5.6 (L) 6.4 - 8.3 g/dL    Albumin 2.8 (L) 3.5 - 5.2 g/dL    Total Bilirubin <0.2 0.0 - 1.2 mg/dL    Alkaline Phosphatase 121 (H) 35 - 104 U/L    ALT 11 0 - 32 U/L    AST 12 0 - 31 U/L   Troponin   Result Value Ref Range    Troponin, High Sensitivity 119 (H) 0 - 9 ng/L   Urinalysis with Microscopic   Result Value Ref Range    Color, UA Yellow Straw/Yellow    Clarity, UA SL CLOUDY Clear    Glucose, Ur >=1000 (A) Negative mg/dL    Bilirubin Urine Negative Negative    Ketones, Urine Negative Negative mg/dL    Specific Gravity, UA <=1.005 1.005 - 1.030    Blood, Urine LARGE (A) Negative    pH, UA 6.0 5.0 - 9.0    Protein,  (A) Negative mg/dL    Urobilinogen, Urine 0.2 <2.0 E.U./dL    Nitrite, Urine Negative Negative    Leukocyte Esterase, Urine MODERATE (A) Negative    WBC, UA 10-20 (A) 0 - 5 /HPF    RBC, UA 10-20 (A) 0 - 2 /HPF    Epithelial Cells, UA MODERATE /HPF    Renal Epithelial, UA FEW /HPF    Bacteria, UA MANY (A) None Seen /HPF   Pregnancy, urine   Result Value Ref Range    HCG(Urine) Pregnancy Test NEGATIVE NEGATIVE   Beta-Hydroxybutyrate   Result Value Ref Range    Beta-Hydroxybutyrate 0.10 0.02 - 0.27 mmol/L   PH, VENOUS   Result Value Ref Range    pH, Harris 7.40 7.35 - 7.45   Lactic Acid   Result Value Ref Range    Lactic Acid 5.9 (HH) 0.5 - 2.2 mmol/L   Magnesium   Result Value Ref Range    Magnesium 2.1 1.6 - 2.6 mg/dL   Lactic Acid   Result Value Ref Range    Lactic Acid 3.5 (HH) 0.5 - 2.2 mmol/L   Troponin   Result Value Ref Range    Troponin, High Sensitivity 78 (H) 0 - 9 ng/L   Lactic Acid   Result Value Ref Range    Lactic Acid 2.1 0.5 - 2.2 mmol/L   Brain Natriuretic Peptide   Result Value Ref Range    Pro- (H) 0 - 125 pg/mL   HCG, SERUM, QUALITATIVE   Result Value Ref Range    hCG Qual NEGATIVE NEGATIVE   POCT glucose   Result Value Ref Range    Glucose 255 mg/dL   POCT Glucose   Result Value Ref Range    Meter Glucose 255 (H) 74 - 99 mg/dL   POC Pregnancy Urine Qual   Result Value Ref Range    HCG, Urine, POC Negative Negative    Lot Number [id number]     Positive QC Pass/Fail Pass     Negative QC Pass/Fail Pass    ,       RADIOLOGY:  Interpreted by Radiologist unless otherwise specified  CTA CHEST W CONTRAST   Final Result   1. A 6.4cm multilocular cystic lesion of the left adnexa. 2.  No evidence of a aortic dissection or anuerysm. 3.  A 3mm pulmonary nodule in the right upper lobe. CTA ABDOMEN PELVIS W CONTRAST   Final Result   1. A 6.4cm multilocular cystic lesion of the left adnexa. 2.  No evidence of a aortic dissection or anuerysm. 3.  A 3mm pulmonary nodule in the right upper lobe. CT HEAD WO CONTRAST   Final Result   No acute intracranial abnormality. XR CHEST PORTABLE   Final Result   No acute process. EKG Interpretation  Interpreted by emergency department physician, Dr. Goodwin Check    EKG: This EKG is signed and interpreted by me. Rate: 83  Rhythm: Sinus  Interpretation: Normal sinus rhythm, left axis deviation, no acute elevations or depressions, intervals within normal limits, QTC is 493  Comparison: stable as compared to patient's most recent EKG     ------------------------- NURSING NOTES AND VITALS REVIEWED ---------------------------   The nursing notes within the ED encounter and vital signs as below have been reviewed by myself  BP (!) 173/100   Pulse 82   Temp 98.1 °F (36.7 °C) (Oral)   Resp 17   Ht 5' 1\" (1.549 m)   Wt 160 lb (72.6 kg)   SpO2 97%   BMI 30.23 kg/m²     Oxygen Saturation Interpretation: Normal    The patients available past medical records and past encounters were reviewed.         ------------------------------ ED COURSE/MEDICAL DECISION MAKING----------------------  Medications   0.9 % sodium chloride bolus (0 mLs IntraVENous Stopped 5/11/22 2145)   0.9 % sodium chloride bolus (0 mLs IntraVENous Stopped 5/11/22 2251)   0.9 % sodium chloride bolus (0 mLs IntraVENous Stopped 5/11/22 2251)   potassium bicarb-citric acid (EFFER-K) effervescent tablet 40 mEq (40 mEq Oral Given 5/11/22 2341)   potassium chloride 10 mEq/100 mL IVPB (Peripheral Line) (0 mEq IntraVENous Stopped 5/12/22 0100)   0.9 % sodium chloride bolus (0 mLs IntraVENous Stopped 5/12/22 0220)   potassium chloride 10 mEq/100 mL IVPB (Peripheral Line) (10 mEq IntraVENous New Bag 5/12/22 0053)   iopamidol (ISOVUE-370) 76 % injection 90 mL (90 mLs IntraVENous Given 5/12/22 0136)   cefTRIAXone (ROCEPHIN) 1,000 mg in sterile water 10 mL IV syringe (1,000 mg IntraVENous Given 5/12/22 0219)           The cardiac monitor revealed NSR with a heart rate in the 80s as interpreted by me. The cardiac monitor was ordered secondary to the patient's hyperglycemia and to monitor the patient for dysrhythmia. CPT [id number]       I, Dr. Jeri Lorenzo, am the primary provider of record    Medical Decision Making:   The patient is a 60-year-old female who presents to the emergency department complaining hyperglycemia. The patient was found to be hypokalemic which was repleted in the ED and had a slight ELIU with a creatinine of 1.3. She was treated with IV fluids. No evidence of DKA. Anion gap is normal at 16. She did have a lactic acidosis of 5.9 which did clear with IV fluids to 2.1 on multiple repeats. Troponin was elevated at 119 with a repeat troponin of 78. Patient does not have any chest pain or shortness of breath or cardiac symptoms. EKG does not show evidence of acute ischemia. She does have a slight leukocytosis over 16,000 and evidence of urinary tract infection. Cultures were obtained and she was treated with antibiotics. Did obtain CTAs of her chest and abdomen due to the cramping that she states that she was still experiencing after IV fluid hydration in the potassium repletion. No evidence of dissection or aneurysm present. She felt much better and was ambulatory to and from the bathroom multiple times in the emergency department. She was treated for urinary tract infection. She does have an allergy to cefdinir which she states just tore up her stomach.   She was given a dose of Rocephin here which she has received in the past without any reaction and a prescription for Keflex for urinary tract infection. She was discharged home in stable condition with strict return precautions. Oxygen Saturation Interpretation: 97 % on room air. Re-Evaluations:       Patient is resting comfortably and in no distress. She felt much better on reassessment. This patient's ED course included: a personal history and physicial examination, re-evaluation prior to disposition, multiple bedside re-evaluations, IV medications, cardiac monitoring, continuous pulse oximetry and complex medical decision making and emergency management    This patient has remained hemodynamically stable during their ED course. Counseling: The emergency provider has spoken with the patient and discussed todays results, in addition to providing specific details for the plan of care and counseling regarding the diagnosis and prognosis. Questions are answered at this time and they are agreeable with the plan.       --------------------------------- IMPRESSION AND DISPOSITION ---------------------------------    IMPRESSION  1. Dehydration    2. Lactic acidosis    3. Acute cystitis with hematuria    4. Hypokalemia    5. EL[facility]        DISPOSITION  Disposition: Discharge to home  Patient condition is stable        NOTE: This report was transcribed using voice recognition software.  Every effort was made to ensure accuracy; however, inadvertent computerized transcription errors may be present       Ector Salcedo DO  05/12/22 8249

## 2022-05-12 ENCOUNTER — HOSPITAL ENCOUNTER (OUTPATIENT)
Dept: PHYSICAL THERAPY | Age: 52
Setting detail: THERAPIES SERIES
Discharge: HOME | End: 2022-05-12
Payer: MEDICAID

## 2022-05-12 ENCOUNTER — APPOINTMENT (OUTPATIENT)
Dept: CT IMAGING | Age: 52
End: 2022-05-12
Payer: MEDICAID

## 2022-05-12 VITALS
HEART RATE: 80 BPM | SYSTOLIC BLOOD PRESSURE: 154 MMHG | TEMPERATURE: 98.1 F | RESPIRATION RATE: 18 BRPM | OXYGEN SATURATION: 98 % | WEIGHT: 160 LBS | DIASTOLIC BLOOD PRESSURE: 90 MMHG | HEIGHT: 61 IN | BODY MASS INDEX: 30.21 KG/M2

## 2022-05-12 LAB
BACTERIA: ABNORMAL /HPF
BILIRUBIN URINE: NEGATIVE
BLOOD, URINE: ABNORMAL
CLARITY: ABNORMAL
COLOR: YELLOW
EPITHELIAL CELLS, UA: ABNORMAL /HPF
GLUCOSE URINE: >=1000 MG/DL
HCG QUALITATIVE: NEGATIVE
HCG(URINE) PREGNANCY TEST: NEGATIVE
HCG, URINE, POC: NEGATIVE
KETONES, URINE: NEGATIVE MG/DL
LACTIC ACID: 2.1 MMOL/L (ref 0.5–2.2)
LEUKOCYTE ESTERASE, URINE: ABNORMAL
Lab: NORMAL
NEGATIVE QC PASS/FAIL: NORMAL
NITRITE, URINE: NEGATIVE
PH UA: 6 (ref 5–9)
POSITIVE QC PASS/FAIL: NORMAL
PRO-BNP: 224 PG/ML (ref 0–125)
PROTEIN UA: 100 MG/DL
RBC UA: ABNORMAL /HPF (ref 0–2)
RENAL EPITHELIAL, UA: ABNORMAL /HPF
SPECIFIC GRAVITY UA: <=1.005 (ref 1–1.03)
TROPONIN, HIGH SENSITIVITY: 78 NG/L (ref 0–9)
UROBILINOGEN, URINE: 0.2 E.U./DL
WBC UA: ABNORMAL /HPF (ref 0–5)

## 2022-05-12 PROCEDURE — 6360000002 HC RX W HCPCS: Performed by: STUDENT IN AN ORGANIZED HEALTH CARE EDUCATION/TRAINING PROGRAM

## 2022-05-12 PROCEDURE — 83605 ASSAY OF LACTIC ACID: CPT

## 2022-05-12 PROCEDURE — 6360000004 HC RX CONTRAST MEDICATION: Performed by: RADIOLOGY

## 2022-05-12 PROCEDURE — 83880 ASSAY OF NATRIURETIC PEPTIDE: CPT

## 2022-05-12 PROCEDURE — 84703 CHORIONIC GONADOTROPIN ASSAY: CPT

## 2022-05-12 PROCEDURE — 71275 CT ANGIOGRAPHY CHEST: CPT

## 2022-05-12 PROCEDURE — 2580000003 HC RX 258: Performed by: STUDENT IN AN ORGANIZED HEALTH CARE EDUCATION/TRAINING PROGRAM

## 2022-05-12 PROCEDURE — 74174 CTA ABD&PLVS W/CONTRAST: CPT

## 2022-05-12 PROCEDURE — 87040 BLOOD CULTURE FOR BACTERIA: CPT

## 2022-05-12 RX ORDER — CEPHALEXIN 500 MG/1
500 CAPSULE ORAL 4 TIMES DAILY
Qty: 28 CAPSULE | Refills: 0 | Status: ON HOLD | OUTPATIENT
Start: 2022-05-12 | End: 2022-05-20 | Stop reason: HOSPADM

## 2022-05-12 RX ORDER — BUPROPION HYDROCHLORIDE 100 MG/1
100 TABLET ORAL DAILY
Status: ON HOLD | COMMUNITY
End: 2022-05-31 | Stop reason: HOSPADM

## 2022-05-12 RX ORDER — POTASSIUM CHLORIDE 7.45 MG/ML
10 INJECTION INTRAVENOUS ONCE
Status: COMPLETED | OUTPATIENT
Start: 2022-05-12 | End: 2022-05-12

## 2022-05-12 RX ORDER — 0.9 % SODIUM CHLORIDE 0.9 %
2000 INTRAVENOUS SOLUTION INTRAVENOUS ONCE
Status: COMPLETED | OUTPATIENT
Start: 2022-05-12 | End: 2022-05-12

## 2022-05-12 RX ORDER — PREGABALIN 100 MG/1
100 CAPSULE ORAL 3 TIMES DAILY
COMMUNITY
End: 2022-06-17 | Stop reason: SDUPTHER

## 2022-05-12 RX ADMIN — POTASSIUM CHLORIDE 10 MEQ: 7.46 INJECTION, SOLUTION INTRAVENOUS at 00:53

## 2022-05-12 RX ADMIN — IOPAMIDOL 90 ML: 755 INJECTION, SOLUTION INTRAVENOUS at 01:36

## 2022-05-12 RX ADMIN — SODIUM CHLORIDE 2000 ML: 9 INJECTION, SOLUTION INTRAVENOUS at 00:49

## 2022-05-12 RX ADMIN — CEFTRIAXONE 1000 MG: 1 INJECTION, POWDER, FOR SOLUTION INTRAMUSCULAR; INTRAVENOUS at 02:19

## 2022-05-12 NOTE — ED NOTES
Patient requested taxi ride home; charge RN notified; food provided to patient and sent to waiting room at this time with proper discharge instructions and education     Gill Cruz, ELADIO  05/12/22 0183

## 2022-05-12 NOTE — PROGRESS NOTES
[facility]                Phone: [phone]  Fax: [phone]    Physical Therapy  Cancellation/No-show Note  Patient Name:  Alyce Bustillo  :  1970   Date:  2022    For today's appointment patient:  [x]  Cancelled  []  Rescheduled appointment  []  No-show     Reason given by patient:  []  Patient ill  []  Conflicting appointment  []  No transportation    []  Conflict with work  [x]  No reason given  []  Other:     Comments:      Electronically signed by:   Valentino Sings, PT

## 2022-05-12 NOTE — PROGRESS NOTES
S:  pt presents to therap for one of two scheduled visits this week, having cancelled on 5/12/22; at week's only visit she continued to c/o LBP with all prolonged activities; pain level given as /10 and remained constant in nature; no c/o buckling or LOB over last week's time; HEP going well  per pt     O:  performed the exercises/treatments as written in the flowsheet for the week ending 5/13/22; initiated HEP for home management of condition; LB AROM grossly 50%WNL for all ranges;  B LE strength grossly 5/5 for all planes     A:  rashi tx well; pt able to perform all requested tasks with good form and pacing noted; LB AROM/B LE strength grossly stable since eval; movement remains slow/guarded at trunk due to aching; gait stable with normal  noted; static/dynamic balance is GOOD+; endurance for all prolonged activities is GOOD-     P:  cont with POC of stretching/strengthening for LB/LE's with modalities as needed

## 2022-05-13 ENCOUNTER — HOSPITAL ENCOUNTER (EMERGENCY)
Age: 52
Discharge: HOME | End: 2022-05-14
Attending: EMERGENCY MEDICINE
Payer: MEDICAID

## 2022-05-13 DIAGNOSIS — R11.2 NON-INTRACTABLE VOMITING WITH NAUSEA, UNSPECIFIED VOMITING TYPE: Primary | ICD-10-CM

## 2022-05-13 LAB
ALBUMIN SERPL-MCNC: 2.8 G/DL (ref 3.5–5.2)
ALP BLD-CCNC: 129 U/L (ref 35–104)
ALT SERPL-CCNC: 10 U/L (ref 0–32)
ANION GAP SERPL CALCULATED.3IONS-SCNC: 19 MMOL/L (ref 7–16)
AST SERPL-CCNC: 10 U/L (ref 0–31)
BASOPHILS ABSOLUTE: 0.07 E9/L (ref 0–0.2)
BASOPHILS RELATIVE PERCENT: 0.6 % (ref 0–2)
BETA-HYDROXYBUTYRATE: >4.5 MMOL/L (ref 0.02–0.27)
BILIRUB SERPL-MCNC: 0.2 MG/DL (ref 0–1.2)
BILIRUBIN DIRECT: <0.2 MG/DL (ref 0–0.3)
BILIRUBIN, INDIRECT: ABNORMAL MG/DL (ref 0–1)
BUN BLDV-MCNC: 12 MG/DL (ref 6–20)
CALCIUM SERPL-MCNC: 9.4 MG/DL (ref 8.6–10.2)
CHLORIDE BLD-SCNC: 97 MMOL/L (ref 98–107)
CO2: 19 MMOL/L (ref 22–29)
CREAT SERPL-MCNC: 0.8 MG/DL (ref 0.5–1)
EOSINOPHILS ABSOLUTE: 0.01 E9/L (ref 0.05–0.5)
EOSINOPHILS RELATIVE PERCENT: 0.1 % (ref 0–6)
GFR AFRICAN AMERICAN: >60
GFR NON-AFRICAN AMERICAN: >60 ML/MIN/1.73
GLUCOSE BLD-MCNC: 390 MG/DL (ref 74–99)
HCT VFR BLD CALC: 41.9 % (ref 34–48)
HEMOGLOBIN: 12.9 G/DL (ref 11.5–15.5)
IMMATURE GRANULOCYTES #: 0.1 E9/L
IMMATURE GRANULOCYTES %: 0.9 % (ref 0–5)
LACTIC ACID: 1.7 MMOL/L (ref 0.5–2.2)
LIPASE: 20 U/L (ref 13–60)
LYMPHOCYTES ABSOLUTE: 2.23 E9/L (ref 1.5–4)
LYMPHOCYTES RELATIVE PERCENT: 20.5 % (ref 20–42)
MCH RBC QN AUTO: 23.7 PG (ref 26–35)
MCHC RBC AUTO-ENTMCNC: 30.8 % (ref 32–34.5)
MCV RBC AUTO: 77 FL (ref 80–99.9)
MONOCYTES ABSOLUTE: 0.37 E9/L (ref 0.1–0.95)
MONOCYTES RELATIVE PERCENT: 3.4 % (ref 2–12)
NEUTROPHILS ABSOLUTE: 8.11 E9/L (ref 1.8–7.3)
NEUTROPHILS RELATIVE PERCENT: 74.5 % (ref 43–80)
PDW BLD-RTO: 15.3 FL (ref 11.5–15)
PLATELET # BLD: 282 E9/L (ref 130–450)
PMV BLD AUTO: 10.4 FL (ref 7–12)
POTASSIUM SERPL-SCNC: 4.1 MMOL/L (ref 3.5–5)
RBC # BLD: 5.44 E12/L (ref 3.5–5.5)
SODIUM BLD-SCNC: 135 MMOL/L (ref 132–146)
TOTAL PROTEIN: 6.2 G/DL (ref 6.4–8.3)
WBC # BLD: 10.9 E9/L (ref 4.5–11.5)

## 2022-05-13 PROCEDURE — 96374 THER/PROPH/DIAG INJ IV PUSH: CPT

## 2022-05-13 PROCEDURE — 83605 ASSAY OF LACTIC ACID: CPT

## 2022-05-13 PROCEDURE — 83690 ASSAY OF LIPASE: CPT

## 2022-05-13 PROCEDURE — 85025 COMPLETE CBC W/AUTO DIFF WBC: CPT

## 2022-05-13 PROCEDURE — 96372 THER/PROPH/DIAG INJ SC/IM: CPT

## 2022-05-13 PROCEDURE — 99284 EMERGENCY DEPT VISIT MOD MDM: CPT

## 2022-05-13 PROCEDURE — 36415 COLL VENOUS BLD VENIPUNCTURE: CPT

## 2022-05-13 PROCEDURE — 80048 BASIC METABOLIC PNL TOTAL CA: CPT

## 2022-05-13 PROCEDURE — 82010 KETONE BODYS QUAN: CPT

## 2022-05-13 PROCEDURE — 80076 HEPATIC FUNCTION PANEL: CPT

## 2022-05-13 RX ORDER — ONDANSETRON 2 MG/ML
4 INJECTION INTRAMUSCULAR; INTRAVENOUS ONCE
Status: DISCONTINUED | OUTPATIENT
Start: 2022-05-13 | End: 2022-05-13

## 2022-05-13 RX ORDER — METOCLOPRAMIDE HYDROCHLORIDE 5 MG/ML
10 INJECTION INTRAMUSCULAR; INTRAVENOUS ONCE
Status: COMPLETED | OUTPATIENT
Start: 2022-05-13 | End: 2022-05-14

## 2022-05-13 RX ORDER — 0.9 % SODIUM CHLORIDE 0.9 %
1000 INTRAVENOUS SOLUTION INTRAVENOUS ONCE
Status: COMPLETED | OUTPATIENT
Start: 2022-05-13 | End: 2022-05-14

## 2022-05-13 ASSESSMENT — PAIN DESCRIPTION - LOCATION: LOCATION: ABDOMEN

## 2022-05-13 ASSESSMENT — PAIN - FUNCTIONAL ASSESSMENT
PAIN_FUNCTIONAL_ASSESSMENT: ACTIVITIES ARE NOT PREVENTED
PAIN_FUNCTIONAL_ASSESSMENT: 0-10

## 2022-05-13 ASSESSMENT — PAIN SCALES - GENERAL: PAINLEVEL_OUTOF10: 5

## 2022-05-13 ASSESSMENT — PAIN DESCRIPTION - PAIN TYPE: TYPE: ACUTE PAIN

## 2022-05-13 ASSESSMENT — PAIN DESCRIPTION - DESCRIPTORS: DESCRIPTORS: CRAMPING

## 2022-05-13 NOTE — ED NOTES
Department of Emergency Medicine  FIRST PROVIDER TRIAGE NOTE             Independent MLP           5/13/22  6:31 PM EDT    Date of Encounter: 5/13/22   MRN: [id number]      HPI: Charity Santamaria is a 47 y.o. female who presents to the ED for Nausea (on and off x several months, had an endoscopy on friday and waiting on results. zofran perscription not helping. pt seen yesterday) and Emesis   see nTuesday for elevatedBGL. Had EGD last Friday. ROS: Negative for cp, sob, fever, cough. PE: Gen Appearance/Constitutional: alert  HEENT: NC/NT. PERRLA,  Airway patent. Neck: supple     Initial Plan of Care: All treatment areas with department are currently occupied. Plan to order/Initiate the following while awaiting opening in ED: labs, imaging studies, antiemetics and IV fluids.   Initiate Treatment-Testing, Proceed toTreatment Area When Bed Available for ED Attending/MLP to Continue Care    Electronically signed by FRANSISCO Stock   DD: 5/13/22         Nieves Morejon [place]  05/13/22 [id number]

## 2022-05-14 VITALS
DIASTOLIC BLOOD PRESSURE: 111 MMHG | RESPIRATION RATE: 16 BRPM | HEIGHT: 61 IN | BODY MASS INDEX: 30.21 KG/M2 | TEMPERATURE: 97.5 F | OXYGEN SATURATION: 98 % | WEIGHT: 160 LBS | SYSTOLIC BLOOD PRESSURE: 190 MMHG | HEART RATE: 95 BPM

## 2022-05-14 LAB
ANION GAP SERPL CALCULATED.3IONS-SCNC: 15 MMOL/L (ref 7–16)
BACTERIA: ABNORMAL /HPF
BILIRUBIN URINE: NEGATIVE
BLOOD, URINE: ABNORMAL
BUN BLDV-MCNC: 12 MG/DL (ref 6–20)
CALCIUM SERPL-MCNC: 8.5 MG/DL (ref 8.6–10.2)
CHLORIDE BLD-SCNC: 102 MMOL/L (ref 98–107)
CLARITY: ABNORMAL
CO2: 19 MMOL/L (ref 22–29)
COLOR: YELLOW
CREAT SERPL-MCNC: 0.7 MG/DL (ref 0.5–1)
EPITHELIAL CELLS, UA: ABNORMAL /HPF
GFR AFRICAN AMERICAN: >60
GFR NON-AFRICAN AMERICAN: >60 ML/MIN/1.73
GLUCOSE BLD-MCNC: 340 MG/DL (ref 74–99)
GLUCOSE URINE: >=1000 MG/DL
KETONES, URINE: 15 MG/DL
LEUKOCYTE ESTERASE, URINE: NEGATIVE
NITRITE, URINE: NEGATIVE
PH UA: 6.5 (ref 5–9)
PH VENOUS: 7.41 (ref 7.35–7.45)
POTASSIUM REFLEX MAGNESIUM: 3.8 MMOL/L (ref 3.5–5)
PROTEIN UA: >=300 MG/DL
RBC UA: ABNORMAL /HPF (ref 0–2)
SODIUM BLD-SCNC: 136 MMOL/L (ref 132–146)
SPECIFIC GRAVITY UA: 1.02 (ref 1–1.03)
UROBILINOGEN, URINE: 0.2 E.U./DL
WBC UA: ABNORMAL /HPF (ref 0–5)
YEAST: PRESENT /HPF

## 2022-05-14 PROCEDURE — 6360000002 HC RX W HCPCS

## 2022-05-14 PROCEDURE — 87088 URINE BACTERIA CULTURE: CPT

## 2022-05-14 PROCEDURE — 2580000003 HC RX 258: Performed by: STUDENT IN AN ORGANIZED HEALTH CARE EDUCATION/TRAINING PROGRAM

## 2022-05-14 PROCEDURE — 2580000003 HC RX 258: Performed by: PHYSICIAN ASSISTANT

## 2022-05-14 PROCEDURE — 82800 BLOOD PH: CPT

## 2022-05-14 PROCEDURE — 81001 URINALYSIS AUTO W/SCOPE: CPT

## 2022-05-14 PROCEDURE — 6360000002 HC RX W HCPCS: Performed by: EMERGENCY MEDICINE

## 2022-05-14 PROCEDURE — 6360000002 HC RX W HCPCS: Performed by: STUDENT IN AN ORGANIZED HEALTH CARE EDUCATION/TRAINING PROGRAM

## 2022-05-14 PROCEDURE — 80048 BASIC METABOLIC PNL TOTAL CA: CPT

## 2022-05-14 RX ORDER — PROMETHAZINE HYDROCHLORIDE 25 MG/1
25 TABLET ORAL 3 TIMES DAILY PRN
Qty: 12 TABLET | Refills: 0 | Status: SHIPPED | OUTPATIENT
Start: 2022-05-14 | End: 2022-05-21

## 2022-05-14 RX ORDER — PROMETHAZINE HYDROCHLORIDE 25 MG/ML
25 INJECTION, SOLUTION INTRAMUSCULAR; INTRAVENOUS ONCE
Status: COMPLETED | OUTPATIENT
Start: 2022-05-14 | End: 2022-05-14

## 2022-05-14 RX ORDER — 0.9 % SODIUM CHLORIDE 0.9 %
1000 INTRAVENOUS SOLUTION INTRAVENOUS ONCE
Status: COMPLETED | OUTPATIENT
Start: 2022-05-14 | End: 2022-05-14

## 2022-05-14 RX ADMIN — TRIMETHOBENZAMIDE HYDROCHLORIDE: 100 INJECTION INTRAMUSCULAR at 01:30

## 2022-05-14 RX ADMIN — METOCLOPRAMIDE HYDROCHLORIDE 10 MG: 5 INJECTION INTRAMUSCULAR; INTRAVENOUS at 00:23

## 2022-05-14 RX ADMIN — SODIUM CHLORIDE 1000 ML: 9 INJECTION, SOLUTION INTRAVENOUS at 00:23

## 2022-05-14 RX ADMIN — PROMETHAZINE HYDROCHLORIDE 25 MG: 25 INJECTION INTRAMUSCULAR; INTRAVENOUS at 04:09

## 2022-05-14 RX ADMIN — SODIUM CHLORIDE 1000 ML: 9 INJECTION, SOLUTION INTRAVENOUS at 00:54

## 2022-05-14 NOTE — ED PROVIDER NOTES
81-year-old female presenting for nausea and emesis. Symptoms have been present for 4 days and have been persistent and moderate in severity. No exacerbating or relieving factors. Patient states she has been having emesis daily since Tuesday and has not been to keep anything down. She has been having similar episodes for a while and recently had an EGD done. She has been taking Zofran without relief. She denies any fevers, chills, chest pain, shortness of breath, abdominal pain, or diarrhea. Review of Systems   Constitutional: Negative for chills and fever. HENT: Negative for congestion and sore throat. Eyes: Negative for photophobia and visual disturbance. Respiratory: Negative for cough and shortness of breath. Cardiovascular: Negative for chest pain. Gastrointestinal: Positive for nausea and vomiting. Negative for abdominal pain and diarrhea. Genitourinary: Negative for dysuria and flank pain. Musculoskeletal: Negative for back pain and myalgias. Skin: Negative for rash and wound. Neurological: Negative for dizziness and headaches. All other systems reviewed and are negative. Physical Exam  Constitutional:       General: She is not in acute distress. Appearance: Normal appearance. She is not ill-appearing. HENT:      Head: Normocephalic and atraumatic. Right Ear: External ear normal.      Left Ear: External ear normal.      Nose: Nose normal.   Eyes:      Conjunctiva/sclera: Conjunctivae normal.   Cardiovascular:      Rate and Rhythm: Normal rate and regular rhythm. Pulmonary:      Effort: Pulmonary effort is normal.      Breath sounds: Normal breath sounds. Abdominal:      General: There is no distension. Palpations: Abdomen is soft. Tenderness: There is no abdominal tenderness. There is no right CVA tenderness or left CVA tenderness. Musculoskeletal:         General: No swelling or deformity. Skin:     General: Skin is warm and dry. -------------------------------------------------  Labs:  Results for orders placed or performed during the hospital encounter of 05/13/22   CBC with Auto Differential   Result Value Ref Range    WBC 10.9 4.5 - 11.5 E9/L    RBC 5.44 3.50 - 5.50 E12/L    Hemoglobin 12.9 11.5 - 15.5 g/dL    Hematocrit 41.9 34.0 - 48.0 %    MCV 77.0 (L) 80.0 - 99.9 fL    MCH 23.7 (L) 26.0 - 35.0 pg    MCHC 30.8 (L) 32.0 - 34.5 %    RDW 15.3 (H) 11.5 - 15.0 fL    Platelets 504 820 - 342 E9/L    MPV 10.4 7.0 - 12.0 fL    Neutrophils % 74.5 43.0 - 80.0 %    Immature Granulocytes % 0.9 0.0 - 5.0 %    Lymphocytes % 20.5 20.0 - 42.0 %    Monocytes % 3.4 2.0 - 12.0 %    Eosinophils % 0.1 0.0 - 6.0 %    Basophils % 0.6 0.0 - 2.0 %    Neutrophils Absolute 8.11 (H) 1.80 - 7.30 E9/L    Immature Granulocytes # 0.10 E9/L    Lymphocytes Absolute 2.23 1.50 - 4.00 E9/L    Monocytes Absolute 0.37 0.10 - 0.95 E9/L    Eosinophils Absolute 0.01 (L) 0.05 - 0.50 E9/L    Basophils Absolute 0.07 0.00 - 0.20 B6/Z   Basic Metabolic Panel   Result Value Ref Range    Sodium 135 132 - 146 mmol/L    Potassium 4.1 3.5 - 5.0 mmol/L    Chloride 97 (L) 98 - 107 mmol/L    CO2 19 (L) 22 - 29 mmol/L    Anion Gap 19 (H) 7 - 16 mmol/L    Glucose 390 (H) 74 - 99 mg/dL    BUN 12 6 - 20 mg/dL    CREATININE 0.8 0.5 - 1.0 mg/dL    GFR Non-African American >60 >=60 mL/min/1.73    GFR African American >60     Calcium 9.4 8.6 - 10.2 mg/dL   Hepatic Function Panel   Result Value Ref Range    Total Protein 6.2 (L) 6.4 - 8.3 g/dL    Albumin 2.8 (L) 3.5 - 5.2 g/dL    Alkaline Phosphatase 129 (H) 35 - 104 U/L    ALT 10 0 - 32 U/L    AST 10 0 - 31 U/L    Total Bilirubin 0.2 0.0 - 1.2 mg/dL    Bilirubin, Direct <0.2 0.0 - 0.3 mg/dL    Bilirubin, Indirect see below 0.0 - 1.0 mg/dL   Lactic Acid   Result Value Ref Range    Lactic Acid 1.7 0.5 - 2.2 mmol/L   Urinalysis   Result Value Ref Range    Color, UA Yellow Straw/Yellow    Clarity, UA CLOUDY (A) Clear    Glucose, Ur >=1000 (A) Negative mg/dL    Bilirubin Urine Negative Negative    Ketones, Urine 15 (A) Negative mg/dL    Specific Gravity, UA 1.020 1.005 - 1.030    Blood, Urine SMALL (A) Negative    pH, UA 6.5 5.0 - 9.0    Protein, UA >=300 (A) Negative mg/dL    Urobilinogen, Urine 0.2 <2.0 E.U./dL    Nitrite, Urine Negative Negative    Leukocyte Esterase, Urine Negative Negative   Lipase   Result Value Ref Range    Lipase 20 13 - 60 U/L   Beta-Hydroxybutyrate   Result Value Ref Range    Beta-Hydroxybutyrate >4.50 (H) 0.02 - 0.27 mmol/L   pH, venous   Result Value Ref Range    pH, Harris 7.41 7.35 - 0.84   Basic Metabolic Panel w/ Reflex to MG   Result Value Ref Range    Sodium 136 132 - 146 mmol/L    Potassium reflex Magnesium 3.8 3.5 - 5.0 mmol/L    Chloride 102 98 - 107 mmol/L    CO2 19 (L) 22 - 29 mmol/L    Anion Gap 15 7 - 16 mmol/L    Glucose 340 (H) 74 - 99 mg/dL    BUN 12 6 - 20 mg/dL    CREATININE 0.7 0.5 - 1.0 mg/dL    GFR Non-African American >60 >=60 mL/min/1.73    GFR African American >60     Calcium 8.5 (L) 8.6 - 10.2 mg/dL   Microscopic Urinalysis   Result Value Ref Range    WBC, UA 10-20 (A) 0 - 5 /HPF    RBC, UA 5-10 (A) 0 - 2 /HPF    Epithelial Cells, UA MODERATE /HPF    Bacteria, UA FEW (A) None Seen /HPF    Yeast, UA Present (A) None Seen /HPF       Radiology:  No orders to display       ------------------------- NURSING NOTES AND VITALS REVIEWED ---------------------------  Date / Time Roomed:  5/13/2022 10:40 PM  ED Bed Assignment:  18A/18A-18    The nursing notes within the ED encounter and vital signs as below have been reviewed.    BP (!) 190/111   Pulse 95   Temp 97.5 °F (36.4 °C) (Oral)   Resp 16   Ht 5' 1\" (1.549 m)   Wt 160 lb (72.6 kg)   SpO2 98%   BMI 30.23 kg/m²   Oxygen Saturation Interpretation: Normal      ------------------------------------------ PROGRESS NOTES ------------------------------------------    I have spoken with the patient and discussed todays results, in addition to providing specific details for the plan of care and counseling regarding the diagnosis and prognosis. Their questions are answered at this time and they are agreeable with the plan. I discussed at length with them reasons for immediate return here for re evaluation. They will followup with their gastroenterologist and primary care physician by calling their office on Monday.      --------------------------------- ADDITIONAL PROVIDER NOTES ---------------------------------  At this time the patient is without objective evidence of an acute process requiring hospitalization or inpatient management. They have remained hemodynamically stable throughout their entire ED visit and are stable for discharge with outpatient follow-up. The plan has been discussed in detail and they are aware of the specific conditions for emergent return, as well as the importance of follow-up. Discharge Medication List as of 5/14/2022  4:55 AM      START taking these medications    Details   promethazine (PHENERGAN) 25 MG tablet Take 1 tablet by mouth 3 times daily as needed for Nausea, Disp-12 tablet, R-0Print             Diagnosis:  1. Non-intractable vomiting with nausea, unspecified vomiting type        Disposition:  Patient's disposition: Discharge to home  Patient's condition is stable.           Rudy Yousif MD  Resident  05/14/22 9955

## 2022-05-14 NOTE — ED PROVIDER NOTES
60-year-old female presenting for nausea and emesis. Symptoms have been present for 4 days and have been persistent and moderate in severity. No exacerbating or relieving factors. Patient states she has been having emesis daily since Tuesday and has not been to keep anything down. She has been having similar episodes for a while and recently had an EGD done. She has been taking Zofran without relief. She denies any fevers, chills, chest pain, shortness of breath, abdominal pain, or diarrhea. Review of Systems   Constitutional: Negative for chills and fever. HENT: Negative for congestion and sore throat. Eyes: Negative for photophobia and visual disturbance. Respiratory: Negative for cough and shortness of breath. Cardiovascular: Negative for chest pain. Gastrointestinal: Positive for nausea and vomiting. Negative for abdominal pain and diarrhea. Genitourinary: Negative for dysuria and flank pain. Musculoskeletal: Negative for back pain and myalgias. Skin: Negative for rash and wound. Neurological: Negative for dizziness and headaches. All other systems reviewed and are negative. Physical Exam  Constitutional:       General: She is not in acute distress. Appearance: Normal appearance. She is not ill-appearing. HENT:      Head: Normocephalic and atraumatic. Right Ear: External ear normal.      Left Ear: External ear normal.      Nose: Nose normal.   Eyes:      Conjunctiva/sclera: Conjunctivae normal.   Cardiovascular:      Rate and Rhythm: Normal rate and regular rhythm. Pulmonary:      Effort: Pulmonary effort is normal.      Breath sounds: Normal breath sounds. Abdominal:      General: There is no distension. Palpations: Abdomen is soft. Tenderness: There is no abdominal tenderness. There is no right CVA tenderness or left CVA tenderness. Musculoskeletal:         General: No swelling or deformity. Skin:     General: Skin is warm and dry. Neurological:      General: No focal deficit present. Mental Status: She is alert. Psychiatric:         Mood and Affect: Mood normal.         Behavior: Behavior normal.          Procedures     MDM                  --------------------------------------------- PAST HISTORY ---------------------------------------------  Past Medical History:  has a past medical history of Anxiety, Arthritis, Depression, Diabetes mellitus ([facility] [address] Fibromyalgia, HTN (hypertension), Hypertensive chronic kidney disease, Left shoulder pain, LIZABETH (obstructive sleep apnea), Rheumatoid arthritis involving multiple sites ([facility] Sjogren's disease ([facility] and Thyroid disease. Past Surgical History:  has a past surgical history that includes Shoulder arthroscopy (Right, 06/16/2017); CT BIOPSY RENAL (11/18/2021); and Upper gastrointestinal endoscopy (N/A, 5/6/2022). Social History:  reports that she quit smoking about 24 years ago. She started smoking about 25 years ago. She has a 1.00 pack-year smoking history. She has never used smokeless tobacco. She reports that she does not drink alcohol and does not use drugs. Family History: She was adopted. Family history is unknown by patient. The patients home medications have been reviewed.     Allergies: Amoxicillin, Sulfa antibiotics, Cefdinir, Doxycycline, Other, Milk-related compounds, and Tomato    -------------------------------------------------- RESULTS -------------------------------------------------  Labs:  Results for orders placed or performed during the hospital encounter of 05/13/22   CBC with Auto Differential   Result Value Ref Range    WBC 10.9 4.5 - 11.5 E9/L    RBC 5.44 3.50 - 5.50 E12/L    Hemoglobin 12.9 11.5 - 15.5 g/dL    Hematocrit 41.9 34.0 - 48.0 %    MCV 77.0 (L) 80.0 - 99.9 fL    MCH 23.7 (L) 26.0 - 35.0 pg    MCHC 30.8 (L) 32.0 - 34.5 %    RDW 15.3 (H) 11.5 - 15.0 fL    Platelets 386 652 - 866 E9/L    MPV 10.4 7.0 - 12.0 fL    Neutrophils % 74.5 43.0 - 80.0 %    Immature Granulocytes % 0.9 0.0 - 5.0 %    Lymphocytes % 20.5 20.0 - 42.0 %    Monocytes % 3.4 2.0 - 12.0 %    Eosinophils % 0.1 0.0 - 6.0 %    Basophils % 0.6 0.0 - 2.0 %    Neutrophils Absolute 8.11 (H) 1.80 - 7.30 E9/L    Immature Granulocytes # 0.10 E9/L    Lymphocytes Absolute 2.23 1.50 - 4.00 E9/L    Monocytes Absolute 0.37 0.10 - 0.95 E9/L    Eosinophils Absolute 0.01 (L) 0.05 - 0.50 E9/L    Basophils Absolute 0.07 0.00 - 0.20 N0/N   Basic Metabolic Panel   Result Value Ref Range    Sodium 135 132 - 146 mmol/L    Potassium 4.1 3.5 - 5.0 mmol/L    Chloride 97 (L) 98 - 107 mmol/L    CO2 19 (L) 22 - 29 mmol/L    Anion Gap 19 (H) 7 - 16 mmol/L    Glucose 390 (H) 74 - 99 mg/dL    BUN 12 6 - 20 mg/dL    CREATININE 0.8 0.5 - 1.0 mg/dL    GFR Non-African American >60 >=60 mL/min/1.73    GFR African American >60     Calcium 9.4 8.6 - 10.2 mg/dL   Hepatic Function Panel   Result Value Ref Range    Total Protein 6.2 (L) 6.4 - 8.3 g/dL    Albumin 2.8 (L) 3.5 - 5.2 g/dL    Alkaline Phosphatase 129 (H) 35 - 104 U/L    ALT 10 0 - 32 U/L    AST 10 0 - 31 U/L    Total Bilirubin 0.2 0.0 - 1.2 mg/dL    Bilirubin, Direct <0.2 0.0 - 0.3 mg/dL    Bilirubin, Indirect see below 0.0 - 1.0 mg/dL   Lactic Acid   Result Value Ref Range    Lactic Acid 1.7 0.5 - 2.2 mmol/L   Lipase   Result Value Ref Range    Lipase 20 13 - 60 U/L   Beta-Hydroxybutyrate   Result Value Ref Range    Beta-Hydroxybutyrate >4.50 (H) 0.02 - 0.27 mmol/L   pH, venous   Result Value Ref Range    pH, Harris 7.41 7.35 - 7.45       Radiology:  No orders to display       ------------------------- NURSING NOTES AND VITALS REVIEWED ---------------------------  Date / Time Roomed:  5/13/2022 10:40 PM  ED Bed Assignment:  18A/18A-18    The nursing notes within the ED encounter and vital signs as below have been reviewed.    BP (!) 171/97   Pulse 94   Temp 97.5 °F (36.4 °C) (Oral)   Resp 18   Ht 5' 1\" (1.549 m)   Wt 160 lb (72.6 kg)   SpO2 98%   BMI 30.23 kg/m²   Oxygen Saturation Interpretation: {Pulse ox analysis:41969}      ------------------------------------------ PROGRESS NOTES ------------------------------------------  12:41 AM EDT  I have spoken with the {PERSONS; CARETAKERS:10707} and discussed todays results, in addition to providing specific details for the plan of care and counseling regarding the diagnosis and prognosis. Their questions are answered at this time and they are agreeable with the plan. I discussed at length with them reasons for immediate return here for re evaluation. They will followup with {Blank multiple:80710::\"their\",\"the on call\"} {Blank multiple:65488::\"primary care physician\",\"general surgeon\",\"orthopedic physician\"} by calling their office {Blank multiple:65018::\"tomorrow\",\"on Monday\"}.      --------------------------------- ADDITIONAL PROVIDER NOTES ---------------------------------  At this time the patient is without objective evidence of an acute process requiring hospitalization or inpatient management. They have remained hemodynamically stable throughout their entire ED visit and are stable for discharge with outpatient follow-up. The plan has been discussed in detail and they are aware of the specific conditions for emergent return, as well as the importance of follow-up. New Prescriptions    No medications on file       Diagnosis:  No diagnosis found. Disposition:  Patient's disposition: Discharge to {ED - Discharge:60924}  Patient's condition is stable.

## 2022-05-15 LAB — URINE CULTURE, ROUTINE: NORMAL

## 2022-05-16 ENCOUNTER — HOSPITAL ENCOUNTER (INPATIENT)
Age: 52
LOS: 4 days | Discharge: HOME | DRG: 048 | End: 2022-05-20
Attending: EMERGENCY MEDICINE | Admitting: INTERNAL MEDICINE
Payer: MEDICAID

## 2022-05-16 ENCOUNTER — OFFICE VISIT (OUTPATIENT)
Dept: GASTROENTEROLOGY | Age: 52
End: 2022-05-16
Payer: MEDICAID

## 2022-05-16 ENCOUNTER — APPOINTMENT (OUTPATIENT)
Dept: GENERAL RADIOLOGY | Age: 52
DRG: 048 | End: 2022-05-16
Payer: MEDICAID

## 2022-05-16 VITALS
SYSTOLIC BLOOD PRESSURE: 143 MMHG | OXYGEN SATURATION: 98 % | WEIGHT: 147 LBS | HEART RATE: 105 BPM | DIASTOLIC BLOOD PRESSURE: 91 MMHG | BODY MASS INDEX: 27.78 KG/M2

## 2022-05-16 DIAGNOSIS — M54.50 LOW BACK PAIN, UNSPECIFIED BACK PAIN LATERALITY, UNSPECIFIED CHRONICITY, UNSPECIFIED WHETHER SCIATICA PRESENT: ICD-10-CM

## 2022-05-16 DIAGNOSIS — E11.65 TYPE 2 DIABETES MELLITUS WITH HYPERGLYCEMIA, WITH LONG-TERM CURRENT USE OF INSULIN (HCC): Primary | ICD-10-CM

## 2022-05-16 DIAGNOSIS — E87.6 HYPOKALEMIA: Primary | ICD-10-CM

## 2022-05-16 DIAGNOSIS — R11.2 NAUSEA AND VOMITING, INTRACTABILITY OF VOMITING NOT SPECIFIED, UNSPECIFIED VOMITING TYPE: ICD-10-CM

## 2022-05-16 DIAGNOSIS — Z76.0 MEDICATION REFILL: ICD-10-CM

## 2022-05-16 DIAGNOSIS — Z79.4 TYPE 2 DIABETES MELLITUS WITH HYPERGLYCEMIA, WITH LONG-TERM CURRENT USE OF INSULIN (HCC): Primary | ICD-10-CM

## 2022-05-16 DIAGNOSIS — E86.0 DEHYDRATION: ICD-10-CM

## 2022-05-16 DIAGNOSIS — R42 DIZZINESS: Primary | ICD-10-CM

## 2022-05-16 LAB
ALBUMIN SERPL-MCNC: 2.9 G/DL (ref 3.5–5.2)
ALP BLD-CCNC: 153 U/L (ref 35–104)
ALT SERPL-CCNC: 11 U/L (ref 0–32)
ANION GAP SERPL CALCULATED.3IONS-SCNC: 17 MMOL/L (ref 7–16)
AST SERPL-CCNC: 14 U/L (ref 0–31)
BACTERIA: ABNORMAL /HPF
BASOPHILS ABSOLUTE: 0.07 E9/L (ref 0–0.2)
BASOPHILS RELATIVE PERCENT: 0.4 % (ref 0–2)
BETA-HYDROXYBUTYRATE: 1.42 MMOL/L (ref 0.02–0.27)
BILIRUB SERPL-MCNC: <0.2 MG/DL (ref 0–1.2)
BILIRUBIN URINE: NEGATIVE
BLOOD, URINE: ABNORMAL
BUN BLDV-MCNC: 11 MG/DL (ref 6–20)
CALCIUM SERPL-MCNC: 9.4 MG/DL (ref 8.6–10.2)
CHLORIDE BLD-SCNC: 102 MMOL/L (ref 98–107)
CHP ED QC CHECK: NORMAL
CLARITY: ABNORMAL
CO2: 24 MMOL/L (ref 22–29)
COLOR: YELLOW
CREAT SERPL-MCNC: 0.8 MG/DL (ref 0.5–1)
EKG ATRIAL RATE: 83 BPM
EKG ATRIAL RATE: 96 BPM
EKG P AXIS: 14 DEGREES
EKG P AXIS: 4 DEGREES
EKG P-R INTERVAL: 114 MS
EKG P-R INTERVAL: 144 MS
EKG Q-T INTERVAL: 380 MS
EKG Q-T INTERVAL: 420 MS
EKG QRS DURATION: 82 MS
EKG QRS DURATION: 84 MS
EKG QTC CALCULATION (BAZETT): 480 MS
EKG QTC CALCULATION (BAZETT): 493 MS
EKG R AXIS: -10 DEGREES
EKG R AXIS: -3 DEGREES
EKG T AXIS: 2 DEGREES
EKG T AXIS: 42 DEGREES
EKG VENTRICULAR RATE: 83 BPM
EKG VENTRICULAR RATE: 96 BPM
EOSINOPHILS ABSOLUTE: 0.03 E9/L (ref 0.05–0.5)
EOSINOPHILS RELATIVE PERCENT: 0.2 % (ref 0–6)
EPITHELIAL CELLS, UA: ABNORMAL /HPF
GFR AFRICAN AMERICAN: >60
GFR NON-AFRICAN AMERICAN: >60 ML/MIN/1.73
GLUCOSE BLD-MCNC: 73 MG/DL
GLUCOSE BLD-MCNC: 92 MG/DL (ref 74–99)
GLUCOSE URINE: 100 MG/DL
HCT VFR BLD CALC: 41.8 % (ref 34–48)
HEMOGLOBIN: 13.1 G/DL (ref 11.5–15.5)
IMMATURE GRANULOCYTES #: 0.17 E9/L
IMMATURE GRANULOCYTES %: 1.1 % (ref 0–5)
KETONES, URINE: ABNORMAL MG/DL
LACTIC ACID: 1.2 MMOL/L (ref 0.5–2.2)
LACTIC ACID: 2.6 MMOL/L (ref 0.5–2.2)
LEUKOCYTE ESTERASE, URINE: ABNORMAL
LIPASE: 16 U/L (ref 13–60)
LYMPHOCYTES ABSOLUTE: 4.25 E9/L (ref 1.5–4)
LYMPHOCYTES RELATIVE PERCENT: 26.5 % (ref 20–42)
MAGNESIUM: 1.7 MG/DL (ref 1.6–2.6)
MCH RBC QN AUTO: 24.1 PG (ref 26–35)
MCHC RBC AUTO-ENTMCNC: 31.3 % (ref 32–34.5)
MCV RBC AUTO: 76.8 FL (ref 80–99.9)
METER GLUCOSE: 103 MG/DL (ref 74–99)
METER GLUCOSE: 121 MG/DL (ref 74–99)
METER GLUCOSE: 72 MG/DL (ref 74–99)
MONOCYTES ABSOLUTE: 0.93 E9/L (ref 0.1–0.95)
MONOCYTES RELATIVE PERCENT: 5.8 % (ref 2–12)
NEUTROPHILS ABSOLUTE: 10.59 E9/L (ref 1.8–7.3)
NEUTROPHILS RELATIVE PERCENT: 66 % (ref 43–80)
NITRITE, URINE: NEGATIVE
PDW BLD-RTO: 15.2 FL (ref 11.5–15)
PH UA: 7.5 (ref 5–9)
PH VENOUS: 7.53 (ref 7.35–7.45)
PLATELET # BLD: 337 E9/L (ref 130–450)
PMV BLD AUTO: 10.2 FL (ref 7–12)
POTASSIUM REFLEX MAGNESIUM: 2.7 MMOL/L (ref 3.5–5)
PROTEIN UA: >=300 MG/DL
RBC # BLD: 5.44 E12/L (ref 3.5–5.5)
RBC UA: ABNORMAL /HPF (ref 0–2)
REASON FOR REJECTION: NORMAL
REJECTED TEST: NORMAL
SODIUM BLD-SCNC: 143 MMOL/L (ref 132–146)
SPECIFIC GRAVITY UA: 1.02 (ref 1–1.03)
TOTAL PROTEIN: 6.5 G/DL (ref 6.4–8.3)
TROPONIN, HIGH SENSITIVITY: 47 NG/L (ref 0–9)
TSH SERPL DL<=0.05 MIU/L-ACNC: 5.22 UIU/ML (ref 0.27–4.2)
UROBILINOGEN, URINE: 0.2 E.U./DL
WBC # BLD: 16 E9/L (ref 4.5–11.5)
WBC UA: ABNORMAL /HPF (ref 0–5)

## 2022-05-16 PROCEDURE — 6360000002 HC RX W HCPCS: Performed by: INTERNAL MEDICINE

## 2022-05-16 PROCEDURE — 6370000000 HC RX 637 (ALT 250 FOR IP): Performed by: EMERGENCY MEDICINE

## 2022-05-16 PROCEDURE — 96361 HYDRATE IV INFUSION ADD-ON: CPT

## 2022-05-16 PROCEDURE — 6370000000 HC RX 637 (ALT 250 FOR IP): Performed by: INTERNAL MEDICINE

## 2022-05-16 PROCEDURE — 99285 EMERGENCY DEPT VISIT HI MDM: CPT

## 2022-05-16 PROCEDURE — 83690 ASSAY OF LIPASE: CPT

## 2022-05-16 PROCEDURE — 93005 ELECTROCARDIOGRAM TRACING: CPT | Performed by: EMERGENCY MEDICINE

## 2022-05-16 PROCEDURE — 80053 COMPREHEN METABOLIC PANEL: CPT

## 2022-05-16 PROCEDURE — 82010 KETONE BODYS QUAN: CPT

## 2022-05-16 PROCEDURE — 99212 OFFICE O/P EST SF 10 MIN: CPT | Performed by: NURSE PRACTITIONER

## 2022-05-16 PROCEDURE — 83735 ASSAY OF MAGNESIUM: CPT

## 2022-05-16 PROCEDURE — 2580000003 HC RX 258: Performed by: INTERNAL MEDICINE

## 2022-05-16 PROCEDURE — 93010 ELECTROCARDIOGRAM REPORT: CPT | Performed by: INTERNAL MEDICINE

## 2022-05-16 PROCEDURE — 99223 1ST HOSP IP/OBS HIGH 75: CPT | Performed by: INTERNAL MEDICINE

## 2022-05-16 PROCEDURE — 84443 ASSAY THYROID STIM HORMONE: CPT

## 2022-05-16 PROCEDURE — 82962 GLUCOSE BLOOD TEST: CPT

## 2022-05-16 PROCEDURE — 83605 ASSAY OF LACTIC ACID: CPT

## 2022-05-16 PROCEDURE — 82962 GLUCOSE BLOOD TEST: CPT | Performed by: NURSE PRACTITIONER

## 2022-05-16 PROCEDURE — 36415 COLL VENOUS BLD VENIPUNCTURE: CPT

## 2022-05-16 PROCEDURE — 71045 X-RAY EXAM CHEST 1 VIEW: CPT

## 2022-05-16 PROCEDURE — 1200000000 HC SEMI PRIVATE

## 2022-05-16 PROCEDURE — 6360000002 HC RX W HCPCS: Performed by: EMERGENCY MEDICINE

## 2022-05-16 PROCEDURE — 96374 THER/PROPH/DIAG INJ IV PUSH: CPT

## 2022-05-16 PROCEDURE — 2580000003 HC RX 258: Performed by: EMERGENCY MEDICINE

## 2022-05-16 PROCEDURE — 84484 ASSAY OF TROPONIN QUANT: CPT

## 2022-05-16 PROCEDURE — 82800 BLOOD PH: CPT

## 2022-05-16 PROCEDURE — 85025 COMPLETE CBC W/AUTO DIFF WBC: CPT

## 2022-05-16 PROCEDURE — 87088 URINE BACTERIA CULTURE: CPT

## 2022-05-16 PROCEDURE — 81001 URINALYSIS AUTO W/SCOPE: CPT

## 2022-05-16 RX ORDER — ACETAMINOPHEN 650 MG/1
650 SUPPOSITORY RECTAL EVERY 6 HOURS PRN
Status: DISCONTINUED | OUTPATIENT
Start: 2022-05-16 | End: 2022-05-20 | Stop reason: HOSPADM

## 2022-05-16 RX ORDER — SODIUM CHLORIDE 0.9 % (FLUSH) 0.9 %
5-40 SYRINGE (ML) INJECTION PRN
Status: DISCONTINUED | OUTPATIENT
Start: 2022-05-16 | End: 2022-05-20 | Stop reason: HOSPADM

## 2022-05-16 RX ORDER — DEXTROSE MONOHYDRATE 50 MG/ML
100 INJECTION, SOLUTION INTRAVENOUS PRN
Status: DISCONTINUED | OUTPATIENT
Start: 2022-05-16 | End: 2022-05-20 | Stop reason: HOSPADM

## 2022-05-16 RX ORDER — SODIUM CHLORIDE 9 MG/ML
INJECTION, SOLUTION INTRAVENOUS CONTINUOUS
Status: DISCONTINUED | OUTPATIENT
Start: 2022-05-17 | End: 2022-05-17

## 2022-05-16 RX ORDER — CALCIUM CARBONATE 500(1250)
500 TABLET ORAL DAILY
Status: DISCONTINUED | OUTPATIENT
Start: 2022-05-16 | End: 2022-05-20 | Stop reason: HOSPADM

## 2022-05-16 RX ORDER — ONDANSETRON 4 MG/1
4 TABLET, ORALLY DISINTEGRATING ORAL EVERY 8 HOURS PRN
Status: DISCONTINUED | OUTPATIENT
Start: 2022-05-16 | End: 2022-05-20 | Stop reason: HOSPADM

## 2022-05-16 RX ORDER — SODIUM CHLORIDE AND POTASSIUM CHLORIDE .9; .15 G/100ML; G/100ML
SOLUTION INTRAVENOUS ONCE
Status: COMPLETED | OUTPATIENT
Start: 2022-05-16 | End: 2022-05-16

## 2022-05-16 RX ORDER — HYDROXYCHLOROQUINE SULFATE 200 MG/1
200 TABLET, FILM COATED ORAL 2 TIMES DAILY
Status: DISCONTINUED | OUTPATIENT
Start: 2022-05-16 | End: 2022-05-17

## 2022-05-16 RX ORDER — POTASSIUM CHLORIDE 7.45 MG/ML
10 INJECTION INTRAVENOUS ONCE
Status: COMPLETED | OUTPATIENT
Start: 2022-05-16 | End: 2022-05-16

## 2022-05-16 RX ORDER — SODIUM CHLORIDE 0.9 % (FLUSH) 0.9 %
5-40 SYRINGE (ML) INJECTION EVERY 12 HOURS SCHEDULED
Status: DISCONTINUED | OUTPATIENT
Start: 2022-05-16 | End: 2022-05-20 | Stop reason: HOSPADM

## 2022-05-16 RX ORDER — ACETAMINOPHEN 325 MG/1
650 TABLET ORAL EVERY 6 HOURS PRN
Status: DISCONTINUED | OUTPATIENT
Start: 2022-05-16 | End: 2022-05-20 | Stop reason: HOSPADM

## 2022-05-16 RX ORDER — 0.9 % SODIUM CHLORIDE 0.9 %
1000 INTRAVENOUS SOLUTION INTRAVENOUS ONCE
Status: COMPLETED | OUTPATIENT
Start: 2022-05-16 | End: 2022-05-16

## 2022-05-16 RX ORDER — LEVOTHYROXINE SODIUM 0.05 MG/1
50 TABLET ORAL DAILY
Status: DISCONTINUED | OUTPATIENT
Start: 2022-05-16 | End: 2022-05-16

## 2022-05-16 RX ORDER — POLYETHYLENE GLYCOL 3350 17 G/17G
17 POWDER, FOR SOLUTION ORAL DAILY PRN
Status: DISCONTINUED | OUTPATIENT
Start: 2022-05-16 | End: 2022-05-20 | Stop reason: HOSPADM

## 2022-05-16 RX ORDER — POTASSIUM CHLORIDE 20 MEQ/1
40 TABLET, EXTENDED RELEASE ORAL ONCE
Status: COMPLETED | OUTPATIENT
Start: 2022-05-16 | End: 2022-05-16

## 2022-05-16 RX ORDER — LAMOTRIGINE 100 MG/1
100 TABLET ORAL DAILY
Status: DISCONTINUED | OUTPATIENT
Start: 2022-05-16 | End: 2022-05-20 | Stop reason: HOSPADM

## 2022-05-16 RX ORDER — ONDANSETRON 2 MG/ML
4 INJECTION INTRAMUSCULAR; INTRAVENOUS EVERY 6 HOURS PRN
Status: DISCONTINUED | OUTPATIENT
Start: 2022-05-16 | End: 2022-05-20 | Stop reason: HOSPADM

## 2022-05-16 RX ORDER — SODIUM CHLORIDE 9 MG/ML
INJECTION, SOLUTION INTRAVENOUS PRN
Status: DISCONTINUED | OUTPATIENT
Start: 2022-05-16 | End: 2022-05-20 | Stop reason: HOSPADM

## 2022-05-16 RX ORDER — BLOOD-GLUCOSE SENSOR
EACH MISCELLANEOUS
Qty: 4 EACH | Refills: 5 | Status: SHIPPED | OUTPATIENT
Start: 2022-05-16

## 2022-05-16 RX ORDER — INSULIN LISPRO 100 [IU]/ML
0-6 INJECTION, SOLUTION INTRAVENOUS; SUBCUTANEOUS NIGHTLY
Status: DISCONTINUED | OUTPATIENT
Start: 2022-05-16 | End: 2022-05-17

## 2022-05-16 RX ORDER — ENOXAPARIN SODIUM 100 MG/ML
40 INJECTION SUBCUTANEOUS DAILY
Status: DISCONTINUED | OUTPATIENT
Start: 2022-05-16 | End: 2022-05-20 | Stop reason: HOSPADM

## 2022-05-16 RX ORDER — MAGNESIUM SULFATE IN WATER 40 MG/ML
2000 INJECTION, SOLUTION INTRAVENOUS ONCE
Status: COMPLETED | OUTPATIENT
Start: 2022-05-16 | End: 2022-05-16

## 2022-05-16 RX ORDER — PREGABALIN 100 MG/1
100 CAPSULE ORAL 3 TIMES DAILY
Status: DISCONTINUED | OUTPATIENT
Start: 2022-05-16 | End: 2022-05-20 | Stop reason: HOSPADM

## 2022-05-16 RX ORDER — INSULIN LISPRO 100 [IU]/ML
0-12 INJECTION, SOLUTION INTRAVENOUS; SUBCUTANEOUS
Status: DISCONTINUED | OUTPATIENT
Start: 2022-05-17 | End: 2022-05-17

## 2022-05-16 RX ORDER — LEVOTHYROXINE SODIUM 0.05 MG/1
50 TABLET ORAL DAILY
Status: DISCONTINUED | OUTPATIENT
Start: 2022-05-17 | End: 2022-05-20 | Stop reason: HOSPADM

## 2022-05-16 RX ORDER — FOLIC ACID 1 MG/1
1 TABLET ORAL DAILY
Status: DISCONTINUED | OUTPATIENT
Start: 2022-05-16 | End: 2022-05-20 | Stop reason: HOSPADM

## 2022-05-16 RX ORDER — LANOLIN ALCOHOL/MO/W.PET/CERES
3 CREAM (GRAM) TOPICAL NIGHTLY PRN
Status: DISCONTINUED | OUTPATIENT
Start: 2022-05-16 | End: 2022-05-20 | Stop reason: HOSPADM

## 2022-05-16 RX ORDER — SUBCUTANEOUS INSULIN PUMP
EACH MISCELLANEOUS
Qty: 1 KIT | Refills: 0 | Status: SHIPPED | OUTPATIENT
Start: 2022-05-16

## 2022-05-16 RX ORDER — BLOOD-GLUCOSE TRANSMITTER
EACH MISCELLANEOUS
Qty: 1 EACH | Refills: 0 | Status: SHIPPED | OUTPATIENT
Start: 2022-05-16

## 2022-05-16 RX ORDER — ONDANSETRON 2 MG/ML
4 INJECTION INTRAMUSCULAR; INTRAVENOUS ONCE
Status: COMPLETED | OUTPATIENT
Start: 2022-05-16 | End: 2022-05-16

## 2022-05-16 RX ORDER — CIPROFLOXACIN HYDROCHLORIDE 3.5 MG/ML
1 SOLUTION/ DROPS TOPICAL EVERY 6 HOURS SCHEDULED
Status: DISCONTINUED | OUTPATIENT
Start: 2022-05-16 | End: 2022-05-16

## 2022-05-16 RX ORDER — CIPROFLOXACIN HYDROCHLORIDE 3.5 MG/ML
1 SOLUTION/ DROPS TOPICAL EVERY 6 HOURS SCHEDULED
Status: DISCONTINUED | OUTPATIENT
Start: 2022-05-17 | End: 2022-05-20 | Stop reason: HOSPADM

## 2022-05-16 RX ADMIN — MAGNESIUM SULFATE HEPTAHYDRATE 2000 MG: 2 INJECTION, SOLUTION INTRAVENOUS at 13:53

## 2022-05-16 RX ADMIN — PREGABALIN 100 MG: 100 CAPSULE ORAL at 20:56

## 2022-05-16 RX ADMIN — SODIUM CHLORIDE 1000 ML: 9 INJECTION, SOLUTION INTRAVENOUS at 10:08

## 2022-05-16 RX ADMIN — HYDROXYCHLOROQUINE SULFATE 200 MG: 200 TABLET ORAL at 20:57

## 2022-05-16 RX ADMIN — POTASSIUM CHLORIDE AND SODIUM CHLORIDE: 900; 150 INJECTION, SOLUTION INTRAVENOUS at 18:58

## 2022-05-16 RX ADMIN — ONDANSETRON 4 MG: 2 INJECTION INTRAMUSCULAR; INTRAVENOUS at 10:09

## 2022-05-16 RX ADMIN — ENOXAPARIN SODIUM 40 MG: 100 INJECTION SUBCUTANEOUS at 18:56

## 2022-05-16 RX ADMIN — CIPROFLOXACIN HYDROCHLORIDE 1 DROP: 3 SOLUTION/ DROPS OPHTHALMIC at 20:56

## 2022-05-16 RX ADMIN — POTASSIUM CHLORIDE 10 MEQ: 7.46 INJECTION, SOLUTION INTRAVENOUS at 12:18

## 2022-05-16 RX ADMIN — Medication 10 ML: at 20:59

## 2022-05-16 RX ADMIN — POTASSIUM CHLORIDE 40 MEQ: 20 TABLET, EXTENDED RELEASE ORAL at 12:18

## 2022-05-16 RX ADMIN — SODIUM CHLORIDE 1000 ML: 9 INJECTION, SOLUTION INTRAVENOUS at 11:43

## 2022-05-16 ASSESSMENT — LIFESTYLE VARIABLES: HOW OFTEN DO YOU HAVE A DRINK CONTAINING ALCOHOL: NEVER

## 2022-05-16 ASSESSMENT — ENCOUNTER SYMPTOMS
BACK PAIN: 0
COUGH: 0
SHORTNESS OF BREATH: 0
NAUSEA: 1

## 2022-05-16 ASSESSMENT — PAIN SCALES - GENERAL: PAINLEVEL_OUTOF10: 0

## 2022-05-16 NOTE — H&P
Department of Internal Medicine  General Internal Medicine  Attending History and Physical      CHIEF COMPLAINT:  Intractable Vomiting    Reason for Admission:  Severe hypokalemia/ uncontrolled vomiting    History Obtained From:  patient    HISTORY OF PRESENT ILLNESS:      The patient is a 47 y.o. female with significant past medical history of Dm type 2 poorly controlled who presents with Vomiting. She noted that this has been going on since august.. She noted that she had EGD about 1 week ago. No specific finding to explain this nausea. Report of EGD is in chart. She went to see her GI specialist and complained of dizziness. Her BGL was noted to be 73 and patient started that she's not been able to tolerate food since EGD on 5/6. In the Er, patient noted that she's been diabetic since 2010 and that her A1c is always elevated at 14-15. She denied chest pain. She noted neuropathy. However, she denied any hx of Gastroparesis. She stated that her GI physician told her that she doesn't have it. She is being admitted for management of intractable nausea and vomiting with hypokalemia and dehydration.      Past Medical History:        Diagnosis Date    Anxiety     Arthritis     Depression     Diabetes mellitus (HCC)     Fibromyalgia     HTN (hypertension)     Hypertensive chronic kidney disease     Left shoulder pain     LIZABETH (obstructive sleep apnea)     Rheumatoid arthritis involving multiple sites ([address]     Sjogren's disease ([facility] [address]     Thyroid disease      Past Surgical History:        Procedure Laterality Date    CT BIOPSY RENAL  11/18/2021    CT BIOPSY RENAL 11/18/2021 Shelli Deshpande MD [facility]    SHOULDER ARTHROSCOPY Right 06/16/2017    UPPER GASTROINTESTINAL ENDOSCOPY N/A 5/6/2022    EGD BIOPSY performed by Taras Wolf MD at [facility] ENDOSCOPY     Medications Prior to Admission:    Medications Prior to Admission: Insulin Infusion Pump (MINIMED 770G INSULIN PUMP SYS) KIT, Use to infuse insulin  Continuous Blood Gluc Transmit (GUARDIAN LINK 3 TRANSMITTER) MISC, Use with insulin pump  Continuous Blood Gluc Sensor (GUARDIAN SENSOR 3) [facility], Change every 7 days  promethazine (PHENERGAN) 25 MG tablet, Take 1 tablet by mouth 3 times daily as needed for Nausea  cephALEXin (KEFLEX) 500 MG capsule, Take 1 capsule by mouth 4 times daily for 7 days  pregabalin (LYRICA) 100 MG capsule, 100 mg 3 times daily.    buPROPion (WELLBUTRIN) 100 MG tablet, Take 100 mg by mouth daily  omeprazole (PRILOSEC) 40 MG delayed release capsule, Take 1 capsule by mouth every morning (before breakfast)  dapagliflozin (FARXIGA) 5 MG tablet, Take 5 mg by mouth daily  lisinopril (PRINIVIL;ZESTRIL) 20 MG tablet, Take 2 tablets by mouth daily  [DISCONTINUED] FARXIGA 5 MG tablet,   Insulin Lispro (HUMALOG KWIKPEN SC), Inject 25 Units into the skin 3 times daily (before meals) Plus sliding scale  insulin glargine (LANTUS) 100 UNIT/ML injection vial, Inject 50 Units into the skin 2 times daily  Continuous Blood Gluc Sensor (FREESTYLE NIMISHA 2 SENSOR) [facility], USE TO TEST SUGAR CONTINUOUSLY AS DIRECTED  lidocaine (LIDODERM) 5 %, Place 1 patch onto the skin daily 12 hours on, 12 hours off.  diclofenac sodium (VOLTAREN) 1 % GEL, Apply 4 g topically 4 times daily for 7 days  famotidine (PEPCID) 20 MG tablet, TAKE 1 TABLET BY MOUTH TWICE DAILY  metFORMIN (GLUCOPHAGE-XR) 500 MG extended release tablet, TAKE 2 TABLETS BY MOUTH 2 TIMES DAILY  naproxen (NAPROSYN) 250 MG tablet, TAKE 1 TABLET BY MOUTH TWICE A DAY WITH MEALS  ondansetron (ZOFRAN-ODT) 4 MG disintegrating tablet, Take 1 tablet by mouth 3 times daily as needed for Nausea or Vomiting  prochlorperazine (COMPAZINE) 10 MG tablet, Take 1 tablet by mouth every 6 hours as needed (nausea)  levothyroxine (SYNTHROID) 88 MCG tablet, TAKE 1 TABLET BY MOUTH DAILY  JARDIANCE 25 MG tablet, TAKE 1 TABLET BY MOUTH DAILY  ONETOUCH ULTRA strip, USE TO TEST THREE TIMES A DAY  calcium elemental (OSCAL) 500 MG TABS tablet, TAKE 1 TABLET BY MOUTH TWICE A DAY WITH LUNCH AND DINNER  lisinopril (PRINIVIL;ZESTRIL) 10 MG tablet, Take 2 tablets by mouth daily (Patient taking differently: Take 10 mg by mouth daily )  lamoTRIgine (LAMICTAL) 100 MG tablet, TAKE 1 TABLET BY MOUTH EVERY DAY  OZEMPIC, 1 MG/DOSE, 4 MG/3ML SOPN, INJECT 1 MG UNDER THE SKIN ONCE WEEKLY  Insulin Pen Needle 31G X 5 MM MISC, 1 each by Does not apply route 3 times daily  melatonin 3 MG TABS tablet, TAKE ONE TABLET BY MOUTH EVERY NIGHT AT BEDTIME  hydrocortisone 2.5 % cream, Apply topically 2 times daily. Blood Pressure KIT, 1 box by Does not apply route 2 times daily  hydroxychloroquine (PLAQUENIL) 200 MG tablet, Take 200 mg by mouth 2 times daily  methotrexate (RHEUMATREX) 2.5 MG chemo tablet, Take 2.5 mg by mouth once a week Sundays  folic acid (FOLVITE) 1 MG tablet, Take 1 tablet by mouth daily  FREESTYLE LANCETS MISC, CHECK BS 4 TIMES DAILY    Allergies:  Amoxicillin, Sulfa antibiotics, Cefdinir, Doxycycline, Other, Milk-related compounds, and Tomato    Social History:   TOBACCO:   reports that she quit smoking about 24 years ago. She started smoking about 25 years ago. She has a 1.00 pack-year smoking history. She has never used smokeless tobacco.  ETOH:   reports no history of alcohol use.     Family History:       Adopted: Yes   Family history unknown: Yes     REVIEW OF SYSTEMS:  CONSTITUTIONAL:  positive for  malaise  EYES:  positive for  Right redness   HEENT:  negative for  hearing loss, tinnitus and earaches  RESPIRATORY:  negative for  dry cough, cough with sputum, dyspnea and wheezing  CARDIOVASCULAR:  negative for  chest pain, palpitations, orthopnea  GASTROINTESTINAL:  positive for nausea and vomiting  ENDOCRINE:  negative for heat intolerance and cold intolerance  MUSCULOSKELETAL:  negative for  myalgias and arthralgias  NEUROLOGICAL:  negative for headaches and seizures  BEHAVIOR/PSYCH:  negative for poor appetite and increased appetite  PHYSICAL EXAM:    Vitals:  BP (!) 163/99   Pulse 101   Temp 98.7 °F (37.1 °C) (Oral)   Resp 18   Ht 5' 1\" (1.549 m)   Wt 147 lb (66.7 kg)   SpO2 98%   BMI 27.78 kg/m²     CONSTITUTIONAL:  awake and alert  EYES:  lids and lashes normal, extra-ocular muscles intact and vision intact  ENT:  normocepalic, without obvious abnormality, atraumatic  NECK:  supple, symmetrical, trachea midline  LUNGS:  no increased work of breathing, no retractions and no crackles or wheezing  CARDIOVASCULAR:  normal apical pulses, normal S1 and S2 and no edema  ABDOMEN:  normal bowel sounds, non-distended and non-tender  MUSCULOSKELETAL:  there is no redness, warmth, or swelling of the joints  NEUROLOGIC:  Mental Status Exam:  Level of Alertness:   awake  Orientation:   person, place, time  SKIN:  no bruising or bleeding    DATA:  CBC:   Lab Results   Component Value Date    WBC 16.0 05/16/2022    RBC 5.44 05/16/2022    HGB 13.1 05/16/2022    HCT 41.8 05/16/2022    MCV 76.8 05/16/2022    MCH 24.1 05/16/2022    MCHC 31.3 05/16/2022    RDW 15.2 05/16/2022     05/16/2022    MPV 10.2 05/16/2022     BMP:    Lab Results   Component Value Date     05/16/2022    K 2.7 05/16/2022     05/16/2022    CO2 24 05/16/2022    BUN 11 05/16/2022    LABALBU 2.9 05/16/2022    CREATININE 0.8 05/16/2022    CALCIUM 9.4 05/16/2022    GFRAA >60 05/16/2022    LABGLOM >60 05/16/2022    GLUCOSE 92 05/16/2022     ASSESSMENT AND PLAN:      1. Hypokalemia due to gastrointestinal loss:  Replace with IV fluid with K for 1 L and then switch to NS  Repeat BMP in am    2. Hypertension Essential:  Resume medication as tolerated    3. Intractable Nausea and Vomiting: Will treat with IV Zofran  Concern that this may be related to Gastroparesis. Still no official diagnosis  Will check Gastric Emptying test    4.   Diabetes Mellitus type 2 Poorly controlled  Complicated with neuropathy;  Continue diet control  Continue home medication if able to tolerate  Insulin sliding scale with coverage. 5.  Lactic Acidosis due to starvation:  Correct and gradually introduce diet as tolerated    6. Hypothyroidism:  Check TSH  Continue synthroid    7. Dehydration due to vomiting:  IV fluid initiated    Mild Hypomagenesemia:   Replaced with mag and continue to monitor    8.   Conjunctivitis: continue cipro eye drop

## 2022-05-16 NOTE — PROGRESS NOTES
Diego Lipscomb (:  1970) is a 47 y.o. female, here for evaluation of the following chief complaint(s):  Follow-up (egd follow up) and Other (patient felt dizzy, checked glucose level)      SUBJECTIVE/OBJECTIVE:  HPI:    Dania Beyer is a very pleasant 47year old female that presents today for nausea and vomiting daily since having an EGD on 22. Patient     Findings:      EGD:  Normal esophagus. Possible column of Grade II varices? Or edematous fold. Normal Z line at 35cm. Mild antral gastritis. Biopsied. Small nodule, possibly submucosal, in antrum. Resected with cold snare. Mild duodenitis with erosion, edema, and erythema in bulb. Biopsies obtained. Diagnosis:   A.  Duodenum, endoscopic biopsy: No diagnostic abnormality. B.  Gastric antrum, endoscopic biopsy: Mild chronic gastritis;   immunostain negative for Helicobacter organisms. [facility], specimen #3 designated \"gastric nodule\", endoscopic biopsy:    - Chronic gastritis; immunostain negative for Helicobacter organisms.    - Small collection of benign adipocytes (fatty infiltration) in the   lamina propria (see comment). Patient tells me today that she has been vomiting since having an EGD on 22. Is not able to drink water and has not eaten. States \" I vomited 30 times yesterday\". Presented to the ER at [facility] on 22 with elevated blood sugars. CT scan showed   1. A 6.4cm multilocular cystic lesion of the left adnexa.       2. No evidence of a aortic dissection or anuerysm.       3. A 3mm pulmonary nodule in the right upper lobe. EKG was NSR. Patient was hypokalemic and slight ELIU. Treated with IV fluids. Noted to have a UTI. Patient was discharged. Returned to ER on 22 for N/V. Labs initially noted DKA. Treated again with IV fluids and antiemetics. Hospital admission was offered but patient politely declined. Complains of dizziness in the room and is unable to stand. involving multiple sites ([facility]     Sjogren's disease ([facility]     Thyroid disease       Past Surgical History:   Procedure Laterality Date    CT BIOPSY RENAL  11/18/2021    CT BIOPSY RENAL 11/18/2021 Julia Braden MD [facility]    SHOULDER ARTHROSCOPY Right 06/16/2017    UPPER GASTROINTESTINAL ENDOSCOPY N/A 5/6/2022    EGD BIOPSY performed by Celestina Esparza MD at [address] History   Adopted: Yes   Family history unknown: Yes        Lab Results   Component Value Date    WBC 10.9 05/13/2022    HGB 12.9 05/13/2022    HCT 41.9 05/13/2022    MCV 77.0 (L) 05/13/2022     05/13/2022      Lab Results   Component Value Date     05/14/2022    K 3.8 05/14/2022     05/14/2022    CO2 19 (L) 05/14/2022    BUN 12 05/14/2022    CREATININE 0.7 05/14/2022    GLUCOSE 73 05/16/2022    CALCIUM 8.5 (L) 05/14/2022    PROT 6.2 (L) 05/13/2022    LABALBU 2.8 (L) 05/13/2022    BILITOT 0.2 05/13/2022    ALKPHOS 129 (H) 05/13/2022    AST 10 05/13/2022    ALT 10 05/13/2022    LABGLOM >60 05/14/2022    GFRAA >60 05/14/2022                       ASSESSMENT/PLAN:    1. Dizziness  2. Dehydration  3. Nausea and vomiting, intractability of vomiting not specified, unspecified vomiting type   -Patient refuses to attempt a rectal suppository for nausea. Offered patient Scopolamine patch and Ativan. Patient politely declines. Concerned with taking these medications because she lives alone. Patient is agreeable to go to ER for evaluation and possible admission. -EMS called. Patient was alert and oriented with EMS arrival.      Return for Follow up. An electronic signature was used to authenticate this note.     --Elizabeth Matthews, ANA - CNP

## 2022-05-17 ENCOUNTER — HOSPITAL ENCOUNTER (OUTPATIENT)
Dept: PHYSICAL THERAPY | Age: 52
Setting detail: THERAPIES SERIES
Discharge: HOME | End: 2022-05-17
Payer: MEDICAID

## 2022-05-17 ENCOUNTER — APPOINTMENT (OUTPATIENT)
Dept: NUCLEAR MEDICINE | Age: 52
DRG: 048 | End: 2022-05-17
Payer: MEDICAID

## 2022-05-17 LAB
ALBUMIN SERPL-MCNC: 2.1 G/DL (ref 3.5–5.2)
ALP BLD-CCNC: 117 U/L (ref 35–104)
ALT SERPL-CCNC: 9 U/L (ref 0–32)
ANION GAP SERPL CALCULATED.3IONS-SCNC: 8 MMOL/L (ref 7–16)
AST SERPL-CCNC: 14 U/L (ref 0–31)
BASOPHILS ABSOLUTE: 0 E9/L (ref 0–0.2)
BASOPHILS RELATIVE PERCENT: 0.7 % (ref 0–2)
BILIRUB SERPL-MCNC: <0.2 MG/DL (ref 0–1.2)
BLOOD CULTURE, ROUTINE: NORMAL
BUN BLDV-MCNC: 7 MG/DL (ref 6–20)
CALCIUM SERPL-MCNC: 7.7 MG/DL (ref 8.6–10.2)
CHLORIDE BLD-SCNC: 109 MMOL/L (ref 98–107)
CO2: 25 MMOL/L (ref 22–29)
CREAT SERPL-MCNC: 0.8 MG/DL (ref 0.5–1)
CULTURE, BLOOD 2: NORMAL
EOSINOPHILS ABSOLUTE: 0.12 E9/L (ref 0.05–0.5)
EOSINOPHILS RELATIVE PERCENT: 0.9 % (ref 0–6)
FOLATE: >20 NG/ML (ref 4.8–24.2)
GFR AFRICAN AMERICAN: >60
GFR NON-AFRICAN AMERICAN: >60 ML/MIN/1.73
GLUCOSE BLD-MCNC: 64 MG/DL (ref 74–99)
HCT VFR BLD CALC: 36.3 % (ref 34–48)
HEMOGLOBIN: 11 G/DL (ref 11.5–15.5)
LYMPHOCYTES ABSOLUTE: 4.52 E9/L (ref 1.5–4)
LYMPHOCYTES RELATIVE PERCENT: 34.8 % (ref 20–42)
MAGNESIUM: 2 MG/DL (ref 1.6–2.6)
MCH RBC QN AUTO: 24 PG (ref 26–35)
MCHC RBC AUTO-ENTMCNC: 30.3 % (ref 32–34.5)
MCV RBC AUTO: 79.1 FL (ref 80–99.9)
METER GLUCOSE: 112 MG/DL (ref 74–99)
METER GLUCOSE: 133 MG/DL (ref 74–99)
METER GLUCOSE: 167 MG/DL (ref 74–99)
METER GLUCOSE: 188 MG/DL (ref 74–99)
METER GLUCOSE: 70 MG/DL (ref 74–99)
MONOCYTES ABSOLUTE: 0.77 E9/L (ref 0.1–0.95)
MONOCYTES RELATIVE PERCENT: 6.1 % (ref 2–12)
NEUTROPHILS ABSOLUTE: 7.48 E9/L (ref 1.8–7.3)
NEUTROPHILS RELATIVE PERCENT: 58.3 % (ref 43–80)
OVALOCYTES: ABNORMAL
PDW BLD-RTO: 15.5 FL (ref 11.5–15)
PLATELET # BLD: 243 E9/L (ref 130–450)
PMV BLD AUTO: 10 FL (ref 7–12)
POIKILOCYTES: ABNORMAL
POLYCHROMASIA: ABNORMAL
POTASSIUM SERPL-SCNC: 2.9 MMOL/L (ref 3.5–5)
RBC # BLD: 4.59 E12/L (ref 3.5–5.5)
SODIUM BLD-SCNC: 142 MMOL/L (ref 132–146)
TOTAL PROTEIN: 5 G/DL (ref 6.4–8.3)
VITAMIN B-12: 553 PG/ML (ref 211–946)
WBC # BLD: 12.9 E9/L (ref 4.5–11.5)

## 2022-05-17 PROCEDURE — 82962 GLUCOSE BLOOD TEST: CPT

## 2022-05-17 PROCEDURE — 82746 ASSAY OF FOLIC ACID SERUM: CPT

## 2022-05-17 PROCEDURE — 83735 ASSAY OF MAGNESIUM: CPT

## 2022-05-17 PROCEDURE — A9541 TC99M SULFUR COLLOID: HCPCS | Performed by: RADIOLOGY

## 2022-05-17 PROCEDURE — 99221 1ST HOSP IP/OBS SF/LOW 40: CPT | Performed by: SURGERY

## 2022-05-17 PROCEDURE — 85025 COMPLETE CBC W/AUTO DIFF WBC: CPT

## 2022-05-17 PROCEDURE — 2580000003 HC RX 258: Performed by: FAMILY MEDICINE

## 2022-05-17 PROCEDURE — 6370000000 HC RX 637 (ALT 250 FOR IP): Performed by: INTERNAL MEDICINE

## 2022-05-17 PROCEDURE — 36415 COLL VENOUS BLD VENIPUNCTURE: CPT

## 2022-05-17 PROCEDURE — 3430000000 HC RX DIAGNOSTIC RADIOPHARMACEUTICAL: Performed by: RADIOLOGY

## 2022-05-17 PROCEDURE — 6360000002 HC RX W HCPCS: Performed by: INTERNAL MEDICINE

## 2022-05-17 PROCEDURE — 2580000003 HC RX 258: Performed by: INTERNAL MEDICINE

## 2022-05-17 PROCEDURE — 6370000000 HC RX 637 (ALT 250 FOR IP): Performed by: FAMILY MEDICINE

## 2022-05-17 PROCEDURE — 2500000003 HC RX 250 WO HCPCS: Performed by: INTERNAL MEDICINE

## 2022-05-17 PROCEDURE — 80053 COMPREHEN METABOLIC PANEL: CPT

## 2022-05-17 PROCEDURE — 1200000000 HC SEMI PRIVATE

## 2022-05-17 PROCEDURE — 78264 GASTRIC EMPTYING IMG STUDY: CPT

## 2022-05-17 PROCEDURE — 82607 VITAMIN B-12: CPT

## 2022-05-17 PROCEDURE — 99233 SBSQ HOSP IP/OBS HIGH 50: CPT | Performed by: INTERNAL MEDICINE

## 2022-05-17 RX ORDER — POTASSIUM CHLORIDE 20 MEQ/1
40 TABLET, EXTENDED RELEASE ORAL ONCE
Status: COMPLETED | OUTPATIENT
Start: 2022-05-17 | End: 2022-05-17

## 2022-05-17 RX ORDER — INSULIN LISPRO 100 [IU]/ML
0-6 INJECTION, SOLUTION INTRAVENOUS; SUBCUTANEOUS
Status: DISCONTINUED | OUTPATIENT
Start: 2022-05-17 | End: 2022-05-20 | Stop reason: HOSPADM

## 2022-05-17 RX ORDER — HYDROXYCHLOROQUINE SULFATE 200 MG/1
200 TABLET, FILM COATED ORAL 2 TIMES DAILY
Status: DISCONTINUED | OUTPATIENT
Start: 2022-05-17 | End: 2022-05-20 | Stop reason: HOSPADM

## 2022-05-17 RX ORDER — POTASSIUM CHLORIDE, DEXTROSE MONOHYDRATE AND SODIUM CHLORIDE 300; 5; 900 MG/100ML; G/100ML; MG/100ML
INJECTION, SOLUTION INTRAVENOUS ONCE
Status: COMPLETED | OUTPATIENT
Start: 2022-05-17 | End: 2022-05-17

## 2022-05-17 RX ORDER — INSULIN LISPRO 100 [IU]/ML
0-3 INJECTION, SOLUTION INTRAVENOUS; SUBCUTANEOUS NIGHTLY
Status: DISCONTINUED | OUTPATIENT
Start: 2022-05-17 | End: 2022-05-20 | Stop reason: HOSPADM

## 2022-05-17 RX ADMIN — LAMOTRIGINE 100 MG: 100 TABLET ORAL at 14:26

## 2022-05-17 RX ADMIN — INSULIN LISPRO 1 UNITS: 100 INJECTION, SOLUTION INTRAVENOUS; SUBCUTANEOUS at 17:23

## 2022-05-17 RX ADMIN — CIPROFLOXACIN HYDROCHLORIDE 1 DROP: 3.5 SOLUTION/ DROPS TOPICAL at 05:51

## 2022-05-17 RX ADMIN — SODIUM CHLORIDE: 9 INJECTION, SOLUTION INTRAVENOUS at 01:46

## 2022-05-17 RX ADMIN — Medication 10 ML: at 20:50

## 2022-05-17 RX ADMIN — Medication 2 MILLICURIE: at 08:58

## 2022-05-17 RX ADMIN — HYDROXYCHLOROQUINE SULFATE 200 MG: 200 TABLET ORAL at 14:26

## 2022-05-17 RX ADMIN — POTASSIUM CHLORIDE 40 MEQ: 20 TABLET, EXTENDED RELEASE ORAL at 14:26

## 2022-05-17 RX ADMIN — DEXTROSE MONOHYDRATE, SODIUM CHLORIDE, AND POTASSIUM CHLORIDE: 50; 9; 2.98 INJECTION, SOLUTION INTRAVENOUS at 14:25

## 2022-05-17 RX ADMIN — CIPROFLOXACIN HYDROCHLORIDE 1 DROP: 3.5 SOLUTION/ DROPS TOPICAL at 17:22

## 2022-05-17 RX ADMIN — PREGABALIN 100 MG: 100 CAPSULE ORAL at 19:53

## 2022-05-17 RX ADMIN — CIPROFLOXACIN HYDROCHLORIDE 1 DROP: 3.5 SOLUTION/ DROPS TOPICAL at 14:28

## 2022-05-17 RX ADMIN — FOLIC ACID 1 MG: 1 TABLET ORAL at 14:27

## 2022-05-17 RX ADMIN — ENOXAPARIN SODIUM 40 MG: 100 INJECTION SUBCUTANEOUS at 14:27

## 2022-05-17 RX ADMIN — DEXTROSE MONOHYDRATE 125 ML: 100 INJECTION, SOLUTION INTRAVENOUS at 06:29

## 2022-05-17 RX ADMIN — PREGABALIN 100 MG: 100 CAPSULE ORAL at 14:26

## 2022-05-17 ASSESSMENT — ENCOUNTER SYMPTOMS
COLOR CHANGE: 0
NAUSEA: 1
ANAL BLEEDING: 0
VOMITING: 1
RECTAL PAIN: 0
BLOOD IN STOOL: 0
ABDOMINAL PAIN: 0
DIARRHEA: 0
ABDOMINAL DISTENTION: 0
SHORTNESS OF BREATH: 0
CONSTIPATION: 0

## 2022-05-17 NOTE — CARE COORDINATION
5/17/2022 CM transition of care: Telemetry, room air,  Magnesium replaced, n/v- gastric emptying- pt off the floor to nuclear med. CM to clarify legal nok- has other x2 listed in epic. Cm to follow up with patient.  Electronically signed by Young Cogan, RN-BC on 5/17/2022 at 11:08 AM

## 2022-05-17 NOTE — PROGRESS NOTES
Comprehensive Nutrition Assessment    Type and Reason for Visit:  Initial,Positive Nutrition Screen    Nutrition Recommendations/Plan:   1. Continue NPO, ADAT when medically feasible   2. Will monitor for nutrition progression      Malnutrition Assessment:  Malnutrition Status: At risk for malnutrition (Comment) (05/17/22 1018)    Context:  Chronic Illness     Findings of the 6 clinical characteristics of malnutrition:  Energy Intake:  75% or less estimated energy requirements for 1 month or longer  Weight Loss:  Unable to assess (LIO wt change w/ no measured CBW/UBW)     Body Fat Loss:  Unable to assess     Muscle Mass Loss:  Unable to assess    Fluid Accumulation:  No significant fluid accumulation     Strength:  Not Performed    Nutrition Assessment:    Pt adm d/t persistent N/V x several months s/p prior EGD w/ possible varices, submucosal nodule, mild duodenitis/, currently NPO pending gastric emptying study - r/o gastroparesis. Hx DM. Will monitor for nutrition progression. Nutrition Related Findings:    A&O, hypoactive BS, soft abd, loss appetite, persistent N/V, +1LLE edema, Wound Type: None       Current Nutrition Intake & Therapies:    Average Meal Intake: NPO  Average Supplements Intake: NPO  Diet NPO    Anthropometric Measures:  Height: 5' 1\" (154.9 cm)  Ideal Body Weight (IBW): 105 lbs (48 kg)    Current Body Weight: 147 lb (66.7 kg) (5/16 stated -UTO updated measured wt), 140 % IBW  Current BMI (kg/m2): 27.8  Usual Body Weight:  (note 152-168lbs fluctuations LIO wt change d/t no methods per EMR)  BMI Categories: Overweight (BMI 25.0-29. 9)    Estimated Daily Nutrient Needs:  Energy Requirements Based On: Formula  Weight Used for Energy Requirements: Current  Energy (kcal/day): 7533-5432  Weight Used for Protein Requirements: Current  Protein (g/day): 70-80  Method Used for Fluid Requirements: 1 ml/kcal  Fluid (ml/day): 3994-1287    Nutrition Diagnosis:   · Inadequate oral intake related to altered GI function as evidenced by nausea,vomiting,GI abnormality,NPO or clear liquid status due to medical condition    Nutrition Interventions:   Food and/or Nutrient Delivery: Continue NPO (ADAT when medically feasible)  Nutrition Education/Counseling: No recommendation at this time  Coordination of Nutrition Care: Continue to monitor while inpatient    Goals:  Goals: Initiate PO diet    Nutrition Monitoring and Evaluation:   Behavioral-Environmental Outcomes: None Identified  Food/Nutrient Intake Outcomes: Diet Advancement/Tolerance  Physical Signs/Symptoms Outcomes: Biochemical Data,Nutrition Focused Physical Findings,Weight,Skin,Chewing or Swallowing,GI Status,Nausea or Vomiting,Fluid Status or Edema    Discharge Planning:     Too soon to determine     Barry Reynolds, MS, RD, LD  Contact: [phone]

## 2022-05-17 NOTE — PLAN OF CARE
Problem: Pain  Goal: Verbalizes/displays adequate comfort level or baseline comfort level  5/17/2022 1807 by Isidoro Briggs RN  Outcome: Progressing     Problem: Safety - Adult  Goal: Free from fall injury  5/17/2022 1807 by Isidoro Briggs RN  Outcome: Progressing     Problem: Nutrition Deficit:  Goal: Optimize nutritional status  5/17/2022 1807 by Isidoro Briggs RN  Outcome: Progressing

## 2022-05-17 NOTE — PROGRESS NOTES
Department of Internal Medicine  General Internal Medicine  Attending Progress Note  Chief Complaint   Patient presents with    Nausea     pt c/o nausea/vomiting since 5-6-22 after having an endoscopy. pt had scope done because she has had n/v since august of 2021     SUBJECTIVE:    Reports that she is doing better this morning. Denied fever and chills. No chest pain. Denied nausea and vomiting.  Aware of plans for further test.     OBJECTIVE      Medications    Current Facility-Administered Medications: insulin lispro (HUMALOG) injection vial 0-6 Units, 0-6 Units, SubCUTAneous, TID WC  insulin lispro (HUMALOG) injection vial 0-3 Units, 0-3 Units, SubCUTAneous, Nightly  potassium chloride (KLOR-CON M) extended release tablet 40 mEq, 40 mEq, Oral, Once  dextrose 5% and 0.9% sodium chloride with KCl 40 mEq infusion, , IntraVENous, Once  technetium sulfur colloid egg (NYCOMED-SC) solution 2 millicurie, 2 millicurie, Oral, ONCE PRN  calcium elemental (OSCAL) tablet 500 mg, 500 mg, Oral, Daily  folic acid (FOLVITE) tablet 1 mg, 1 mg, Oral, Daily  hydroxychloroquine (PLAQUENIL) tablet 200 mg, 200 mg, Oral, BID  lamoTRIgine (LAMICTAL) tablet 100 mg, 100 mg, Oral, Daily  melatonin tablet 3 mg, 3 mg, Oral, Nightly PRN  pregabalin (LYRICA) capsule 100 mg, 100 mg, Oral, TID  sodium chloride flush 0.9 % injection 5-40 mL, 5-40 mL, IntraVENous, 2 times per day  sodium chloride flush 0.9 % injection 5-40 mL, 5-40 mL, IntraVENous, PRN  0.9 % sodium chloride infusion, , IntraVENous, PRN  enoxaparin (LOVENOX) injection 40 mg, 40 mg, SubCUTAneous, Daily  ondansetron (ZOFRAN-ODT) disintegrating tablet 4 mg, 4 mg, Oral, Q8H PRN **OR** ondansetron (ZOFRAN) injection 4 mg, 4 mg, IntraVENous, Q6H PRN  polyethylene glycol (GLYCOLAX) packet 17 g, 17 g, Oral, Daily PRN  acetaminophen (TYLENOL) tablet 650 mg, 650 mg, Oral, Q6H PRN **OR** acetaminophen (TYLENOL) suppository 650 mg, 650 mg, Rectal, Q6H PRN  levothyroxine (SYNTHROID) tablet 50 mcg, 50 mcg, Oral, Daily  glucose chewable tablet 16 g, 4 tablet, Oral, PRN  dextrose bolus 10% 125 mL, 125 mL, IntraVENous, PRN **OR** dextrose bolus 10% 250 mL, 250 mL, IntraVENous, PRN  glucagon (rDNA) injection 1 mg, 1 mg, IntraMUSCular, PRN  dextrose 5 % solution, 100 mL/hr, IntraVENous, PRN  ciprofloxacin (CILOXAN) 0.3 % ophthalmic solution 1 drop, 1 drop, Right Eye, 4 times per day  Physical    VITALS:  BP (!) 168/90   Pulse 99   Temp 98.5 °F (36.9 °C) (Oral)   Resp 16   Ht 5' 1\" (1.549 m)   Wt 147 lb (66.7 kg)   SpO2 98%   BMI 27.78 kg/m²   CONSTITUTIONAL:  awake and alert  EYES:  extra-ocular muscles intact and vision intact  ENT:  normocepalic, without obvious abnormality  NECK:  supple, symmetrical, trachea midline  LUNGS:  no increased work of breathing, no retractions and clear to auscultation  CARDIOVASCULAR:  normal apical pulses and normal S1 and S2  ABDOMEN:  normal bowel sounds, non-distended and non-tender  MUSCULOSKELETAL:  there is no redness, warmth, or swelling of the joints  NEUROLOGIC:  Mental Status Exam:  Level of Alertness:   awake  Orientation:   person, place, time  SKIN:  no bruising or bleeding  Data    CBC:   Lab Results   Component Value Date    WBC 12.9 05/17/2022    RBC 4.59 05/17/2022    HGB 11.0 05/17/2022    HCT 36.3 05/17/2022    MCV 79.1 05/17/2022    MCH 24.0 05/17/2022    MCHC 30.3 05/17/2022    RDW 15.5 05/17/2022     05/17/2022    MPV 10.0 05/17/2022     BMP:    Lab Results   Component Value Date     05/17/2022    K 2.9 05/17/2022    K 2.7 05/16/2022     05/17/2022    CO2 25 05/17/2022    BUN 7 05/17/2022    LABALBU 2.1 05/17/2022    CREATININE 0.8 05/17/2022    CALCIUM 7.7 05/17/2022    GFRAA >60 05/17/2022    LABGLOM >60 05/17/2022    GLUCOSE 64 05/17/2022       ASSESSMENT AND PLAN      1.   Hypokalemia due to loss of potassium  Improved K, but overall still low  Change IV Fluid to normal saline with 40meq K @100ml once  Check BMP in am  Mag is within normal  Also added K-dur    2. Poorly control DM:  Complicated with Neuropathy. Episodes of hypoglycemia noted. Continue to monitor    3. Hypertension Essential:  Continue home meds     4. Starvation acidosis: improved    5. Hypothyroidism: on synthroid    6. Dehydration:  Much improved    7. Conjunctivitis:  Continue Eye drop    8. Intractable Nausea and vomiting:  Improved  Advance diet as tolerated  Suspected that this may be due to undiagnosed Gastroparesis  Going for Gastric Emptying study today. 9.  Leukocytosis: much improved  Suspects hemoconcentration from dehydration  Continue to monitor  No overt sign of infection.

## 2022-05-17 NOTE — CONSULTS
GENERAL SURGERY  CONSULT NOTE  5/17/2022    Physician Consulted: Dr. Wilner Lamas  Reason for Consult: Nausea and vomiting  Referring Physician: Dr. Guilherme Dumont is a 47 y.o. female with a past medical history significant for uncontrolled type 2 diabetes. She has had persistent nausea and vomiting over the past year. Recent EGD with gastroenterology showed possible varices, submucosal nodule, mild duodenitis and gastritis. Overall EGD does not explain her persistent nausea vomiting. Patient has never had a gastric emptying study. Minimal abdominal pain. Patient states that she is moving her bowels normally. Past Medical History:   Diagnosis Date    Anxiety     Arthritis     Depression     Diabetes mellitus (HCC)     Fibromyalgia     HTN (hypertension)     Hypertensive chronic kidney disease     Left shoulder pain     LIZABETH (obstructive sleep apnea)     [facility]umatoid arthritis involving multiple sites [facility]     Sjogren's disease ([facility]     Thyroid disease        Past Surgical History:   Procedure Laterality Date    CT BIOPSY RENAL  11/18/2021    CT BIOPSY RENAL 11/18/2021 Piero Lr MD [facility]    SHOULDER ARTHROSCOPY Right 06/16/2017    UPPER GASTROINTESTINAL ENDOSCOPY N/A 5/6/2022    EGD BIOPSY performed by Alena Mcgregor MD at [facility] ENDOSCOPY       Medications Prior to Admission:    Prior to Admission medications    Medication Sig Start Date End Date Taking?  Authorizing Provider   Insulin Infusion Pump (MINIMED 770G INSULIN PUMP SYS) KIT Use to infuse insulin 5/16/22   ANA Nam NP   Continuous Blood Gluc Transmit (GUARDIAN LINK 3 TRANSMITTER) MISC Use with insulin pump 5/16/22   ANA Nam NP   Continuous Blood Gluc Sensor (GUARDIAN SENSOR 3) MISC Change every 7 days 5/16/22   ANA Nam NP   promethazine (PHENERGAN) 25 MG tablet Take 1 tablet by mouth 3 times daily as needed for Nausea 5/14/22 5/21/22  Bushra [place] MD cephALEXin (KEFLEX) 500 MG capsule Take 1 capsule by mouth 4 times daily for 7 days 5/12/22 5/19/22  Toni Chavez DO   pregabalin (LYRICA) 100 MG capsule 100 mg 3 times daily.      Historical Provider, MD   buPROPion (WELLBUTRIN) 100 MG tablet Take 100 mg by mouth daily    Historical Provider, MD   omeprazole (PRILOSEC) 40 MG delayed release capsule Take 1 capsule by mouth every morning (before breakfast) 5/6/22   Taras Wolf MD   dapagliflozin (FARXIGA) 5 MG tablet Take 5 mg by mouth daily 12/10/21   Historical Provider, MD   lisinopril (PRINIVIL;ZESTRIL) 20 MG tablet Take 2 tablets by mouth daily 5/2/22 6/1/22  Randene Najjar, MD   Insulin Lispro (HUMALOG KWIKPEN SC) Inject 25 Units into the skin 3 times daily (before meals) Plus sliding scale    Historical Provider, MD   insulin glargine (LANTUS) 100 UNIT/ML injection vial Inject 50 Units into the skin 2 times daily    Historical Provider, MD   Continuous Blood Gluc Sensor (FREESTYLE NIMISHA 2 SENSOR) [facility] USE TO TEST SUGAR CONTINUOUSLY AS DIRECTED 4/20/22   Randene Najjar, MD   lidocaine (LIDODERM) 5 % Place 1 patch onto the skin daily 12 hours on, 12 hours off. 4/19/22 5/19/22  Randene Najjar, MD   diclofenac sodium (VOLTAREN) 1 % GEL Apply 4 g topically 4 times daily for 7 days 4/19/22 5/2/22  Randene Najjar, MD   famotidine (PEPCID) 20 MG tablet TAKE 1 TABLET BY MOUTH TWICE DAILY 4/18/22   Randene Najjar, MD   metFORMIN (GLUCOPHAGE-XR) 500 MG extended release tablet TAKE 2 TABLETS BY MOUTH 2 TIMES DAILY 4/18/22 6/17/22  Randene Najjar, MD   naproxen (NAPROSYN) 250 MG tablet TAKE 1 TABLET BY MOUTH TWICE A DAY WITH MEALS 4/18/22   Randene Najjar, MD   ondansetron (ZOFRAN-ODT) 4 MG disintegrating tablet Take 1 tablet by mouth 3 times daily as needed for Nausea or Vomiting 3/31/22   Andrei Blowers, DO   prochlorperazine (COMPAZINE) 10 MG tablet Take 1 tablet by mouth every 6 hours as needed (nausea) 3/31/22   Andrei Nelson, DO levothyroxine (SYNTHROID) 88 MCG tablet TAKE 1 TABLET BY MOUTH DAILY 3/18/22   Tristen Muhammad MD   JARDIANCE 25 MG tablet TAKE 1 TABLET BY MOUTH DAILY 3/18/22   Tristen Muhammad MD   ONETOUCH ULTRA strip USE TO TEST THREE TIMES A DAY 3/18/22   Tristen Muhammad MD   calcium elemental (OSCAL) 500 MG TABS tablet TAKE 1 TABLET BY MOUTH TWICE A DAY WITH LUNCH AND DINNER  Patient not taking: Reported on 5/16/2022 3/18/22   Tristen Muhammad MD   lisinopril (PRINIVIL;ZESTRIL) 10 MG tablet Take 2 tablets by mouth daily  Patient taking differently: Take 10 mg by mouth daily  3/14/22 6/12/22  Tristen Muhammad MD   lamoTRIgine (LAMICTAL) 100 MG tablet TAKE 1 TABLET BY MOUTH EVERY DAY 2/17/22   Katherine Osman MD   OZEMPIC, 1 MG/DOSE, 4 MG/3ML SOPN INJECT 1 MG UNDER THE SKIN ONCE WEEKLY 2/17/22   Tristen Muhammad MD   Insulin Pen Needle 31G X 5 MM MISC 1 each by Does not apply route 3 times daily 12/29/21   Katherine Parson MD   melatonin 3 MG TABS tablet TAKE ONE TABLET BY MOUTH EVERY NIGHT AT BEDTIME 12/23/21   Tristen Muhammad MD   melatonin 3 MG TABS tablet Take 1 tablet by mouth nightly as needed (insomnia) 9/10/21   Tristen Muhammad MD   hydrocortisone 2.5 % cream Apply topically 2 times daily.  7/12/21   Tristen Muhammad MD   Blood Pressure KIT 1 box by Does not apply route 2 times daily 3/24/21   Tristen Muhammad MD   hydroxychloroquine (PLAQUENIL) 200 MG tablet Take 200 mg by mouth 2 times daily    Historical Provider, MD   methotrexate (RHEUMATREX) 2.5 MG chemo tablet Take 2.5 mg by mouth once a week Sundays    Historical Provider, MD   folic acid (FOLVITE) 1 MG tablet Take 1 tablet by mouth daily 11/14/16   MD CHERYL Carrasco MISC CHECK BS 4 TIMES DAILY 7/23/15   Dieter Pabon MD       Allergies   Allergen Reactions    Amoxicillin Other (See Comments)     Hives all over body, states that hives are severe     Sulfa Antibiotics     Cefdinir     Doxycycline Other (See Comments)     Pt states no allergy to this med    Other Other (See Comments)    Milk-Related Compounds     Tomato        Family History   Adopted: Yes   Family history unknown: Yes       Social History     Tobacco Use    Smoking status: Former Smoker     Packs/day: 2.00     Years: 0.50     Pack years: 1.00     Start date:      Quit date:      Years since quittin.3    Smokeless tobacco: Never Used   Vaping Use    Vaping Use: Never used   Substance Use Topics    Alcohol use: No    Drug use: No         Review of Systems   Review of Systems   Constitutional: Positive for appetite change. Negative for chills, fever and unexpected weight change. HENT: Negative for hearing loss. Eyes: Negative for visual disturbance. Respiratory: Negative for shortness of breath. Cardiovascular: Negative for chest pain, palpitations and leg swelling. Gastrointestinal: Positive for nausea and vomiting. Negative for abdominal distention, abdominal pain, anal bleeding, blood in stool, constipation, diarrhea and rectal pain. Endocrine: Negative. Genitourinary: Negative for difficulty urinating, dysuria and hematuria. Musculoskeletal: Negative for arthralgias. Skin: Negative for color change and wound. Allergic/Immunologic: Negative for immunocompromised state. Neurological: Negative for syncope and headaches. Hematological: Negative. Psychiatric/Behavioral: Negative. PHYSICAL EXAM:    Vitals:    22 194   BP: (!) 168/90   Pulse: 99   Resp: 16   Temp: 98.5 °F (36.9 °C)   SpO2: 98%       General Appearance:  awake, alert, oriented, in no acute distress  Skin:  Skin color, texture, turgor normal. No rashes or lesions. Head/face:  NCAT  Eyes:  No gross abnormalities. Lungs:  Breathing Pattern: regular, no distress  Heart:  Heart regular rate and rhythm  Abdomen: Soft, nontender, nondistended  Extremities: Extremities warm to touch, pink, with no edema.     LABS:  CBC  Recent Labs     22  6903 WBC 12.9*   HGB 11.0*   HCT 36.3        BMP  Recent Labs     05/17/22  0536      K 2.9*   *   CO2 25   BUN 7   CREATININE 0.8   CALCIUM 7.7*     Liver Function  Recent Labs     05/16/22  0947 05/16/22  0947 05/17/22  0536   LIPASE 16  --   --    BILITOT <0.2   < > <0.2   AST 14   < > 14   ALT 11   < > 9   ALKPHOS 153*   < > 117*   PROT 6.5   < > 5.0*   LABALBU 2.9*   < > 2.1*    < > = values in this interval not displayed. No results for input(s): LACTATE in the last 72 hours. No results for input(s): INR, PTT in the last 72 hours.     Invalid input(s): PT    RADIOLOGY  I have personally reviewed all relevant imaging:    CT abdomen pelvis from 5/12 unremarkable    ASSESSMENT:  47 y.o. female with persistent nausea and vomiting with uncontrolled diabetes    PLAN:    Evaluate for gastroparesis with gastric emptying study  Appreciate GI recommendations    Electronically signed by Cristiane Leslie MD on 5/17/22 at 7:14 AM EDT    General Surgery Progress Note  [facility]    Patient's Name/Date of Birth: Sandra Mendez / 1970    Date: May 17, 2022     Surgeon: Warden Pau MD    Chief Complaint: Intractable nausea    Patient Active Problem List   Diagnosis    Type 2 diabetes mellitus with complication, with long-term current use of insulin ([address]    Depression    Sjogren's syndrome ([address]    Diabetic ketosis ([address]    Anemia    Hyperglycemia    Acquired hypothyroidism    Vitamin D deficiency    Encounter for long-term (current) use of insulin ([address]    Infective urethritis    Severe obstructive sleep apnea    Mild depression ([address]    Anxiety disorder    Benign hypertensive kidney disease with chronic kidney disease    Chronic kidney disease, stage I    COVID-19    Fibromyalgia    Mixed hyperlipidemia    Hypoglycemia    Hypokalemia    Nephrotic syndrome due to type 2 diabetes mellitus ([address]    Hypothyroidism    Proteinuria    Depressive disorder    Type 2 diabetes mellitus with chronic kidney disease (HCC)    [facility] involving multiple sites ([facility]    Chronic fatigue syndrome    Diabetic renal disease ([facility]    Essential hypertension    Hyperglycemia due to type 2 diabetes mellitus (HCC)    Major depression, single episode    Metabolic acidosis    Neuropathy    Dietary noncompliance    Hypokalemia due to loss of potassium       Subjective: Patient has persistent nausea. HPI otherwise as above.     Objective:  BP (!) 168/90   Pulse 99   Temp 98.5 °F (36.9 °C) (Oral)   Resp 16   Ht 5' 1\" (1.549 m)   Wt 147 lb (66.7 kg)   SpO2 98%   BMI 27.78 kg/m²   Labs:  Recent Labs     05/16/22  0947 05/17/22  0536   WBC 16.0* 12.9*   HGB 13.1 11.0*   HCT 41.8 36.3     Lab Results   Component Value Date    CREATININE 0.8 05/17/2022    BUN 7 05/17/2022     05/17/2022    K 2.9 (L) 05/17/2022     (H) 05/17/2022    CO2 25 05/17/2022     Recent Labs     05/16/22  0947   LIPASE 16     CBC with Differential:    Lab Results   Component Value Date    WBC 12.9 05/17/2022    RBC 4.59 05/17/2022    HGB 11.0 05/17/2022    HCT 36.3 05/17/2022     05/17/2022    MCV 79.1 05/17/2022    MCH 24.0 05/17/2022    MCHC 30.3 05/17/2022    RDW 15.5 05/17/2022    NRBC 1 09/08/2015    BANDSPCT 4 09/08/2015    METASPCT 3 09/08/2015    LYMPHOPCT 34.8 05/17/2022    MONOPCT 6.1 05/17/2022    BASOPCT 0.7 05/17/2022    MONOSABS 0.77 05/17/2022    LYMPHSABS 4.52 05/17/2022    EOSABS 0.12 05/17/2022    BASOSABS 0.00 05/17/2022     CMP:    Lab Results   Component Value Date     05/17/2022    K 2.9 05/17/2022    K 2.7 05/16/2022     05/17/2022    CO2 25 05/17/2022    BUN 7 05/17/2022    CREATININE 0.8 05/17/2022    GFRAA >60 05/17/2022    LABGLOM >60 05/17/2022    GLUCOSE 64 05/17/2022    PROT 5.0 05/17/2022    LABALBU 2.1 05/17/2022    CALCIUM 7.7 05/17/2022    BILITOT <0.2 05/17/2022    ALKPHOS 117 05/17/2022    AST 14 05/17/2022    ALT 9 05/17/2022       General appearance:  NAD  Head: NCAT, PERRLA, EOMI, red conjunctiva  Neck: supple, no masses  Lungs: CTAB, equal chest rise bilateral  Heart: Reg rate  Abdomen: soft, nondistended, nontender  Skin; no lesions  Gu: no cva tenderness  Extremities: extremities normal, atraumatic, no cyanosis or edema      Assessment/Plan:  Reg Childers is a 47 y.o. female with intractable nausea, rule out gastroparesis    Gastric emptying study today  Reglan if positive  No plan for surgical intervention  To follow-up with Dr. Godwin Riddle as an outpatient    Skye Abreu MD  5/17/2022  8:59 AM

## 2022-05-17 NOTE — PROGRESS NOTES
[facility]                Phone: [phone]  Fax: [phone]    Physical Therapy  Cancellation/No-show Note  Patient Name:  Naima Constantino  :  1970   Date:  2022    For today's appointment patient:  [x]  Cancelled  []  Rescheduled appointment  []  No-show     Reason given by patient:  []  Patient ill  []  Conflicting appointment  []  No transportation    []  Conflict with work  [x]  No reason given  []  Other:     Comments:  pt hospitalized     Electronically signed by:   Jessica Estrada PT

## 2022-05-18 LAB
ANION GAP SERPL CALCULATED.3IONS-SCNC: 9 MMOL/L (ref 7–16)
BUN BLDV-MCNC: 8 MG/DL (ref 6–20)
CALCIUM SERPL-MCNC: 7.5 MG/DL (ref 8.6–10.2)
CHLORIDE BLD-SCNC: 110 MMOL/L (ref 98–107)
CO2: 22 MMOL/L (ref 22–29)
CREAT SERPL-MCNC: 0.8 MG/DL (ref 0.5–1)
GFR AFRICAN AMERICAN: >60
GFR NON-AFRICAN AMERICAN: >60 ML/MIN/1.73
GLUCOSE BLD-MCNC: 157 MG/DL (ref 74–99)
METER GLUCOSE: 157 MG/DL (ref 74–99)
METER GLUCOSE: 166 MG/DL (ref 74–99)
METER GLUCOSE: 175 MG/DL (ref 74–99)
METER GLUCOSE: 181 MG/DL (ref 74–99)
ORGANISM: ABNORMAL
POTASSIUM SERPL-SCNC: 3.7 MMOL/L (ref 3.5–5)
SODIUM BLD-SCNC: 141 MMOL/L (ref 132–146)
URINE CULTURE, ROUTINE: ABNORMAL

## 2022-05-18 PROCEDURE — 6360000002 HC RX W HCPCS: Performed by: STUDENT IN AN ORGANIZED HEALTH CARE EDUCATION/TRAINING PROGRAM

## 2022-05-18 PROCEDURE — 6370000000 HC RX 637 (ALT 250 FOR IP): Performed by: INTERNAL MEDICINE

## 2022-05-18 PROCEDURE — 2580000003 HC RX 258: Performed by: INTERNAL MEDICINE

## 2022-05-18 PROCEDURE — 93005 ELECTROCARDIOGRAM TRACING: CPT | Performed by: STUDENT IN AN ORGANIZED HEALTH CARE EDUCATION/TRAINING PROGRAM

## 2022-05-18 PROCEDURE — 36415 COLL VENOUS BLD VENIPUNCTURE: CPT

## 2022-05-18 PROCEDURE — 99221 1ST HOSP IP/OBS SF/LOW 40: CPT | Performed by: STUDENT IN AN ORGANIZED HEALTH CARE EDUCATION/TRAINING PROGRAM

## 2022-05-18 PROCEDURE — 99232 SBSQ HOSP IP/OBS MODERATE 35: CPT | Performed by: INTERNAL MEDICINE

## 2022-05-18 PROCEDURE — 82962 GLUCOSE BLOOD TEST: CPT

## 2022-05-18 PROCEDURE — 6360000002 HC RX W HCPCS: Performed by: INTERNAL MEDICINE

## 2022-05-18 PROCEDURE — 1200000000 HC SEMI PRIVATE

## 2022-05-18 PROCEDURE — 6370000000 HC RX 637 (ALT 250 FOR IP): Performed by: FAMILY MEDICINE

## 2022-05-18 PROCEDURE — 80048 BASIC METABOLIC PNL TOTAL CA: CPT

## 2022-05-18 PROCEDURE — 6370000000 HC RX 637 (ALT 250 FOR IP): Performed by: STUDENT IN AN ORGANIZED HEALTH CARE EDUCATION/TRAINING PROGRAM

## 2022-05-18 RX ORDER — LISINOPRIL 20 MG/1
40 TABLET ORAL DAILY
Status: DISCONTINUED | OUTPATIENT
Start: 2022-05-18 | End: 2022-05-20 | Stop reason: HOSPADM

## 2022-05-18 RX ORDER — METOCLOPRAMIDE HYDROCHLORIDE 5 MG/ML
10 INJECTION INTRAMUSCULAR; INTRAVENOUS EVERY 6 HOURS
Status: DISCONTINUED | OUTPATIENT
Start: 2022-05-18 | End: 2022-05-20 | Stop reason: HOSPADM

## 2022-05-18 RX ADMIN — PREGABALIN 100 MG: 100 CAPSULE ORAL at 22:14

## 2022-05-18 RX ADMIN — CIPROFLOXACIN HYDROCHLORIDE 1 DROP: 3.5 SOLUTION/ DROPS TOPICAL at 18:32

## 2022-05-18 RX ADMIN — INSULIN LISPRO 1 UNITS: 100 INJECTION, SOLUTION INTRAVENOUS; SUBCUTANEOUS at 08:02

## 2022-05-18 RX ADMIN — ENOXAPARIN SODIUM 40 MG: 100 INJECTION SUBCUTANEOUS at 09:44

## 2022-05-18 RX ADMIN — PREGABALIN 100 MG: 100 CAPSULE ORAL at 14:14

## 2022-05-18 RX ADMIN — LISINOPRIL 40 MG: 20 TABLET ORAL at 09:43

## 2022-05-18 RX ADMIN — Medication 10 ML: at 09:44

## 2022-05-18 RX ADMIN — FOLIC ACID 1 MG: 1 TABLET ORAL at 09:43

## 2022-05-18 RX ADMIN — INSULIN LISPRO 1 UNITS: 100 INJECTION, SOLUTION INTRAVENOUS; SUBCUTANEOUS at 11:50

## 2022-05-18 RX ADMIN — CIPROFLOXACIN HYDROCHLORIDE 1 DROP: 3.5 SOLUTION/ DROPS TOPICAL at 11:50

## 2022-05-18 RX ADMIN — HYDROXYCHLOROQUINE SULFATE 200 MG: 200 TABLET ORAL at 22:14

## 2022-05-18 RX ADMIN — CIPROFLOXACIN HYDROCHLORIDE 1 DROP: 3.5 SOLUTION/ DROPS TOPICAL at 00:32

## 2022-05-18 RX ADMIN — HYDROXYCHLOROQUINE SULFATE 200 MG: 200 TABLET ORAL at 09:43

## 2022-05-18 RX ADMIN — PREGABALIN 100 MG: 100 CAPSULE ORAL at 09:43

## 2022-05-18 RX ADMIN — INSULIN LISPRO 1 UNITS: 100 INJECTION, SOLUTION INTRAVENOUS; SUBCUTANEOUS at 16:58

## 2022-05-18 RX ADMIN — METOCLOPRAMIDE HYDROCHLORIDE 10 MG: 5 INJECTION INTRAMUSCULAR; INTRAVENOUS at 12:53

## 2022-05-18 RX ADMIN — METOCLOPRAMIDE HYDROCHLORIDE 10 MG: 5 INJECTION INTRAMUSCULAR; INTRAVENOUS at 18:32

## 2022-05-18 RX ADMIN — HYDROXYCHLOROQUINE SULFATE 200 MG: 200 TABLET ORAL at 00:32

## 2022-05-18 RX ADMIN — POTASSIUM BICARBONATE 40 MEQ: 782 TABLET, EFFERVESCENT ORAL at 12:52

## 2022-05-18 RX ADMIN — INSULIN LISPRO 1 UNITS: 100 INJECTION, SOLUTION INTRAVENOUS; SUBCUTANEOUS at 22:16

## 2022-05-18 RX ADMIN — LAMOTRIGINE 100 MG: 100 TABLET ORAL at 11:08

## 2022-05-18 RX ADMIN — CALCIUM 500 MG: 500 TABLET ORAL at 09:43

## 2022-05-18 RX ADMIN — LEVOTHYROXINE SODIUM 50 MCG: 0.05 TABLET ORAL at 06:18

## 2022-05-18 RX ADMIN — CIPROFLOXACIN HYDROCHLORIDE 1 DROP: 3.5 SOLUTION/ DROPS TOPICAL at 06:18

## 2022-05-18 ASSESSMENT — PAIN SCALES - GENERAL
PAINLEVEL_OUTOF10: 0
PAINLEVEL_OUTOF10: 0

## 2022-05-18 NOTE — PLAN OF CARE
Problem: Pain  Goal: Verbalizes/displays adequate comfort level or baseline comfort level  5/18/2022 0407 by Lluvia Brito RN  Outcome: Progressing  5/17/2022 1807 by Robert Arriaga RN  Outcome: Progressing     Problem: Safety - Adult  Goal: Free from fall injury  5/18/2022 0407 by Lluvia Brito RN  Outcome: Progressing  5/17/2022 1807 by Robert Arriaga RN  Outcome: Progressing     Problem: Nutrition Deficit:  Goal: Optimize nutritional status  5/18/2022 0407 by Lluvia Brito RN  Outcome: Progressing  5/17/2022 1807 by Robert Arriaga RN  Outcome: Progressing

## 2022-05-18 NOTE — CARE COORDINATION
Ss note:5/18/2022.10:35 AM Follow up visit to pts room re: Advanced Directives packet, sw offered to assist with forms. Pt relays she does not wish to complete forms during this hospital visit. Plan is home alone, states she will contact her insurance company for [address] [facility] [phone]; SW can assist if needed. Pt uses [facility] to obtain medications.  PRESLEY Aceves

## 2022-05-18 NOTE — PROGRESS NOTES
GENERAL SURGERY  DAILY PROGRESS NOTE  5/18/2022    Subjective:  No events overnight. Patient dates that her nausea/vomiting and abdominal pain is completely resolved. Patient underwent gastric emptying study which demonstrated gastroparesis. Objective:  BP (!) 176/101   Pulse 98   Temp 98.1 °F (36.7 °C) (Oral)   Resp 20   Ht 5' 1\" (1.549 m)   Wt 147 lb (66.7 kg)   SpO2 98%   BMI 27.78 kg/m²     General appearance: alert, cooperative and in no acute distress. Eyes: grossly normal  Lungs: nonlabored breathing on room air  Heart: regular rate  Abdomen:  soft, non-tender, non distended  Skin: No skin abnormalities  Neurologic: Alert and oriented x 3. Grossly normal  Musculoskeletal: No clubbing cyanosis or edema    Assessment/Plan:  47 y.o. female with persistent nausea/vomiting due to gastroparesis from uncontrolled diabetes    Repeat EKG this a.m. to evaluate for QT prolongation  Discussed with pharmacy starting Reglan in this patient after repeat EKG was done  No plan for surgical intervention   Follow for recommendations from GI Dr. Zehra Deleon  Prn nausea control  Tolerating liquid diet this AM    Electronically signed by Corrie Solis DO on 5/18/2022 at 7:54 AM    As above.

## 2022-05-18 NOTE — CONSULTS
Remote Gastroenterology Consult      ASSESSMENT AND PLAN:    52y/F w/ poorly controlled DMII presents to the ED w/ persistent symptoms of nausea/vomiting and had GES on admission which is diagnostic for gastroparesis. PLAN:  1. Diabetic Gastroparesis:  -Supportive care w/ schedule IV zofran and IV compazine while monitoring QTc. Can also consider RI Phenergan if not working.   -I would keep patient NPO until nausea resolves. I would then begin clear liquid diet for 48 hours before slowly advancing.  -Please consult Nutrition/Dietician to educate the patient on a Diabetic Gastroparesis diet. She will require a low fiber, low fat diet and meals should be low-volume and frequent. -If symptoms not improving, induce rest with IV Ativan and IV Benadryl while keeping the patient NPO. -Please have Pharmacist visit with the patient to go over her home medication list as polypharmacy is also contributing to symptoms. Eliminate any unnecessary medications.   -Okay to give Reglan 10mg QID for 3 days. Do not continue on discharge given black box warning.   -Adequate glucose control as this will also help with her symptoms. I am out of the office this week due to a conference. If further recommendations are needed, please contact me through [facility] which I will be intermittently checking or [facility] [place] through our GI office/[employer]. Thank you for including us in the care of this patient. Please do not hesitate to contact us with any additional questions or concerns. Kevin Rubio MD  Gastroenterology/Hepatology  Advanced Endoscopy          HISTORY OF PRESENT ILLNESS:      Ms. Osorio Nam is a 52y/F w/ poorly-controlled DMII who was first seen in clinic with [address] [place] [address] on February 9th for symptoms of persistent nausea/vomiting. At that point, her latest Hgb A1c was 14.5%. She underwent EGD on May 6 which showed mild gastritis but was otherwise negative.  She has had persistent nausea and vomiting since the procedure and was seen again in clinic 5/16. She had reported frequent ER visits due to the persistent nausea/vomiting. She was again sent to the ER for admission from clinic. GES was performed yesterday and was positive for gastroparesis. Past Medical History:        Diagnosis Date    Anxiety     Arthritis     Depression     Diabetes mellitus (HCC)     Fibromyalgia     HTN (hypertension)     Hypertensive chronic kidney disease     Left shoulder pain     LIZABETH (obstructive sleep apnea)     [facility]umatoid arthritis involving multiple sites ([facility] [address]     Sjogren's disease ([facility] [address]     Thyroid disease      Past Surgical History:        Procedure Laterality Date    CT BIOPSY RENAL  11/18/2021    CT BIOPSY RENAL 11/18/2021 Antonio Valentin MD [facility]    SHOULDER ARTHROSCOPY Right 06/16/2017    UPPER GASTROINTESTINAL ENDOSCOPY N/A 5/6/2022    EGD BIOPSY performed by Torres Valdez MD at [facility] History:    TOBACCO:   reports that she quit smoking about 24 years ago. She started smoking about 25 years ago. She has a 1.00 pack-year smoking history. She has never used smokeless tobacco.  ETOH:   reports no history of alcohol use. DRUGS:   reports no history of drug use.   Family History:       Adopted: Yes   Family history unknown: Yes         Current Facility-Administered Medications:     insulin lispro (HUMALOG) injection vial 0-6 Units, 0-6 Units, SubCUTAneous, TID WC, Sameer Garza DO, 1 Units at 05/17/22 1723    insulin lispro (HUMALOG) injection vial 0-3 Units, 0-3 Units, SubCUTAneous, Nightly, Butler Greg, DO    hydroxychloroquine (PLAQUENIL) tablet 200 mg, 200 mg, Oral, BID, Leti Darnell MD, 200 mg at 05/18/22 0032    calcium elemental (OSCAL) tablet 500 mg, 500 mg, Oral, Daily, Leti Darnell MD    folic acid (FOLVITE) tablet 1 mg, 1 mg, Oral, Daily, Leti Darnell MD, 1 mg at 05/17/22 1427    lamoTRIgine (LAMICTAL) tablet 100 mg, 100 mg, Oral, Daily, Tracie Banegas MD, 100 mg at 05/17/22 1426    melatonin tablet 3 mg, 3 mg, Oral, Nightly PRN, Tracie Banegas MD    pregabalin (LYRICA) capsule 100 mg, 100 mg, Oral, TID, Tracie Banegas MD, 100 mg at 05/17/22 1953    sodium chloride flush 0.9 % injection 5-40 mL, 5-40 mL, IntraVENous, 2 times per day, Tracie Banegas MD, 10 mL at 05/17/22 2050    sodium chloride flush 0.9 % injection 5-40 mL, 5-40 mL, IntraVENous, PRN, Tracie Banegas MD    0.9 % sodium chloride infusion, , IntraVENous, PRN, Tracie Banegas MD    enoxaparin (LOVENOX) injection 40 mg, 40 mg, SubCUTAneous, Daily, Tracie Banegas MD, 40 mg at 05/17/22 1427    ondansetron (ZOFRAN-ODT) disintegrating tablet 4 mg, 4 mg, Oral, Q8H PRN **OR** ondansetron (ZOFRAN) injection 4 mg, 4 mg, IntraVENous, Q6H PRN, Tracie Banegas MD    polyethylene glycol (GLYCOLAX) packet 17 g, 17 g, Oral, Daily PRN, Tracie Banegas MD    acetaminophen (TYLENOL) tablet 650 mg, 650 mg, Oral, Q6H PRN **OR** acetaminophen (TYLENOL) suppository 650 mg, 650 mg, Rectal, Q6H PRN, Tracie Banegas MD    levothyroxine (SYNTHROID) tablet 50 mcg, 50 mcg, Oral, Daily, Tracie Banegas MD, 50 mcg at 05/18/22 0618    glucose chewable tablet 16 g, 4 tablet, Oral, PRN, Perez Kyara, DO    dextrose bolus 10% 125 mL, 125 mL, IntraVENous, PRN, Stopped at 05/17/22 8994 **OR** dextrose bolus 10% 250 mL, 250 mL, IntraVENous, PRN, Perez Kyara, DO    glucagon (rDNA) injection 1 mg, 1 mg, IntraMUSCular, PRN, Perez Kyara, DO    dextrose 5 % solution, 100 mL/hr, IntraVENous, PRN, Perez Kyara, DO    ciprofloxacin (CILOXAN) 0.3 % ophthalmic solution 1 drop, 1 drop, Right Eye, 4 times per day, Tracie Banegas MD, 1 drop at 05/18/22 5837     Allergies:  Amoxicillin, Sulfa antibiotics, Cefdinir, Doxycycline, Other, Milk-related compounds, and Tomato        PHYSICAL EXAM:  BP (!) 176/101   Pulse 98   Temp 98.1 °F

## 2022-05-18 NOTE — PROGRESS NOTES
Department of Internal Medicine  General Internal Medicine  Attending Progress Note  Chief Complaint   Patient presents with    Nausea     pt c/o nausea/vomiting since 5-6-22 after having an endoscopy. pt had scope done because she has had n/v since august of 2021     SUBJECTIVE:    Reports that she is doing well. Denied fever and chills. Noted that nausea has resolved. Aware that her Gastric emptying test was positive for Gastroparesis.     OBJECTIVE      Medications    Current Facility-Administered Medications: lisinopril (PRINIVIL;ZESTRIL) tablet 40 mg, 40 mg, Oral, Daily  insulin lispro (HUMALOG) injection vial 0-6 Units, 0-6 Units, SubCUTAneous, TID WC  insulin lispro (HUMALOG) injection vial 0-3 Units, 0-3 Units, SubCUTAneous, Nightly  hydroxychloroquine (PLAQUENIL) tablet 200 mg, 200 mg, Oral, BID  calcium elemental (OSCAL) tablet 500 mg, 500 mg, Oral, Daily  folic acid (FOLVITE) tablet 1 mg, 1 mg, Oral, Daily  lamoTRIgine (LAMICTAL) tablet 100 mg, 100 mg, Oral, Daily  melatonin tablet 3 mg, 3 mg, Oral, Nightly PRN  pregabalin (LYRICA) capsule 100 mg, 100 mg, Oral, TID  sodium chloride flush 0.9 % injection 5-40 mL, 5-40 mL, IntraVENous, 2 times per day  sodium chloride flush 0.9 % injection 5-40 mL, 5-40 mL, IntraVENous, PRN  0.9 % sodium chloride infusion, , IntraVENous, PRN  enoxaparin (LOVENOX) injection 40 mg, 40 mg, SubCUTAneous, Daily  ondansetron (ZOFRAN-ODT) disintegrating tablet 4 mg, 4 mg, Oral, Q8H PRN **OR** ondansetron (ZOFRAN) injection 4 mg, 4 mg, IntraVENous, Q6H PRN  polyethylene glycol (GLYCOLAX) packet 17 g, 17 g, Oral, Daily PRN  acetaminophen (TYLENOL) tablet 650 mg, 650 mg, Oral, Q6H PRN **OR** acetaminophen (TYLENOL) suppository 650 mg, 650 mg, Rectal, Q6H PRN  levothyroxine (SYNTHROID) tablet 50 mcg, 50 mcg, Oral, Daily  glucose chewable tablet 16 g, 4 tablet, Oral, PRN  dextrose bolus 10% 125 mL, 125 mL, IntraVENous, PRN **OR** dextrose bolus 10% 250 mL, 250 mL, IntraVENous, PRN  glucagon (rDNA) injection 1 mg, 1 mg, IntraMUSCular, PRN  dextrose 5 % solution, 100 mL/hr, IntraVENous, PRN  ciprofloxacin (CILOXAN) 0.3 % ophthalmic solution 1 drop, 1 drop, Right Eye, 4 times per day  Physical    VITALS:  /84   Pulse 98   Temp 98.7 °F (37.1 °C) (Oral)   Resp 16   Ht 5' 1\" (1.549 m)   Wt 147 lb (66.7 kg)   SpO2 94%   BMI 27.78 kg/m²   CONSTITUTIONAL:  awake and alert  EYES:  extra-ocular muscles intact and vision intact  ENT:  normocepalic, without obvious abnormality  NECK:  supple, symmetrical, trachea midline  LUNGS:  no increased work of breathing, no retractions and clear to auscultation  CARDIOVASCULAR:  normal apical pulses and normal S1 and S2  ABDOMEN:  normal bowel sounds, non-distended and non-tender  MUSCULOSKELETAL:  there is no redness, warmth, or swelling of the joints  NEUROLOGIC:  Mental Status Exam:  Level of Alertness:   awake  SKIN:  no bruising or bleeding  Data    CBC:   Lab Results   Component Value Date    WBC 12.9 05/17/2022    RBC 4.59 05/17/2022    HGB 11.0 05/17/2022    HCT 36.3 05/17/2022    MCV 79.1 05/17/2022    MCH 24.0 05/17/2022    MCHC 30.3 05/17/2022    RDW 15.5 05/17/2022     05/17/2022    MPV 10.0 05/17/2022     BMP:    Lab Results   Component Value Date     05/18/2022    K 3.7 05/18/2022    K 2.7 05/16/2022     05/18/2022    CO2 22 05/18/2022    BUN 8 05/18/2022    LABALBU 2.1 05/17/2022    CREATININE 0.8 05/18/2022    CALCIUM 7.5 05/18/2022    GFRAA >60 05/18/2022    LABGLOM >60 05/18/2022    GLUCOSE 157 05/18/2022       ASSESSMENT AND PLAN        1. Hypokalemia due to loss of potassium  Resolved after treating with both IV and oral supplementation  Loss suspected to be due to GI  Continue to monitor    2. Dm type 2 Poorly controlled:  Complicated with Neuropathy and gastroparesis  BGL is trending up, gradually restart her antihyperglycemic agents  Sliding scales  accucheck ACH S    3.  Hypertension Essential:  Resume home dose insulin    4. Intractable Nausea and Vomiting: This is due to Gastroparesis  Continue to monitor  Supportive care with Zofran and Compazine  Appreciate GI and Gen surg recommendations. Advance diet as tolerated    5. Leukocytosis: no sign of infection  Continue to monitor    6.   Dehydration: improved

## 2022-05-19 DIAGNOSIS — Z79.4 TYPE 2 DIABETES MELLITUS WITH HYPERGLYCEMIA, WITH LONG-TERM CURRENT USE OF INSULIN (HCC): Primary | ICD-10-CM

## 2022-05-19 DIAGNOSIS — E11.65 TYPE 2 DIABETES MELLITUS WITH HYPERGLYCEMIA, WITH LONG-TERM CURRENT USE OF INSULIN (HCC): Primary | ICD-10-CM

## 2022-05-19 LAB
EKG ATRIAL RATE: 96 BPM
EKG ATRIAL RATE: 99 BPM
EKG P AXIS: 27 DEGREES
EKG P AXIS: 30 DEGREES
EKG P-R INTERVAL: 146 MS
EKG P-R INTERVAL: 152 MS
EKG Q-T INTERVAL: 364 MS
EKG Q-T INTERVAL: 380 MS
EKG QRS DURATION: 82 MS
EKG QRS DURATION: 88 MS
EKG QTC CALCULATION (BAZETT): 467 MS
EKG QTC CALCULATION (BAZETT): 480 MS
EKG R AXIS: -16 DEGREES
EKG R AXIS: -9 DEGREES
EKG T AXIS: 23 DEGREES
EKG T AXIS: 34 DEGREES
EKG VENTRICULAR RATE: 96 BPM
EKG VENTRICULAR RATE: 99 BPM
METER GLUCOSE: 165 MG/DL (ref 74–99)
METER GLUCOSE: 209 MG/DL (ref 74–99)
METER GLUCOSE: 224 MG/DL (ref 74–99)
METER GLUCOSE: 322 MG/DL (ref 74–99)

## 2022-05-19 PROCEDURE — 6360000002 HC RX W HCPCS: Performed by: STUDENT IN AN ORGANIZED HEALTH CARE EDUCATION/TRAINING PROGRAM

## 2022-05-19 PROCEDURE — 6360000002 HC RX W HCPCS: Performed by: INTERNAL MEDICINE

## 2022-05-19 PROCEDURE — 99232 SBSQ HOSP IP/OBS MODERATE 35: CPT | Performed by: INTERNAL MEDICINE

## 2022-05-19 PROCEDURE — 6370000000 HC RX 637 (ALT 250 FOR IP): Performed by: FAMILY MEDICINE

## 2022-05-19 PROCEDURE — 1200000000 HC SEMI PRIVATE

## 2022-05-19 PROCEDURE — 6370000000 HC RX 637 (ALT 250 FOR IP): Performed by: INTERNAL MEDICINE

## 2022-05-19 PROCEDURE — 82962 GLUCOSE BLOOD TEST: CPT

## 2022-05-19 PROCEDURE — 2580000003 HC RX 258: Performed by: INTERNAL MEDICINE

## 2022-05-19 RX ORDER — FLUCONAZOLE 100 MG/1
100 TABLET ORAL ONCE
Status: COMPLETED | OUTPATIENT
Start: 2022-05-19 | End: 2022-05-19

## 2022-05-19 RX ADMIN — METOCLOPRAMIDE HYDROCHLORIDE 10 MG: 5 INJECTION INTRAMUSCULAR; INTRAVENOUS at 00:35

## 2022-05-19 RX ADMIN — CIPROFLOXACIN HYDROCHLORIDE 1 DROP: 3.5 SOLUTION/ DROPS TOPICAL at 05:47

## 2022-05-19 RX ADMIN — Medication 10 ML: at 20:25

## 2022-05-19 RX ADMIN — PREGABALIN 100 MG: 100 CAPSULE ORAL at 20:25

## 2022-05-19 RX ADMIN — INSULIN LISPRO 2 UNITS: 100 INJECTION, SOLUTION INTRAVENOUS; SUBCUTANEOUS at 11:31

## 2022-05-19 RX ADMIN — PREGABALIN 100 MG: 100 CAPSULE ORAL at 13:37

## 2022-05-19 RX ADMIN — INSULIN LISPRO 1 UNITS: 100 INJECTION, SOLUTION INTRAVENOUS; SUBCUTANEOUS at 08:11

## 2022-05-19 RX ADMIN — CIPROFLOXACIN HYDROCHLORIDE 1 DROP: 3.5 SOLUTION/ DROPS TOPICAL at 17:41

## 2022-05-19 RX ADMIN — HYDROXYCHLOROQUINE SULFATE 200 MG: 200 TABLET ORAL at 20:25

## 2022-05-19 RX ADMIN — INSULIN LISPRO 2 UNITS: 100 INJECTION, SOLUTION INTRAVENOUS; SUBCUTANEOUS at 16:38

## 2022-05-19 RX ADMIN — FOLIC ACID 1 MG: 1 TABLET ORAL at 08:06

## 2022-05-19 RX ADMIN — LEVOTHYROXINE SODIUM 50 MCG: 0.05 TABLET ORAL at 05:45

## 2022-05-19 RX ADMIN — CIPROFLOXACIN HYDROCHLORIDE 1 DROP: 3.5 SOLUTION/ DROPS TOPICAL at 11:30

## 2022-05-19 RX ADMIN — METOCLOPRAMIDE HYDROCHLORIDE 10 MG: 5 INJECTION INTRAMUSCULAR; INTRAVENOUS at 05:45

## 2022-05-19 RX ADMIN — ENOXAPARIN SODIUM 40 MG: 100 INJECTION SUBCUTANEOUS at 08:06

## 2022-05-19 RX ADMIN — HYDROXYCHLOROQUINE SULFATE 200 MG: 200 TABLET ORAL at 08:06

## 2022-05-19 RX ADMIN — INSULIN LISPRO 2 UNITS: 100 INJECTION, SOLUTION INTRAVENOUS; SUBCUTANEOUS at 20:26

## 2022-05-19 RX ADMIN — LAMOTRIGINE 100 MG: 100 TABLET ORAL at 08:06

## 2022-05-19 RX ADMIN — CIPROFLOXACIN HYDROCHLORIDE 1 DROP: 3.5 SOLUTION/ DROPS TOPICAL at 00:35

## 2022-05-19 RX ADMIN — CALCIUM 500 MG: 500 TABLET ORAL at 08:06

## 2022-05-19 RX ADMIN — LISINOPRIL 40 MG: 20 TABLET ORAL at 08:06

## 2022-05-19 RX ADMIN — METOCLOPRAMIDE HYDROCHLORIDE 10 MG: 5 INJECTION INTRAMUSCULAR; INTRAVENOUS at 11:30

## 2022-05-19 RX ADMIN — Medication 10 ML: at 00:39

## 2022-05-19 RX ADMIN — FLUCONAZOLE 100 MG: 100 TABLET ORAL at 09:05

## 2022-05-19 RX ADMIN — METOCLOPRAMIDE HYDROCHLORIDE 10 MG: 5 INJECTION INTRAMUSCULAR; INTRAVENOUS at 17:42

## 2022-05-19 RX ADMIN — Medication 10 ML: at 07:28

## 2022-05-19 RX ADMIN — PREGABALIN 100 MG: 100 CAPSULE ORAL at 08:06

## 2022-05-19 NOTE — CONSULTS
Nutrition Education    · Educated on 5/19   · Learners: Patient  · Readiness: Acceptance  · Method: Explanation and Handout  · Response: Verbalizes Understanding  · Contact name and number provided.     Kevin Gillespie RD  Contact Number: [phone]

## 2022-05-19 NOTE — PROGRESS NOTES
GENERAL SURGERY  DAILY PROGRESS NOTE  5/19/2022    Subjective:  No events overnight. Patient dates that her nausea/vomiting and abdominal pain is completely resolved. Patient underwent gastric emptying study which demonstrated gastroparesis. Objective:  /83   Pulse 92   Temp 98.4 °F (36.9 °C) (Oral)   Resp 18   Ht 5' 1\" (1.549 m)   Wt 147 lb (66.7 kg)   SpO2 99%   BMI 27.78 kg/m²     General appearance: alert, cooperative and in no acute distress. Eyes: grossly normal  Lungs: nonlabored breathing on room air  Heart: regular rate  Abdomen:  soft, non-tender, non distended  Skin: No skin abnormalities  Neurologic: Alert and oriented x 3. Grossly normal  Musculoskeletal: No clubbing cyanosis or edema    Assessment/Plan:  47 y.o. female with persistent nausea/vomiting due to gastroparesis from uncontrolled diabetes.  Nausea much improved    reglan ordered for 3 days as per GI recommendations - stop on discharge  EKG last night with QTC ok after reglan was started, should continue to monitor EKGs given multiple QTC prolonging medications  Can slowly advance to diabetic low fiber diet  Follow up with Dr. Yvette Campoverde  Will be available as needed    Electronically signed by Anthony Singh MD on 5/19/2022 at 10:58 AM

## 2022-05-19 NOTE — CARE COORDINATION
5/19/2022 CM transition of care:   Tele, Full liquid- plans to advance to limited carb diet and iv reglan continued. Pt active with NET program through [address]- they provide most of her transportation needs- they do not do same day pick ups. Pt will call her insurance co for transport home via [address] [employer] has the information- [phone]. CM/SS assigned to follow.  Electronically signed by Melony Schreiber RN-BC on 5/19/2022 at 11:01 AM

## 2022-05-19 NOTE — PROGRESS NOTES
Department of Internal Medicine  General Internal Medicine  Attending Progress Note  Chief Complaint   Patient presents with    Nausea     pt c/o nausea/vomiting since 5-6-22 after having an endoscopy. pt had scope done because she has had n/v since august of 2021     SUBJECTIVE:    Reports that she is doing better. No fever or chills. No chest pain.     OBJECTIVE      Medications    Current Facility-Administered Medications: fluconazole (DIFLUCAN) tablet 100 mg, 100 mg, Oral, Once  lisinopril (PRINIVIL;ZESTRIL) tablet 40 mg, 40 mg, Oral, Daily  metoclopramide (REGLAN) injection 10 mg, 10 mg, IntraVENous, Q6H  insulin lispro (HUMALOG) injection vial 0-6 Units, 0-6 Units, SubCUTAneous, TID WC  insulin lispro (HUMALOG) injection vial 0-3 Units, 0-3 Units, SubCUTAneous, Nightly  hydroxychloroquine (PLAQUENIL) tablet 200 mg, 200 mg, Oral, BID  calcium elemental (OSCAL) tablet 500 mg, 500 mg, Oral, Daily  folic acid (FOLVITE) tablet 1 mg, 1 mg, Oral, Daily  lamoTRIgine (LAMICTAL) tablet 100 mg, 100 mg, Oral, Daily  melatonin tablet 3 mg, 3 mg, Oral, Nightly PRN  pregabalin (LYRICA) capsule 100 mg, 100 mg, Oral, TID  sodium chloride flush 0.9 % injection 5-40 mL, 5-40 mL, IntraVENous, 2 times per day  sodium chloride flush 0.9 % injection 5-40 mL, 5-40 mL, IntraVENous, PRN  0.9 % sodium chloride infusion, , IntraVENous, PRN  enoxaparin (LOVENOX) injection 40 mg, 40 mg, SubCUTAneous, Daily  ondansetron (ZOFRAN-ODT) disintegrating tablet 4 mg, 4 mg, Oral, Q8H PRN **OR** ondansetron (ZOFRAN) injection 4 mg, 4 mg, IntraVENous, Q6H PRN  polyethylene glycol (GLYCOLAX) packet 17 g, 17 g, Oral, Daily PRN  acetaminophen (TYLENOL) tablet 650 mg, 650 mg, Oral, Q6H PRN **OR** acetaminophen (TYLENOL) suppository 650 mg, 650 mg, Rectal, Q6H PRN  levothyroxine (SYNTHROID) tablet 50 mcg, 50 mcg, Oral, Daily  glucose chewable tablet 16 g, 4 tablet, Oral, PRN  dextrose bolus 10% 125 mL, 125 mL, IntraVENous, PRN **OR** dextrose bolus 10% 250 mL, 250 mL, IntraVENous, PRN  glucagon (rDNA) injection 1 mg, 1 mg, IntraMUSCular, PRN  dextrose 5 % solution, 100 mL/hr, IntraVENous, PRN  ciprofloxacin (CILOXAN) 0.3 % ophthalmic solution 1 drop, 1 drop, Right Eye, 4 times per day  Physical    VITALS:  /83   Pulse 92   Temp 98.4 °F (36.9 °C) (Oral)   Resp 18   Ht 5' 1\" (1.549 m)   Wt 147 lb (66.7 kg)   SpO2 99%   BMI 27.78 kg/m²   CONSTITUTIONAL:  awake and alert  EYES:  extra-ocular muscles intact and vision intact  ENT:  normocepalic, without obvious abnormality  NECK:  supple, symmetrical, trachea midline  LUNGS:  no increased work of breathing, no retractions and no crackles or wheezing  CARDIOVASCULAR:  normal apical pulses, normal S1 and S2 and no edema  ABDOMEN:  normal bowel sounds, non-distended and non-tender  MUSCULOSKELETAL:  there is no redness, warmth, or swelling of the joints  NEUROLOGIC:  Mental Status Exam:  Level of Alertness:   awake  Orientation:   person, place, time  SKIN:  no bruising or bleeding  Data    CBC:   Lab Results   Component Value Date    WBC 12.9 05/17/2022    RBC 4.59 05/17/2022    HGB 11.0 05/17/2022    HCT 36.3 05/17/2022    MCV 79.1 05/17/2022    MCH 24.0 05/17/2022    MCHC 30.3 05/17/2022    RDW 15.5 05/17/2022     05/17/2022    MPV 10.0 05/17/2022     BMP:    Lab Results   Component Value Date     05/18/2022    K 3.7 05/18/2022    K 2.7 05/16/2022     05/18/2022    CO2 22 05/18/2022    BUN 8 05/18/2022    LABALBU 2.1 05/17/2022    CREATININE 0.8 05/18/2022    CALCIUM 7.5 05/18/2022    GFRAA >60 05/18/2022    LABGLOM >60 05/18/2022    GLUCOSE 157 05/18/2022       ASSESSMENT AND PLAN      1. Hypokalemia due GI Loss:  Much resolved  Continue to monitor  Nausea has resolved    2. DM type 2 Poorly controlled:  Complicated with Neuropathy and Gastroparesis  Resume home medication  Will start Metformin   BGL is better controlled    3.   Intractable Nausea and Vomiting:  Much improved  Continue to monitor  PRN zofran as needed  Advance diet as tolerated    4. Hypertension Essential:  Better controlled  Continue to monitor. 5.  Leukocytosis:  No sign of infections    6. Dehydration: continue home meds    7. Candiduria: give 1 time dose of zofran    8. Hypothyroidism:   On synthroid

## 2022-05-19 NOTE — CONSULTS
Nutrition Education    · Educated on 5/19    · Learners: Patient  · Readiness: Acceptance  · Method: Explanation and Handout  · Response: Verbalizes Understanding  · Contact name and number provided.     Harper Rivas RD  Contact Number: [phone]

## 2022-05-19 NOTE — PLAN OF CARE
Problem: Pain  Goal: Verbalizes/displays adequate comfort level or baseline comfort level  Outcome: Progressing     Problem: Safety - Adult  Goal: Free from fall injury  Outcome: Progressing     Problem: Nutrition Deficit:  Goal: Optimize nutritional status  Outcome: Progressing

## 2022-05-20 VITALS
RESPIRATION RATE: 18 BRPM | DIASTOLIC BLOOD PRESSURE: 84 MMHG | SYSTOLIC BLOOD PRESSURE: 126 MMHG | HEART RATE: 98 BPM | HEIGHT: 61 IN | OXYGEN SATURATION: 98 % | WEIGHT: 147 LBS | BODY MASS INDEX: 27.75 KG/M2 | TEMPERATURE: 98.1 F

## 2022-05-20 LAB
ALBUMIN SERPL-MCNC: 1.9 G/DL (ref 3.5–5.2)
ALP BLD-CCNC: 98 U/L (ref 35–104)
ALT SERPL-CCNC: 13 U/L (ref 0–32)
ANION GAP SERPL CALCULATED.3IONS-SCNC: 10 MMOL/L (ref 7–16)
AST SERPL-CCNC: 18 U/L (ref 0–31)
BILIRUB SERPL-MCNC: <0.2 MG/DL (ref 0–1.2)
BUN BLDV-MCNC: 15 MG/DL (ref 6–20)
CALCIUM SERPL-MCNC: 7.8 MG/DL (ref 8.6–10.2)
CHLORIDE BLD-SCNC: 106 MMOL/L (ref 98–107)
CO2: 22 MMOL/L (ref 22–29)
CREAT SERPL-MCNC: 1 MG/DL (ref 0.5–1)
GFR AFRICAN AMERICAN: >60
GFR NON-AFRICAN AMERICAN: 58 ML/MIN/1.73
GLUCOSE BLD-MCNC: 261 MG/DL (ref 74–99)
METER GLUCOSE: 140 MG/DL (ref 74–99)
METER GLUCOSE: 194 MG/DL (ref 74–99)
METER GLUCOSE: 239 MG/DL (ref 74–99)
METER GLUCOSE: 281 MG/DL (ref 74–99)
POTASSIUM SERPL-SCNC: 3.8 MMOL/L (ref 3.5–5)
SODIUM BLD-SCNC: 138 MMOL/L (ref 132–146)
TOTAL PROTEIN: 4.8 G/DL (ref 6.4–8.3)

## 2022-05-20 PROCEDURE — 6360000002 HC RX W HCPCS: Performed by: STUDENT IN AN ORGANIZED HEALTH CARE EDUCATION/TRAINING PROGRAM

## 2022-05-20 PROCEDURE — 6370000000 HC RX 637 (ALT 250 FOR IP): Performed by: INTERNAL MEDICINE

## 2022-05-20 PROCEDURE — 82962 GLUCOSE BLOOD TEST: CPT

## 2022-05-20 PROCEDURE — 6360000002 HC RX W HCPCS: Performed by: INTERNAL MEDICINE

## 2022-05-20 PROCEDURE — 99239 HOSP IP/OBS DSCHRG MGMT >30: CPT | Performed by: INTERNAL MEDICINE

## 2022-05-20 PROCEDURE — 80053 COMPREHEN METABOLIC PANEL: CPT

## 2022-05-20 PROCEDURE — 36415 COLL VENOUS BLD VENIPUNCTURE: CPT

## 2022-05-20 PROCEDURE — 2580000003 HC RX 258: Performed by: INTERNAL MEDICINE

## 2022-05-20 PROCEDURE — 6370000000 HC RX 637 (ALT 250 FOR IP): Performed by: FAMILY MEDICINE

## 2022-05-20 RX ORDER — INSULIN GLARGINE 100 [IU]/ML
25 INJECTION, SOLUTION SUBCUTANEOUS 2 TIMES DAILY
Status: DISCONTINUED | OUTPATIENT
Start: 2022-05-20 | End: 2022-05-20 | Stop reason: HOSPADM

## 2022-05-20 RX ORDER — LIDOCAINE 50 MG/G
1 PATCH TOPICAL DAILY
Qty: 30 PATCH | Refills: 2 | Status: SHIPPED | OUTPATIENT
Start: 2022-05-20 | End: 2022-06-19

## 2022-05-20 RX ORDER — INSULIN LISPRO 100 [IU]/ML
2 INJECTION, SOLUTION INTRAVENOUS; SUBCUTANEOUS ONCE
Status: COMPLETED | OUTPATIENT
Start: 2022-05-20 | End: 2022-05-20

## 2022-05-20 RX ORDER — CIPROFLOXACIN HYDROCHLORIDE 3.5 MG/ML
1 SOLUTION/ DROPS TOPICAL
Qty: 1 EACH | Refills: 0 | Status: ON HOLD | OUTPATIENT
Start: 2022-05-20 | End: 2022-05-31 | Stop reason: HOSPADM

## 2022-05-20 RX ADMIN — METOCLOPRAMIDE HYDROCHLORIDE 10 MG: 5 INJECTION INTRAMUSCULAR; INTRAVENOUS at 18:34

## 2022-05-20 RX ADMIN — INSULIN LISPRO 2 UNITS: 100 INJECTION, SOLUTION INTRAVENOUS; SUBCUTANEOUS at 07:16

## 2022-05-20 RX ADMIN — PREGABALIN 100 MG: 100 CAPSULE ORAL at 14:03

## 2022-05-20 RX ADMIN — METOCLOPRAMIDE HYDROCHLORIDE 10 MG: 5 INJECTION INTRAMUSCULAR; INTRAVENOUS at 11:49

## 2022-05-20 RX ADMIN — CALCIUM 500 MG: 500 TABLET ORAL at 07:16

## 2022-05-20 RX ADMIN — METOCLOPRAMIDE HYDROCHLORIDE 10 MG: 5 INJECTION INTRAMUSCULAR; INTRAVENOUS at 00:14

## 2022-05-20 RX ADMIN — INSULIN LISPRO 2 UNITS: 100 INJECTION, SOLUTION INTRAVENOUS; SUBCUTANEOUS at 11:56

## 2022-05-20 RX ADMIN — Medication 10 ML: at 07:16

## 2022-05-20 RX ADMIN — ENOXAPARIN SODIUM 40 MG: 100 INJECTION SUBCUTANEOUS at 07:16

## 2022-05-20 RX ADMIN — LEVOTHYROXINE SODIUM 50 MCG: 0.05 TABLET ORAL at 06:30

## 2022-05-20 RX ADMIN — CIPROFLOXACIN HYDROCHLORIDE 1 DROP: 3.5 SOLUTION/ DROPS TOPICAL at 06:27

## 2022-05-20 RX ADMIN — CIPROFLOXACIN HYDROCHLORIDE 1 DROP: 3.5 SOLUTION/ DROPS TOPICAL at 11:49

## 2022-05-20 RX ADMIN — CIPROFLOXACIN HYDROCHLORIDE 1 DROP: 3.5 SOLUTION/ DROPS TOPICAL at 00:14

## 2022-05-20 RX ADMIN — INSULIN LISPRO 3 UNITS: 100 INJECTION, SOLUTION INTRAVENOUS; SUBCUTANEOUS at 11:50

## 2022-05-20 RX ADMIN — METOCLOPRAMIDE HYDROCHLORIDE 10 MG: 5 INJECTION INTRAMUSCULAR; INTRAVENOUS at 06:27

## 2022-05-20 RX ADMIN — PREGABALIN 100 MG: 100 CAPSULE ORAL at 07:15

## 2022-05-20 RX ADMIN — INSULIN GLARGINE 25 UNITS: 100 INJECTION, SOLUTION SUBCUTANEOUS at 09:58

## 2022-05-20 RX ADMIN — FOLIC ACID 1 MG: 1 TABLET ORAL at 07:15

## 2022-05-20 RX ADMIN — HYDROXYCHLOROQUINE SULFATE 200 MG: 200 TABLET ORAL at 07:16

## 2022-05-20 RX ADMIN — LISINOPRIL 40 MG: 20 TABLET ORAL at 07:15

## 2022-05-20 RX ADMIN — LAMOTRIGINE 100 MG: 100 TABLET ORAL at 07:15

## 2022-05-20 RX ADMIN — CIPROFLOXACIN HYDROCHLORIDE 1 DROP: 3.5 SOLUTION/ DROPS TOPICAL at 17:33

## 2022-05-20 RX ADMIN — INSULIN LISPRO 1 UNITS: 100 INJECTION, SOLUTION INTRAVENOUS; SUBCUTANEOUS at 16:49

## 2022-05-20 ASSESSMENT — PAIN SCALES - GENERAL: PAINLEVEL_OUTOF10: 0

## 2022-05-20 NOTE — PROGRESS NOTES
CLINICAL PHARMACY NOTE: MEDS TO BEDS    Total # of Prescriptions Filled: 1   The following medications were delivered to the patient:  · Ciprofloxacin 0.3% solution     Additional Documentation:

## 2022-05-20 NOTE — DISCHARGE SUMMARY
Physician Discharge Summary     Patient ID:  Cole Ontiveros  [id number]  47 y.o.  1970    Admit date: 5/16/2022    Discharge date and time: No discharge date for patient encounter. Admitting Physician: Benjamín Garcia MD     Discharge Physician: Benjamín Garcia    Admission Diagnoses: Hypokalemia [E87.6]  Hypoxia [R09.02]  Hypokalemia due to loss of potassium [E87.6]    Discharge Diagnoses:   1. Severe Hypokalemia due to GI loss  2. Metabolic Acidosis due to starvation  3. Dm type 2 poorly controlled  4. Gastroparesis due to complications from #3  5. Conjunctivitis  6. Hypertension Essential  7. Candiduria   8. Dehydration  9. Leukocytosis  10. Intractable Nausea and vomiting. 11.  Hypoglycemia    Admission Condition: poor    Discharged Condition: good    Indication for Admission:     Hospital Course:   Mrs. Chucky Duane was admitted with intractable nausea and vomiting. Further evaluation revealed starvation acidosis, dehydration and severe hypokalemia. Potassium was replaced. She had Gastric Emptying test. This was positive for Gastroparesis. His diet was modified. He was seen and evaluated by GI and Gen surgery. Patient's Antihyperglycemic medications were initially held as patient was Npo due to Nausea/vomiting. She was also hypoglycemic on admission. Hypoglycemia has resolved at the time of discharge. I discussed diabetic medications with patients. Due to current BGL in hospital, Patient and I agreed that she will stop taking, [place] and Ruma Umm. She will continue lantus, humalog and metformin at current home dose. She will also continue weekly Ozempic. She was treated with Cipro eye drop for Right conjunctivitis. Prescription was given at discharge. She was given one time dose of Fluconazole due to candiduria    Time spent in discharge of patient is greater than 30 minutes.      Consults: GI and general surgery    Significant Diagnostic Studies: labs:     Treatments: IV hydration    Discharge Exam:  /84   Pulse 98   Temp 98.1 °F (36.7 °C) (Oral)   Resp 18   Ht 5' 1\" (1.549 m)   Wt 147 lb (66.7 kg)   SpO2 98%   BMI 27.78 kg/m²   General appearance: alert, appears stated age and cooperative  Head: Normocephalic, without obvious abnormality, atraumatic  Eyes: conjunctivae/corneas clear. PERRL, EOM's intact. Fundi benign. Ears: normal TM's and external ear canals both ears  Nose: Nares normal. Septum midline. Mucosa normal. No drainage or sinus tenderness. Throat: lips, mucosa, and tongue normal; teeth and gums normal  Lungs: clear to auscultation bilaterally  Heart: regular rate and rhythm, S1, S2 normal, no murmur, click, rub or gallop  Abdomen: soft, non-tender; bowel sounds normal; no masses,  no organomegaly  Pelvic: cervix normal in appearance, external genitalia normal, no adnexal masses or tenderness, no cervical motion tenderness, rectovaginal septum normal, uterus normal size, shape, and consistency, vagina normal without discharge  Extremities: extremities normal, atraumatic, no cyanosis or edema  Pulses: 2+ and symmetric    Disposition: home    In process/preliminary results:  Outstanding Order Results     No orders found from 4/17/2022 to 5/17/2022. Patient Instructions:   Current Discharge Medication List      START taking these medications    Details   ciprofloxacin (CILOXAN) 0.3 % ophthalmic solution Place 1 drop into the right eye every 6 hours while awake for 3 days Please provide medication to patient before discharge  Qty: 1 each, Refills: 0      glucose 4g chewable tablet Take 4 tablets by mouth as needed for Low blood sugar  Qty: 60 tablet, Refills: 3         CONTINUE these medications which have CHANGED    Details   lidocaine (LIDODERM) 5 % Place 1 patch onto the skin daily 12 hours on, 12 hours off.   Qty: 30 patch, Refills: 2    Associated Diagnoses: Low back pain, unspecified back pain laterality, unspecified chronicity, unspecified whether sciatica present; Medication refill         CONTINUE these medications which have NOT CHANGED    Details   Insulin Infusion Pump (MINIMED 770G INSULIN PUMP SYS) KIT Use to infuse insulin  Qty: 1 kit, Refills: 0    Associated Diagnoses: Type 2 diabetes mellitus with hyperglycemia, with long-term current use of insulin ([facility])      Continuous Blood Gluc Transmit (GUARDIAN LINK 3 TRANSMITTER) MISC Use with insulin pump  Qty: 1 each, Refills: 0    Associated Diagnoses: Type 2 diabetes mellitus with hyperglycemia, with long-term current use of insulin ([address]      ! ! Continuous Blood Gluc Sensor (GUARDIAN SENSOR 3) MISC Change every 7 days  Qty: 4 each, Refills: 5    Associated Diagnoses: Type 2 diabetes mellitus with hyperglycemia, with long-term current use of insulin ([facility])      promethazine (PHENERGAN) 25 MG tablet Take 1 tablet by mouth 3 times daily as needed for Nausea  Qty: 12 tablet, Refills: 0      pregabalin (LYRICA) 100 MG capsule 100 mg 3 times daily. buPROPion (WELLBUTRIN) 100 MG tablet Take 100 mg by mouth daily      omeprazole (PRILOSEC) 40 MG delayed release capsule Take 1 capsule by mouth every morning (before breakfast)  Qty: 30 capsule, Refills: 5      lisinopril (PRINIVIL;ZESTRIL) 20 MG tablet Take 2 tablets by mouth daily  Qty: 60 tablet, Refills: 0    Associated Diagnoses: Hypertension, unspecified type      Insulin Lispro (HUMALOG KWIKPEN SC) Inject 25 Units into the skin 3 times daily (before meals) Plus sliding scale      insulin glargine (LANTUS) 100 UNIT/ML injection vial Inject 50 Units into the skin 2 times daily      !! Continuous Blood Gluc Sensor (FREESTYLE NIMISHA 2 SENSOR) MISC USE TO TEST SUGAR CONTINUOUSLY AS DIRECTED  Qty: 2 each, Refills: 3    Associated Diagnoses: Uncontrolled type 2 diabetes mellitus with hypoglycemia, unspecified hypoglycemia coma status ([address]  Non-compliance      diclofenac sodium (VOLTAREN) 1 % GEL Apply 4 g topically 4 times daily for 7 days  Qty: 112 g, Refills: 0    Associated Diagnoses: Low back pain, unspecified back pain laterality, unspecified chronicity, unspecified whether sciatica present;  Medication refill      famotidine (PEPCID) 20 MG tablet TAKE 1 TABLET BY MOUTH TWICE DAILY  Qty: 60 tablet, Refills: 1    Comments: PT WILL NEED REFILLS FOR NEXT MONTH PLEASE      metFORMIN (GLUCOPHAGE-XR) 500 MG extended release tablet TAKE 2 TABLETS BY MOUTH 2 TIMES DAILY  Qty: 120 tablet, Refills: 1    Comments: PT WILL NEED REFILLS FOR NEXT MONTH PLEASE  Associated Diagnoses: Uncontrolled type 2 diabetes mellitus with hyperglycemia ([facility])      naproxen (NAPROSYN) 250 MG tablet TAKE 1 TABLET BY MOUTH TWICE A DAY WITH MEALS  Qty: 60 tablet, Refills: 5    Comments: PT NEEDS REFILLS ASAP  Associated Diagnoses: Pain of both hip joints      ondansetron (ZOFRAN-ODT) 4 MG disintegrating tablet Take 1 tablet by mouth 3 times daily as needed for Nausea or Vomiting  Qty: 12 tablet, Refills: 0      prochlorperazine (COMPAZINE) 10 MG tablet Take 1 tablet by mouth every 6 hours as needed (nausea)  Qty: 16 tablet, Refills: 0      levothyroxine (SYNTHROID) 88 MCG tablet TAKE 1 TABLET BY MOUTH DAILY  Qty: 30 tablet, Refills: 2    Comments: PT WILL NEED REFILLS FOR NEXT MONTH PLEASE  Associated Diagnoses: Hypothyroidism, unspecified type      ONETOUCH ULTRA strip USE TO TEST THREE TIMES A DAY  Qty: 100 each, Refills: 2    Comments: PT NEEDS REFILLS ASAP PLEASE      calcium elemental (OSCAL) 500 MG TABS tablet TAKE 1 TABLET BY MOUTH TWICE A DAY WITH LUNCH AND DINNER  Qty: 60 tablet, Refills: 5    Comments: PT NEEDS REFILLS ASAP PLEASE      lamoTRIgine (LAMICTAL) 100 MG tablet TAKE 1 TABLET BY MOUTH EVERY DAY  Qty: 30 tablet, Refills: 5    Comments: PT WILL NEED REFILLS FOR NEXT MONTH PLEASE      OZEMPIC, 1 MG/DOSE, 4 MG/3ML SOPN INJECT 1 MG UNDER THE SKIN ONCE WEEKLY  Qty: 3 mL, Refills: 1    Comments: PT WILL NEED REFILLS FOR NEXT MONTH PLEASE      Insulin Pen Needle 31G X 5 MM MISC 1 each by Does not apply route 3 times daily  Qty: 100 each, Refills: 3    Associated Diagnoses: Non-compliance      melatonin 3 MG TABS tablet TAKE ONE TABLET BY MOUTH EVERY NIGHT AT BEDTIME  Qty: 30 tablet, Refills: 0    Associated Diagnoses: Sleeping difficulty      hydrocortisone 2.5 % cream Apply topically 2 times daily. Qty: 45 g, Refills: 0    Associated Diagnoses: Skin rash      Blood Pressure KIT 1 box by Does not apply route 2 times daily  Qty: 1 kit, Refills: 0    Associated Diagnoses: Hypertension, unspecified type      hydroxychloroquine (PLAQUENIL) 200 MG tablet Take 200 mg by mouth 2 times daily      methotrexate (RHEUMATREX) 2.5 MG chemo tablet Take 2.5 mg by mouth once a week Sundays      folic acid (FOLVITE) 1 MG tablet Take 1 tablet by mouth daily  Qty: 30 tablet, Refills: 1    Associated Diagnoses: Rheumatoid arthritis, involving unspecified site, unspecified rheumatoid factor presence; High risk medication use      FREESTYLE LANCETS MISC CHECK BS 4 TIMES DAILY  Qty: 100 each, Refills: 3       !! - Potential duplicate medications found. Please discuss with provider. STOP taking these medications       cephALEXin (KEFLEX) 500 MG capsule Comments:   Reason for Stopping:         dapagliflozin (FARXIGA) 5 MG tablet Comments:   Reason for Stopping:         FARXIGA 5 MG tablet Comments:   Reason for Stopping:         JARDIANCE 25 MG tablet Comments:   Reason for Stopping:             Activity: activity as tolerated  Diet: regular diet  Wound Care: keep wound clean and dry    Follow-up with PCP in 6 weeks.     Armin Garg  5/20/2022  11:07 AM

## 2022-05-20 NOTE — CARE COORDINATION
Ss note:5/20/2022.2:24 PM dishcarge order noted. Pt does not have ride home, sw contacted [address] [place] [address] ([place] [id number]) [phone] per pt request. Insurance will find a provider to transport pt home today between 3:00 pm and 6:00 pm today, they will call the charge nurse at [phone] when transport has been arranged and staff will need to take pt down to main lobby with a mask. Confirmation Number [id number].  PRESLEY Kwon

## 2022-05-22 ENCOUNTER — APPOINTMENT (OUTPATIENT)
Dept: GENERAL RADIOLOGY | Age: 52
End: 2022-05-22
Payer: MEDICAID

## 2022-05-22 ENCOUNTER — HOSPITAL ENCOUNTER (EMERGENCY)
Age: 52
Discharge: PSYCHIATRIC OR SUBSTANCE USE FACILITY | End: 2022-05-23
Attending: EMERGENCY MEDICINE
Payer: MEDICAID

## 2022-05-22 DIAGNOSIS — R45.851 SUICIDAL IDEATION: ICD-10-CM

## 2022-05-22 DIAGNOSIS — R11.2 INTRACTABLE VOMITING WITH NAUSEA, UNSPECIFIED VOMITING TYPE: Primary | ICD-10-CM

## 2022-05-22 DIAGNOSIS — Z86.39 HISTORY OF DIABETIC GASTROPARESIS: ICD-10-CM

## 2022-05-22 LAB
ACETAMINOPHEN LEVEL: <5 MCG/ML (ref 10–30)
ALBUMIN SERPL-MCNC: 2.5 G/DL (ref 3.5–5.2)
ALP BLD-CCNC: 115 U/L (ref 35–104)
ALT SERPL-CCNC: 21 U/L (ref 0–32)
AMPHETAMINE SCREEN, URINE: NOT DETECTED
ANION GAP SERPL CALCULATED.3IONS-SCNC: 8 MMOL/L (ref 7–16)
AST SERPL-CCNC: 18 U/L (ref 0–31)
BACTERIA: ABNORMAL /HPF
BARBITURATE SCREEN URINE: NOT DETECTED
BASOPHILS ABSOLUTE: 0.09 E9/L (ref 0–0.2)
BASOPHILS RELATIVE PERCENT: 0.9 % (ref 0–2)
BENZODIAZEPINE SCREEN, URINE: NOT DETECTED
BETA-HYDROXYBUTYRATE: 0.07 MMOL/L (ref 0.02–0.27)
BILIRUB SERPL-MCNC: <0.2 MG/DL (ref 0–1.2)
BILIRUBIN URINE: NEGATIVE
BLOOD, URINE: ABNORMAL
BUN BLDV-MCNC: 13 MG/DL (ref 6–20)
CALCIUM SERPL-MCNC: 8.5 MG/DL (ref 8.6–10.2)
CANNABINOID SCREEN URINE: NOT DETECTED
CHLORIDE BLD-SCNC: 108 MMOL/L (ref 98–107)
CLARITY: CLEAR
CO2: 26 MMOL/L (ref 22–29)
COCAINE METABOLITE SCREEN URINE: NOT DETECTED
COLOR: YELLOW
CREAT SERPL-MCNC: 0.9 MG/DL (ref 0.5–1)
EOSINOPHILS ABSOLUTE: 0.09 E9/L (ref 0.05–0.5)
EOSINOPHILS RELATIVE PERCENT: 0.9 % (ref 0–6)
EPITHELIAL CELLS, UA: ABNORMAL /HPF
ETHANOL: <10 MG/DL (ref 0–0.08)
FENTANYL SCREEN, URINE: NOT DETECTED
GFR AFRICAN AMERICAN: >60
GFR NON-AFRICAN AMERICAN: >60 ML/MIN/1.73
GLUCOSE BLD-MCNC: 152 MG/DL
GLUCOSE BLD-MCNC: 181 MG/DL (ref 74–99)
GLUCOSE URINE: 100 MG/DL
HCG, URINE, POC: NEGATIVE
HCT VFR BLD CALC: 31.3 % (ref 34–48)
HEMOGLOBIN: 9.6 G/DL (ref 11.5–15.5)
HYALINE CASTS: ABNORMAL /LPF (ref 0–2)
IMMATURE GRANULOCYTES #: 0.13 E9/L
IMMATURE GRANULOCYTES %: 1.3 % (ref 0–5)
INFLUENZA A BY PCR: NOT DETECTED
INFLUENZA B BY PCR: NOT DETECTED
KETONES, URINE: NEGATIVE MG/DL
LACTIC ACID: 1.8 MMOL/L (ref 0.5–2.2)
LEUKOCYTE ESTERASE, URINE: ABNORMAL
LIPASE: 53 U/L (ref 13–60)
LYMPHOCYTES ABSOLUTE: 2.48 E9/L (ref 1.5–4)
LYMPHOCYTES RELATIVE PERCENT: 23.8 % (ref 20–42)
Lab: NORMAL
Lab: NORMAL
MAGNESIUM: 1.9 MG/DL (ref 1.6–2.6)
MCH RBC QN AUTO: 24.1 PG (ref 26–35)
MCHC RBC AUTO-ENTMCNC: 30.7 % (ref 32–34.5)
MCV RBC AUTO: 78.4 FL (ref 80–99.9)
METER GLUCOSE: 153 MG/DL (ref 74–99)
METHADONE SCREEN, URINE: NOT DETECTED
MONOCYTES ABSOLUTE: 0.67 E9/L (ref 0.1–0.95)
MONOCYTES RELATIVE PERCENT: 6.4 % (ref 2–12)
NEGATIVE QC PASS/FAIL: NORMAL
NEUTROPHILS ABSOLUTE: 6.94 E9/L (ref 1.8–7.3)
NEUTROPHILS RELATIVE PERCENT: 66.7 % (ref 43–80)
NITRITE, URINE: NEGATIVE
OPIATE SCREEN URINE: NOT DETECTED
OXYCODONE URINE: NOT DETECTED
PDW BLD-RTO: 14.8 FL (ref 11.5–15)
PH UA: 8 (ref 5–9)
PH VENOUS: 7.36 (ref 7.35–7.45)
PHENCYCLIDINE SCREEN URINE: NOT DETECTED
PLATELET # BLD: 251 E9/L (ref 130–450)
PMV BLD AUTO: 9.9 FL (ref 7–12)
POSITIVE QC PASS/FAIL: NORMAL
POTASSIUM SERPL-SCNC: 3.3 MMOL/L (ref 3.5–5)
PROTEIN UA: 100 MG/DL
RBC # BLD: 3.99 E12/L (ref 3.5–5.5)
RBC UA: ABNORMAL /HPF (ref 0–2)
SALICYLATE, SERUM: <0.3 MG/DL (ref 0–30)
SARS-COV-2, NAAT: NOT DETECTED
SODIUM BLD-SCNC: 142 MMOL/L (ref 132–146)
SPECIFIC GRAVITY UA: 1.02 (ref 1–1.03)
TOTAL PROTEIN: 5.5 G/DL (ref 6.4–8.3)
TRICYCLIC ANTIDEPRESSANTS SCREEN SERUM: NEGATIVE NG/ML
TROPONIN, HIGH SENSITIVITY: 40 NG/L (ref 0–9)
TROPONIN, HIGH SENSITIVITY: 47 NG/L (ref 0–9)
UROBILINOGEN, URINE: 0.2 E.U./DL
WBC # BLD: 10.4 E9/L (ref 4.5–11.5)
WBC UA: ABNORMAL /HPF (ref 0–5)

## 2022-05-22 PROCEDURE — 80307 DRUG TEST PRSMV CHEM ANLYZR: CPT

## 2022-05-22 PROCEDURE — 87502 INFLUENZA DNA AMP PROBE: CPT

## 2022-05-22 PROCEDURE — 93005 ELECTROCARDIOGRAM TRACING: CPT | Performed by: STUDENT IN AN ORGANIZED HEALTH CARE EDUCATION/TRAINING PROGRAM

## 2022-05-22 PROCEDURE — 80053 COMPREHEN METABOLIC PANEL: CPT

## 2022-05-22 PROCEDURE — 87635 SARS-COV-2 COVID-19 AMP PRB: CPT

## 2022-05-22 PROCEDURE — 83690 ASSAY OF LIPASE: CPT

## 2022-05-22 PROCEDURE — 82077 ASSAY SPEC XCP UR&BREATH IA: CPT

## 2022-05-22 PROCEDURE — 6360000002 HC RX W HCPCS: Performed by: STUDENT IN AN ORGANIZED HEALTH CARE EDUCATION/TRAINING PROGRAM

## 2022-05-22 PROCEDURE — 96374 THER/PROPH/DIAG INJ IV PUSH: CPT

## 2022-05-22 PROCEDURE — 83735 ASSAY OF MAGNESIUM: CPT

## 2022-05-22 PROCEDURE — 2580000003 HC RX 258: Performed by: STUDENT IN AN ORGANIZED HEALTH CARE EDUCATION/TRAINING PROGRAM

## 2022-05-22 PROCEDURE — 82800 BLOOD PH: CPT

## 2022-05-22 PROCEDURE — 99285 EMERGENCY DEPT VISIT HI MDM: CPT

## 2022-05-22 PROCEDURE — 96376 TX/PRO/DX INJ SAME DRUG ADON: CPT

## 2022-05-22 PROCEDURE — 82010 KETONE BODYS QUAN: CPT

## 2022-05-22 PROCEDURE — 80179 DRUG ASSAY SALICYLATE: CPT

## 2022-05-22 PROCEDURE — 85025 COMPLETE CBC W/AUTO DIFF WBC: CPT

## 2022-05-22 PROCEDURE — 81001 URINALYSIS AUTO W/SCOPE: CPT

## 2022-05-22 PROCEDURE — 82962 GLUCOSE BLOOD TEST: CPT

## 2022-05-22 PROCEDURE — 80143 DRUG ASSAY ACETAMINOPHEN: CPT

## 2022-05-22 PROCEDURE — 83605 ASSAY OF LACTIC ACID: CPT

## 2022-05-22 PROCEDURE — 71045 X-RAY EXAM CHEST 1 VIEW: CPT

## 2022-05-22 PROCEDURE — 6370000000 HC RX 637 (ALT 250 FOR IP): Performed by: STUDENT IN AN ORGANIZED HEALTH CARE EDUCATION/TRAINING PROGRAM

## 2022-05-22 PROCEDURE — 84484 ASSAY OF TROPONIN QUANT: CPT

## 2022-05-22 RX ORDER — ONDANSETRON 2 MG/ML
4 INJECTION INTRAMUSCULAR; INTRAVENOUS ONCE
Status: DISCONTINUED | OUTPATIENT
Start: 2022-05-22 | End: 2022-05-23 | Stop reason: HOSPADM

## 2022-05-22 RX ORDER — 0.9 % SODIUM CHLORIDE 0.9 %
1000 INTRAVENOUS SOLUTION INTRAVENOUS ONCE
Status: COMPLETED | OUTPATIENT
Start: 2022-05-22 | End: 2022-05-22

## 2022-05-22 RX ORDER — ONDANSETRON 2 MG/ML
4 INJECTION INTRAMUSCULAR; INTRAVENOUS ONCE
Status: COMPLETED | OUTPATIENT
Start: 2022-05-22 | End: 2022-05-22

## 2022-05-22 RX ORDER — ONDANSETRON 4 MG/1
4 TABLET, ORALLY DISINTEGRATING ORAL EVERY 8 HOURS PRN
Qty: 6 TABLET | Refills: 0 | Status: ON HOLD | OUTPATIENT
Start: 2022-05-22 | End: 2022-05-31 | Stop reason: HOSPADM

## 2022-05-22 RX ADMIN — ONDANSETRON 4 MG: 2 INJECTION INTRAMUSCULAR; INTRAVENOUS at 16:55

## 2022-05-22 RX ADMIN — SODIUM CHLORIDE 1000 ML: 9 INJECTION, SOLUTION INTRAVENOUS at 16:04

## 2022-05-22 RX ADMIN — POTASSIUM BICARBONATE 40 MEQ: 782 TABLET, EFFERVESCENT ORAL at 16:55

## 2022-05-22 ASSESSMENT — ENCOUNTER SYMPTOMS
WHEEZING: 0
COUGH: 0
STRIDOR: 0
BACK PAIN: 0
SHORTNESS OF BREATH: 0
ABDOMINAL DISTENTION: 0
COLOR CHANGE: 0
ABDOMINAL PAIN: 0
CONSTIPATION: 0
DIARRHEA: 0
NAUSEA: 0
VOMITING: 1

## 2022-05-22 NOTE — ED NOTES
Pt able to keep crackers and oral fluids down without emesis, denies abdominal pain     Damian Gagnon RN  05/22/22 [id number]

## 2022-05-22 NOTE — ED PROVIDER NOTES
[facility] ENCOUNTER      Pt Name: Barbara Gómez  MRN: [id number]  [place] 1970  Date of evaluation: 5/22/2022      CHIEF COMPLAINT       Chief Complaint   Patient presents with    Emesis     admitted to [address] [place] [address] Monday, discharged Friday when she was able to keep food down, now unable to eat or drink        HPI  Barbara Gómez is a 47 y.o. female history of insulin-dependent diabetes, gastroparesis presents with complaints of emesis. Patient was recently admitted to [facility] and discharged on 5/20/2022 for gastroparesis. Prior discharge patient was able to eat and drink normally. States that yesterdayto  patient has been unable to eat. She had 1 bout of emesis yesterday. States that she had another 4 bouts today. States that there was no blood in her vomitus. No alleviating exacerbating factors. Patient not take any medications or measures alleviate symptoms. She is denying any recent fevers, chills, nausea, chest pain, shortness of breath, abdominal pain, flank pain, dysuria, hematuria, diarrhea, constipation, new rashes or sores. Except as noted above the remainder of the review of systems was reviewed and negative. Review of Systems   Constitutional: Negative for activity change, appetite change, diaphoresis, fatigue, fever and unexpected weight change. HENT: Negative for congestion. Eyes: Negative for visual disturbance. Respiratory: Negative for cough, shortness of breath, wheezing and stridor. Cardiovascular: Negative for chest pain, palpitations and leg swelling. Gastrointestinal: Positive for vomiting. Negative for abdominal distention, abdominal pain, constipation, diarrhea and nausea. Endocrine: Negative for polyphagia and polyuria. Genitourinary: Negative for dysuria and hematuria. Musculoskeletal: Negative for back pain.    Skin: Negative for color change, pallor, rash and wound.   Neurological: Negative for dizziness and syncope. Hematological: Negative for adenopathy. Does not bruise/bleed easily. Psychiatric/Behavioral: Negative for agitation. Physical Exam  Vitals and nursing note reviewed. Constitutional:       General: She is not in acute distress. Appearance: Normal appearance. She is not ill-appearing or diaphoretic. HENT:      Head: Normocephalic and atraumatic. Nose: Nose normal.   Eyes:      Extraocular Movements: Extraocular movements intact. Pupils: Pupils are equal, round, and reactive to light. Cardiovascular:      Rate and Rhythm: Normal rate and regular rhythm. Pulses: Normal pulses. Heart sounds: Normal heart sounds. Pulmonary:      Effort: Pulmonary effort is normal.      Breath sounds: Normal breath sounds. Abdominal:      General: Bowel sounds are normal.      Palpations: Abdomen is soft. Tenderness: There is abdominal tenderness. There is no right CVA tenderness, left CVA tenderness or rebound. Negative signs include Arriola's sign, Rovsing's sign and McBurney's sign. Musculoskeletal:         General: Normal range of motion. Cervical back: Normal range of motion. Skin:     General: Skin is warm and dry. Capillary Refill: Capillary refill takes less than 2 seconds. Neurological:      General: No focal deficit present. Mental Status: She is alert and oriented to person, place, and time. Psychiatric:         Mood and Affect: Mood normal.          Procedures     MDM  Number of Diagnoses or Management Options  History of diabetic gastroparesis  Intractable vomiting with nausea, unspecified vomiting type  Suicidal ideation  Diagnosis management comments: 71-year-old female history of gastroparesis, diabetes on insulin presents with complaints of emesis. Patient states that she has not been able to eat or drink anything since being discharged from [place] on Friday, 5/20/2022.   Vitals here within normal limits. On physical exam patient is in no acute distress, speaking full sentence, alert and oriented x3. Ab soft nontender no rebound or guarding. Diagnostic labs and imaging troponin reviewed. Initial concern was patient was in DKA. Labs unremarkable. Patient is not in DKA. She does have hypokalemia. Patient was able. Tolerate p.o. challenge here in the department. I was about to discharge the patient back home. Patient then started crying. States that she had suicidal ideation. No plan. States that she has had suicidal ideation since she was 6years old. She has that she has homicidal ideation to her mother. She does not have access to any firearms. And states that she has not had any previous suicide attempts. Patient has been pink slipped here in the department. She requires psychiatric evaluation prior to disposition. Patient on repeat evaluations has been able to eat without difficulty. Patient's labs have resulted. She is medically clear for psychiatric evaluation. Amount and/or Complexity of Data Reviewed  Decide to obtain previous medical records or to obtain history from someone other than the patient: yes           Patient is awake alert, hemodynamic stable, afebrile and in no respiratory distress. Discussed with patient plan for close outpatient follow-up with the patient's PCP as well as return precautions and the patient understands and agrees to the plan. Patient was seen with attending. ED Course as of 05/22/22 2008   Sun May 22, 2022   1742 Patient reevaluated bedside. No acute distress. She is drinking her effervescent calcium currently. She has not had any bout of emesis here in the department. She will be given more to drink here and evaluated. [JV]   [id number] Patient reevaluated bedside. She is drinking a diet Pepsi and crackers. She has not had a bout of emesis. Patient will be observed in the ER for another 10 minutes.   She will be discharged home. [JV]   0883 Patient was to be discharged as she is tolerating p.o. challenge. Patient started crying in the room. States that she did not eat on Saturday as she had no food in her refrigerator. States that she is unable to travel as she has no ride. States that she has no friends or family around her to help her. States that she has to take the bus everywhere. States that she has no access to her medications as she cannot go to a pharmacy due to transport issues. Patient then stated that she has suicidal ideation. No plan. States that she wants to [place] her mother. \"  States that she has not seen her mother in 6 years. She has no access to weapons. She has had no previous suicide attempts per her. Patient will be monitored. And social work has been consulted. Patient has been pink slipped. [JV]   2035 Patient on reevaluation is now eating sandwiches in the room. She is in no distress. [JV]   1933 On reevaluation. Patient is no acute distress. She is tolerating all her food. States that she has had suicidal ideation since she was 6years old. Still has no plan. Patient to be seen by social work and behavioral health for disposition to psychiatric facility. [JV]      ED Course User Index  [JV] Ericka Horowitz MD         --------------------------------------------- PAST HISTORY ---------------------------------------------  Past Medical History:  has a past medical history of Anxiety, Arthritis, Depression, Diabetes mellitus ([facility] Fibromyalgia, HTN (hypertension), Hypertensive chronic kidney disease, Left shoulder pain, LIZABETH (obstructive sleep apnea), Rheumatoid arthritis involving multiple sites ([facility] Sjogren's disease ([facility] and Thyroid disease. Past Surgical History:  has a past surgical history that includes Shoulder arthroscopy (Right, 06/16/2017); CT BIOPSY RENAL (11/18/2021); and Upper gastrointestinal endoscopy (N/A, 5/6/2022).     Social History:  reports that she quit smoking about 24 years ago. She started smoking about 25 years ago. She has a 1.00 pack-year smoking history. She has never used smokeless tobacco. She reports that she does not drink alcohol and does not use drugs. Family History: She was adopted. Family history is unknown by patient. The patients home medications have been reviewed.     Allergies: Amoxicillin, Sulfa antibiotics, Cefdinir, Doxycycline, Other, Milk-related compounds, and Tomato    -------------------------------------------------- RESULTS -------------------------------------------------  Labs:  Results for orders placed or performed during the hospital encounter of 05/22/22   COVID-19, Rapid    Specimen: Nasopharyngeal Swab   Result Value Ref Range    SARS-CoV-2, NAAT Not Detected Not Detected   Rapid influenza A/B antigens    Specimen: Nares   Result Value Ref Range    Influenza A by PCR Not Detected Not Detected    Influenza B by PCR Not Detected Not Detected   CBC with Auto Differential   Result Value Ref Range    WBC 10.4 4.5 - 11.5 E9/L    RBC 3.99 3.50 - 5.50 E12/L    Hemoglobin 9.6 (L) 11.5 - 15.5 g/dL    Hematocrit 31.3 (L) 34.0 - 48.0 %    MCV 78.4 (L) 80.0 - 99.9 fL    MCH 24.1 (L) 26.0 - 35.0 pg    MCHC 30.7 (L) 32.0 - 34.5 %    RDW 14.8 11.5 - 15.0 fL    Platelets 741 071 - 037 E9/L    MPV 9.9 7.0 - 12.0 fL    Neutrophils % 66.7 43.0 - 80.0 %    Immature Granulocytes % 1.3 0.0 - 5.0 %    Lymphocytes % 23.8 20.0 - 42.0 %    Monocytes % 6.4 2.0 - 12.0 %    Eosinophils % 0.9 0.0 - 6.0 %    Basophils % 0.9 0.0 - 2.0 %    Neutrophils Absolute 6.94 1.80 - 7.30 E9/L    Immature Granulocytes # 0.13 E9/L    Lymphocytes Absolute 2.48 1.50 - 4.00 E9/L    Monocytes Absolute 0.67 0.10 - 0.95 E9/L    Eosinophils Absolute 0.09 0.05 - 0.50 E9/L    Basophils Absolute 0.09 0.00 - 0.20 E9/L   Comprehensive Metabolic Panel   Result Value Ref Range    Sodium 142 132 - 146 mmol/L    Potassium 3.3 (L) 3.5 - 5.0 mmol/L    Chloride 108 (H) 98 - 107 mmol/L    CO2 26 22 - 29 mmol/L    Anion Gap 8 7 - 16 mmol/L    Glucose 181 (H) 74 - 99 mg/dL    BUN 13 6 - 20 mg/dL    CREATININE 0.9 0.5 - 1.0 mg/dL    GFR Non-African American >60 >=60 mL/min/1.73    GFR African American >60     Calcium 8.5 (L) 8.6 - 10.2 mg/dL    Total Protein 5.5 (L) 6.4 - 8.3 g/dL    Albumin 2.5 (L) 3.5 - 5.2 g/dL    Total Bilirubin <0.2 0.0 - 1.2 mg/dL    Alkaline Phosphatase 115 (H) 35 - 104 U/L    ALT 21 0 - 32 U/L    AST 18 0 - 31 U/L   Magnesium   Result Value Ref Range    Magnesium 1.9 1.6 - 2.6 mg/dL   Troponin   Result Value Ref Range    Troponin, High Sensitivity 47 (H) 0 - 9 ng/L   Lipase   Result Value Ref Range    Lipase 53 13 - 60 U/L   Lactic Acid   Result Value Ref Range    Lactic Acid 1.8 0.5 - 2.2 mmol/L   pH, venous   Result Value Ref Range    pH, Harris 7.36 7.35 - 7.45   Beta-Hydroxybutyrate   Result Value Ref Range    Beta-Hydroxybutyrate 0.07 0.02 - 0.27 mmol/L   Urinalysis with Microscopic   Result Value Ref Range    Color, UA Yellow Straw/Yellow    Clarity, UA Clear Clear    Glucose, Ur 100 (A) Negative mg/dL    Bilirubin Urine Negative Negative    Ketones, Urine Negative Negative mg/dL    Specific Gravity, UA 1.025 1.005 - 1.030    Blood, Urine SMALL (A) Negative    pH, UA 8.0 5.0 - 9.0    Protein,  (A) Negative mg/dL    Urobilinogen, Urine 0.2 <2.0 E.U./dL    Nitrite, Urine Negative Negative    Leukocyte Esterase, Urine TRACE (A) Negative   Troponin   Result Value Ref Range    Troponin, High Sensitivity 40 (H) 0 - 9 ng/L   Urine Drug Screen   Result Value Ref Range    Amphetamine Screen, Urine NOT DETECTED Negative <1000 ng/mL    Barbiturate Screen, Ur NOT DETECTED Negative < 200 ng/mL    Benzodiazepine Screen, Urine NOT DETECTED Negative < 200 ng/mL    Cannabinoid Scrn, Ur NOT DETECTED Negative < 50ng/mL    Cocaine Metabolite Screen, Urine NOT DETECTED Negative < 300 ng/mL    Opiate Scrn, Ur NOT DETECTED Negative < 300ng/mL    PCP Screen, Urine NOT DETECTED Negative < 25 ng/mL    Methadone Screen, Urine NOT DETECTED Negative <300 ng/mL    Oxycodone Urine NOT DETECTED Negative <100 ng/mL    FENTANYL SCREEN, URINE NOT DETECTED Negative <1 ng/mL    Drug Screen Comment: see below    Serum Drug Screen   Result Value Ref Range    Ethanol Lvl <10 mg/dL    Acetaminophen Level <5.0 (L) 10.0 - 97.4 mcg/mL    Salicylate, Serum <3.7 0.0 - 30.0 mg/dL   POC Pregnancy Urine Qual   Result Value Ref Range    HCG, Urine, POC Negative Negative    Lot Number [id number]     Positive QC Pass/Fail Pass     Negative QC Pass/Fail Pass    EKG 12 Lead   Result Value Ref Range    Ventricular Rate 98 BPM    Atrial Rate 98 BPM    P-R Interval 152 ms    QRS Duration 82 ms    Q-T Interval 360 ms    QTc Calculation (Bazett) 459 ms    P Axis 32 degrees    R Axis -16 degrees    T Axis 13 degrees       ECG:  EKG read inter by me. Heart rate 100. Normal sinus rhythm. Left axis deviation. QTc 460. No ST elevations or depressions. Stable as compared to previous EKG. Radiology:  XR CHEST PORTABLE   Final Result   No pneumonia or pleural effusion.             ------------------------- NURSING NOTES AND VITALS REVIEWED ---------------------------  Date / Time Roomed:  5/22/2022  3:18 PM  ED Bed Assignment:  07/07    The nursing notes within the ED encounter and vital signs as below have been reviewed. BP (!) 150/86   Pulse 82   Temp 97.4 °F (36.3 °C) (Oral)   Resp 16   Ht 5' 1\" (1.549 m)   Wt 147 lb (66.7 kg)   LMP  (LMP Unknown)   SpO2 100%   BMI 27.78 kg/m²   Oxygen Saturation Interpretation: normal      The plan has been discussed in detail and they are aware of the specific conditions for emergent return, as well as the importance of follow-up. New Prescriptions    ONDANSETRON (ZOFRAN ODT) 4 MG DISINTEGRATING TABLET    Take 1 tablet by mouth every 8 hours as needed for Nausea or Vomiting       Diagnosis:  1. Intractable vomiting with nausea, unspecified vomiting type    2.  History of diabetic gastroparesis    3. Suicidal ideation        Disposition:  Patient's disposition: Pending psychiatric evaluation. Patient is medically clear. Patient's condition is stable.       Dmitri Coates MD  Resident  05/22/22 1939       Dmitri Coates MD  Resident  05/22/22 1940       Dmitri Coates MD  Resident  05/22/22 2008

## 2022-05-23 ENCOUNTER — HOSPITAL ENCOUNTER (INPATIENT)
Age: 52
LOS: 9 days | Discharge: HOME | DRG: 751 | End: 2022-06-01
Attending: PSYCHIATRY & NEUROLOGY | Admitting: PSYCHIATRY & NEUROLOGY
Payer: MEDICAID

## 2022-05-23 VITALS
SYSTOLIC BLOOD PRESSURE: 158 MMHG | DIASTOLIC BLOOD PRESSURE: 82 MMHG | HEIGHT: 61 IN | OXYGEN SATURATION: 97 % | WEIGHT: 147 LBS | TEMPERATURE: 97.8 F | HEART RATE: 88 BPM | BODY MASS INDEX: 27.75 KG/M2 | RESPIRATION RATE: 16 BRPM

## 2022-05-23 DIAGNOSIS — E03.9 HYPOTHYROIDISM, UNSPECIFIED TYPE: ICD-10-CM

## 2022-05-23 DIAGNOSIS — E11.65 UNCONTROLLED TYPE 2 DIABETES MELLITUS WITH HYPERGLYCEMIA (HCC): ICD-10-CM

## 2022-05-23 PROBLEM — R45.851 DEPRESSION WITH SUICIDAL IDEATION: Status: ACTIVE | Noted: 2022-05-23

## 2022-05-23 PROBLEM — F32.A DEPRESSION WITH SUICIDAL IDEATION: Status: ACTIVE | Noted: 2022-05-23

## 2022-05-23 PROBLEM — R45.851 SUICIDAL IDEATIONS: Status: ACTIVE | Noted: 2022-05-23

## 2022-05-23 PROBLEM — E78.49 OTHER HYPERLIPIDEMIA: Status: ACTIVE | Noted: 2021-03-08

## 2022-05-23 LAB
ANION GAP SERPL CALCULATED.3IONS-SCNC: 11 MMOL/L (ref 7–16)
BUN BLDV-MCNC: 8 MG/DL (ref 6–20)
CALCIUM SERPL-MCNC: 8.4 MG/DL (ref 8.6–10.2)
CHLORIDE BLD-SCNC: 109 MMOL/L (ref 98–107)
CO2: 23 MMOL/L (ref 22–29)
CREAT SERPL-MCNC: 0.8 MG/DL (ref 0.5–1)
EKG ATRIAL RATE: 98 BPM
EKG P AXIS: 32 DEGREES
EKG P-R INTERVAL: 152 MS
EKG Q-T INTERVAL: 360 MS
EKG QRS DURATION: 82 MS
EKG QTC CALCULATION (BAZETT): 459 MS
EKG R AXIS: -16 DEGREES
EKG T AXIS: 13 DEGREES
EKG VENTRICULAR RATE: 98 BPM
GFR AFRICAN AMERICAN: >60
GFR NON-AFRICAN AMERICAN: >60 ML/MIN/1.73
GLUCOSE BLD-MCNC: 110 MG/DL (ref 74–99)
METER GLUCOSE: 140 MG/DL (ref 74–99)
METER GLUCOSE: 65 MG/DL (ref 74–99)
METER GLUCOSE: 66 MG/DL (ref 74–99)
METER GLUCOSE: 70 MG/DL (ref 74–99)
METER GLUCOSE: 80 MG/DL (ref 74–99)
POTASSIUM SERPL-SCNC: 3.4 MMOL/L (ref 3.5–5)
SODIUM BLD-SCNC: 143 MMOL/L (ref 132–146)
TROPONIN, HIGH SENSITIVITY: 43 NG/L (ref 0–9)
TROPONIN, HIGH SENSITIVITY: 43 NG/L (ref 0–9)
TROPONIN, HIGH SENSITIVITY: 66 NG/L (ref 0–9)

## 2022-05-23 PROCEDURE — 80061 LIPID PANEL: CPT

## 2022-05-23 PROCEDURE — 84484 ASSAY OF TROPONIN QUANT: CPT

## 2022-05-23 PROCEDURE — 99254 IP/OBS CNSLTJ NEW/EST MOD 60: CPT | Performed by: INTERNAL MEDICINE

## 2022-05-23 PROCEDURE — 80048 BASIC METABOLIC PNL TOTAL CA: CPT

## 2022-05-23 PROCEDURE — 82962 GLUCOSE BLOOD TEST: CPT

## 2022-05-23 PROCEDURE — 2580000003 HC RX 258: Performed by: EMERGENCY MEDICINE

## 2022-05-23 PROCEDURE — 6360000002 HC RX W HCPCS: Performed by: EMERGENCY MEDICINE

## 2022-05-23 PROCEDURE — 36415 COLL VENOUS BLD VENIPUNCTURE: CPT

## 2022-05-23 PROCEDURE — 6370000000 HC RX 637 (ALT 250 FOR IP): Performed by: STUDENT IN AN ORGANIZED HEALTH CARE EDUCATION/TRAINING PROGRAM

## 2022-05-23 PROCEDURE — 1240000000 HC EMOTIONAL WELLNESS R&B

## 2022-05-23 PROCEDURE — APPSS60 APP SPLIT SHARED TIME 46-60 MINUTES

## 2022-05-23 RX ORDER — LISINOPRIL 20 MG/1
20 TABLET ORAL DAILY
Status: DISCONTINUED | OUTPATIENT
Start: 2022-05-23 | End: 2022-05-29

## 2022-05-23 RX ORDER — LANOLIN ALCOHOL/MO/W.PET/CERES
3 CREAM (GRAM) TOPICAL NIGHTLY
Status: DISCONTINUED | OUTPATIENT
Start: 2022-05-23 | End: 2022-05-23 | Stop reason: HOSPADM

## 2022-05-23 RX ORDER — HYDROXYZINE PAMOATE 50 MG/1
50 CAPSULE ORAL 3 TIMES DAILY PRN
Status: DISCONTINUED | OUTPATIENT
Start: 2022-05-23 | End: 2022-06-01 | Stop reason: HOSPADM

## 2022-05-23 RX ORDER — INSULIN GLARGINE-YFGN 100 [IU]/ML
25 INJECTION, SOLUTION SUBCUTANEOUS NIGHTLY
Status: DISCONTINUED | OUTPATIENT
Start: 2022-05-23 | End: 2022-05-27

## 2022-05-23 RX ORDER — MAGNESIUM HYDROXIDE/ALUMINUM HYDROXICE/SIMETHICONE 120; 1200; 1200 MG/30ML; MG/30ML; MG/30ML
30 SUSPENSION ORAL PRN
Status: DISCONTINUED | OUTPATIENT
Start: 2022-05-23 | End: 2022-06-01 | Stop reason: HOSPADM

## 2022-05-23 RX ORDER — LEVOTHYROXINE SODIUM 88 UG/1
88 TABLET ORAL DAILY
Status: DISCONTINUED | OUTPATIENT
Start: 2022-05-24 | End: 2022-06-01 | Stop reason: HOSPADM

## 2022-05-23 RX ORDER — PREGABALIN 50 MG/1
100 CAPSULE ORAL 3 TIMES DAILY
Status: DISCONTINUED | OUTPATIENT
Start: 2022-05-23 | End: 2022-06-01 | Stop reason: HOSPADM

## 2022-05-23 RX ORDER — NICOTINE 21 MG/24HR
1 PATCH, TRANSDERMAL 24 HOURS TRANSDERMAL DAILY
Status: DISCONTINUED | OUTPATIENT
Start: 2022-05-23 | End: 2022-05-23 | Stop reason: HOSPADM

## 2022-05-23 RX ORDER — NICOTINE 21 MG/24HR
1 PATCH, TRANSDERMAL 24 HOURS TRANSDERMAL DAILY
Status: DISCONTINUED | OUTPATIENT
Start: 2022-05-23 | End: 2022-06-01 | Stop reason: HOSPADM

## 2022-05-23 RX ORDER — ONDANSETRON 4 MG/1
4 TABLET, ORALLY DISINTEGRATING ORAL EVERY 8 HOURS PRN
Status: DISCONTINUED | OUTPATIENT
Start: 2022-05-23 | End: 2022-06-01 | Stop reason: HOSPADM

## 2022-05-23 RX ORDER — ACETAMINOPHEN 325 MG/1
650 TABLET ORAL EVERY 6 HOURS PRN
Status: DISCONTINUED | OUTPATIENT
Start: 2022-05-23 | End: 2022-05-23 | Stop reason: HOSPADM

## 2022-05-23 RX ORDER — ONDANSETRON 2 MG/ML
4 INJECTION INTRAMUSCULAR; INTRAVENOUS ONCE
Status: COMPLETED | OUTPATIENT
Start: 2022-05-23 | End: 2022-05-23

## 2022-05-23 RX ORDER — DEXTROSE MONOHYDRATE 25 G/50ML
12.5 INJECTION, SOLUTION INTRAVENOUS PRN
Status: DISCONTINUED | OUTPATIENT
Start: 2022-05-23 | End: 2022-06-01 | Stop reason: HOSPADM

## 2022-05-23 RX ORDER — FOLIC ACID 1 MG/1
1 TABLET ORAL DAILY
Status: DISCONTINUED | OUTPATIENT
Start: 2022-05-24 | End: 2022-06-01 | Stop reason: HOSPADM

## 2022-05-23 RX ORDER — LANOLIN ALCOHOL/MO/W.PET/CERES
3 CREAM (GRAM) TOPICAL NIGHTLY PRN
Status: DISCONTINUED | OUTPATIENT
Start: 2022-05-24 | End: 2022-05-23

## 2022-05-23 RX ORDER — MAGNESIUM HYDROXIDE/ALUMINUM HYDROXICE/SIMETHICONE 120; 1200; 1200 MG/30ML; MG/30ML; MG/30ML
30 SUSPENSION ORAL PRN
Status: DISCONTINUED | OUTPATIENT
Start: 2022-05-23 | End: 2022-05-23 | Stop reason: HOSPADM

## 2022-05-23 RX ORDER — LANOLIN ALCOHOL/MO/W.PET/CERES
3 CREAM (GRAM) TOPICAL NIGHTLY
Status: DISCONTINUED | OUTPATIENT
Start: 2022-05-23 | End: 2022-06-01 | Stop reason: HOSPADM

## 2022-05-23 RX ORDER — 0.9 % SODIUM CHLORIDE 0.9 %
1000 INTRAVENOUS SOLUTION INTRAVENOUS ONCE
Status: COMPLETED | OUTPATIENT
Start: 2022-05-23 | End: 2022-05-23

## 2022-05-23 RX ORDER — DEXTROSE MONOHYDRATE 50 MG/ML
100 INJECTION, SOLUTION INTRAVENOUS PRN
Status: DISCONTINUED | OUTPATIENT
Start: 2022-05-23 | End: 2022-06-01 | Stop reason: HOSPADM

## 2022-05-23 RX ORDER — FAMOTIDINE 20 MG/1
20 TABLET, FILM COATED ORAL 2 TIMES DAILY
Status: DISCONTINUED | OUTPATIENT
Start: 2022-05-23 | End: 2022-06-01 | Stop reason: HOSPADM

## 2022-05-23 RX ORDER — METFORMIN HYDROCHLORIDE 500 MG/1
1000 TABLET, EXTENDED RELEASE ORAL 2 TIMES DAILY
Status: DISCONTINUED | OUTPATIENT
Start: 2022-05-23 | End: 2022-06-01 | Stop reason: HOSPADM

## 2022-05-23 RX ORDER — ACETAMINOPHEN 325 MG/1
650 TABLET ORAL EVERY 4 HOURS PRN
Status: DISCONTINUED | OUTPATIENT
Start: 2022-05-23 | End: 2022-06-01 | Stop reason: HOSPADM

## 2022-05-23 RX ORDER — HYDROXYZINE HYDROCHLORIDE 10 MG/1
50 TABLET, FILM COATED ORAL 3 TIMES DAILY PRN
Status: DISCONTINUED | OUTPATIENT
Start: 2022-05-23 | End: 2022-05-23 | Stop reason: HOSPADM

## 2022-05-23 RX ADMIN — ONDANSETRON 4 MG: 2 INJECTION INTRAMUSCULAR; INTRAVENOUS at 07:58

## 2022-05-23 RX ADMIN — ONDANSETRON 4 MG: 4 TABLET, ORALLY DISINTEGRATING ORAL at 22:05

## 2022-05-23 RX ADMIN — SODIUM CHLORIDE 1000 ML: 9 INJECTION, SOLUTION INTRAVENOUS at 08:03

## 2022-05-23 RX ADMIN — POTASSIUM BICARBONATE 40 MEQ: 782 TABLET, EFFERVESCENT ORAL at 13:31

## 2022-05-23 RX ADMIN — PREGABALIN 100 MG: 50 CAPSULE ORAL at 22:04

## 2022-05-23 ASSESSMENT — PAIN SCALES - GENERAL: PAINLEVEL_OUTOF10: 0

## 2022-05-23 ASSESSMENT — SLEEP AND FATIGUE QUESTIONNAIRES
DO YOU USE A SLEEP AID: YES
AVERAGE NUMBER OF SLEEP HOURS: 12
DO YOU HAVE DIFFICULTY SLEEPING: NO

## 2022-05-23 ASSESSMENT — LIFESTYLE VARIABLES: HOW OFTEN DO YOU HAVE A DRINK CONTAINING ALCOHOL: NEVER

## 2022-05-23 ASSESSMENT — PATIENT HEALTH QUESTIONNAIRE - PHQ9: SUM OF ALL RESPONSES TO PHQ QUESTIONS 1-9: 20

## 2022-05-23 ASSESSMENT — PAIN - FUNCTIONAL ASSESSMENT: PAIN_FUNCTIONAL_ASSESSMENT: NONE - DENIES PAIN

## 2022-05-23 NOTE — PLAN OF CARE
Patient A&Ox4. Pt endoreses SI & HI with no plan or intention and contracts for safety. Pt states thoughts of HI are towards mother. Pt rates anxiety and depression 8/10. Patient is flat and cooperative with a blunt affect. Pt is visible on the unit and is isolative to self. Pt is med. & meal compliant. Pt with even and unlabored breathing, no signs of acute physical distress noted. Will continue to monitor and offer support as needed. Problem: Anxiety  Goal: Will report anxiety at manageable levels  Description: INTERVENTIONS:  1. Administer medication as ordered  2. Teach and rehearse alternative coping skills  3. Provide emotional support with 1:1 interaction with staff  Outcome: Not Progressing     Problem: Coping  Goal: Pt/Family able to verbalize concerns and demonstrate effective coping strategies  Description: INTERVENTIONS:  1. Assist patient/family to identify coping skills, available support systems and cultural and spiritual values  2. Provide emotional support, including active listening and acknowledgement of concerns of patient and caregivers  3. Reduce environmental stimuli, as able  4. Instruct patient/family in relaxation techniques, as appropriate  5. Assess for spiritual pain/suffering and initiate Spiritual Care, Psychosocial Clinical Specialist consults as needed  Outcome: Not Progressing     Problem: Behavior  Goal: Pt/Family maintain appropriate behavior and adhere to behavioral management agreement, if implemented  Description: INTERVENTIONS:  1. Assess patient/family's coping skills and  non-compliant behavior (including use of illegal substances)  2. Notify security of behavior or suspected illegal substances which indicate the need for search of the patient and/or belongings  3. Encourage verbalization of thoughts and concerns in a socially appropriate manner  4. Utilize positive, consistent limit setting strategies supporting safety of patient, staff and others  5.  Encourage participation in the decision making process about the behavioral management agreement  6. Implement a Health Care Agreement if patient meets criteria  7. If a patient's behavior jeopardizes the safety of the patient, staff, or others refer to organization policy. If a visitor's behavior poses a threat to safety call refer to organization policy. 8. Initiate consult with , Psychosocial CNS, Spiritual Care as appropriate  Outcome: Not Progressing     Problem: Depression/Self Harm  Goal: Effect of psychiatric condition will be minimized and patient will be protected from self harm  Description: INTERVENTIONS:  1. Assess impact of patient's symptoms on level of functioning, self care needs and offer support as indicated  2. Assess patient/family knowledge of depression, impact on illness and need for teaching  3. Provide emotional support, presence and reassurance  4. Assess for possible suicidal thoughts or ideation. If patient expresses suicidal thoughts or statements do not leave alone, initiate Suicide Precautions, move to a room close to the nursing station and obtain sitter  5. Initiate consults as appropriate with Mental Health Professional, Spiritual Care, Psychosocial CNS, and consider a recommendation to the LIP for a Psychiatric Consultation  Outcome: Not Progressing     Problem: Involuntary Admit  Goal: Will cooperate with staff recommendations and doctor's orders and will demonstrate appropriate behavior  Description: INTERVENTIONS:  1. Treat underlying conditions and offer medication as ordered  2. Educate regarding involuntary admission procedures and rules  3.  Contain excessive/inappropriate behavior per unit and hospital policies  Outcome: Not Progressing

## 2022-05-23 NOTE — ED NOTES
Received a call from the access center [facility]elia Ba[facility]uel[facility] reporting that they are unable to place this pt because of her elevated troponin. Racquel Barber said that Dr. Carly Lyles advised that this pt is considered medically cleared. The [facility] AN AFFILIATE OF [facility] nurse will review for admission to .             PRESLEY Ramsay  05/23/22 6242

## 2022-05-23 NOTE — ED NOTES
BINTA called spoke with Lashay Tavera- patient medically cleared. Will fax pink slip and binta consent form. # [phone]  patient is 3rd in line for behavior consult.      Alondra Madden RN  05/22/22 [address], RN  05/22/22 2026
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
## 2022-05-23 NOTE — ED NOTES
Pt has been accepted to [facility] by Dr. Ava Lane to Yin Doll in admitting    [facility] is aware and will arrange transport    N2N Reema Trinidad 3, [facility]  05/23/22 0248

## 2022-05-23 NOTE — CONSULTS
Inpatient Cardiology Consultation      Reason for Consult:  Elevated troponin     Consulting Physician:  Dr Varun Henderson     Requesting Physician:  Yamilet North DO    Date of Consultation: 5/23/2022    HISTORY OF PRESENT ILLNESS:   Patient is a 61-year-old female who is not known to [facility] cardiology. PMHx: Hypertension, CKD, diabetes requiring insulin, LIZABETH, Sjogren's disease, gastroparesis, rheumatoid arthritis, thyroid disease, anxiety, arthritis, depression, fibromyalgia, renal biopsy, upper EGD. HPI:  · Patient presented to the ER 5/22/2022 for 5 bouts of emesis after recently being seen at the ER at [facility] for gastroparesis. The patient was about to be discharged from the ER when she admitted that she was suicidal and homicidal towards her mother. The patient since has been pink slipped as an awaiting symptoms to a psychiatric facility. Psychiatric facility will not take the patient until she is cleared from cardiology due to elevated troponins. Vital signs at time of admission 97.4, respiration 16, pulse 78, 170/91, 97% on room air. · Labs: Potassium 3.4, chloride 109, BUN 8, creatinine 0.8, glucose 110, calcium 8.4, HScTNT 47>40> 43>43 (5/11/2022-119), albumin 2.5, alk phos 115, total protein 5.5, WBC 10.4, H&H 9.6/31.3, platelet 855, influenza and COVID-19 negative. · Chest x-ray: Negative  · Upon assessment today 5/23/2022 patient is in ER bed 15 with constant observer at the door due to the patient being pink slipped for suicidal ideation and homicidal ideation. Patient is alert and oriented, cooperative, speaks in full sentences, is in no apparent distress. The patient reports she initially presented to the ER for vomiting and was subsequently kept in the ER after admitting that she wanted to hurt herself and her mother.   The patient denies any current or recent chest pain, shortness of breath, dyspnea on exertion, palpitations, leg swelling, dizziness, nausea, diaphoresis, orthopnea, or PND.  The patient reports she has never been diagnosed with any cardiac disease. The patient does not know her family history due to her being adopted. The patient had a recent elevated troponin of 119 on 5/11/2022 during ER visit for hyperglycemia and dehydration. Patient was found to have an ELIU and was subsequently given fluids and discharged. During this admission the patient has had flat yet elevated troponins without ischemic EKG changes or anginal symptoms. VS today 14 respiration, 92 pulse, 160/80, 96% on room air. Please note: past medical records were reviewed per electronic medical record (EMR) - see detailed reports under Past Medical/ Surgical History. Past Medical History:    1. Hypertension, uncontrolled currently  2. CKD--creatinine 0.8 during this admission  3. Diabetes requiring insulin  4. Thyroid disease, on HRT  5. LIZABETH without use of CPAP  6. Sjogren's disease  7. Gastroparesis  8. Rheumatoid arthritis  9. Anxiety/depression  10. Fibromyalgia  11. History of bacteremia with rule out endocarditis (9/2015):  · YWA (09/08/2015) R/O endocarditis: Normal left ventricular systolic function. Ejection fraction is visually estimated at 65%. Normal right ventricular size and function. No evidence of a left atrial appendage thrombus. No evidence of interatrial shunting on bubble study. No evidence of hemodynamically significant valve disease. No evidence of a vegetation. · TTE (09/04/2015): Summary: Normal 2-D/ Doppler echocardiogram.  12. Renal biopsy, upper EGD. Medications Prior to admit:  Prior to Admission medications    Medication Sig Start Date End Date Taking?  Authorizing Provider   ondansetron (ZOFRAN ODT) 4 MG disintegrating tablet Take 1 tablet by mouth every 8 hours as needed for Nausea or Vomiting 5/22/22 5/24/22 Yes Rina Ramirez MD   lidocaine (LIDODERM) 5 % Place 1 patch onto the skin daily 12 hours on, 12 hours off. 5/20/22 6/19/22  Nhan Simmons MD   ciprofloxacin (CILOXAN) 0.3 % ophthalmic solution Place 1 drop into the right eye every 6 hours while awake for 3 days Please provide medication to patient before discharge 5/20/22 5/23/22  Jaswant Dalton MD   glucose 4g chewable tablet Take 4 tablets by mouth as needed for Low blood sugar 5/20/22   Jaswant Dalton MD   Insulin Infusion Pump (MINIMED 770G INSULIN PUMP SYS) KIT Use to infuse insulin 5/16/22   ANA Nam NP   Continuous Blood Gluc Transmit (GUARDIAN LINK 3 TRANSMITTER) MISC Use with insulin pump 5/16/22   ANA Nam NP   Continuous Blood Gluc Sensor (GUARDIAN SENSOR 3) MISC Change every 7 days 5/16/22   ANA Nam NP   pregabalin (LYRICA) 100 MG capsule 100 mg 3 times daily.      Historical Provider, MD   buPROPion (WELLBUTRIN) 100 MG tablet Take 100 mg by mouth daily    Historical Provider, MD   omeprazole (PRILOSEC) 40 MG delayed release capsule Take 1 capsule by mouth every morning (before breakfast) 5/6/22   Jocelyne Benavidez MD   lisinopril (PRINIVIL;ZESTRIL) 20 MG tablet Take 2 tablets by mouth daily 5/2/22 6/1/22  Elmer Cheng MD   Insulin Lispro (HUMALOG KWIKPEN SC) Inject 25 Units into the skin 3 times daily (before meals) Plus sliding scale    Historical Provider, MD   insulin glargine (LANTUS) 100 UNIT/ML injection vial Inject 50 Units into the skin 2 times daily    Historical Provider, MD   Continuous Blood Gluc Sensor (FREESTYLE NIMISHA 2 SENSOR) MISC USE TO TEST SUGAR CONTINUOUSLY AS DIRECTED 4/20/22   Elmer Cheng MD   diclofenac sodium (VOLTAREN) 1 % GEL Apply 4 g topically 4 times daily for 7 days 4/19/22 5/2/22  Elmer Cheng MD   famotidine (PEPCID) 20 MG tablet TAKE 1 TABLET BY MOUTH TWICE DAILY 4/18/22   Elmer Cheng MD   metFORMIN (GLUCOPHAGE-XR) 500 MG extended release tablet TAKE 2 TABLETS BY MOUTH 2 TIMES DAILY 4/18/22 6/17/22  Elmer Cheng MD   naproxen (NAPROSYN) 250 MG tablet TAKE 1 TABLET BY MOUTH TWICE A DAY WITH MEALS 4/18/22   Elvie Nicholson MD   prochlorperazine (COMPAZINE) 10 MG tablet Take 1 tablet by mouth every 6 hours as needed (nausea) 3/31/22   Raegan Faria DO   levothyroxine (SYNTHROID) 88 MCG tablet TAKE 1 TABLET BY MOUTH DAILY 3/18/22   Elvie Nicholson MD   ONETOUCH ULTRA strip USE TO TEST THREE TIMES A DAY 3/18/22   Elvie Nicholson MD   calcium elemental (OSCAL) 500 MG TABS tablet TAKE 1 TABLET BY MOUTH TWICE A DAY WITH LUNCH AND DINNER  Patient not taking: Reported on 5/16/2022 3/18/22   Elvie Nicholson MD   lamoTRIgine (LAMICTAL) 100 MG tablet TAKE 1 TABLET BY MOUTH EVERY DAY 2/17/22   Katherine Osman MD   OZEMPIC, 1 MG/DOSE, 4 MG/3ML SOPN INJECT 1 MG UNDER THE SKIN ONCE WEEKLY 2/17/22   Elvie Nicholson MD   Insulin Pen Needle 31G X 5 MM MISC 1 each by Does not apply route 3 times daily 12/29/21   Katherine Jama MD   melatonin 3 MG TABS tablet TAKE ONE TABLET BY MOUTH EVERY NIGHT AT BEDTIME 12/23/21   Elvie Nicholson MD   melatonin 3 MG TABS tablet Take 1 tablet by mouth nightly as needed (insomnia) 9/10/21   Elvie Nicholson MD   hydrocortisone 2.5 % cream Apply topically 2 times daily.  7/12/21   Elvie Nicholson MD   Blood Pressure KIT 1 box by Does not apply route 2 times daily 3/24/21   Elvie Nicholson MD   hydroxychloroquine (PLAQUENIL) 200 MG tablet Take 200 mg by mouth 2 times daily    Historical Provider, MD   methotrexate (RHEUMATREX) 2.5 MG chemo tablet Take 2.5 mg by mouth once a week Sundays    Historical Provider, MD   folic acid (FOLVITE) 1 MG tablet Take 1 tablet by mouth daily 11/14/16   MD CHERYL Contreras MISC CHECK BS 4 TIMES DAILY 7/23/15   Ava Andrade MD       Current Medications:    Current Facility-Administered Medications: potassium bicarb-citric acid (EFFER-K) effervescent tablet 40 mEq, 40 mEq, Oral, Daily  ondansetron (ZOFRAN) injection 4 mg, 4 mg, IntraVENous, Once    Allergies:  Amoxicillin, Sulfa antibiotics, Cefdinir, Doxycycline, Other, Milk-related compounds, and Tomato    Social History:    Housing: Lives alone in apartment. Tobacco: Smoked for 6 months only in 1996  EtOH: Drank alcohol for 6 months 1996  Drugs: Denies  O2: Room air at home  Ambulation: Does not use ambulatory aids    Family History:   Family History   Adopted: Yes   Family history unknown: Yes         REVIEW OF SYSTEMS:     · Constitutional: Denies fevers, chills, night sweats, and fatigue  · HEENT: Denies headaches, nose bleeds, and blurred vision,oral pain, abscess or lesion. · Musculoskeletal: Denies falls, pain to BLE with ambulation and edema to BLE. · Neurological: Denies dizziness and lightheadedness, numbness and tingling  · Cardiovascular: Denies chest pain, palpitations, and feelings of heart racing. · Respiratory: Denies orthopnea and PND  · Gastrointestinal: Denies heartburn, diarrhea and constipation, black/bloody, and tarry stools. Admits to intermittent nausea and vomiting. · Genitourinary: Denies dysuria and hematuria  · Hematologic: Denies excessive bruising or bleeding  · Lymphatic: Denies lumps and bumps to neck, axilla, breast, and groin  · Endocrine: Denies excessive thirst. Denies intolerance to hot and cold  · GYN: Postmenopausal state; Denies vaginal bleeding. · Psychiatric: Admits to recent suicidal ideation. PHYSICAL EXAM:   BP (!) 160/80   Pulse 92   Temp 97.8 °F (36.6 °C)   Resp 14   Ht 5' 1\" (1.549 m)   Wt 147 lb (66.7 kg)   LMP  (LMP Unknown)   SpO2 96%   BMI 27.78 kg/m²   CONST:  Well developed, 81-year-old  female well nourished who appears stated age. Awake, alert, cooperative, no apparent distress  HEENT:   Head- Normocephalic, atraumatic   Eyes- Conjunctivae pink, anicteric  Throat- Oral mucosa pink and moist  Neck-  No stridor, trachea midline, no jugular venous distention. No adenopathy   CHEST: Chest symmetrical and non-tender to palpation.  No accessory muscle use or intercostal retractions  RESPIRATORY: Lung sounds - clear throughout fields   CARDIOVASCULAR:     Right-sided carotid bruit. Heart Inspection- shows no noted pulsations  Heart Palpation- no heaves or thrills; PMI is non-displaced   Heart Ausculation- Regular rate and rhythm, no murmur. No s3, s4 or rub   PV: No lower extremity edema. No varicosities. Pedal pulses palpable, no clubbing or cyanosis   ABDOMEN: Soft, non-tender to light palpation. Bowel sounds present. No palpable masses no organomegaly; no abdominal bruit  MS: Good muscle strength and tone. No atrophy or abnormal movements. : Deferred  SKIN: Warm and dry no statis dermatitis or ulcers   NEURO / PSYCH: Oriented to person, place and time. Speech clear and appropriate. Follows all commands. Pleasant affect     DATA:    ECG: Normal sinus rhythm, vent rate 98, NM interval 152, QRS duration 82    Diagnostic:    Intake/Output Summary (Last 24 hours) at 5/23/2022 1410  Last data filed at 5/22/2022 1705  Gross per 24 hour   Intake 1000 ml   Output --   Net 1000 ml       Labs:   CBC:   Recent Labs     05/22/22  1548   WBC 10.4   HGB 9.6*   HCT 31.3*        BMP:   Recent Labs     05/22/22  1548 05/23/22  0812    143   K 3.3* 3.4*   CO2 26 23   BUN 13 8   CREATININE 0.9 0.8   LABGLOM >60 >60   CALCIUM 8.5* 8.4*     Mag:   Recent Labs     05/22/22  1548   MG 1.9     HgA1c:   Lab Results   Component Value Date    LABA1C 15.0 05/03/2022     FASTING LIPID PANEL:  Lab Results   Component Value Date    CHOL 193 06/18/2021    HDL 31 06/18/2021    LDLCALC 108 06/18/2021    TRIG 271 06/18/2021     LIVER PROFILE:  Recent Labs     05/22/22  1548   AST 18   ALT 21   LABALBU 2.5*     Results for Wayne Paula (MRN [id number]) as of 5/23/2022 15:06   Ref. Range 5/22/2022 15:48 5/22/2022 16:57 5/23/2022 05:27 5/23/2022 11:00   Troponin, High Sensitivity Latest Ref Range: 0 - 9 ng/L 47 (H) 40 (H) 43 (H) 43 (H)     Results for Wayne Paula (MRN [id number]) as of 5/23/2022 15:06   Ref.  Range 5/22/2022 18:40   Amphetamine Screen, Urine Latest Ref Range: Negative <1000 ng/mL  NOT DETECTED   Barbiturate Screen, Ur Latest Ref Range: Negative < 200 ng/mL  NOT DETECTED   Benzodiazepine Screen, Urine Latest Ref Range: Negative < 200 ng/mL  NOT DETECTED   Cannabinoid Scrn, Ur Latest Ref Range: Negative < 50ng/mL  NOT DETECTED   METHADONE SCREEN, URINE, 27029 Latest Ref Range: Negative <300 ng/mL  NOT DETECTED   Opiate Scrn, Ur Latest Ref Range: Negative < 300ng/mL  NOT DETECTED   PCP Screen, Urine Latest Ref Range: Negative < 25 ng/mL  NOT DETECTED   Cocaine Metabolite Screen, Urine Latest Ref Range: Negative < 300 ng/mL  NOT DETECTED   FENTANYL SCREEN, URINE Latest Ref Range: Negative <1 ng/mL  NOT DETECTED   Oxycodone Urine Latest Ref Range: Negative <100 ng/mL  NOT DETECTED     Assessment:  1. Elevated troponin--elevated flat troponin (47>40>43>43) without anginal symptoms or ischemic EKG changes, not consistent with ACS. 2. Nausea/vomiting--recent history of gastroparesis  3. Hypokalemia in setting of recent vomiting  4. Suicidal/homicidal ideation--being managed by emergency medicine and patient being transferred to psychiatric unit. 5. Thyroid disease, on HRT  6. Hypertension, currently uncontrolled  7. Diabetes, requiring  8. CKD--creatinine 0.8 during this admission  9. Sjogren's disease rheumatoid arthritis  10. Fibromyalgia  11. Anxiety/depression    Plan:   1. No cardiac testing or medications recommended at this time. 2. Recommend restarting antihypertensive medication for patient as preferred by emergency medicine/psychiatric service. 3. Rest per emergency medicine and psychiatric service. 4. Case was reviewed with Dr. Ernesto Webb, cardiology signed off.         Electronically signed by ANA Day CNP on 5/23/2022 at 2:10 PM

## 2022-05-23 NOTE — CONSULTS
[facility] - [facility] Resident Inpatient  History and Physical    CC: Suicidal and Homicidal Ideation, Nausea/Vomiting    HPI: History obtained from patient, electronic medical record. Charity Santamaria is a 47 y.o. female with a PMH of uncontrolled DM2 on long term insulin with neuropathy, HTN, Hypothyroidism, rheumatoid arthritis, who presented to the [facility] ED for intractable nausea and vomiting. Patient had tolerated food at [facility] ED, but prior to discharge became tearful and admitted to suicidal ideation without a plan, and to homicidal ideation toward her mother. Patient was accepted by Dr. Yao Lerner and was transferred to [facility]. She reports that after transportation she had breakfast, and was unable to keep it down. Reports some current lightheadedness. Of note, patient was seen by cardiology at [facility] ED for concern of elevated troponins. They recommended restarting BP meds, getting lipid panel. Recommended no further cardiac work up at this time. Patient reports intermittent vomiting since August, but became more frequently following EGD on May 6, 2022. Became concerned that she could not tolerate oral intake and that she would become dehydrated, leading to presentation at [facility] ED. She also reports diarrhea this past week with some soft stools, but states she has had mostly liquid diet this week. Hx of poorly controlled DM 2 on long term insulin. States she did yet have a chance to get an insulin pump, so has been on regimen of Lantus 50 U BID and Humalog 25 U TID, as well as metformin 1000 mg BID. Also takes Lyrica 100 mg TID for diabetic neuropathy. Patient also reports taking levothyroxine for hypothyroidism, and methotrexate and plaquenil for rheumatoid arthritis, but has missed the RA medications for the past week due to nausea.  She admits to not taking lisinopril for hypertension because she is on \"too many pills,\" and is uncertain out of the other listed medications which ones she takes. ED Course: The patient remained hemodynamically stable. EKG Rhythm strip normal sinus rhythm, unchanged from previous tracings. The significant laboratory data obtained by ED work up was K 3.4, AG 11, glucose 70, troponin 40-->43, Hgb 9.6, U/A with glucose 100, small blood, trace LE, few bacteria    CXR negative for acute process. Patient was given:  Medications   acetaminophen (TYLENOL) tablet 650 mg (has no administration in time range)   magnesium hydroxide (MILK OF MAGNESIA) 400 MG/5ML suspension 30 mL (has no administration in time range)   nicotine (NICODERM CQ) 21 MG/24HR 1 patch (has no administration in time range)   aluminum & magnesium hydroxide-simethicone (MAALOX) 200-200-20 MG/5ML suspension 30 mL (has no administration in time range)   hydrOXYzine (VISTARIL) capsule 50 mg (has no administration in time range)   melatonin tablet 3 mg (3 mg Oral Not Given 5/23/22 2206)   famotidine (PEPCID) tablet 20 mg (20 mg Oral Not Given 4/13/25 1597)   folic acid (FOLVITE) tablet 1 mg (has no administration in time range)   levothyroxine (SYNTHROID) tablet 88 mcg (has no administration in time range)   lisinopril (PRINIVIL;ZESTRIL) tablet 20 mg (20 mg Oral Not Given 5/23/22 2206)   metFORMIN (GLUCOPHAGE-XR) extended release tablet 1,000 mg (1,000 mg Oral Not Given 5/23/22 2206)   ondansetron (ZOFRAN-ODT) disintegrating tablet 4 mg (4 mg Oral Given 5/23/22 2205)   pregabalin (LYRICA) capsule 100 mg (100 mg Oral Given 5/23/22 2204)        ED orders:   Orders Placed This Encounter   Procedures    Basic Metabolic Panel w/ Reflex to MG    Lipid panel    ADULT DIET; Regular; 4 carb choices (60 gm/meal);  Safety Tray; Safety Tray (Disposables)    Vital signs per unit routine    Up as tolerated    Observation    Search Patient    Full Code    Inpatient consult to Hospitalist    Inpatient consult to Social Work    POCT Glucose    POCT glucose    POCT Glucose    POCT Glucose    POCT Glucose    Admit to Behavioral Health    Suicide precautions       PMH:  has a past medical history of Anxiety, Arthritis, Depression, Diabetes mellitus ([address] Fibromyalgia, HTN (hypertension), Hypertensive chronic kidney disease, Left shoulder pain, LIZABETH (obstructive sleep apnea), Rheumatoid arthritis involving multiple sites ([address] Sjogren's disease ([address] and Thyroid disease. PSH:  has a past surgical history that includes Shoulder arthroscopy (Right, 06/16/2017); CT BIOPSY RENAL (11/18/2021); and Upper gastrointestinal endoscopy (N/A, 5/6/2022). FH: She was adopted. Family history is unknown by patient. Social:  reports that she quit smoking about 24 years ago. She started smoking about 25 years ago. She has a 1.00 pack-year smoking history. She has never used smokeless tobacco. She reports that she does not drink alcohol and does not use drugs. Allergies: Allergies   Allergen Reactions    Amoxicillin Other (See Comments)     Hives all over body, states that hives are severe     Sulfa Antibiotics     Cefdinir     Doxycycline Other (See Comments)     Pt states no allergy to this med    Other Other (See Comments)        Home Medications:   No current facility-administered medications on file prior to encounter. Current Outpatient Medications on File Prior to Encounter   Medication Sig Dispense Refill    ondansetron (ZOFRAN ODT) 4 MG disintegrating tablet Take 1 tablet by mouth every 8 hours as needed for Nausea or Vomiting 6 tablet 0    lidocaine (LIDODERM) 5 % Place 1 patch onto the skin daily 12 hours on, 12 hours off.  30 patch 2    ciprofloxacin (CILOXAN) 0.3 % ophthalmic solution Place 1 drop into the right eye every 6 hours while awake for 3 days Please provide medication to patient before discharge 1 each 0    glucose 4g chewable tablet Take 4 tablets by mouth as needed for Low blood sugar 60 tablet 3    Insulin Infusion Pump (MINIMED 770G INSULIN PUMP SYS) KIT Use to infuse insulin 1 kit 0    Continuous Blood Gluc Transmit (GUARDIAN LINK 3 TRANSMITTER) MISC Use with insulin pump 1 each 0    Continuous Blood Gluc Sensor (GUARDIAN SENSOR 3) MISC Change every 7 days 4 each 5    pregabalin (LYRICA) 100 MG capsule 100 mg 3 times daily.        buPROPion (WELLBUTRIN) 100 MG tablet Take 100 mg by mouth daily      omeprazole (PRILOSEC) 40 MG delayed release capsule Take 1 capsule by mouth every morning (before breakfast) 30 capsule 5    lisinopril (PRINIVIL;ZESTRIL) 20 MG tablet Take 2 tablets by mouth daily 60 tablet 0    Insulin Lispro (HUMALOG KWIKPEN SC) Inject 25 Units into the skin 3 times daily (before meals) Plus sliding scale      insulin glargine (LANTUS) 100 UNIT/ML injection vial Inject 50 Units into the skin 2 times daily      Continuous Blood Gluc Sensor (FREESTYLE NIMSIHA 2 SENSOR) MISC USE TO TEST SUGAR CONTINUOUSLY AS DIRECTED 2 each 3    diclofenac sodium (VOLTAREN) 1 % GEL Apply 4 g topically 4 times daily for 7 days 112 g 0    famotidine (PEPCID) 20 MG tablet TAKE 1 TABLET BY MOUTH TWICE DAILY 60 tablet 1    metFORMIN (GLUCOPHAGE-XR) 500 MG extended release tablet TAKE 2 TABLETS BY MOUTH 2 TIMES DAILY 120 tablet 1    naproxen (NAPROSYN) 250 MG tablet TAKE 1 TABLET BY MOUTH TWICE A DAY WITH MEALS 60 tablet 5    prochlorperazine (COMPAZINE) 10 MG tablet Take 1 tablet by mouth every 6 hours as needed (nausea) 16 tablet 0    levothyroxine (SYNTHROID) 88 MCG tablet TAKE 1 TABLET BY MOUTH DAILY 30 tablet 2    ONETOUCH ULTRA strip USE TO TEST THREE TIMES A  each 2    calcium elemental (OSCAL) 500 MG TABS tablet TAKE 1 TABLET BY MOUTH TWICE A DAY WITH LUNCH AND DINNER (Patient not taking: Reported on 5/16/2022) 60 tablet 5    lamoTRIgine (LAMICTAL) 100 MG tablet TAKE 1 TABLET BY MOUTH EVERY DAY 30 tablet 5    OZEMPIC, 1 MG/DOSE, 4 MG/3ML SOPN INJECT 1 MG UNDER THE SKIN ONCE WEEKLY 3 mL 1    Insulin Pen Needle 31G X 5 MM MISC 1 each by Does not or deformity, full & symmetric excursion  Lung: Clear to auscultation bilaterally,  respirations unlabored. No rales/wheezing/rubs  Heart: Tachycardic, S1 and S2 normal, no murmur, rub or gallop. DP pulses 2/4  Abdomen: SNTND, no masses, no organomegaly, no guarding, rebound or rigidity. Genital/Rectal: deferred  Extremities:  Extremities normal, atraumatic, no cyanosis or edema. Distal pulses equal bilaterally  Skin: Skin color, texture, turgor normal, no rashes or lesions  Musculoskeletal: No joint swelling, no muscle tenderness. Normal ROM in extremities. Neurologic: Alert & Oriented x 3  Psychiatric: Slightly depressed mood, normal behavior    Labs:   Results for orders placed or performed during the hospital encounter of 05/23/22   POCT Glucose   Result Value Ref Range    Meter Glucose 70 (L) 74 - 99 mg/dL   POCT Glucose   Result Value Ref Range    Meter Glucose 65 (L) 74 - 99 mg/dL   POCT Glucose   Result Value Ref Range    Meter Glucose 66 (L) 74 - 99 mg/dL   POCT Glucose   Result Value Ref Range    Meter Glucose 140 (H) 74 - 99 mg/dL       Imaging:  CT HEAD WO CONTRAST    Result Date: 5/11/2022  EXAMINATION: CT OF THE HEAD WITHOUT CONTRAST  5/11/2022 5:54 pm TECHNIQUE: CT of the head was performed without the administration of intravenous contrast. Automated exposure control, iterative reconstruction, and/or weight based adjustment of the mA/kV was utilized to reduce the radiation dose to as low as reasonably achievable. COMPARISON: 03/16/2019 HISTORY: ORDERING SYSTEM PROVIDED HISTORY: syncope TECHNOLOGIST PROVIDED HISTORY: Reason for exam:->syncope Has a \"code stroke\" or \"stroke alert\" been called? ->No Decision Support Exception - unselect if not a suspected or confirmed emergency medical condition->Emergency Medical Condition (MA) What reading provider will be dictating this exam?->CRC FINDINGS: BRAIN/VENTRICLES: There is no acute intracranial hemorrhage, mass effect or midline shift.   No abnormal hemidiaphragms. No pneumonia or pleural effusion. XR CHEST PORTABLE    Result Date: 5/16/2022  EXAMINATION: ONE XRAY VIEW OF THE CHEST 5/16/2022 10:18 am COMPARISON: None. HISTORY: ORDERING SYSTEM PROVIDED HISTORY: Upper abdominal pain, vomiting TECHNOLOGIST PROVIDED HISTORY: Reason for exam:->Upper abdominal pain, vomiting FINDINGS: The lungs are without acute focal process. There is no effusion or pneumothorax. The cardiomediastinal silhouette is without acute process. The osseous structures are without acute process. There is a small scar seen within the right infrahilar region which is chronic. No acute process. XR CHEST PORTABLE    Result Date: 5/11/2022  EXAMINATION: ONE XRAY VIEW OF THE CHEST 5/11/2022 8:03 pm COMPARISON: 05/16/2021. HISTORY: ORDERING SYSTEM PROVIDED HISTORY: syncope TECHNOLOGIST PROVIDED HISTORY: Reason for exam:->syncope What reading provider will be dictating this exam?->CRC FINDINGS: The lungs are without acute focal process. Right perihilar atelectasis. There is no effusion or pneumothorax. The cardiomediastinal silhouette is without acute process. The osseous structures are without acute     No acute process. CTA CHEST W CONTRAST    Result Date: 5/12/2022  EXAMINATION: CTA OF THE CHEST; CTA OF THE ABDOMEN AND PELVIS WITH CONTRAST 5/12/2022 1:35 am TECHNIQUE: CTA of the chest was performed after the administration of intravenous contrast.  Multiplanar reformatted images are provided for review. MIP images are provided for review. Automated exposure control, iterative reconstruction, and/or weight based adjustment of the mA/kV was utilized to reduce the radiation dose to as low as reasonably achievable.; CTA of the abdomen and pelvis was performed with the administration of intravenous contrast. Multiplanar reformatted images are provided for review. MIP images are provided for review.  Automated exposure control, iterative reconstruction, and/or weight based adjustment of the mA/kV was utilized to reduce the radiation dose to as low as reasonably achievable. COMPARISON: None. HISTORY: ORDERING SYSTEM PROVIDED HISTORY: r/o dissection TECHNOLOGIST PROVIDED HISTORY: Reason for exam:->r/o dissection Decision Support Exception - unselect if not a suspected or confirmed emergency medical condition->Emergency Medical Condition (MA) What reading provider will be dictating this exam?->CRC CTA CHEST Aorta: No evidence of thoracic aortic aneurysm or dissection. No acute abnormality of the aorta. Mediastinum: No evidence of mediastinal lymphadenopathy. The heart and pericardium demonstrate no acute abnormality. Lungs/Pleura: The lungs are without acute process. No focal consolidation or pulmonary edema. No evidence of pleural effusion or pneumothorax. There is a 3mm pulmonary nodule in the right upper lobe. Soft Tissues/Bones: No acute bone or soft tissue abnormality. CTA ABDOMEN PELVIS LIVER:  The liver is normal in size, contour and attenuation. No focal mass. No intra or extrahepatic bile duct dilation. GALL BLADDER: Unremarkable. SPLEEN:  Unremarkable. PANCREAS:  Unremarkable. ADRENAL GLANDS: Unremarkable. ESOPHAGUS AND STOMACH:  Unremarkable. BOWEL: Small bowel:  Unremarkable. Large bowel: The colon and rectum are of normal course and caliber. The appendix is within normal limits. There is no intraperitoneal free air or fluid. URINARY/GENITAL TRACT: Kidneys:  The kidneys enhance symmetrically. No evidence of hydronephrosis, renal calcifications or solid renal mass. Ureters: The ureters are normal course and caliber. There is no evidence of ureter calculus/calculi. URINARY BLADDER:  The urinary bladder is well distended without wall thickening or focal mass. REPRODUCTIVE ORGANS: There is a 6.4cm multilocular cystic lesion of the left adnexa. BLOOD VESSELS: Unremarkable given patients age. No evidence of abdominal aortic anuerysm or dissection.  LYMPH NODES: No evidence of intraabdominal or intrapelvic lymphadenopathy. ABDOMINAL WALL & SOFT TISSUES: Unremarkable. OSSEOUS STRUCTURES: No acute osseous lesion. 1. A 6.4cm multilocular cystic lesion of the left adnexa. 2.  No evidence of a aortic dissection or anuerysm. 3.  A 3mm pulmonary nodule in the right upper lobe. CTA ABDOMEN PELVIS W CONTRAST    Result Date: 5/12/2022  EXAMINATION: CTA OF THE CHEST; CTA OF THE ABDOMEN AND PELVIS WITH CONTRAST 5/12/2022 1:35 am TECHNIQUE: CTA of the chest was performed after the administration of intravenous contrast.  Multiplanar reformatted images are provided for review. MIP images are provided for review. Automated exposure control, iterative reconstruction, and/or weight based adjustment of the mA/kV was utilized to reduce the radiation dose to as low as reasonably achievable.; CTA of the abdomen and pelvis was performed with the administration of intravenous contrast. Multiplanar reformatted images are provided for review. MIP images are provided for review. Automated exposure control, iterative reconstruction, and/or weight based adjustment of the mA/kV was utilized to reduce the radiation dose to as low as reasonably achievable. COMPARISON: None. HISTORY: ORDERING SYSTEM PROVIDED HISTORY: r/o dissection TECHNOLOGIST PROVIDED HISTORY: Reason for exam:->r/o dissection Decision Support Exception - unselect if not a suspected or confirmed emergency medical condition->Emergency Medical Condition (MA) What reading provider will be dictating this exam?->CRC CTA CHEST Aorta: No evidence of thoracic aortic aneurysm or dissection. No acute abnormality of the aorta. Mediastinum: No evidence of mediastinal lymphadenopathy. The heart and pericardium demonstrate no acute abnormality. Lungs/Pleura: The lungs are without acute process. No focal consolidation or pulmonary edema. No evidence of pleural effusion or pneumothorax. There is a 3mm pulmonary nodule in the right upper lobe. Soft Tissues/Bones: No acute bone or soft tissue abnormality. CTA ABDOMEN PELVIS LIVER:  The liver is normal in size, contour and attenuation. No focal mass. No intra or extrahepatic bile duct dilation. GALL BLADDER: Unremarkable. SPLEEN:  Unremarkable. PANCREAS:  Unremarkable. ADRENAL GLANDS: Unremarkable. ESOPHAGUS AND STOMACH:  Unremarkable. BOWEL: Small bowel:  Unremarkable. Large bowel: The colon and rectum are of normal course and caliber. The appendix is within normal limits. There is no intraperitoneal free air or fluid. URINARY/GENITAL TRACT: Kidneys:  The kidneys enhance symmetrically. No evidence of hydronephrosis, renal calcifications or solid renal mass. Ureters: The ureters are normal course and caliber. There is no evidence of ureter calculus/calculi. URINARY BLADDER:  The urinary bladder is well distended without wall thickening or focal mass. REPRODUCTIVE ORGANS: There is a 6.4cm multilocular cystic lesion of the left adnexa. BLOOD VESSELS: Unremarkable given patients age. No evidence of abdominal aortic anuerysm or dissection. LYMPH NODES: No evidence of intraabdominal or intrapelvic lymphadenopathy. ABDOMINAL WALL & SOFT TISSUES: Unremarkable. OSSEOUS STRUCTURES: No acute osseous lesion. 1. A 6.4cm multilocular cystic lesion of the left adnexa. 2.  No evidence of a aortic dissection or anuerysm. 3.  A 3mm pulmonary nodule in the right upper lobe. Assessment and Plan  Principal Problem:    Suicidal ideations  Active Problems:    Hypokalemia due to loss of potassium    Intractable vomiting    Type 2 diabetes mellitus with complication, with long-term current use of insulin (HCC)    Acquired hypothyroidism    Encounter for long-term (current) use of insulin (HCC)    Rheumatoid arthritis involving multiple sites [facility])    Primary hypertension  Resolved Problems:    * No resolved hospital problems.  *    Depression with SI/HI  · Management as per psychiatry    DM2  Hypoglycemia  Diabetic neuropathy  · Patient initially hypoglycemic, likely secondary to nausea and gastric paresis  · Repeat glucose 140, lantus restarted at 25 U HS, consider increasing to home dose of 50 U BID as patient's oral intake improves and glucose levels continue to elevate  · Consider restarting home 25 U humalog TID w/ meals plus sliding scale  · Hypoglycemic protocols in place  · Continue home Lyrica    Gastric paresis  N/V  · Likely 2/2 to poor diabetic control  · Maalox prn continued   · Home zofran restarted  · Home pepcid restarted  · QTc 459 on 5/23/22, consider repeat EKG as psych meds are restarted, and re-evaluate use of Zofran based on QTc    Hypokalemia  · Initial K 3.4 in [facility] ED, repeat pending  · Replace as needed    Hypertension  · Due to noncompliance, restart lisinopril at 20 mg QD, and increase as tolerated    Hypothyroidism  · Continue home synthroid 88 mcg daily    RA  · Hold home methotrexate once weekly, consider restarting plaquenil 200 mg BID      DVT / GI prophylaxis: patient fully mobile, able to ambulate, consider starting DVT prophylaxis and Pepcid    Dispo - psychiatric    Electronically signed by Hailey Coats DO on 5/23/2022 at 10:39 PM.  This case was discussed with attending physician, Dr. Ria Cotto

## 2022-05-23 NOTE — ED NOTES
[facility] [phone] x [phone]- left message for return call for Behavior health consult. Waiting for return call.      Doug Quevedo RN  05/23/22 002

## 2022-05-23 NOTE — ED NOTES
Patient resting comfortably in bed, 1:1 CO at bedside.  All needs med at this time     Joana Gomes RN  05/22/22 9810

## 2022-05-23 NOTE — ED NOTES
New troponin Results faxed to [phone], [facility] requested.      Sapna Kolb RN  05/23/22 5977 Lydia Webster, ELADIO  05/23/22 8142

## 2022-05-23 NOTE — ED NOTES
Return call from [phone]-#[phone] Linda - information given to start placement process for behavior health needs.        J Carlos Mcmanus RN  05/23/22 3009

## 2022-05-23 NOTE — ED NOTES
Update/chart to [employer] [facility]. Patient belongings also given to crew.      Chan Naik RN  05/23/22 2043

## 2022-05-23 NOTE — ED NOTES
B; Apple Juice provided. Patient is relaxing in the bed, no other requests at this time. VS stable. Sitter continuously at the bedside.       Tony Toribio RN  [phone]

## 2022-05-23 NOTE — CARE COORDINATION
Social Work/Transition of Care:    Pt accepted transport established ETA 1600, pt updated.     Electronically signed by Daren Pulliam on 2/35/2638 at 3:42 PM

## 2022-05-24 PROBLEM — F33.2 SEVERE EPISODE OF RECURRENT MAJOR DEPRESSIVE DISORDER, WITHOUT PSYCHOTIC FEATURES (HCC): Status: ACTIVE | Noted: 2022-05-24

## 2022-05-24 PROBLEM — R45.851 SUICIDAL IDEATIONS: Status: RESOLVED | Noted: 2022-05-23 | Resolved: 2022-05-24

## 2022-05-24 PROBLEM — F60.89 CLUSTER B PERSONALITY DISORDER (HCC): Status: ACTIVE | Noted: 2022-05-24

## 2022-05-24 LAB
ANION GAP SERPL CALCULATED.3IONS-SCNC: 12 MMOL/L (ref 7–16)
ANION GAP SERPL CALCULATED.3IONS-SCNC: 15 MMOL/L (ref 7–16)
BUN BLDV-MCNC: 10 MG/DL (ref 6–20)
BUN BLDV-MCNC: 9 MG/DL (ref 6–20)
CALCIUM SERPL-MCNC: 8.4 MG/DL (ref 8.6–10.2)
CALCIUM SERPL-MCNC: 8.6 MG/DL (ref 8.6–10.2)
CHLORIDE BLD-SCNC: 109 MMOL/L (ref 98–107)
CHLORIDE BLD-SCNC: 110 MMOL/L (ref 98–107)
CHOLESTEROL, TOTAL: 265 MG/DL (ref 0–199)
CO2: 21 MMOL/L (ref 22–29)
CO2: 21 MMOL/L (ref 22–29)
CREAT SERPL-MCNC: 0.8 MG/DL (ref 0.5–1)
CREAT SERPL-MCNC: 0.9 MG/DL (ref 0.5–1)
GFR AFRICAN AMERICAN: >60
GFR AFRICAN AMERICAN: >60
GFR NON-AFRICAN AMERICAN: >60 ML/MIN/1.73
GFR NON-AFRICAN AMERICAN: >60 ML/MIN/1.73
GLUCOSE BLD-MCNC: 166 MG/DL (ref 74–99)
GLUCOSE BLD-MCNC: 69 MG/DL (ref 74–99)
HDLC SERPL-MCNC: 44 MG/DL
LDL CHOLESTEROL CALCULATED: 167 MG/DL (ref 0–99)
METER GLUCOSE: 159 MG/DL (ref 74–99)
METER GLUCOSE: 161 MG/DL (ref 74–99)
METER GLUCOSE: 164 MG/DL (ref 74–99)
METER GLUCOSE: 179 MG/DL (ref 74–99)
METER GLUCOSE: 186 MG/DL (ref 74–99)
POTASSIUM REFLEX MAGNESIUM: 3.6 MMOL/L (ref 3.5–5)
POTASSIUM REFLEX MAGNESIUM: 3.6 MMOL/L (ref 3.5–5)
SODIUM BLD-SCNC: 142 MMOL/L (ref 132–146)
SODIUM BLD-SCNC: 146 MMOL/L (ref 132–146)
TRIGL SERPL-MCNC: 270 MG/DL (ref 0–149)
TROPONIN, HIGH SENSITIVITY: 57 NG/L (ref 0–9)
VLDLC SERPL CALC-MCNC: 54 MG/DL

## 2022-05-24 PROCEDURE — 6370000000 HC RX 637 (ALT 250 FOR IP): Performed by: NURSE PRACTITIONER

## 2022-05-24 PROCEDURE — 36415 COLL VENOUS BLD VENIPUNCTURE: CPT

## 2022-05-24 PROCEDURE — S5553 INSULIN LONG ACTING 5 U: HCPCS | Performed by: STUDENT IN AN ORGANIZED HEALTH CARE EDUCATION/TRAINING PROGRAM

## 2022-05-24 PROCEDURE — 82962 GLUCOSE BLOOD TEST: CPT

## 2022-05-24 PROCEDURE — 6370000000 HC RX 637 (ALT 250 FOR IP): Performed by: STUDENT IN AN ORGANIZED HEALTH CARE EDUCATION/TRAINING PROGRAM

## 2022-05-24 PROCEDURE — 6370000000 HC RX 637 (ALT 250 FOR IP): Performed by: PSYCHIATRY & NEUROLOGY

## 2022-05-24 PROCEDURE — 90792 PSYCH DIAG EVAL W/MED SRVCS: CPT | Performed by: NURSE PRACTITIONER

## 2022-05-24 PROCEDURE — 99222 1ST HOSP IP/OBS MODERATE 55: CPT | Performed by: FAMILY MEDICINE

## 2022-05-24 PROCEDURE — 84484 ASSAY OF TROPONIN QUANT: CPT

## 2022-05-24 PROCEDURE — 80048 BASIC METABOLIC PNL TOTAL CA: CPT

## 2022-05-24 PROCEDURE — 1240000000 HC EMOTIONAL WELLNESS R&B

## 2022-05-24 PROCEDURE — 93005 ELECTROCARDIOGRAM TRACING: CPT | Performed by: STUDENT IN AN ORGANIZED HEALTH CARE EDUCATION/TRAINING PROGRAM

## 2022-05-24 RX ORDER — BUPROPION HYDROCHLORIDE 100 MG/1
100 TABLET ORAL 2 TIMES DAILY
Status: DISCONTINUED | OUTPATIENT
Start: 2022-05-24 | End: 2022-06-01 | Stop reason: HOSPADM

## 2022-05-24 RX ORDER — DULOXETIN HYDROCHLORIDE 30 MG/1
30 CAPSULE, DELAYED RELEASE ORAL 2 TIMES DAILY
Status: DISCONTINUED | OUTPATIENT
Start: 2022-05-24 | End: 2022-06-01 | Stop reason: HOSPADM

## 2022-05-24 RX ORDER — ARIPIPRAZOLE 2 MG/1
2 TABLET ORAL DAILY
Status: DISCONTINUED | OUTPATIENT
Start: 2022-05-24 | End: 2022-05-27

## 2022-05-24 RX ADMIN — METFORMIN HYDROCHLORIDE 1000 MG: 500 TABLET, EXTENDED RELEASE ORAL at 09:06

## 2022-05-24 RX ADMIN — PREGABALIN 100 MG: 50 CAPSULE ORAL at 09:06

## 2022-05-24 RX ADMIN — INSULIN GLARGINE-YFGN 25 UNITS: 100 INJECTION, SOLUTION SUBCUTANEOUS at 21:13

## 2022-05-24 RX ADMIN — FAMOTIDINE 20 MG: 20 TABLET ORAL at 21:11

## 2022-05-24 RX ADMIN — BUPROPION HYDROCHLORIDE 100 MG: 100 TABLET, FILM COATED ORAL at 21:11

## 2022-05-24 RX ADMIN — METFORMIN HYDROCHLORIDE 1000 MG: 500 TABLET, EXTENDED RELEASE ORAL at 21:10

## 2022-05-24 RX ADMIN — DULOXETINE 30 MG: 30 CAPSULE, DELAYED RELEASE ORAL at 13:17

## 2022-05-24 RX ADMIN — PREGABALIN 100 MG: 50 CAPSULE ORAL at 21:11

## 2022-05-24 RX ADMIN — FAMOTIDINE 20 MG: 20 TABLET ORAL at 09:06

## 2022-05-24 RX ADMIN — Medication 3 MG: at 21:11

## 2022-05-24 RX ADMIN — PREGABALIN 100 MG: 50 CAPSULE ORAL at 13:05

## 2022-05-24 RX ADMIN — LEVOTHYROXINE SODIUM 88 MCG: 0.09 TABLET ORAL at 09:06

## 2022-05-24 RX ADMIN — ARIPIPRAZOLE 2 MG: 2 TABLET ORAL at 13:17

## 2022-05-24 RX ADMIN — DULOXETINE 30 MG: 30 CAPSULE, DELAYED RELEASE ORAL at 21:11

## 2022-05-24 RX ADMIN — BUPROPION HYDROCHLORIDE 100 MG: 100 TABLET, FILM COATED ORAL at 13:16

## 2022-05-24 RX ADMIN — LISINOPRIL 20 MG: 20 TABLET ORAL at 09:06

## 2022-05-24 RX ADMIN — FOLIC ACID 1 MG: 1 TABLET ORAL at 09:06

## 2022-05-24 ASSESSMENT — LIFESTYLE VARIABLES
HOW OFTEN DO YOU HAVE A DRINK CONTAINING ALCOHOL: NEVER
HOW MANY STANDARD DRINKS CONTAINING ALCOHOL DO YOU HAVE ON A TYPICAL DAY: 1 OR 2

## 2022-05-24 ASSESSMENT — SLEEP AND FATIGUE QUESTIONNAIRES
DO YOU HAVE DIFFICULTY SLEEPING: NO
AVERAGE NUMBER OF SLEEP HOURS: 10
DO YOU USE A SLEEP AID: YES
SLEEP PATTERN: NORMAL

## 2022-05-24 ASSESSMENT — PAIN DESCRIPTION - ORIENTATION: ORIENTATION: LOWER

## 2022-05-24 ASSESSMENT — PAIN DESCRIPTION - PAIN TYPE: TYPE: ACUTE PAIN

## 2022-05-24 ASSESSMENT — PATIENT HEALTH QUESTIONNAIRE - PHQ9: SUM OF ALL RESPONSES TO PHQ QUESTIONS 1-9: 21

## 2022-05-24 ASSESSMENT — PAIN SCALES - GENERAL: PAINLEVEL_OUTOF10: 4

## 2022-05-24 ASSESSMENT — PAIN DESCRIPTION - LOCATION: LOCATION: BACK

## 2022-05-24 NOTE — PROGRESS NOTES
Comprehensive Nutrition Assessment    Type and Reason for Visit:  Initial,Positive Nutrition Screen    Nutrition Recommendations/Plan:   1. Continue current diet  2. Start Glucerna BID to promote adequate oral intake  3. Consult if needed     Malnutrition Assessment:  Malnutrition Status:  Insufficient data (05/24/22 1406)    Context:  Acute Illness     Findings of the 6 clinical characteristics of malnutrition:  Energy Intake:  75% or less of estimated energy requirements for 7 or more days  Weight Loss:  Unable to assess (d/t wt fluctuations in ~3 mo ranging between 147#-164#; wt does appear to be trending down; 2/24/22=157#, 5/16/22=147# equates to ~6% (10#) in ~ 3mo)     Body Fat Loss:  Unable to assess     Muscle Mass Loss:  Unable to assess    Fluid Accumulation:  No significant fluid accumulation     Strength:  Not Performed    Nutrition Assessment:    pt adm d/t intractable N/V; pt reports normal appetite when she can keep food down; pt's wts appeart to fluctuate between 164-147# in ~3 mo per EMR review; hx of DM, HTN, CKD, gastroparesis; will start ONS to aid in oral intake; consult if needed. Nutrition Related Findings:    LIO I/O; A&Ox4; abd WNL; no edema Wound Type: None       Current Nutrition Intake & Therapies:    Average Meal Intake: Unable to assess (no intakes recorded per EMR)  Average Supplements Intake: None Ordered  ADULT DIET; Regular; 4 carb choices (60 gm/meal); Safety Tray; Safety Tray (Disposables)  ADULT ORAL NUTRITION SUPPLEMENT; Lunch, Dinner; Diabetic Oral Supplement    Anthropometric Measures:  Height: 5' 1\" (154.9 cm)  Ideal Body Weight (IBW): 105 lbs (48 kg)    Admission Body Weight: 147 lb (66.7 kg) (5/16-EMR office visit)  Current Body Weight: 147 lb (66.7 kg) (5/16-EMR office visit), 140 % IBW.     Current BMI (kg/m2): 27.8  Usual Body Weight: 157 lb (71.2 kg) (2/24/22-EMR office visit; note, EMR office visit wts fluctuate between 164-147# per EMR review)  % Weight Change (Calculated): -6.4  Weight Adjustment For: No Adjustment                 BMI Categories: Overweight (BMI 25.0-29. 9)    Estimated Daily Nutrient Needs:  Energy Requirements Based On: Formula  Weight Used for Energy Requirements: Current  Energy (kcal/day): 6045-7551  Weight Used for Protein Requirements: Ideal  Protein (g/day): 1.3-1.5g/kgxIBW=60-70g  Method Used for Fluid Requirements: 1 ml/kcal  Fluid (ml/day): 5354-5714    Nutrition Diagnosis:   · Inadequate oral intake related to altered GI function (gastroparesis) as evidenced by poor intake prior to admission,nausea,vomiting      Nutrition Interventions:   Food and/or Nutrient Delivery: Continue Current Diet,Start Oral Nutrition Supplement (glucerna BID)  Nutrition Education/Counseling: No recommendation at this time  Coordination of Nutrition Care: Continue to monitor while inpatient       Goals:     Goals: PO intake 50% or greater       Nutrition Monitoring and Evaluation:   Behavioral-Environmental Outcomes: None Identified  Food/Nutrient Intake Outcomes: Supplement Intake,Food and Nutrient Intake  Physical Signs/Symptoms Outcomes: Biochemical Data,Nutrition Focused Physical Findings,Skin,Weight,Chewing or Swallowing,GI Status,Fluid Status or Edema    Discharge Planning:    No discharge needs at this time     Ernie Barbour RD  Contact: [phone]

## 2022-05-24 NOTE — PLAN OF CARE
[id number] [address]  Initial Interdisciplinary Treatment Plan NOTE    Review Date & Time: 5/24/2022 0900    Patient was in treatment team    Admission Type:   Admission Type:  Involuntary    Reason for admission:  Reason for Admission: \"my doctor was worried about me harming myself\"      Estimated Length of Stay Update:  3-5 days  Estimated Discharge Date Update: 5/27/2022    EDUCATION:   Learner Progress Toward Treatment Goals: Reviewed results and recommendations of this team    Method: Small group    Outcome: Verbalized understanding    PATIENT GOALS: Try to manage my physical health issues    PLAN/TREATMENT RECOMMENDATIONS UPDATE: Encourage group participation and continue to provide emotional support    GOALS UPDATE:   Time frame for Short-Term Goals: 1-3 days    Shona Osgood, RN

## 2022-05-24 NOTE — H&P
ondansetron (ZOFRAN ODT) 4 MG disintegrating tablet, Take 1 tablet by mouth every 8 hours as needed for Nausea or Vomiting  lidocaine (LIDODERM) 5 %, Place 1 patch onto the skin daily 12 hours on, 12 hours off. [] ciprofloxacin (CILOXAN) 0.3 % ophthalmic solution, Place 1 drop into the right eye every 6 hours while awake for 3 days Please provide medication to patient before discharge  glucose 4g chewable tablet, Take 4 tablets by mouth as needed for Low blood sugar  Insulin Infusion Pump (MINIMED 770G INSULIN PUMP SYS) KIT, Use to infuse insulin  Continuous Blood Gluc Transmit (GUARDIAN LINK 3 TRANSMITTER) MISC, Use with insulin pump  Continuous Blood Gluc Sensor (GUARDIAN SENSOR 3) [facility] [place], Change every 7 days  pregabalin (LYRICA) 100 MG capsule, 100 mg 3 times daily.    buPROPion (WELLBUTRIN) 100 MG tablet, Take 100 mg by mouth daily  omeprazole (PRILOSEC) 40 MG delayed release capsule, Take 1 capsule by mouth every morning (before breakfast)  lisinopril (PRINIVIL;ZESTRIL) 20 MG tablet, Take 2 tablets by mouth daily  Insulin Lispro (HUMALOG KWIKPEN SC), Inject 25 Units into the skin 3 times daily (before meals) Plus sliding scale  insulin glargine (LANTUS) 100 UNIT/ML injection vial, Inject 50 Units into the skin 2 times daily  Continuous Blood Gluc Sensor (FREESTYLE NIMISHA 2 SENSOR) [facility] [place], USE TO TEST SUGAR CONTINUOUSLY AS DIRECTED  diclofenac sodium (VOLTAREN) 1 % GEL, Apply 4 g topically 4 times daily for 7 days  famotidine (PEPCID) 20 MG tablet, TAKE 1 TABLET BY MOUTH TWICE DAILY  metFORMIN (GLUCOPHAGE-XR) 500 MG extended release tablet, TAKE 2 TABLETS BY MOUTH 2 TIMES DAILY  naproxen (NAPROSYN) 250 MG tablet, TAKE 1 TABLET BY MOUTH TWICE A DAY WITH MEALS  prochlorperazine (COMPAZINE) 10 MG tablet, Take 1 tablet by mouth every 6 hours as needed (nausea)  levothyroxine (SYNTHROID) 88 MCG tablet, TAKE 1 TABLET BY MOUTH DAILY  ONETOUCH ULTRA strip, USE TO TEST THREE TIMES A DAY  calcium elemental (OSCAL) 500 MG TABS tablet, TAKE 1 TABLET BY MOUTH TWICE A DAY WITH LUNCH AND DINNER (Patient not taking: Reported on 5/16/2022)  lamoTRIgine (LAMICTAL) 100 MG tablet, TAKE 1 TABLET BY MOUTH EVERY DAY  OZEMPIC, 1 MG/DOSE, 4 MG/3ML SOPN, INJECT 1 MG UNDER THE SKIN ONCE WEEKLY  Insulin Pen Needle 31G X 5 MM MISC, 1 each by Does not apply route 3 times daily  melatonin 3 MG TABS tablet, TAKE ONE TABLET BY MOUTH EVERY NIGHT AT BEDTIME  hydrocortisone 2.5 % cream, Apply topically 2 times daily. Blood Pressure KIT, 1 box by Does not apply route 2 times daily  hydroxychloroquine (PLAQUENIL) 200 MG tablet, Take 200 mg by mouth 2 times daily  methotrexate (RHEUMATREX) 2.5 MG chemo tablet, Take 2.5 mg by mouth once a week Sundays  folic acid (FOLVITE) 1 MG tablet, Take 1 tablet by mouth daily  FREESTYLE LANCETS MISC, CHECK BS 4 TIMES DAILY    Past Surgical History:        Procedure Laterality Date    CT BIOPSY RENAL  11/18/2021    CT BIOPSY RENAL 11/18/2021 Carol Archer MD [facility]    SHOULDER ARTHROSCOPY Right 06/16/2017    UPPER GASTROINTESTINAL ENDOSCOPY N/A 5/6/2022    EGD BIOPSY performed by Jocelyne Benavidez MD at [facility] ENDOSCOPY       Allergies:   Amoxicillin, Sulfa antibiotics, Cefdinir, Doxycycline, and Other    Family History  Family History   Adopted: Yes   Family history unknown: Yes             EXAMINATION:    REVIEW OF SYSTEMS:    ROS:  [x] All negative/unchanged except if checked.  Explain positive(checked items) below:  [] Constitutional  [] Eyes  [] Ear/Nose/Mouth/Throat  [] Respiratory  [] CV  [x] GI  []   [] Musculoskeletal  [] Skin/Breast  [] Neurological  [] Endocrine  [] Heme/Lymph  [] Allergic/Immunologic    Explanation:   Hx gastroparesis  Vitals:  BP (!) 154/91   Pulse 96   Temp 97.6 °F (36.4 °C) (Temporal)   Resp 18   LMP  (LMP Unknown)   SpO2 99%      Physical Examination:   Head: x  Atraumatic: x normocephalic  Skin and Mucosa        Moist x  Dry   Pale  x Normal   Neck:  Thyroid  Palpable   x  Not palpable   venus distention   adenopathy   Chest: x Clear   Rhonchi     Wheezing   CV:  xS1   xS2    xNo murmer   Abdomen:  x  Soft    Tender    Viceromegaly   Extremities:  x No Edema     Edema     Cranial Nerves Examination:   CN II:   xPupils are reactive to light  Pupils are non reactive to light  CN III, IV, VI:  xNo eye deviation    No diplopia or ptosis   CN V:    xFacial Sensation is intact     Facial Sensation is not intact   CN IIIV:   x Hearing is normal to rubbing fingers   CN IX, X:     xNormal gag reflex and phonation   CN XI:   xShoulder shrug and neck rotation is normal  CNXII:    xTongue is midline no deviation or atrophy    Mental Status Examination:    Level of consciousness:  within normal limits   Appearance:  well-appearing  Behavior/Motor:  no abnormalities noted  Attitude toward examiner:  cooperative  Speech:  normal rate, normal volume and well articulated   Mood: depressed  Affect:  blunted  Thought processes:  linear   Thought content: The patient is devoid of suicidal or homicidal ideation intent or plan. Devoid of auditory or visual hallucinations or other perceptual disturbances, there are no overt or covert signs of psychosis or paranoia. There are no neurovegetative signs of depression.   Cognition:  oriented to person, place, and time   Concentration intact  Memory intact  Insight poor   Judgement poor   Fund of Knowledge good      DIAGNOSIS:  Severe episode of recurrent major depressive disorder, without psychotic features ([facility] [address]  Cluster B personality traits          LABS: REVIEWED TODAY:  Recent Labs     05/22/22  1548   WBC 10.4   HGB 9.6*        Recent Labs     05/22/22  1548 05/22/22  1548 05/22/22  2303 05/23/22  0812 05/23/22 2027     --   --  143 146   K 3.3*  --   --  3.4* 3.6   *  --   --  109* 110*   CO2 26  --   --  23 21*   BUN 13  --   --  8 10   CREATININE 0.9  --   --  0.8 0.9   GLUCOSE 181*   < > 152 110* 69*    < > = values in this interval not displayed. Recent Labs     05/22/22  1548   BILITOT <0.2   ALKPHOS 115*   AST 18   ALT 21     Lab Results   Component Value Date    LABAMPH NOT DETECTED 05/22/2022    BARBSCNU NOT DETECTED 05/22/2022    LABBENZ NOT DETECTED 05/22/2022    LABMETH NOT DETECTED 05/22/2022    OPIATESCREENURINE NOT DETECTED 05/22/2022    PHENCYCLIDINESCREENURINE NOT DETECTED 05/22/2022    ETOH <10 05/22/2022     Lab Results   Component Value Date    TSH 5.220 05/16/2022     No results found for: LITHIUM  No results found for: VALPROATE, CBMZ  No results found for: LITHIUM, VALPROATE      Radiology CT HEAD WO CONTRAST    Result Date: 5/11/2022  EXAMINATION: CT OF THE HEAD WITHOUT CONTRAST  5/11/2022 5:54 pm TECHNIQUE: CT of the head was performed without the administration of intravenous contrast. Automated exposure control, iterative reconstruction, and/or weight based adjustment of the mA/kV was utilized to reduce the radiation dose to as low as reasonably achievable. COMPARISON: 03/16/2019 HISTORY: ORDERING SYSTEM PROVIDED HISTORY: syncope TECHNOLOGIST PROVIDED HISTORY: Reason for exam:->syncope Has a \"code stroke\" or \"stroke alert\" been called? ->No Decision Support Exception - unselect if not a suspected or confirmed emergency medical condition->Emergency Medical Condition (MA) What reading provider will be dictating this exam?->CRC FINDINGS: BRAIN/VENTRICLES: There is no acute intracranial hemorrhage, mass effect or midline shift. No abnormal extra-axial fluid collection. The gray-white differentiation is maintained without evidence of an acute infarct. There is no evidence of hydrocephalus. ORBITS: The visualized portion of the orbits demonstrate no acute abnormality. SINUSES: The visualized paranasal sinuses and mastoid air cells demonstrate no acute abnormality. SOFT TISSUES/SKULL:  No acute abnormality of the visualized skull or soft tissues. No acute intracranial abnormality.      NM GASTRIC EMPTYING    Result Date: 5/17/2022  EXAMINATION: NUCLEAR MEDICINE GASTRIC EMPTYING STUDY 5/17/2022 8:29 am TECHNIQUE: Following ingestion of a standard solid meal labeled with 2.0 mCi Tc 99m sulfur colloid, planar images of the chest and abdomen were obtained at 0, 1, 2 and 4 hours in both anterior and posterior projections. Regions of interest were drawn around the stomach and geometric means were calculated. All medications capable of altering motility were held. COMPARISON: None. HISTORY: ORDERING SYSTEM PROVIDED HISTORY: intractable vomiting in diabetic patient TECHNOLOGIST PROVIDED HISTORY: Reason for exam:->intractable vomiting in diabetic patient What reading provider will be dictating this exam?->CRC FINDINGS: The gastric emptying were calculated as follows: At 60 min, there is 11% emptying of the stomach. (Normal range is 10-70%) At 120 min, there is 33% emptying of the stomach. (Normal is >40%) At 240 min, there is 76% emptying of the stomach. (Normal is >90%) No significant esophageal retention, hiatal hernia or reflux was observed. Delayed gastric emptying consistent with gastroparesis     XR CHEST PORTABLE    Result Date: 5/22/2022  EXAMINATION: ONE XRAY VIEW OF THE CHEST 5/22/2022 3:39 pm COMPARISON: May 16, 2022 HISTORY: ORDERING SYSTEM PROVIDED HISTORY: emesis TECHNOLOGIST PROVIDED HISTORY: Reason for exam:->emesis FINDINGS: No airspace opacity or pleural effusion. The heart is normal size. No pneumothorax. No free air beneath the hemidiaphragms. No pneumonia or pleural effusion. XR CHEST PORTABLE    Result Date: 5/16/2022  EXAMINATION: ONE XRAY VIEW OF THE CHEST 5/16/2022 10:18 am COMPARISON: None. HISTORY: ORDERING SYSTEM PROVIDED HISTORY: Upper abdominal pain, vomiting TECHNOLOGIST PROVIDED HISTORY: Reason for exam:->Upper abdominal pain, vomiting FINDINGS: The lungs are without acute focal process. There is no effusion or pneumothorax. The cardiomediastinal silhouette is without acute process.  The osseous structures are without acute process. There is a small scar seen within the right infrahilar region which is chronic. No acute process. XR CHEST PORTABLE    Result Date: 5/11/2022  EXAMINATION: ONE XRAY VIEW OF THE CHEST 5/11/2022 8:03 pm COMPARISON: 05/16/2021. HISTORY: ORDERING SYSTEM PROVIDED HISTORY: syncope TECHNOLOGIST PROVIDED HISTORY: Reason for exam:->syncope What reading provider will be dictating this exam?->CRC FINDINGS: The lungs are without acute focal process. Right perihilar atelectasis. There is no effusion or pneumothorax. The cardiomediastinal silhouette is without acute process. The osseous structures are without acute     No acute process. CTA CHEST W CONTRAST    Result Date: 5/12/2022  EXAMINATION: CTA OF THE CHEST; CTA OF THE ABDOMEN AND PELVIS WITH CONTRAST 5/12/2022 1:35 am TECHNIQUE: CTA of the chest was performed after the administration of intravenous contrast.  Multiplanar reformatted images are provided for review. MIP images are provided for review. Automated exposure control, iterative reconstruction, and/or weight based adjustment of the mA/kV was utilized to reduce the radiation dose to as low as reasonably achievable.; CTA of the abdomen and pelvis was performed with the administration of intravenous contrast. Multiplanar reformatted images are provided for review. MIP images are provided for review. Automated exposure control, iterative reconstruction, and/or weight based adjustment of the mA/kV was utilized to reduce the radiation dose to as low as reasonably achievable. COMPARISON: None. HISTORY: ORDERING SYSTEM PROVIDED HISTORY: r/o dissection TECHNOLOGIST PROVIDED HISTORY: Reason for exam:->r/o dissection Decision Support Exception - unselect if not a suspected or confirmed emergency medical condition->Emergency Medical Condition (MA) What reading provider will be dictating this exam?->CRC CTA CHEST Aorta: No evidence of thoracic aortic aneurysm or dissection. No acute abnormality of the aorta. Mediastinum: No evidence of mediastinal lymphadenopathy. The heart and pericardium demonstrate no acute abnormality. Lungs/Pleura: The lungs are without acute process. No focal consolidation or pulmonary edema. No evidence of pleural effusion or pneumothorax. There is a 3mm pulmonary nodule in the right upper lobe. Soft Tissues/Bones: No acute bone or soft tissue abnormality. CTA ABDOMEN PELVIS LIVER:  The liver is normal in size, contour and attenuation. No focal mass. No intra or extrahepatic bile duct dilation. GALL BLADDER: Unremarkable. SPLEEN:  Unremarkable. PANCREAS:  Unremarkable. ADRENAL GLANDS: Unremarkable. ESOPHAGUS AND STOMACH:  Unremarkable. BOWEL: Small bowel:  Unremarkable. Large bowel: The colon and rectum are of normal course and caliber. The appendix is within normal limits. There is no intraperitoneal free air or fluid. URINARY/GENITAL TRACT: Kidneys:  The kidneys enhance symmetrically. No evidence of hydronephrosis, renal calcifications or solid renal mass. Ureters: The ureters are normal course and caliber. There is no evidence of ureter calculus/calculi. URINARY BLADDER:  The urinary bladder is well distended without wall thickening or focal mass. REPRODUCTIVE ORGANS: There is a 6.4cm multilocular cystic lesion of the left adnexa. BLOOD VESSELS: Unremarkable given patients age. No evidence of abdominal aortic anuerysm or dissection. LYMPH NODES: No evidence of intraabdominal or intrapelvic lymphadenopathy. ABDOMINAL WALL & SOFT TISSUES: Unremarkable. OSSEOUS STRUCTURES: No acute osseous lesion. 1. A 6.4cm multilocular cystic lesion of the left adnexa. 2.  No evidence of a aortic dissection or anuerysm. 3.  A 3mm pulmonary nodule in the right upper lobe.      CTA ABDOMEN PELVIS W CONTRAST    Result Date: 5/12/2022  EXAMINATION: CTA OF THE CHEST; CTA OF THE ABDOMEN AND PELVIS WITH CONTRAST 5/12/2022 1:35 am TECHNIQUE: CTA of the chest was performed after the administration of intravenous contrast.  Multiplanar reformatted images are provided for review. MIP images are provided for review. Automated exposure control, iterative reconstruction, and/or weight based adjustment of the mA/kV was utilized to reduce the radiation dose to as low as reasonably achievable.; CTA of the abdomen and pelvis was performed with the administration of intravenous contrast. Multiplanar reformatted images are provided for review. MIP images are provided for review. Automated exposure control, iterative reconstruction, and/or weight based adjustment of the mA/kV was utilized to reduce the radiation dose to as low as reasonably achievable. COMPARISON: None. HISTORY: ORDERING SYSTEM PROVIDED HISTORY: r/o dissection TECHNOLOGIST PROVIDED HISTORY: Reason for exam:->r/o dissection Decision Support Exception - unselect if not a suspected or confirmed emergency medical condition->Emergency Medical Condition (MA) What reading provider will be dictating this exam?->CRC CTA CHEST Aorta: No evidence of thoracic aortic aneurysm or dissection. No acute abnormality of the aorta. Mediastinum: No evidence of mediastinal lymphadenopathy. The heart and pericardium demonstrate no acute abnormality. Lungs/Pleura: The lungs are without acute process. No focal consolidation or pulmonary edema. No evidence of pleural effusion or pneumothorax. There is a 3mm pulmonary nodule in the right upper lobe. Soft Tissues/Bones: No acute bone or soft tissue abnormality. CTA ABDOMEN PELVIS LIVER:  The liver is normal in size, contour and attenuation. No focal mass. No intra or extrahepatic bile duct dilation. GALL BLADDER: Unremarkable. SPLEEN:  Unremarkable. PANCREAS:  Unremarkable. ADRENAL GLANDS: Unremarkable. ESOPHAGUS AND STOMACH:  Unremarkable. BOWEL: Small bowel:  Unremarkable. Large bowel: The colon and rectum are of normal course and caliber. The appendix is within normal limits.   There is no intraperitoneal free air or fluid. URINARY/GENITAL TRACT: Kidneys:  The kidneys enhance symmetrically. No evidence of hydronephrosis, renal calcifications or solid renal mass. Ureters: The ureters are normal course and caliber. There is no evidence of ureter calculus/calculi. URINARY BLADDER:  The urinary bladder is well distended without wall thickening or focal mass. REPRODUCTIVE ORGANS: There is a 6.4cm multilocular cystic lesion of the left adnexa. BLOOD VESSELS: Unremarkable given patients age. No evidence of abdominal aortic anuerysm or dissection. LYMPH NODES: No evidence of intraabdominal or intrapelvic lymphadenopathy. ABDOMINAL WALL & SOFT TISSUES: Unremarkable. OSSEOUS STRUCTURES: No acute osseous lesion. 1. A 6.4cm multilocular cystic lesion of the left adnexa. 2.  No evidence of a aortic dissection or anuerysm. 3.  A 3mm pulmonary nodule in the right upper lobe. TREATMENT PLAN:  The patient's diagnosis, treatment plan, medication management were formulated after patient was seen directly by the attending physician and myself and all relevant documentation was reviewed.     Risk, benefit, side effects, possible outcomes of the medication and alternatives discussed with the patient and the patient demonstrated understanding. The patient was also educated that the outcome of treatment will depend on the medication compliance as directed by the prescribers along with regular follow-up, compliance with the labs and other work-up, as clinically indicated.     Risk Management: Based on the diagnosis and assessment biopsychosocial treatment model was presented to the patient and was given the opportunity to ask any question. The patient was agreeable to the plan and all the patient's questions were answered to the patient's satisfaction. I discussed with the patient the risk, benefit, alternative and common side effects for the proposed medication treatment.   The patient is consenting to this treatment. Has been admitted to inpatient behavioral health in an emotionally supportive environment every 15 minute safety checks. This patient is low risk for suicide. Collateral Information:  Will obtain collateral information from the family or friends. Will obtain medical records as appropriate from out patient providers  Will consult the hospitalist for a physical exam to rule out any co-morbid physical condition. Home medication Reconciled   Reviewed and continued as indicated    New Medications started during this admission :    Continue Wellbutrin 100 mg daily  Abilify 2 mg daily for augmentation of treatment for major depressive disorder  Decrease Cymbalta to 30 mg twice daily      Prn Haldol 5mg and Vistaril 50mg q6hr for extreme agitation. Trazodone as ordered for insomnia  Vistaril as ordered for anxiety      Psychotherapy:   Encourage participation in milieu and group therapy  Individual therapy as needed  Patient was recommended for dialectical behavioral therapy in the community for coping skills and to reduce her eliminate self-injurious or parasuicidal behaviors. NOTE: This report was transcribed using voice recognition software. Every effort was made to ensure accuracy; however, inadvertent computerized transcription errors may be present. Behavioral Services  Medicare Certification Upon Admission    I certify that this patient's inpatient psychiatric hospital admission is medically necessary for:    [x] (1) Treatment which could reasonably be expected to improve this patient's condition,       [x] (2) Or for diagnostic study;     AND     [x](2) The inpatient psychiatric services are provided while the individual is under the care of a physician and are included in the individualized plan of care.     Estimated length of stay/service 3 - 5 days based on stabilty    Plan for post-hospital care follow with OP provider     Electronically signed by ANA Iraheta - CNP on 5/24/2022 at 9:29 AM
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
## 2022-05-24 NOTE — PLAN OF CARE
Patient A&Ox4. Pt denies SI, HI, and hallucinations. Pt rates anxiety and depression 7/10. Patient is flat and cooperative with blunt affect. Pt is visible on the unit, attends group and social with select. Pt is med. & meal compliant. Pt with even and unlabored breathing, no signs of acute physical distress noted. Will continue to monitor and offer support as needed. Problem: Anxiety  Goal: Will report anxiety at manageable levels  Description: INTERVENTIONS:  1. Administer medication as ordered  2. Teach and rehearse alternative coping skills  3. Provide emotional support with 1:1 interaction with staff  Outcome: Progressing     Problem: Behavior  Goal: Pt/Family maintain appropriate behavior and adhere to behavioral management agreement, if implemented  Description: INTERVENTIONS:  1. Assess patient/family's coping skills and  non-compliant behavior (including use of illegal substances)  2. Notify security of behavior or suspected illegal substances which indicate the need for search of the patient and/or belongings  3. Encourage verbalization of thoughts and concerns in a socially appropriate manner  4. Utilize positive, consistent limit setting strategies supporting safety of patient, staff and others  5. Encourage participation in the decision making process about the behavioral management agreement  6. Implement a Health Care Agreement if patient meets criteria  7. If a patient's behavior jeopardizes the safety of the patient, staff, or others refer to organization policy. If a visitor's behavior poses a threat to safety call refer to organization policy. 8. Initiate consult with , Psychosocial CNS, Spiritual Care as appropriate  Outcome: Progressing     Problem: Depression/Self Harm  Goal: Effect of psychiatric condition will be minimized and patient will be protected from self harm  Description: INTERVENTIONS:  1.  Assess impact of patient's symptoms on level of functioning, self care needs and offer support as indicated  2. Assess patient/family knowledge of depression, impact on illness and need for teaching  3. Provide emotional support, presence and reassurance  4. Assess for possible suicidal thoughts or ideation. If patient expresses suicidal thoughts or statements do not leave alone, initiate Suicide Precautions, move to a room close to the nursing station and obtain sitter  5.  Initiate consults as appropriate with Mental Health Professional, Spiritual Care, Psychosocial CNS, and consider a recommendation to the [facility] for a Psychiatric Consultation  Outcome: Progressing

## 2022-05-24 NOTE — GROUP NOTE
Date: 5/24/2022    Group Start Time: 1000  Group End Time: 9639  Group Topic: Psychoeducation    [facility]  [address] [place], [place]                                                                        Group Therapy Note    Date: 5/24/2022  Number of Participants: 7  Type of Group: Psychoeducation    Wellness Binder Information  Module Name: if of stressors   Patient's Goal: patient will be able to id daily hassles , major life changes and life circumstances. Notes: Willing to share current triggers and stressors and what baby steps to find balance in ones life. Status After Intervention:  Improved    Participation Level:  Active Listener and Interactive    Participation Quality: Appropriate, Attentive, Sharing, and Supportive      Speech: normal       Thought Process/Content: Logical      Affective Functioning: Congruent      Mood: euthymic      Level of consciousness:  Alert, Oriented x4, and Attentive      Response to Learning: Able to verbalize/acknowledge new learning, Able to retain information, and Progressing to goal      Endings: None Reported    Modes of Intervention: Education, Support, Socialization, Exploration, and Problem-solving      Discipline Responsible: Psychoeducational Specialist      Signature:  Soraida Pina

## 2022-05-24 NOTE — PROGRESS NOTES
[facility]  Family Medicine Attending    S: 47 y.o. female with PMH of uncontrolled DM2 on long term insulin with neuropathy, HTN, Hypothyroidism, rheumatoid arthritis, who presented to the [facility] ED for intractable nausea and vomiting. After a negative evaluation,  she admitted to suicidal ideation and was transferred here for psychiatric treatment. She continued to have N/V and we were consulted for treatment. She recently had EGD, which she feels made her symptoms worse. She has known gastroparesis. States that she usually has one bad week a month, without any pattern    Today, she is tolerating oral feeding    O: VS- Blood pressure (!) 154/91, pulse 96, temperature 97.6 °F (36.4 °C), temperature source Temporal, resp. rate 18, SpO2 99 %, not currently breastfeeding. Exam is as noted by resident with the following changes, additions or corrections:  Awake, alert  Heart - RRR  Lungs- clear  Abdomen- soft, nontender    Previous chart reviewed    Impressions:   Principal Problem:    Suicidal ideations  Active Problems:    Hypokalemia due to loss of potassium    Intractable vomiting    Type 2 diabetes mellitus with complication, with long-term current use of insulin ([address]    Acquired hypothyroidism    Encounter for long-term (current) use of insulin (HCC)    Rheumatoid arthritis involving multiple sites [facility])    Primary hypertension  Resolved Problems:    * No resolved hospital problems. *      Plan:   Monitor and adjust insulin as needed   Consider adjusting other meds to help with N/V   Main issue is her poor diabetic control -she is waiting on insulin pump, but currently requiring high doses of insulin. Ozempic stopped, consider holding or decreasing metformin   Could trial metoclopramide if needed   Psych for depression treatment   Thank you for the referral     Attending Physician Statement  I have reviewed the chart and seen the patient with the resident(s).   I personally reviewed images, EKG's and similar tests, if present. I personally reviewed and performed key elements of the history and exam.  I have reviewed and confirmed student and/or resident history and exam with changes as indicated above. I agree with the assessment, plan and orders as documented by the resident. Please refer to the resident and/or student note for additional information.       Geneva Syed MD

## 2022-05-24 NOTE — GROUP NOTE
Group Therapy Note    Date: 5/24/2022    Group Start Time: 1110  Group End Time: 1301  Group Topic: Psychotherapy    [facility]  Av. AVSI Jacobo, LSW        Group Therapy Note    Attendees: 6         Patient's Goal:  To increase social interaction and improve relationships with others.      Notes:  Pt was attentive in group and was able to identify and agenda. Pt was able to verbalize relating to other and made connections. Status After Intervention:  Improved    Participation Level:  Active Listener and Interactive    Participation Quality: Appropriate, Attentive, Sharing and Supportive      Speech:  normal      Thought Process/Content: Logical  Linear      Affective Functioning: Congruent      Mood: anxious      Level of consciousness:  Alert, Oriented x4 and Attentive      Response to Learning: Able to verbalize current knowledge/experience, Able to verbalize/acknowledge new learning, Able to retain information and Capable of insight      Endings: None Reported    Modes of Intervention: Support, Socialization and Exploration      Discipline Responsible: /Counselor      Signature:  Jen Denver, MSW, Barrie Kong

## 2022-05-24 NOTE — PROGRESS NOTES
[facility] - Family Medicine Inpatient   Resident Progress Note    S:  Hospital day: 1    Brief Synopsis: Chanda Arango is a 47 y.o. female with a PMH of uncontrolled DM2 on long term insulin with neuropathy, HTN, Hypothyroidism and RA who presented to the [facility] ED for intractable nausea and vomiting. Patient had tolerated food at [facility] ED, but prior to discharge became tearful and admitted suicidal ideation without a plan, and homicidal ideation toward her mother. Patient was accepted by Dr. Freddy Sal and was transferred to [facility]. Family medicine has been consulted for medical management. No acute events overnight. Patient was seen and examined this morning. Patient admits not taking some of her medicines. All questions were answered and taking her medications has been recommended. She is drinking and taking liquids without nausea and vomiting. Cont meds:    dextrose       Scheduled meds:    DULoxetine  30 mg Oral BID    ARIPiprazole  2 mg Oral Daily    buPROPion  100 mg Oral BID    nicotine  1 patch TransDERmal Daily    melatonin  3 mg Oral Nightly    famotidine  20 mg Oral BID    folic acid  1 mg Oral Daily    levothyroxine  88 mcg Oral Daily    lisinopril  20 mg Oral Daily    metFORMIN  1,000 mg Oral BID    pregabalin  100 mg Oral TID    insulin glargine  25 Units SubCUTAneous Nightly     PRN meds: acetaminophen, magnesium hydroxide, aluminum & magnesium hydroxide-simethicone, hydrOXYzine, ondansetron, glucose, dextrose **OR** [DISCONTINUED] dextrose bolus, glucagon (rDNA), dextrose     I reviewed the patient's Past Medical and Surgical History, Medications and Allergies. O:  VS: BP (!) 154/91   Pulse 96   Temp 97.6 °F (36.4 °C) (Temporal)   Resp 18   Ht 5' 1\" (1.549 m)   LMP  (LMP Unknown)   SpO2 99%   BMI 27.78 kg/m²     Physical Exam:  Physical Exam  Vitals reviewed. Constitutional:       General: She is not in acute distress.   HENT:      Head: Normocephalic and atraumatic. Nose: Nose normal. No congestion or rhinorrhea. Cardiovascular:      Rate and Rhythm: Normal rate and regular rhythm. Pulses: Normal pulses. Heart sounds: Normal heart sounds. Pulmonary:      Effort: Pulmonary effort is normal.      Breath sounds: Normal breath sounds. Abdominal:      General: Bowel sounds are normal.      Palpations: Abdomen is soft. Tenderness: There is no abdominal tenderness. There is no guarding or rebound. Musculoskeletal:      Cervical back: Normal range of motion and neck supple. Right lower leg: No edema. Left lower leg: No edema. Skin:     General: Skin is warm. Findings: No rash. Neurological:      General: No focal deficit present. Mental Status: She is alert. Psychiatric:         Thought Content: Thought content normal.         Judgment: Judgment normal.            Labs:  Na/K/Cl/CO2:  142/3.6/109/21 (05/24 4970)  BUN/Cr/glu/ALT/AST/amyl/lip:  9/0.8/--/--/--/--/-- (05/24 0711)  WBC/Hgb/Hct/Plts:  10.4/9.6/31.3/251 (05/22 1548)  estimated creatinine clearance is 73 mL/min (based on SCr of 0.8 mg/dL). Other pertinent labs as noted below    New Imaging:  No orders to display       A/P:  Principal Problem:    Severe episode of recurrent major depressive disorder, without psychotic features ([facility]  Active Problems:    Hypokalemia due to loss of potassium    Intractable vomiting    Cluster B personality traits(HCC)    Type 2 diabetes mellitus with complication, with long-term current use of insulin (HCC)    Acquired hypothyroidism    Encounter for long-term (current) use of insulin (HCC)    Rheumatoid arthritis involving multiple sites ([facility]    Primary hypertension  Resolved Problems:    * No resolved hospital problems.  *      Depression with SI/HI  · On Aripiprazole 2 mg daily, Duloxetine 30 mg BID and Bupropion 100 mg BID  · On Melatonin for sleeping  · Suicide precautions  · Management per psychiatry     DM2 with neuropathy and gastroparesis  · Last HbA1C is 15%  · She is waiting on insulin pump, but currently requiring high doses of insulin  · Lantus restarted at 25 U. Consider increasing to home dose of 50 U BID and restarting home 25 U humalog TID with meals plus sliding scale as patient's oral intake improves and glucose levels continue to elevate.   · On Metformin 1000 mg BID  · Liraglutide stopped due to gastroparesis  · Continue home Lyrica 100 mg TID for neuropathy  · On Pepcid 20 mg BID  · On Maalox PRN  · Consider Metoclopramide for gastroparesis  · Hypoglycemic protocol in place     HTN  · On Lisinopril 20 mg daily    Hypokalemia - resolved     Hypothyroidism  · Continue home Levothyroxine 88 mcg daily     RA  · Home medications are Methotrexate and Hydroxychloroquine, patient not taking them      DVT Prophylaxis: none, patient is fully mobile  GI Prophylaxis: Pepcid 20 mg BID  Diet: Adult regular carb controlled plus oral supplements  Disposition: Psych unit      Electronically signed by Nolan Lo MD on 5/24/2022 at 7:59 PM  This case was discussed with attending physician Dr. Karin Rodriguez

## 2022-05-24 NOTE — PROGRESS NOTES
New medication orders placed for patient. Patient refused all medications, except for Lyrica and PRN Zofran (see MAR). Patient states she does not typically take these mediations at this time and if she were to, she would get sick over night. Will continue to monitor and provide support.

## 2022-05-24 NOTE — PLAN OF CARE
Patient calm and cooperative during assessment. Affect flat, sad, but brightens with conversation. Patient has been out on unit, social with select peers. Endorses anxiety rating 10/10. Patient relates this to admission to [address] [place] [address] and states, \"I don't think I need to be here. I was evaluated by a doctor who made a mistake. \" Patient also states her chronic N/V causes her to have anxiety d/t not knowing the cause. Patient endorses passive death wish, no plan or intent, contracts for safety. Patient also admits to feeling homicidal towards mother, but has not spoken to her since 2014. Patient denies intent or plan. Patient has been in control of behaviors. Will continue to monitor and provide support. Problem: Anxiety  Goal: Will report anxiety at manageable levels  Description: INTERVENTIONS:  1. Administer medication as ordered  2. Teach and rehearse alternative coping skills  3. Provide emotional support with 1:1 interaction with staff  5/23/2022 2134 by Norma Ho RN  Outcome: Progressing     Problem: Coping  Goal: Pt/Family able to verbalize concerns and demonstrate effective coping strategies  Description: INTERVENTIONS:  1. Assist patient/family to identify coping skills, available support systems and cultural and spiritual values  2. Provide emotional support, including active listening and acknowledgement of concerns of patient and caregivers  3. Reduce environmental stimuli, as able  4. Instruct patient/family in relaxation techniques, as appropriate  5. Assess for spiritual pain/suffering and initiate Spiritual Care, Psychosocial Clinical Specialist consults as needed  5/23/2022 2134 by Norma Ho RN  Outcome: Progressing     Problem: Behavior  Goal: Pt/Family maintain appropriate behavior and adhere to behavioral management agreement, if implemented  Description: INTERVENTIONS:  1. Assess patient/family's coping skills and  non-compliant behavior (including use of illegal substances)  2. Notify security of behavior or suspected illegal substances which indicate the need for search of the patient and/or belongings  3. Encourage verbalization of thoughts and concerns in a socially appropriate manner  4. Utilize positive, consistent limit setting strategies supporting safety of patient, staff and others  5. Encourage participation in the decision making process about the behavioral management agreement  6. Implement a Health Care Agreement if patient meets criteria  7. If a patient's behavior jeopardizes the safety of the patient, staff, or others refer to organization policy. If a visitor's behavior poses a threat to safety call refer to organization policy.   8. Initiate consult with , Psychosocial CNS, Spiritual Care as appropriate  5/23/2022 2134 by Alice Ferrera RN  Outcome: Progressing

## 2022-05-24 NOTE — PROGRESS NOTES
Attended morning community meeting. Updated on staffing and daily expectations. Shared goal for the day as to try to manage my physical health issues.

## 2022-05-25 LAB
METER GLUCOSE: 106 MG/DL (ref 74–99)
METER GLUCOSE: 118 MG/DL (ref 74–99)
METER GLUCOSE: 121 MG/DL (ref 74–99)
METER GLUCOSE: 124 MG/DL (ref 74–99)

## 2022-05-25 PROCEDURE — 1240000000 HC EMOTIONAL WELLNESS R&B

## 2022-05-25 PROCEDURE — 6370000000 HC RX 637 (ALT 250 FOR IP): Performed by: PSYCHIATRY & NEUROLOGY

## 2022-05-25 PROCEDURE — 99231 SBSQ HOSP IP/OBS SF/LOW 25: CPT | Performed by: NURSE PRACTITIONER

## 2022-05-25 PROCEDURE — 82962 GLUCOSE BLOOD TEST: CPT

## 2022-05-25 PROCEDURE — 6370000000 HC RX 637 (ALT 250 FOR IP): Performed by: STUDENT IN AN ORGANIZED HEALTH CARE EDUCATION/TRAINING PROGRAM

## 2022-05-25 PROCEDURE — 6370000000 HC RX 637 (ALT 250 FOR IP): Performed by: NURSE PRACTITIONER

## 2022-05-25 PROCEDURE — S5553 INSULIN LONG ACTING 5 U: HCPCS | Performed by: STUDENT IN AN ORGANIZED HEALTH CARE EDUCATION/TRAINING PROGRAM

## 2022-05-25 RX ADMIN — BUPROPION HYDROCHLORIDE 100 MG: 100 TABLET, FILM COATED ORAL at 21:57

## 2022-05-25 RX ADMIN — FAMOTIDINE 20 MG: 20 TABLET ORAL at 08:39

## 2022-05-25 RX ADMIN — FAMOTIDINE 20 MG: 20 TABLET ORAL at 21:58

## 2022-05-25 RX ADMIN — PREGABALIN 100 MG: 50 CAPSULE ORAL at 08:39

## 2022-05-25 RX ADMIN — DULOXETINE 30 MG: 30 CAPSULE, DELAYED RELEASE ORAL at 21:58

## 2022-05-25 RX ADMIN — INSULIN GLARGINE-YFGN 25 UNITS: 100 INJECTION, SOLUTION SUBCUTANEOUS at 22:02

## 2022-05-25 RX ADMIN — BUPROPION HYDROCHLORIDE 100 MG: 100 TABLET, FILM COATED ORAL at 08:38

## 2022-05-25 RX ADMIN — LEVOTHYROXINE SODIUM 88 MCG: 0.09 TABLET ORAL at 08:39

## 2022-05-25 RX ADMIN — Medication 3 MG: at 21:58

## 2022-05-25 RX ADMIN — DULOXETINE 30 MG: 30 CAPSULE, DELAYED RELEASE ORAL at 08:39

## 2022-05-25 RX ADMIN — METFORMIN HYDROCHLORIDE 1000 MG: 500 TABLET, EXTENDED RELEASE ORAL at 08:38

## 2022-05-25 RX ADMIN — FOLIC ACID 1 MG: 1 TABLET ORAL at 08:39

## 2022-05-25 RX ADMIN — PREGABALIN 100 MG: 50 CAPSULE ORAL at 13:20

## 2022-05-25 RX ADMIN — METFORMIN HYDROCHLORIDE 1000 MG: 500 TABLET, EXTENDED RELEASE ORAL at 21:58

## 2022-05-25 RX ADMIN — LISINOPRIL 20 MG: 20 TABLET ORAL at 08:39

## 2022-05-25 RX ADMIN — ARIPIPRAZOLE 2 MG: 2 TABLET ORAL at 08:38

## 2022-05-25 RX ADMIN — PREGABALIN 100 MG: 50 CAPSULE ORAL at 21:58

## 2022-05-25 ASSESSMENT — PAIN SCALES - GENERAL: PAINLEVEL_OUTOF10: 0

## 2022-05-25 NOTE — PROGRESS NOTES
BEHAVIORAL HEALTH FOLLOW-UP NOTE     5/25/2022     Patient was seen and examined in person, Chart reviewed   Patient's case discussed with staff/team    Chief Complaint: \"I'm not good\"     Interim History:     Patient assessed in her room this morning, she states that she is not doing good. She tells me that she feels depressed and has no interests or energy. She states that she thinks about suicide all the time but has no intent and no desire to hurt herself. She is able to contract for safety. She denies any AVH or other psychotic symptoms. Patient attending groups and has been observed to be social with peers. Patient encouraged to give the medications time to work. Appetite:   [x] Normal/Unchanged  [] Increased  [] Decreased      Sleep:       [x] Normal/Unchanged  [] Fair       [] Poor              Energy:    [] Normal/Unchanged  [] Increased  [x] Decreased        SI [x] Present  [] Absent    HI  []Present  [x] Absent     Aggression:  [] yes  [x] no    Patient is [x] able  [] unable to CONTRACT FOR SAFETY     PAST MEDICAL/PSYCHIATRIC HISTORY:   Past Medical History:   Diagnosis Date    Anxiety     Arthritis     Depression     [facility]     Fibromyalgia     HTN (hypertension)     Hypertensive chronic kidney disease     Left shoulder pain     LIZABETH (obstructive sleep apnea)     Rhe[facility]oid arthritis involving multiple sites ([facility]     Sjogren's disease ([facility]     Thyroid disease        FAMILY/SOCIAL HISTORY:  Family History   Adopted: Yes   Family history unknown: Yes     Social History     Socioeconomic History    Marital status: Single     Spouse name: Not on file    Number of children: Not on file    Years of education: Not on file    Highest education level:  Bachelor's degree (e.g., BA, AB, BS)   Occupational History    Occupation: unemployed    Tobacco Use    Smoking status: Former Smoker     Packs/day: 2.00     Years: 0.50     Pack years: 1.00     Start date: 1997     Quit negative/unchanged except if checked.  Explain positive(checked items) below:  [] Constitutional  [] Eyes  [] Ear/Nose/Mouth/Throat  [] Respiratory  [] CV  [] GI  []   [] Musculoskeletal  [] Skin/Breast  [] Neurological  [] Endocrine  [] Heme/Lymph  [] Allergic/Immunologic    Explanation:     MEDICATIONS:    Current Facility-Administered Medications:     DULoxetine (CYMBALTA) extended release capsule 30 mg, 30 mg, Oral, BID, Martene Josselin, APRN - CNP, 30 mg at 05/25/22 7562    ARIPiprazole (ABILIFY) tablet 2 mg, 2 mg, Oral, Daily, Martene Josselin, APRN - CNP, 2 mg at 05/25/22 9406    buPROPion [facility]) tablet 100 mg, 100 mg, Oral, BID, Martene Josselin, APRN - CNP, 100 mg at 05/25/22 4316    acetaminophen (TYLENOL) tablet 650 mg, 650 mg, Oral, Q4H PRN, Juan R Blank MD    magnesium hydroxide (MILK OF MAGNESIA) 400 MG/5ML suspension 30 mL, 30 mL, Oral, Daily PRN, Juan R Blank MD    nicotine (NICODERM CQ) 21 MG/24HR 1 patch, 1 patch, TransDERmal, Daily, Juan R Blank MD    aluminum & magnesium hydroxide-simethicone (MAALOX) 200-200-20 MG/5ML suspension 30 mL, 30 mL, Oral, PRN, Juan R Blank MD    hydrOXYzine (VISTARIL) capsule 50 mg, 50 mg, Oral, TID PRN, Juan R Blank MD    melatonin tablet 3 mg, 3 mg, Oral, Nightly, Juan R Blank MD, 3 mg at 05/24/22 2111    famotidine (PEPCID) tablet 20 mg, 20 mg, Oral, BID, Rose A Rech, DO, 20 mg at 94/04/86 7747    folic acid (FOLVITE) tablet 1 mg, 1 mg, Oral, Daily, Rose A Rech, DO, 1 mg at 05/25/22 0839    levothyroxine (SYNTHROID) tablet 88 mcg, 88 mcg, Oral, Daily, Rose A Rech, DO, 88 mcg at 05/25/22 0839    lisinopril (PRINIVIL;ZESTRIL) tablet 20 mg, 20 mg, Oral, Daily, Rose A Rech, DO, 20 mg at 05/25/22 0839    metFORMIN (GLUCOPHAGE-XR) extended release tablet 1,000 mg, 1,000 mg, Oral, BID, Rose A Rech, DO, 1,000 mg at 05/25/22 0838    ondansetron (ZOFRAN-ODT) disintegrating tablet 4 mg, 4 mg, Oral, Q8H PRN, Rose A Rech, DO, 4 mg at 05/23/22 2205    pregabalin (LYRICA) capsule 100 mg, 100 mg, Oral, TID, Rose A Rech, DO, 100 mg at 05/25/22 0839    insulin glargine-yfgn (SEMGLEE-YFGN) injection vial 25 Units, 25 Units, SubCUTAneous, Nightly, Rose A Rech, DO, 25 Units at 05/24/22 2113    glucose chewable tablet 16 g, 4 tablet, Oral, PRN, Rose A Rech, DO    dextrose 50 % IV solution, 12.5 g, IntraVENous, PRN **OR** [DISCONTINUED] dextrose bolus 10% 250 mL, 250 mL, IntraVENous, PRN, Erlene Pin A Rech, DO    glucagon (rDNA) injection 1 mg, 1 mg, IntraMUSCular, PRN, Rose A Rech, DO    dextrose 5 % solution, 100 mL/hr, IntraVENous, PRN, Rose A Rech, DO      Examination:  /73   Pulse 99   Temp 97.2 °F (36.2 °C) (Temporal)   Resp 18   Ht 5' 1\" (1.549 m)   LMP  (LMP Unknown)   SpO2 98%   BMI 27.78 kg/m²   Gait - steady  Medication side effects(SE): none reported     Mental Status Examination:    Level of consciousness:  within normal limits   Appearance:  well-appearing  Behavior/Motor:  no abnormalities noted  Attitude toward examiner:  cooperative  Speech:  normal rate, normal volume and well articulated   Mood: depressed  Affect:  blunted  Thought processes:  linear   Thought content: The patient is devoid of suicidal or homicidal ideation intent or plan. Devoid of auditory or visual hallucinations or other perceptual disturbances, there are no overt or covert signs of psychosis or paranoia. There are no neurovegetative signs of depression.   Cognition:  oriented to person, place, and time   Concentration intact  Memory intact  Insight poor   Judgement poor   Fund of Knowledge good    ASSESSMENT:   Patient symptoms are:  [] Well controlled  [] Improving  [] Worsening  [x] No change      Diagnosis:   Principal Problem:    Severe episode of recurrent major depressive disorder, without psychotic features ([facility]  Active Problems:    Hypokalemia due to loss of potassium    Intractable vomiting    Cluster B personality traits(HCC)    Type 2 diabetes mellitus with complication, with long-term current use of insulin ([address]    Acquired hypothyroidism    Encounter for long-term (current) use of insulin (HCC)    Rheumatoid arthritis involving multiple sites [facility])    Primary hypertension  Resolved Problems:    * No resolved hospital problems. *      LABS:    Recent Labs     05/22/22  1548   WBC 10.4   HGB 9.6*        Recent Labs     05/23/22  0812 05/23/22 2027 05/24/22  0835    146 142   K 3.4* 3.6 3.6   * 110* 109*   CO2 23 21* 21*   BUN 8 10 9   CREATININE 0.8 0.9 0.8   GLUCOSE 110* 69* 166*     Recent Labs     05/22/22  1548   BILITOT <0.2   ALKPHOS 115*   AST 18   ALT 21     Lab Results   Component Value Date    LABAMPH NOT DETECTED 05/22/2022    BARBSCNU NOT DETECTED 05/22/2022    LABBENZ NOT DETECTED 05/22/2022    LABMETH NOT DETECTED 05/22/2022    OPIATESCREENURINE NOT DETECTED 05/22/2022    PHENCYCLIDINESCREENURINE NOT DETECTED 05/22/2022    ETOH <10 05/22/2022     Lab Results   Component Value Date    TSH 5.220 05/16/2022     No results found for: LITHIUM  No results found for: VALPROATE, CBMZ    Treatment Plan:  The patient's diagnosis, treatment plan, medication management were formulated after patient was seen directly by the attending physician and myself and all relevant documentation was reviewed. Risk, benefit, side effects, possible outcomes of the medication and alternatives discussed with the patient and the patient demonstrated understanding. The patient was also educated that the outcome of treatment will depend on the medication compliance as directed by the prescribers along with regular follow-up, compliance with the labs and other work-up, as clinically indicated.     Cymbalta 30 mg twice daily for chronic pain and depression  Continue patient's Wellbutrin 100 mg twice daily for depression and anxiety  Abilify 2 mg daily for augmentation of treatment for depression    Collateral information: Followed by social work  CD evaluation  Encourage patient to attend group and other milieu activities. Discharge planning discussed with the patient and treatment team.    PSYCHOTHERAPY/COUNSELING:  [x] Therapeutic interview  [x] Supportive  [] CBT  [] Ongoing  [] Other    [x] Patient continues to need, on a daily basis, active treatment furnished directly by or requiring the supervision of inpatient psychiatric personnel      Anticipated Length of stay: 5 to 7 days based on stability    NOTE: This report was transcribed using voice recognition software.  Every effort was made to ensure accuracy; however, inadvertent computerized transcription errors may be present.       Electronically signed by ANA Garcia CNP on 5/25/2022 at 10:22 AM

## 2022-05-25 NOTE — PLAN OF CARE
Patient in room isolative at this time. She is alert and oriented. Patient states she is having SI with no plan. Reports that she has always had the feeling. Patient does contract for safety and will notify staff if she develops any plans. Patient brightens with conversations and is cooperative with staff. No behavior issues noted. Reports anxiety 5 and depression 7. She is taking meds and attending groups. Problem: Anxiety  Goal: Will report anxiety at manageable levels  Description: INTERVENTIONS:  1. Administer medication as ordered  2. Teach and rehearse alternative coping skills  3. Provide emotional support with 1:1 interaction with staff  5/25/2022 1014 by Reece Langston RN  Outcome: Progressing     Problem: Coping  Goal: Pt/Family able to verbalize concerns and demonstrate effective coping strategies  Description: INTERVENTIONS:  1. Assist patient/family to identify coping skills, available support systems and cultural and spiritual values  2. Provide emotional support, including active listening and acknowledgement of concerns of patient and caregivers  3. Reduce environmental stimuli, as able  4. Instruct patient/family in relaxation techniques, as appropriate  5. Assess for spiritual pain/suffering and initiate Spiritual Care, Psychosocial Clinical Specialist consults as needed  5/25/2022 1014 by Reece Langston RN  Outcome: Progressing     Problem: Behavior  Goal: Pt/Family maintain appropriate behavior and adhere to behavioral management agreement, if implemented  Description: INTERVENTIONS:  1. Assess patient/family's coping skills and  non-compliant behavior (including use of illegal substances)  2. Notify security of behavior or suspected illegal substances which indicate the need for search of the patient and/or belongings  3. Encourage verbalization of thoughts and concerns in a socially appropriate manner  4.  Utilize positive, consistent limit setting strategies supporting safety of patient, staff and others  5. Encourage participation in the decision making process about the behavioral management agreement  6. Implement a Health Care Agreement if patient meets criteria  7. If a patient's behavior jeopardizes the safety of the patient, staff, or others refer to organization policy. If a visitor's behavior poses a threat to safety call refer to organization policy. 8. Initiate consult with , Psychosocial CNS, Spiritual Care as appropriate  5/25/2022 1014 by Joanie Vasquez RN  Outcome: Progressing     Problem: Depression/Self Harm  Goal: Effect of psychiatric condition will be minimized and patient will be protected from self harm  Description: INTERVENTIONS:  1. Assess impact of patient's symptoms on level of functioning, self care needs and offer support as indicated  2. Assess patient/family knowledge of depression, impact on illness and need for teaching  3. Provide emotional support, presence and reassurance  4. Assess for possible suicidal thoughts or ideation. If patient expresses suicidal thoughts or statements do not leave alone, initiate Suicide Precautions, move to a room close to the nursing station and obtain sitter  5.  Initiate consults as appropriate with Mental Health Professional, Spiritual Care, Psychosocial CNS, and consider a recommendation to the LIP for a Psychiatric Consultation  5/25/2022 1014 by Joanie Vasquez RN  Outcome: Progressing

## 2022-05-25 NOTE — PLAN OF CARE
Patient calm and cooperative. Has been isolative to room this shift. Affect flat, but brightens with conversation. Patient denies SI/HI/AVH. Endorses feelings of anxiety and depression in relation to her chronic N/V. Patient compliant with medications and in control of behaviors. Will continue to monitor and provide support. Problem: Anxiety  Goal: Will report anxiety at manageable levels  Description: INTERVENTIONS:  1. Administer medication as ordered  2. Teach and rehearse alternative coping skills  3. Provide emotional support with 1:1 interaction with staff  5/24/2022 2140 by Jony Graham RN  Outcome: Progressing     Problem: Coping  Goal: Pt/Family able to verbalize concerns and demonstrate effective coping strategies  Description: INTERVENTIONS:  1. Assist patient/family to identify coping skills, available support systems and cultural and spiritual values  2. Provide emotional support, including active listening and acknowledgement of concerns of patient and caregivers  3. Reduce environmental stimuli, as able  4. Instruct patient/family in relaxation techniques, as appropriate  5. Assess for spiritual pain/suffering and initiate Spiritual Care, Psychosocial Clinical Specialist consults as needed  Outcome: Progressing     Problem: Behavior  Goal: Pt/Family maintain appropriate behavior and adhere to behavioral management agreement, if implemented  Description: INTERVENTIONS:  1. Assess patient/family's coping skills and  non-compliant behavior (including use of illegal substances)  2. Notify security of behavior or suspected illegal substances which indicate the need for search of the patient and/or belongings  3. Encourage verbalization of thoughts and concerns in a socially appropriate manner  4. Utilize positive, consistent limit setting strategies supporting safety of patient, staff and others  5.  Encourage participation in the decision making process about the behavioral management agreement  6. Implement a Health Care Agreement if patient meets criteria  7. If a patient's behavior jeopardizes the safety of the patient, staff, or others refer to organization policy. If a visitor's behavior poses a threat to safety call refer to organization policy. 8. Initiate consult with , Psychosocial CNS, Spiritual Care as appropriate  5/24/2022 2140 by Zoya Salomon RN  Outcome: Progressing     Problem: Depression/Self Harm  Goal: Effect of psychiatric condition will be minimized and patient will be protected from self harm  Description: INTERVENTIONS:  1. Assess impact of patient's symptoms on level of functioning, self care needs and offer support as indicated  2. Assess patient/family knowledge of depression, impact on illness and need for teaching  3. Provide emotional support, presence and reassurance  4. Assess for possible suicidal thoughts or ideation. If patient expresses suicidal thoughts or statements do not leave alone, initiate Suicide Precautions, move to a room close to the nursing station and obtain sitter  5.  Initiate consults as appropriate with Mental Health Professional, Spiritual Care, Psychosocial CNS, and consider a recommendation to the LIP for a Psychiatric Consultation  5/24/2022 2140 by Zoya Salomon RN  Outcome: Progressing

## 2022-05-25 NOTE — CARE COORDINATION
Collateral Contact (ROSE signed)  Name: Kezia Jacob  Relationship: friend from [facility]           Number: [phone]      Collateral Information: SW called and left a voicemail to obtain collateral information. Number went directly to voicemail.               Access to Weapons per Collateral Contact: []? Reports []?  Denies

## 2022-05-26 LAB
METER GLUCOSE: 100 MG/DL (ref 74–99)
METER GLUCOSE: 101 MG/DL (ref 74–99)
METER GLUCOSE: 136 MG/DL (ref 74–99)
METER GLUCOSE: 64 MG/DL (ref 74–99)
METER GLUCOSE: 78 MG/DL (ref 74–99)

## 2022-05-26 PROCEDURE — 6370000000 HC RX 637 (ALT 250 FOR IP): Performed by: FAMILY MEDICINE

## 2022-05-26 PROCEDURE — 99231 SBSQ HOSP IP/OBS SF/LOW 25: CPT | Performed by: NURSE PRACTITIONER

## 2022-05-26 PROCEDURE — 6370000000 HC RX 637 (ALT 250 FOR IP): Performed by: PSYCHIATRY & NEUROLOGY

## 2022-05-26 PROCEDURE — 6370000000 HC RX 637 (ALT 250 FOR IP): Performed by: STUDENT IN AN ORGANIZED HEALTH CARE EDUCATION/TRAINING PROGRAM

## 2022-05-26 PROCEDURE — 82962 GLUCOSE BLOOD TEST: CPT

## 2022-05-26 PROCEDURE — S5553 INSULIN LONG ACTING 5 U: HCPCS | Performed by: STUDENT IN AN ORGANIZED HEALTH CARE EDUCATION/TRAINING PROGRAM

## 2022-05-26 PROCEDURE — 1240000000 HC EMOTIONAL WELLNESS R&B

## 2022-05-26 PROCEDURE — 99232 SBSQ HOSP IP/OBS MODERATE 35: CPT | Performed by: FAMILY MEDICINE

## 2022-05-26 PROCEDURE — 6370000000 HC RX 637 (ALT 250 FOR IP): Performed by: NURSE PRACTITIONER

## 2022-05-26 RX ORDER — METOCLOPRAMIDE 5 MG/1
5 TABLET ORAL
Status: DISCONTINUED | OUTPATIENT
Start: 2022-05-26 | End: 2022-05-26

## 2022-05-26 RX ORDER — METOCLOPRAMIDE 5 MG/1
5 TABLET ORAL
Status: DISCONTINUED | OUTPATIENT
Start: 2022-05-26 | End: 2022-06-01 | Stop reason: HOSPADM

## 2022-05-26 RX ADMIN — METOCLOPRAMIDE 5 MG: 5 TABLET ORAL at 17:22

## 2022-05-26 RX ADMIN — PREGABALIN 100 MG: 50 CAPSULE ORAL at 10:02

## 2022-05-26 RX ADMIN — FAMOTIDINE 20 MG: 20 TABLET ORAL at 21:00

## 2022-05-26 RX ADMIN — ARIPIPRAZOLE 2 MG: 2 TABLET ORAL at 10:02

## 2022-05-26 RX ADMIN — DULOXETINE 30 MG: 30 CAPSULE, DELAYED RELEASE ORAL at 10:08

## 2022-05-26 RX ADMIN — METFORMIN HYDROCHLORIDE 1000 MG: 500 TABLET, EXTENDED RELEASE ORAL at 09:58

## 2022-05-26 RX ADMIN — LISINOPRIL 20 MG: 20 TABLET ORAL at 10:02

## 2022-05-26 RX ADMIN — BUPROPION HYDROCHLORIDE 100 MG: 100 TABLET, FILM COATED ORAL at 21:00

## 2022-05-26 RX ADMIN — DULOXETINE 30 MG: 30 CAPSULE, DELAYED RELEASE ORAL at 21:00

## 2022-05-26 RX ADMIN — METOCLOPRAMIDE 5 MG: 5 TABLET ORAL at 12:45

## 2022-05-26 RX ADMIN — PREGABALIN 100 MG: 50 CAPSULE ORAL at 14:20

## 2022-05-26 RX ADMIN — PREGABALIN 100 MG: 50 CAPSULE ORAL at 21:00

## 2022-05-26 RX ADMIN — FAMOTIDINE 20 MG: 20 TABLET ORAL at 10:02

## 2022-05-26 RX ADMIN — FOLIC ACID 1 MG: 1 TABLET ORAL at 10:02

## 2022-05-26 RX ADMIN — Medication 3 MG: at 20:59

## 2022-05-26 RX ADMIN — METOCLOPRAMIDE 5 MG: 5 TABLET ORAL at 20:59

## 2022-05-26 RX ADMIN — BUPROPION HYDROCHLORIDE 100 MG: 100 TABLET, FILM COATED ORAL at 09:58

## 2022-05-26 RX ADMIN — METFORMIN HYDROCHLORIDE 1000 MG: 500 TABLET, EXTENDED RELEASE ORAL at 20:59

## 2022-05-26 RX ADMIN — LEVOTHYROXINE SODIUM 88 MCG: 0.09 TABLET ORAL at 06:33

## 2022-05-26 RX ADMIN — INSULIN GLARGINE-YFGN 25 UNITS: 100 INJECTION, SOLUTION SUBCUTANEOUS at 21:05

## 2022-05-26 ASSESSMENT — PAIN SCALES - GENERAL: PAINLEVEL_OUTOF10: 0

## 2022-05-26 NOTE — GROUP NOTE
Group Therapy Note    Date: 5/26/2022    Group Start Time: 7518  Group End Time: 1150  Group Topic: Psychotherapy    [facility]  [address], [facility]        Group Therapy Note    Attendees: 7         Patient's Goal:  To increase social interaction and improve relationships with others.      Notes:  Pt was attentive in group and was able to identify and agenda. Pt was able to verbalize relating to other and made connections. Status After Intervention:  Improved    Participation Level:  Active Listener and Interactive    Participation Quality: Appropriate, Attentive and Sharing      Speech:  normal      Thought Process/Content: Logical      Affective Functioning: Flat      Mood: depressed      Level of consciousness:  Alert, Oriented x4 and Attentive      Response to Learning: Able to verbalize current knowledge/experience, Able to verbalize/acknowledge new learning and Able to retain information      Endings: None Reported    Modes of Intervention: Support, Socialization and Exploration      Discipline Responsible: /Counselor      Signature:  AVIS [place], [place]

## 2022-05-26 NOTE — CARE COORDINATION
Collateral Contact (ROSE signed)  Name: Twyla  Relationship: friend from [place]           Number: [phone]      Collateral Information: Twyla stated that she believes that the pt is able to return back home upon discharge and the pt does have a home. Nadean Lefort will be able to provide transportation depending on the day, Nadean Lefort works throughout the week until [address] [place] [address] Jamaal stated that she is not sure what concerns she should have for the pt because she just takes the pt to [place] and the grocery store to buy food and the pt has never spoke to her about her mental illness. Nadean Lefort does not think that the pt has access to any weapons, she stated that the pt has never talked about owning weapons and \"I cant even see her holding any. \" Nadean Lefort stated that the pt doesn't have money to purchase any weapons like guns.      Access to Weapons per Collateral Contact: []??? Reports [x]? ??[employer]

## 2022-05-26 NOTE — PLAN OF CARE
Problem: ABCDS Injury Assessment  Goal: Absence of physical injury  Outcome: Progressing     Problem: Anxiety  Goal: Will report anxiety at manageable levels  Description: INTERVENTIONS:  1. Administer medication as ordered  2. Teach and rehearse alternative coping skills  3. Provide emotional support with 1:1 interaction with staff  5/25/2022 2152 by Lalit Solis RN  Outcome: Progressing     Problem: Coping  Goal: Pt/Family able to verbalize concerns and demonstrate effective coping strategies  Description: INTERVENTIONS:  1. Assist patient/family to identify coping skills, available support systems and cultural and spiritual values  2. Provide emotional support, including active listening and acknowledgement of concerns of patient and caregivers  3. Reduce environmental stimuli, as able  4. Instruct patient/family in relaxation techniques, as appropriate  5.  Assess for spiritual pain/suffering and initiate Spiritual Care, Psychosocial Clinical Specialist consults as needed  5/25/2022 2152 by Lalit Solis RN  Outcome: Progressing

## 2022-05-26 NOTE — PROGRESS NOTES
Patient has been isolative to her room so far this shift. Patient continues to endorse suicidal and homicidal ideations. No plans or intent for suicide/homicide. Patient states that \"they're always there but I don't actually want to hurt myself or others. \" Patient denies AVH. Patient is flat and blunt, but cooperative with care. Patient is encouraged to continue to work towards discharge goal by complying with medications, attending groups and to seek staff if feelings are overwhelming. Environmental rounds completed per unit policy to maintain safety of everyone on the unit. Staff will offer support and interventions as requested or required.

## 2022-05-26 NOTE — PROGRESS NOTES
[facility] - Family Medicine Inpatient   Resident Progress Note    S:  Hospital day: 3    Brief Synopsis: Monisha Ray is a 47 y.o. female with a PMH of uncontrolled DM2 on long term insulin with neuropathy, HTN, Hypothyroidism and RA who presented to the [facility] ED for intractable nausea and vomiting. Patient had tolerated food at [facility] ED, but prior to discharge became tearful and admitted suicidal ideation without a plan, and homicidal ideation toward her mother. Patient was accepted by Dr. Jackie Wallace and was transferred to [facility]. Family medicine has been consulted for medical management. No acute events overnight. Patient was seen and examined this morning. Patient continues to endorse suicidal and homicidal ideations. No plans or intent for suicide/homicide. She had an episode of vomiting yesterday night (vomited food, no blood or bile). Cont meds:    dextrose       Scheduled meds:    metoclopramide  5 mg Oral 4x Daily AC & HS    DULoxetine  30 mg Oral BID    ARIPiprazole  2 mg Oral Daily    buPROPion  100 mg Oral BID    nicotine  1 patch TransDERmal Daily    melatonin  3 mg Oral Nightly    famotidine  20 mg Oral BID    folic acid  1 mg Oral Daily    levothyroxine  88 mcg Oral Daily    lisinopril  20 mg Oral Daily    metFORMIN  1,000 mg Oral BID    pregabalin  100 mg Oral TID    insulin glargine  25 Units SubCUTAneous Nightly     PRN meds: acetaminophen, magnesium hydroxide, aluminum & magnesium hydroxide-simethicone, hydrOXYzine, ondansetron, glucose, dextrose **OR** [DISCONTINUED] dextrose bolus, glucagon (rDNA), dextrose     I reviewed the patient's Past Medical and Surgical History, Medications and Allergies. O:  VS: BP (!) 168/84   Pulse 98   Temp 98.4 °F (36.9 °C) (Temporal)   Resp 16   Ht 5' 1\" (1.549 m)   LMP  (LMP Unknown)   SpO2 95%   BMI 27.78 kg/m²     Physical Exam:  Physical Exam  Vitals reviewed.    Constitutional:       General: She is not in acute distress. HENT:      Head: Normocephalic and atraumatic. Nose: Nose normal. No congestion or rhinorrhea. Cardiovascular:      Rate and Rhythm: Normal rate and regular rhythm. Pulses: Normal pulses. Heart sounds: Normal heart sounds. Pulmonary:      Effort: Pulmonary effort is normal.      Breath sounds: Normal breath sounds. Abdominal:      General: Bowel sounds are normal.      Palpations: Abdomen is soft. Tenderness: There is no abdominal tenderness. There is no guarding or rebound. Musculoskeletal:      Cervical back: Normal range of motion and neck supple. Right lower leg: No edema. Left lower leg: No edema. Skin:     General: Skin is warm. Findings: No rash. Neurological:      General: No focal deficit present. Mental Status: She is alert. Psychiatric:         Thought Content: Thought content normal.         Judgment: Judgment normal.            Labs:  Na/K/Cl/CO2:  142/3.6/109/21 (05/24 9178)  BUN/Cr/glu/ALT/AST/amyl/lip:  9/0.8/--/--/--/--/-- (05/24 2003)     estimated creatinine clearance is 73 mL/min (based on SCr of 0.8 mg/dL). Other pertinent labs as noted below    New Imaging:  No orders to display       A/P:  Principal Problem:    Severe episode of recurrent major depressive disorder, without psychotic features ([facility]  Active Problems:    Hypokalemia due to loss of potassium    Intractable vomiting    Cluster B personality traits(HCC)    Type 2 diabetes mellitus with complication, with long-term current use of insulin (HCC)    Acquired hypothyroidism    Encounter for long-term (current) use of insulin (HCC)    Rheumatoid arthritis involving multiple sites ([facility]    Primary hypertension  Resolved Problems:    * No resolved hospital problems.  *      Depression with SI/HI  · Duloxetine 30 mg BID for chronic pain and depression  · Bupropion 100 mg BID for depression and anxiety  · On Aripiprazole 2 mg daily for augmentation of treatment for depression  · On Melatonin 3 mg nightly for sleeping  · Participating in group therapy  · Suicide precautions  · SW to obtain collateral information  · Management per psychiatry     DM2 with neuropathy and gastroparesis  · Last HbA1C is 15%  · She is waiting on insulin pump, but currently requiring high doses of insulin  · Lantus restarted at 25 U  · On Metformin 1000 mg BID  · Liraglutide stopped due to gastroparesis  · Continue home Lyrica 100 mg TID for neuropathy  · On Pepcid 20 mg BID  · On Maalox PRN  · Started on Reglan 5 mg 4 times per day for gastroparesis. EKG to be done tomorrow morning for QTc monitoring.   · Hypoglycemic protocol in place     HTN  · On Lisinopril 20 mg daily    Hypokalemia - resolved     Hypothyroidism  · Continue home Levothyroxine 88 mcg daily    Smoking use  · On Nicotine patch 21 mg/day     RA  · Home medications are Methotrexate and Hydroxychloroquine, patient not taking them      DVT Prophylaxis: none because patient is fully mobile  GI Prophylaxis: Pepcid 20 mg BID  Diet: Adult regular carb controlled plus oral supplements  Disposition: Psych unit  Full code      Electronically signed by Aparna Duran MD on 5/26/2022 at 11:38 AM  This case was discussed with attending physician Dr. Chencho Banuelos

## 2022-05-26 NOTE — PROGRESS NOTES
patient with the resident(s). I personally reviewed images, EKG's and similar tests, if present. I personally reviewed and performed key elements of the history and exam.  I have reviewed and confirmed student and/or resident history and exam with changes as indicated above. I agree with the assessment, plan and orders as documented by the resident. Please refer to the resident and/or student note for additional information.       Jose L Eaton MD

## 2022-05-26 NOTE — PLAN OF CARE
Patient alert and oriented x4. She was isolative to her room. She continues to be state she has SI/HI that has been constant. She has no plan for intention on acting on these thoughts. Patient does contract for safety. She states she is having HI towards her mother who she has not seen in years. Patient educated that she is on 2 hour lock to encourage her to interact with other patient and attend and be engaging in groups. She denies auditory and visual hallucinations. Reports some depression. Problem: Anxiety  Goal: Will report anxiety at manageable levels  Description: INTERVENTIONS:  1. Administer medication as ordered  2. Teach and rehearse alternative coping skills  3. Provide emotional support with 1:1 interaction with staff  Outcome: Progressing     Problem: Coping  Goal: Pt/Family able to verbalize concerns and demonstrate effective coping strategies  Description: INTERVENTIONS:  1. Assist patient/family to identify coping skills, available support systems and cultural and spiritual values  2. Provide emotional support, including active listening and acknowledgement of concerns of patient and caregivers  3. Reduce environmental stimuli, as able  4. Instruct patient/family in relaxation techniques, as appropriate  5. Assess for spiritual pain/suffering and initiate Spiritual Care, Psychosocial Clinical Specialist consults as needed  Outcome: Progressing     Problem: Behavior  Goal: Pt/Family maintain appropriate behavior and adhere to behavioral management agreement, if implemented  Description: INTERVENTIONS:  1. Assess patient/family's coping skills and  non-compliant behavior (including use of illegal substances)  2. Notify security of behavior or suspected illegal substances which indicate the need for search of the patient and/or belongings  3. Encourage verbalization of thoughts and concerns in a socially appropriate manner  4.  Utilize positive, consistent limit setting strategies supporting safety of patient, staff and others  5. Encourage participation in the decision making process about the behavioral management agreement  6. Implement a Health Care Agreement if patient meets criteria  7. If a patient's behavior jeopardizes the safety of the patient, staff, or others refer to organization policy. If a visitor's behavior poses a threat to safety call refer to organization policy. 8. Initiate consult with , Psychosocial CNS, Spiritual Care as appropriate  Outcome: Progressing     Problem: Depression/Self Harm  Goal: Effect of psychiatric condition will be minimized and patient will be protected from self harm  Description: INTERVENTIONS:  1. Assess impact of patient's symptoms on level of functioning, self care needs and offer support as indicated  2. Assess patient/family knowledge of depression, impact on illness and need for teaching  3. Provide emotional support, presence and reassurance  4. Assess for possible suicidal thoughts or ideation. If patient expresses suicidal thoughts or statements do not leave alone, initiate Suicide Precautions, move to a room close to the nursing station and obtain sitter  5. Initiate consults as appropriate with Mental Health Professional, Spiritual Care, Psychosocial CNS, and consider a recommendation to the LIP for a Psychiatric Consultation  Outcome: Progressing     Problem: Involuntary Admit  Goal: Will cooperate with staff recommendations and doctor's orders and will demonstrate appropriate behavior  Description: INTERVENTIONS:  1. Treat underlying conditions and offer medication as ordered  2. Educate regarding involuntary admission procedures and rules  3.  Contain excessive/inappropriate behavior per unit and hospital policies  Outcome: Progressing

## 2022-05-26 NOTE — CARE COORDINATION
Collateral Contact (ROSE signed)  Name: Twyla  Relationship: friend from [facility]           Number: [phone]      Collateral Information: SW called friend Sedrick Ren to obtain collateral, no answer, a voicemail was left. Access to Weapons per Collateral Contact: []?? Reports []? ? Denies

## 2022-05-26 NOTE — PROGRESS NOTES
Attended morning community meeting. Updated on staffing and daily expectations. Shared goal for the day as to stay calm.

## 2022-05-26 NOTE — PROGRESS NOTES
Patient attended afternoon meet and greet. Updated on staffing and evening expectations. Participated in trivia.

## 2022-05-26 NOTE — GROUP NOTE
Date: 5/26/2022    Group Start Time: 1010  Group End Time: 1050  Group Topic: Psychoeducation    [facility] ACUTE [facility] 1    Eloisa Dyson [place]                                                                        Group Therapy Note    Date: 5/26/2022  Number of Participants: 9    Type of Group: Psychoeducation    Wellness Binder Information  Module Name: fighting stress with healthy habits     Patient's Goal: Patient will be able to id what daily habits one can adopt to fight daily stressors. Notes: pleasant and sharing, engaged in group discussion. Status After Intervention:  Improved    Participation Level:  Active Listener    Participation Quality: Appropriate, Attentive, and Sharing      Speech:  normal       Thought Process/Content: Logical      Affective Functioning: Congruent      Mood: euthymic      Level of consciousness:  Alert, Oriented x4, and Attentive      Response to Learning: Able to verbalize/acknowledge new learning, Able to retain information, and Progressing to goal      Endings: None Reported    Modes of Intervention: Education, Support, Socialization, Exploration, and Problem-solving      Discipline Responsible: Psychoeducational Specialist      Signature:  Laura Chacon

## 2022-05-27 LAB
EKG ATRIAL RATE: 100 BPM
EKG ATRIAL RATE: 96 BPM
EKG P AXIS: 32 DEGREES
EKG P AXIS: 34 DEGREES
EKG P-R INTERVAL: 162 MS
EKG P-R INTERVAL: 166 MS
EKG Q-T INTERVAL: 368 MS
EKG Q-T INTERVAL: 384 MS
EKG QRS DURATION: 78 MS
EKG QRS DURATION: 78 MS
EKG QTC CALCULATION (BAZETT): 474 MS
EKG QTC CALCULATION (BAZETT): 485 MS
EKG R AXIS: -11 DEGREES
EKG R AXIS: -13 DEGREES
EKG T AXIS: 30 DEGREES
EKG T AXIS: 39 DEGREES
EKG VENTRICULAR RATE: 100 BPM
EKG VENTRICULAR RATE: 96 BPM
METER GLUCOSE: 119 MG/DL (ref 74–99)
METER GLUCOSE: 62 MG/DL (ref 74–99)
METER GLUCOSE: 64 MG/DL (ref 74–99)
METER GLUCOSE: 96 MG/DL (ref 74–99)

## 2022-05-27 PROCEDURE — 6370000000 HC RX 637 (ALT 250 FOR IP): Performed by: FAMILY MEDICINE

## 2022-05-27 PROCEDURE — 1240000000 HC EMOTIONAL WELLNESS R&B

## 2022-05-27 PROCEDURE — 6370000000 HC RX 637 (ALT 250 FOR IP): Performed by: PSYCHIATRY & NEUROLOGY

## 2022-05-27 PROCEDURE — 93005 ELECTROCARDIOGRAM TRACING: CPT

## 2022-05-27 PROCEDURE — S5553 INSULIN LONG ACTING 5 U: HCPCS | Performed by: FAMILY MEDICINE

## 2022-05-27 PROCEDURE — 99232 SBSQ HOSP IP/OBS MODERATE 35: CPT | Performed by: FAMILY MEDICINE

## 2022-05-27 PROCEDURE — 99231 SBSQ HOSP IP/OBS SF/LOW 25: CPT | Performed by: NURSE PRACTITIONER

## 2022-05-27 PROCEDURE — 6370000000 HC RX 637 (ALT 250 FOR IP): Performed by: NURSE PRACTITIONER

## 2022-05-27 PROCEDURE — 6370000000 HC RX 637 (ALT 250 FOR IP): Performed by: STUDENT IN AN ORGANIZED HEALTH CARE EDUCATION/TRAINING PROGRAM

## 2022-05-27 PROCEDURE — 82962 GLUCOSE BLOOD TEST: CPT

## 2022-05-27 RX ORDER — PREGABALIN 50 MG/1
50 CAPSULE ORAL ONCE
Status: DISCONTINUED | OUTPATIENT
Start: 2022-05-27 | End: 2022-06-01 | Stop reason: HOSPADM

## 2022-05-27 RX ORDER — ARIPIPRAZOLE 5 MG/1
5 TABLET ORAL DAILY
Status: DISCONTINUED | OUTPATIENT
Start: 2022-05-28 | End: 2022-06-01 | Stop reason: HOSPADM

## 2022-05-27 RX ORDER — INSULIN GLARGINE-YFGN 100 [IU]/ML
15 INJECTION, SOLUTION SUBCUTANEOUS NIGHTLY
Status: DISCONTINUED | OUTPATIENT
Start: 2022-05-27 | End: 2022-05-28

## 2022-05-27 RX ADMIN — FOLIC ACID 1 MG: 1 TABLET ORAL at 09:35

## 2022-05-27 RX ADMIN — FAMOTIDINE 20 MG: 20 TABLET ORAL at 22:18

## 2022-05-27 RX ADMIN — DULOXETINE 30 MG: 30 CAPSULE, DELAYED RELEASE ORAL at 22:22

## 2022-05-27 RX ADMIN — Medication 3 MG: at 22:18

## 2022-05-27 RX ADMIN — METFORMIN HYDROCHLORIDE 1000 MG: 500 TABLET, EXTENDED RELEASE ORAL at 22:17

## 2022-05-27 RX ADMIN — FAMOTIDINE 20 MG: 20 TABLET ORAL at 09:35

## 2022-05-27 RX ADMIN — METOCLOPRAMIDE 5 MG: 5 TABLET ORAL at 11:43

## 2022-05-27 RX ADMIN — INSULIN GLARGINE-YFGN 15 UNITS: 100 INJECTION, SOLUTION SUBCUTANEOUS at 22:20

## 2022-05-27 RX ADMIN — METOCLOPRAMIDE 5 MG: 5 TABLET ORAL at 22:17

## 2022-05-27 RX ADMIN — LISINOPRIL 20 MG: 20 TABLET ORAL at 09:35

## 2022-05-27 RX ADMIN — ARIPIPRAZOLE 2 MG: 2 TABLET ORAL at 09:34

## 2022-05-27 RX ADMIN — METOCLOPRAMIDE 5 MG: 5 TABLET ORAL at 16:54

## 2022-05-27 RX ADMIN — PREGABALIN 100 MG: 50 CAPSULE ORAL at 14:12

## 2022-05-27 RX ADMIN — METFORMIN HYDROCHLORIDE 1000 MG: 500 TABLET, EXTENDED RELEASE ORAL at 09:34

## 2022-05-27 RX ADMIN — LEVOTHYROXINE SODIUM 88 MCG: 0.09 TABLET ORAL at 06:34

## 2022-05-27 RX ADMIN — BUPROPION HYDROCHLORIDE 100 MG: 100 TABLET, FILM COATED ORAL at 22:18

## 2022-05-27 RX ADMIN — BUPROPION HYDROCHLORIDE 100 MG: 100 TABLET, FILM COATED ORAL at 09:34

## 2022-05-27 RX ADMIN — PREGABALIN 100 MG: 50 CAPSULE ORAL at 09:34

## 2022-05-27 RX ADMIN — DULOXETINE 30 MG: 30 CAPSULE, DELAYED RELEASE ORAL at 09:35

## 2022-05-27 RX ADMIN — PREGABALIN 100 MG: 50 CAPSULE ORAL at 22:45

## 2022-05-27 RX ADMIN — METOCLOPRAMIDE 5 MG: 5 TABLET ORAL at 06:34

## 2022-05-27 ASSESSMENT — PAIN SCALES - GENERAL: PAINLEVEL_OUTOF10: 0

## 2022-05-27 NOTE — PROGRESS NOTES
BEHAVIORAL HEALTH FOLLOW-UP NOTE     5/27/2022     Patient was seen and examined in person, Chart reviewed   Patient's case discussed with staff/team        Chief Complaint: Calm cooperative     Interim History: Patient seen in the dayroom this morning, she is calm, in no distress. Reports her mood has not worsened or improved. She has been sleeping \"too much\" but does feel well-rested when she awakens. She states she \"always\" has had SI since she was a child and no medication have ever helped her. Patient denies any intent or plan to harm herself. She also endorses thoughts of wanting to harm her mother, but no others, and does not have any intent or plan to harm her mother. Attending groups. Taking medications. Patient reported yesterday that she had not be receiving her medications, review of th MAR shows otherwise, she has received all doses of medications since admission. Patient has significant cluster B personality.       Appetite:   [x] Normal/Unchanged  [] Increased  [] Decreased      Sleep:      [] Normal/Unchanged  [x] Fair      [] Poor              Energy:    [] Normal/Unchanged  [] Increased  [x] Decreased        SI [x] Present  [] Absent    HI  []Present  [x] Absent     Aggression:  [] yes  [x] no    Patient is [x] able  [] unable to CONTRACT FOR SAFETY     PAST MEDICAL/PSYCHIATRIC HISTORY:   Past Medical History:   Diagnosis Date    Anxiety     Arthritis     Depression     [facility]     Fibromyalgia     HTN (hypertension)     Hypertensive chronic kidney disease     Left shoulder pain     LIZABETH (obstructive sleep apnea)     Rhe[facility]oid arthritis involving multiple sites ([facility]     Sjogren's disease ([facility]     Thyroid disease        FAMILY/SOCIAL HISTORY:  Family History   Adopted: Yes   Family history unknown: Yes     Social History     Socioeconomic History    Marital status: Single     Spouse name: Not on file    Number of children: Not on file    Years of education: Not on file    Highest education level: Bachelor's degree (e.g., BA, AB, BS)   Occupational History    Occupation: unemployed    Tobacco Use    Smoking status: Former Smoker     Packs/day: 2.00     Years: 0.50     Pack years: 1.00     Start date:      Quit date:      Years since quittin.4    Smokeless tobacco: Never Used   Vaping Use    Vaping Use: Never used   Substance and Sexual Activity    Alcohol use: No    Drug use: No    Sexual activity: Never   Other Topics Concern    Not on file   Social History Narrative    2/3/2020-Financial Resource strain-states very hard-pt receives [employer] benefits for food, states that food doesn't run out as she also utilizes the [employer] the  of every month    2/3/2020-Stress-states she is very much stressed and reports attending [place] [facility] for her depression and receives medications as well     Social Determinants of Health     Financial Resource Strain: High Risk    Difficulty of Paying Living Expenses: Very hard   Food Insecurity: No Food Insecurity    Worried About Running Out of Food in the Last Year: Never true    Kai of Food in the Last Year: Never true   Transportation Needs:     Lack of Transportation (Medical): Not on file    Lack of Transportation (Non-Medical):  Not on file   Physical Activity:     Days of Exercise per Week: Not on file    Minutes of Exercise per Session: Not on file   Stress:     Feeling of Stress : Not on file   Social Connections:     Frequency of Communication with Friends and Family: Not on file    Frequency of Social Gatherings with Friends and Family: Not on file    Attends Mandaen Services: Not on file    Active Member of Clubs or Organizations: Not on file    Attends Club or Organization Meetings: Not on file    Marital Status: Not on file   Intimate Partner Violence:     Fear of Current or Ex-Partner: Not on file    Emotionally Abused: Not on file    Physically Abused: Not on file    Sexually Abused: Not on file   Housing Stability:     Unable to Pay for Housing in the Last Year: Not on file    Number of Places Lived in the Last Year: Not on file    Unstable Housing in the Last Year: Not on file           ROS:  [x] All negative/unchanged except if checked.  Explain positive(checked items) below:  [] Constitutional  [] Eyes  [] Ear/Nose/Mouth/Throat  [] Respiratory  [] CV  [] GI  []   [] Musculoskeletal  [] Skin/Breast  [] Neurological  [] Endocrine  [] Heme/Lymph  [] Allergic/Immunologic    Explanation:     MEDICATIONS:    Current Facility-Administered Medications:     metoclopramide (REGLAN) tablet 5 mg, 5 mg, Oral, 4x Daily AC & HS, Omid Becerra MD, 5 mg at 05/27/22 0865    DULoxetine (CYMBALTA) extended release capsule 30 mg, 30 mg, Oral, BID, ANA Young - CNP, 30 mg at 05/26/22 2100    ARIPiprazole (ABILIFY) tablet 2 mg, 2 mg, Oral, Daily, ANA Young - CNP, 2 mg at 05/26/22 1002    buPROPion [facility]) tablet 100 mg, 100 mg, Oral, BID, ANA Young - CNP, 100 mg at 05/26/22 2100    acetaminophen (TYLENOL) tablet 650 mg, 650 mg, Oral, Q4H PRN, Kaela Richardson MD    magnesium hydroxide (MILK OF MAGNESIA) 400 MG/5ML suspension 30 mL, 30 mL, Oral, Daily PRN, Kaela Richardson MD    nicotine (NICODERM CQ) 21 MG/24HR 1 patch, 1 patch, TransDERmal, Daily, Kaela Richardson MD    aluminum & magnesium hydroxide-simethicone (MAALOX) 200-200-20 MG/5ML suspension 30 mL, 30 mL, Oral, PRN, Kaela Richardson MD    hydrOXYzine (VISTARIL) capsule 50 mg, 50 mg, Oral, TID PRN, Kaela Richardson MD    melatonin tablet 3 mg, 3 mg, Oral, Nightly, Kaela Richardson MD, 3 mg at 05/26/22 2059    famotidine (PEPCID) tablet 20 mg, 20 mg, Oral, BID, Rose A Rech, DO, 20 mg at 90/21/52 2024    folic acid (FOLVITE) tablet 1 mg, 1 mg, Oral, Daily, Rose A DO Masood, 1 mg at 05/26/22 1002    levothyroxine (SYNTHROID) tablet 88 mcg, 88 mcg, Oral, Daily, Rose A DO Masood, 88 mcg at 05/27/22 9568    lisinopril (PRINIVIL;ZESTRIL) tablet 20 mg, 20 mg, Oral, Daily, Rose A Rech, DO, 20 mg at 05/26/22 1002    metFORMIN (GLUCOPHAGE-XR) extended release tablet 1,000 mg, 1,000 mg, Oral, BID, Rose A Rech, DO, 1,000 mg at 05/26/22 2059    ondansetron (ZOFRAN-ODT) disintegrating tablet 4 mg, 4 mg, Oral, Q8H PRN, Tamsen Abts Rech, DO, 4 mg at 05/23/22 2205    pregabalin (LYRICA) capsule 100 mg, 100 mg, Oral, TID, Rose A Rech, DO, 100 mg at 05/26/22 2100    insulin glargine-yfgn (SEMGLEE-YFGN) injection vial 25 Units, 25 Units, SubCUTAneous, Nightly, Rose A Rech, DO, 25 Units at 05/26/22 2105    glucose chewable tablet 16 g, 4 tablet, Oral, PRN, Rose A Rech, DO    dextrose 50 % IV solution, 12.5 g, IntraVENous, PRN **OR** [DISCONTINUED] dextrose bolus 10% 250 mL, 250 mL, IntraVENous, PRN, Darcella Mu A Rech, DO    glucagon (rDNA) injection 1 mg, 1 mg, IntraMUSCular, PRN, Rose A Rech, DO    dextrose 5 % solution, 100 mL/hr, IntraVENous, PRN, Rose A Rech, DO      Examination:  BP (!) 126/98   Pulse 97   Temp 98.5 °F (36.9 °C) (Oral)   Resp 16   Ht 5' 1\" (1.549 m)   LMP  (LMP Unknown)   SpO2 95%   BMI 27.78 kg/m²   Gait - steady  Medication side effects(SE): denies    Mental Status Examination:    Level of consciousness:  within normal limits   Appearance:  good grooming and good hygiene  Behavior/Motor:  no abnormalities noted  Attitude toward examiner:  cooperative  Speech:  normal volume, well articulated and slow   Mood: depressed  Affect:  blunted  Thought processes:  linear   Thought content:  The patient is devoid of suicidal or homicidal ideation intent or plan.  Devoid of auditory or visual hallucinations or other perceptual disturbances, there are no overt or covert signs of psychosis or paranoia.  There are no neurovegetative signs of depression.   Cognition:  oriented to person, place, and time   Concentration intact  Insight poor   Judgement poor     ASSESSMENT:   Patient 30 mg twice daily for chronic pain and depression  Continue patient's Wellbutrin 100 mg twice daily for depression and anxiety  Abilify 5 mg daily for augmentation of treatment for depression     Collateral information: Followed by social work  CD evaluation  Encourage patient to attend group and other milieu activities.   Discharge planning discussed with the patient and treatment team.    PSYCHOTHERAPY/COUNSELING:  [x] Therapeutic interview  [x] Supportive  [] CBT  [] Ongoing  [] Other    [x] Patient continues to need, on a daily basis, active treatment furnished directly by or requiring the supervision of inpatient psychiatric personnel      Anticipated Length of stay: 5 to 7 days based on stability      Electronically signed by ANA Pillai CNP on 5/27/2022 at 8:26 AM

## 2022-05-27 NOTE — PROGRESS NOTES
[address] [facility]  Family Medicine Attending    S: 47 y.o. female with PMH of uncontrolled DM2 on long term insulin with neuropathy, HTN, Hypothyroidism, rheumatoid arthritis, who presented to the [facility] - [facility] ED for intractable nausea and vomiting. After a negative evaluation,  she admitted to suicidal ideation and was transferred here for psychiatric treatment. She continued to have N/V and we were consulted for treatment. She recently had EGD, which she feels made her symptoms worse. She has known gastroparesis. States that she usually has one bad week a month, without any pattern    Today, feels ok. No further vomiting. Says she has been eating. No other side-effects noted    O: VS- Blood pressure (!) 170/89, pulse 99, temperature 97.5 °F (36.4 °C), temperature source Temporal, resp. rate 16, height 5' 1\" (1.549 m), SpO2 96 %, not currently breastfeeding. Exam is as noted by resident with the following changes, additions or corrections:  Awake, alert  Heart - RRR  Lungs- clear  Abdomen- soft, nontender      Impressions:   Principal Problem:    Severe episode of recurrent major depressive disorder, without psychotic features ([address]  Active Problems:    Hypokalemia due to loss of potassium    Intractable vomiting    Cluster B personality traits(HCC)    Type 2 diabetes mellitus with complication, with long-term current use of insulin (HCC)    Acquired hypothyroidism    Encounter for long-term (current) use of insulin ([address]    Rheumatoid arthritis involving multiple sites ([facility]    Primary hypertension  Resolved Problems:    * No resolved hospital problems.  *      Plan:   Monitor and adjust insulin as needed   Ozempic discontinued   Current insulin of 25 units Lantus with FBS of 64   Continue metoclopramide, EKG ok   Consider increasing lisinopril, adding other meds for BP control   Psych for depression treatment       Attending Physician Statement  I have reviewed the chart and seen the patient with the resident(s). I personally reviewed images, EKG's and similar tests, if present. I personally reviewed and performed key elements of the history and exam.  I have reviewed and confirmed student and/or resident history and exam with changes as indicated above. I agree with the assessment, plan and orders as documented by the resident. Please refer to the resident and/or student note for additional information.       Geneva Syed MD

## 2022-05-27 NOTE — PROGRESS NOTES
Patient endorses suicidal ideation, homicidal ideations and denies AVH. Presents calm and cooperative during assessment. Patient is out on the unit and is social with peers. Medications taken without issue. No complaints or concerns verbalized at this time. No unit problems reported. Will continue to observe and support.

## 2022-05-27 NOTE — PLAN OF CARE
Patient A&Ox4. Pt endorsees SI with no plan and contracts for safety. Pt denies HI, anxiety and hallucinations. Pt rates depression 7/10. Patient is flat, sad and cooperative with a congruent affect. Pt is intermittently visible on the unit, attends group and is social with select. Pt is med. & meal compliant. Pt with even and unlabored breathing, no signs of acute physical distress noted. Will continue to monitor and offer support as needed. Problem: Anxiety  Goal: Will report anxiety at manageable levels  Description: INTERVENTIONS:  1. Administer medication as ordered  2. Teach and rehearse alternative coping skills  3. Provide emotional support with 1:1 interaction with staff  Outcome: Progressing     Problem: Coping  Goal: Pt/Family able to verbalize concerns and demonstrate effective coping strategies  Description: INTERVENTIONS:  1. Assist patient/family to identify coping skills, available support systems and cultural and spiritual values  2. Provide emotional support, including active listening and acknowledgement of concerns of patient and caregivers  3. Reduce environmental stimuli, as able  4. Instruct patient/family in relaxation techniques, as appropriate  5. Assess for spiritual pain/suffering and initiate Spiritual Care, Psychosocial Clinical Specialist consults as needed  Outcome: Progressing     Problem: Involuntary Admit  Goal: Will cooperate with staff recommendations and doctor's orders and will demonstrate appropriate behavior  Description: INTERVENTIONS:  1. Treat underlying conditions and offer medication as ordered  2. Educate regarding involuntary admission procedures and rules  3.  Contain excessive/inappropriate behavior per unit and hospital policies  Outcome: Progressing

## 2022-05-27 NOTE — GROUP NOTE
Group Therapy Note    Date: 5/27/2022    Group Start Time: 1100  Group End Time: 1140  Group Topic: Psychoeducation    [facility]    Susie Vital, CTRS        Group Therapy Note      Number of participants: 8  Type of group: Psychoeducation  Mode of intervention: Education, Support, Socialization, Exploration, Clarifying, and Problem-solving  Topic: Being More Honest with Yourself  Objective: Pt will identify 1 way to be more honest with themselves in recovery. Patient's Goal: Pt reports no goal.     Notes:  Pt interactive during group sharing 1 way to be more honest in recovery. Pt gave support and feedback to others. Status After Intervention:  Improved    Participation Level:  Active Listener and Interactive    Participation Quality: Appropriate, Attentive, Sharing and Supportive      Speech:  normal      Thought Process/Content: Logical      Affective Functioning: Congruent      Mood: euthymic      Level of consciousness:  Alert, Oriented x4 and Attentive      Response to Learning: Able to verbalize current knowledge/experience, Able to verbalize/acknowledge new learning, Able to retain information, Capable of insight, Able to change behavior and Progressing to goal      Endings: None Reported    Modes of Intervention: Education, Support, Socialization, Exploration, Clarifying and Problem-solving

## 2022-05-27 NOTE — PLAN OF CARE
Problem: Chronic Conditions and Co-morbidities  Goal: Patient's chronic conditions and co-morbidity symptoms are monitored and maintained or improved  Outcome: Progressing     Problem: ABCDS Injury Assessment  Goal: Absence of physical injury  Outcome: Progressing     Problem: Anxiety  Goal: Will report anxiety at manageable levels  Description: INTERVENTIONS:  1. Administer medication as ordered  2. Teach and rehearse alternative coping skills  3. Provide emotional support with 1:1 interaction with staff  5/26/2022 2127 by Pam Lopez RN  Outcome: Progressing     Problem: Coping  Goal: Pt/Family able to verbalize concerns and demonstrate effective coping strategies  Description: INTERVENTIONS:  1. Assist patient/family to identify coping skills, available support systems and cultural and spiritual values  2. Provide emotional support, including active listening and acknowledgement of concerns of patient and caregivers  3. Reduce environmental stimuli, as able  4. Instruct patient/family in relaxation techniques, as appropriate  5. Assess for spiritual pain/suffering and initiate Spiritual Care, Psychosocial Clinical Specialist consults as needed  5/26/2022 2127 by Pam Lopez RN  Outcome: Progressing     Problem: Behavior  Goal: Pt/Family maintain appropriate behavior and adhere to behavioral management agreement, if implemented  Description: INTERVENTIONS:  1. Assess patient/family's coping skills and  non-compliant behavior (including use of illegal substances)  2. Notify security of behavior or suspected illegal substances which indicate the need for search of the patient and/or belongings  3. Encourage verbalization of thoughts and concerns in a socially appropriate manner  4. Utilize positive, consistent limit setting strategies supporting safety of patient, staff and others  5. Encourage participation in the decision making process about the behavioral management agreement  6.  Implement a Health Care Agreement if patient meets criteria  7. If a patient's behavior jeopardizes the safety of the patient, staff, or others refer to organization policy. If a visitor's behavior poses a threat to safety call refer to organization policy. 8. Initiate consult with , Psychosocial CNS, Spiritual Care as appropriate  5/26/2022 2127 by Tiera Spaulding RN  Outcome: Progressing     Problem: Depression/Self Harm  Goal: Effect of psychiatric condition will be minimized and patient will be protected from self harm  Description: INTERVENTIONS:  1. Assess impact of patient's symptoms on level of functioning, self care needs and offer support as indicated  2. Assess patient/family knowledge of depression, impact on illness and need for teaching  3. Provide emotional support, presence and reassurance  4. Assess for possible suicidal thoughts or ideation. If patient expresses suicidal thoughts or statements do not leave alone, initiate Suicide Precautions, move to a room close to the nursing station and obtain sitter  5. Initiate consults as appropriate with Mental Health Professional, Spiritual Care, Psychosocial CNS, and consider a recommendation to the LIP for a Psychiatric Consultation  5/26/2022 2127 by Tiera Spauldign RN  Outcome: Progressing     Problem: Involuntary Admit  Goal: Will cooperate with staff recommendations and doctor's orders and will demonstrate appropriate behavior  Description: INTERVENTIONS:  1. Treat underlying conditions and offer medication as ordered  2. Educate regarding involuntary admission procedures and rules  3.  Contain excessive/inappropriate behavior per unit and hospital policies  1/78/5923 1036 by Tiera Spaulding RN  Outcome: Progressing     Problem: Pain  Goal: Verbalizes/displays adequate comfort level or baseline comfort level  Outcome: Progressing     Problem: Nutrition Deficit:  Goal: Optimize nutritional status  Outcome: Progressing

## 2022-05-27 NOTE — PROGRESS NOTES
[facility] - Family Medicine Inpatient   Resident Progress Note    S:  Hospital day: 4    Brief Synopsis: Sandra Mendez is a 47 y.o. female with a PMH of uncontrolled DM2 on long term insulin with neuropathy, HTN, Hypothyroidism and RA who presented to the [facility] for intractable nausea and vomiting. Patient had tolerated food at [facility] ED, but prior to discharge became tearful and admitted suicidal ideation without a plan, and homicidal ideation toward her mother. Patient was accepted by Dr. Betsy Berumen and was transferred to [facility]. Family medicine has been consulted for medical management. No acute events overnight. Patient was seen and examined this morning. Patient continues to endorse suicidal and homicidal ideations. No plans or intent for suicide/homicide. Reglan started, she did not have any nausea/vomiting since then. Appetite is improving. Cont meds:    dextrose       Scheduled meds:    [START ON 5/28/2022] ARIPiprazole  5 mg Oral Daily    metoclopramide  5 mg Oral 4x Daily AC & HS    DULoxetine  30 mg Oral BID    buPROPion  100 mg Oral BID    nicotine  1 patch TransDERmal Daily    melatonin  3 mg Oral Nightly    famotidine  20 mg Oral BID    folic acid  1 mg Oral Daily    levothyroxine  88 mcg Oral Daily    lisinopril  20 mg Oral Daily    metFORMIN  1,000 mg Oral BID    pregabalin  100 mg Oral TID    insulin glargine  25 Units SubCUTAneous Nightly     PRN meds: acetaminophen, magnesium hydroxide, aluminum & magnesium hydroxide-simethicone, hydrOXYzine, ondansetron, glucose, dextrose **OR** [DISCONTINUED] dextrose bolus, glucagon (rDNA), dextrose     I reviewed the patient's Past Medical and Surgical History, Medications and Allergies. O:  VS: BP (!) 170/89   Pulse 99   Temp 97.5 °F (36.4 °C) (Temporal)   Resp 16   Ht 5' 1\" (1.549 m)   LMP  (LMP Unknown)   SpO2 96%   BMI 27.78 kg/m²     Physical Exam:  Physical Exam  Vitals reviewed.    Constitutional: General: She is not in acute distress. HENT:      Head: Normocephalic and atraumatic. Nose: Nose normal. No congestion or rhinorrhea. Cardiovascular:      Rate and Rhythm: Normal rate and regular rhythm. Pulses: Normal pulses. Heart sounds: Normal heart sounds. Pulmonary:      Effort: Pulmonary effort is normal.      Breath sounds: Normal breath sounds. Abdominal:      General: Bowel sounds are normal.      Palpations: Abdomen is soft. Tenderness: There is no abdominal tenderness. There is no guarding or rebound. Musculoskeletal:      Cervical back: Normal range of motion and neck supple. Right lower leg: No edema. Left lower leg: No edema. Skin:     General: Skin is warm. Findings: No rash. Neurological:      General: No focal deficit present. Mental Status: She is alert. Psychiatric:         Thought Content: Thought content normal.         Judgment: Judgment normal.            Labs:           estimated creatinine clearance is 73 mL/min (based on SCr of 0.8 mg/dL). Other pertinent labs as noted below    New Imaging:  No orders to display       A/P:  Principal Problem:    Severe episode of recurrent major depressive disorder, without psychotic features ([facility] [address]  Active Problems:    Hypokalemia due to loss of potassium    Intractable vomiting    Cluster B personality traits(HCC)    Type 2 diabetes mellitus with complication, with long-term current use of insulin (HCC)    Acquired hypothyroidism    Encounter for long-term (current) use of insulin (HCC)    Rheumatoid arthritis involving multiple sites ([facility] [address]    Primary hypertension  Resolved Problems:    * No resolved hospital problems.  *      Depression with SI/HI  · Duloxetine 30 mg BID for chronic pain and depression  · Bupropion 100 mg BID for depression and anxiety  · On Aripiprazole 2 mg daily for augmentation of treatment for depression  · On Melatonin 3 mg nightly for sleeping  · Participating in group therapy  · Suicide precautions  · SW to obtain collateral information  · Management per psychiatry     DM2 with neuropathy and gastroparesis  · Last HbA1C is 15%  · She is waiting on insulin pump, but currently requiring high doses of insulin  · Lantus restarted at 25 U  · On Metformin 1000 mg BID  · Liraglutide stopped due to gastroparesis  · Continue home Lyrica 100 mg TID for neuropathy  · On Pepcid 20 mg BID  · On Maalox PRN  · Started on Reglan 5 mg 4 times per day for gastroparesis. EKG done, QTc is 474, went down from 485. Okay to continue with Reglan. Consider increasing Reglan dose if no improvement. Continue monitoring QTc.   · Hypoglycemic protocol in place  · Monitor glucose, consider decreasing basal insulin dose if morning glucose is < 60 mg     HTN  · On Lisinopril 20 mg daily  · Monitor BP, consider increasing Lisinopril dose or adding a new medication if BP is still elevated    Hypokalemia - resolved     Hypothyroidism  · Continue home Levothyroxine 88 mcg daily    Smoking use  · On Nicotine patch 21 mg/day     RA  · Home medications are Methotrexate and Hydroxychloroquine, patient not taking them      DVT Prophylaxis: none because patient is fully mobile  GI Prophylaxis: Pepcid 20 mg BID  Diet: Adult regular carb controlled plus oral supplements  Disposition: Psych unit  Full code      Electronically signed by Bobby Byrd MD on 5/27/2022 at 11:23 AM  This case was discussed with attending physician Dr. Delia Miller

## 2022-05-28 LAB
METER GLUCOSE: 112 MG/DL (ref 74–99)
METER GLUCOSE: 120 MG/DL (ref 74–99)
METER GLUCOSE: 124 MG/DL (ref 74–99)
METER GLUCOSE: 134 MG/DL (ref 74–99)
METER GLUCOSE: 138 MG/DL (ref 74–99)
METER GLUCOSE: 61 MG/DL (ref 74–99)

## 2022-05-28 PROCEDURE — 6370000000 HC RX 637 (ALT 250 FOR IP): Performed by: FAMILY MEDICINE

## 2022-05-28 PROCEDURE — S5553 INSULIN LONG ACTING 5 U: HCPCS | Performed by: FAMILY MEDICINE

## 2022-05-28 PROCEDURE — 6370000000 HC RX 637 (ALT 250 FOR IP): Performed by: PSYCHIATRY & NEUROLOGY

## 2022-05-28 PROCEDURE — 82962 GLUCOSE BLOOD TEST: CPT

## 2022-05-28 PROCEDURE — 6370000000 HC RX 637 (ALT 250 FOR IP): Performed by: STUDENT IN AN ORGANIZED HEALTH CARE EDUCATION/TRAINING PROGRAM

## 2022-05-28 PROCEDURE — 6370000000 HC RX 637 (ALT 250 FOR IP): Performed by: NURSE PRACTITIONER

## 2022-05-28 PROCEDURE — 1240000000 HC EMOTIONAL WELLNESS R&B

## 2022-05-28 PROCEDURE — 99232 SBSQ HOSP IP/OBS MODERATE 35: CPT | Performed by: FAMILY MEDICINE

## 2022-05-28 RX ORDER — INSULIN GLARGINE-YFGN 100 [IU]/ML
10 INJECTION, SOLUTION SUBCUTANEOUS NIGHTLY
Status: DISCONTINUED | OUTPATIENT
Start: 2022-05-28 | End: 2022-06-01

## 2022-05-28 RX ADMIN — PREGABALIN 100 MG: 50 CAPSULE ORAL at 08:56

## 2022-05-28 RX ADMIN — PREGABALIN 100 MG: 50 CAPSULE ORAL at 14:13

## 2022-05-28 RX ADMIN — METOCLOPRAMIDE 5 MG: 5 TABLET ORAL at 06:57

## 2022-05-28 RX ADMIN — METFORMIN HYDROCHLORIDE 1000 MG: 500 TABLET, EXTENDED RELEASE ORAL at 21:21

## 2022-05-28 RX ADMIN — METOCLOPRAMIDE 5 MG: 5 TABLET ORAL at 21:21

## 2022-05-28 RX ADMIN — FAMOTIDINE 20 MG: 20 TABLET ORAL at 08:57

## 2022-05-28 RX ADMIN — METOCLOPRAMIDE 5 MG: 5 TABLET ORAL at 11:08

## 2022-05-28 RX ADMIN — METOCLOPRAMIDE 5 MG: 5 TABLET ORAL at 16:43

## 2022-05-28 RX ADMIN — PREGABALIN 100 MG: 50 CAPSULE ORAL at 21:21

## 2022-05-28 RX ADMIN — LISINOPRIL 20 MG: 20 TABLET ORAL at 08:56

## 2022-05-28 RX ADMIN — FAMOTIDINE 20 MG: 20 TABLET ORAL at 21:21

## 2022-05-28 RX ADMIN — INSULIN GLARGINE-YFGN 10 UNITS: 100 INJECTION, SOLUTION SUBCUTANEOUS at 21:23

## 2022-05-28 RX ADMIN — DULOXETINE 30 MG: 30 CAPSULE, DELAYED RELEASE ORAL at 08:57

## 2022-05-28 RX ADMIN — LEVOTHYROXINE SODIUM 88 MCG: 0.09 TABLET ORAL at 06:57

## 2022-05-28 RX ADMIN — ARIPIPRAZOLE 5 MG: 5 TABLET ORAL at 08:55

## 2022-05-28 RX ADMIN — Medication 3 MG: at 21:21

## 2022-05-28 RX ADMIN — METFORMIN HYDROCHLORIDE 1000 MG: 500 TABLET, EXTENDED RELEASE ORAL at 08:59

## 2022-05-28 RX ADMIN — BUPROPION HYDROCHLORIDE 100 MG: 100 TABLET, FILM COATED ORAL at 08:55

## 2022-05-28 RX ADMIN — FOLIC ACID 1 MG: 1 TABLET ORAL at 08:55

## 2022-05-28 RX ADMIN — DULOXETINE 30 MG: 30 CAPSULE, DELAYED RELEASE ORAL at 21:21

## 2022-05-28 RX ADMIN — BUPROPION HYDROCHLORIDE 100 MG: 100 TABLET, FILM COATED ORAL at 21:21

## 2022-05-28 ASSESSMENT — PAIN SCALES - GENERAL
PAINLEVEL_OUTOF10: 0
PAINLEVEL_OUTOF10: 0

## 2022-05-28 NOTE — PROGRESS NOTES
[facility] - Family Medicine Inpatient   Resident Progress Note    S:  Hospital day: 5    Brief Synopsis: Joce Bui is a 47 y.o. female with a PMH of uncontrolled DM2 on long term insulin with neuropathy, HTN, Hypothyroidism and RA who presented to the [facility] ED for intractable nausea and vomiting. Patient had tolerated food at [facility] ED, but prior to discharge became tearful and admitted suicidal ideation without a plan, and homicidal ideation toward her mother. Patient was accepted by Dr. Tod Baez and was transferred to [facility]. Family medicine has been consulted for medical management. Patient hypoglycemic into the 60s yesterday and this morning. Patient was seen and examined this morning. Patient continues to endorse suicidal and homicidal ideations. No plans or intent for suicide/homicide. Reglan started, she did not have any nausea/vomiting since then. Appetite is improving. Did feel shaky and lightheaded with low sugar yesterday and this morning. Cont meds:    dextrose       Scheduled meds:    insulin glargine  10 Units SubCUTAneous Nightly    ARIPiprazole  5 mg Oral Daily    pregabalin  50 mg Oral Once    metoclopramide  5 mg Oral 4x Daily AC & HS    DULoxetine  30 mg Oral BID    buPROPion  100 mg Oral BID    nicotine  1 patch TransDERmal Daily    melatonin  3 mg Oral Nightly    famotidine  20 mg Oral BID    folic acid  1 mg Oral Daily    levothyroxine  88 mcg Oral Daily    lisinopril  20 mg Oral Daily    metFORMIN  1,000 mg Oral BID    pregabalin  100 mg Oral TID     PRN meds: acetaminophen, magnesium hydroxide, aluminum & magnesium hydroxide-simethicone, hydrOXYzine, ondansetron, glucose, dextrose **OR** [DISCONTINUED] dextrose bolus, glucagon (rDNA), dextrose     I reviewed the patient's Past Medical and Surgical History, Medications and Allergies.     O:  VS: BP (!) 164/83   Pulse (!) 102   Temp 97.8 °F (36.6 °C) (Temporal)   Resp 18   Ht 5' 1\" (1.549 m)   LMP (LMP Unknown)   SpO2 96%   BMI 27.78 kg/m²     Physical Exam:  Physical Exam  Vitals reviewed. Constitutional:       General: She is not in acute distress. HENT:      Head: Normocephalic and atraumatic. Nose: Nose normal. No congestion or rhinorrhea. Cardiovascular:      Rate and Rhythm: Normal rate and regular rhythm. Pulses: Normal pulses. Heart sounds: Normal heart sounds. Pulmonary:      Effort: Pulmonary effort is normal.      Breath sounds: Normal breath sounds. Abdominal:      General: Bowel sounds are normal.      Palpations: Abdomen is soft. Tenderness: There is no abdominal tenderness. There is no guarding or rebound. Musculoskeletal:      Cervical back: Normal range of motion and neck supple. Right lower leg: No edema. Left lower leg: No edema. Skin:     General: Skin is warm. Findings: No rash. Neurological:      General: No focal deficit present. Mental Status: She is alert. Psychiatric:         Thought Content: Thought content normal.         Judgment: Judgment normal.            Labs:           estimated creatinine clearance is 73 mL/min (based on SCr of 0.8 mg/dL). Other pertinent labs as noted below    New Imaging:  No orders to display       A/P:  Principal Problem:    Severe episode of recurrent major depressive disorder, without psychotic features ([facility] [address]  Active Problems:    Hypokalemia due to loss of potassium    Intractable vomiting    Cluster B personality traits(HCC)    Type 2 diabetes mellitus with complication, with long-term current use of insulin (HCC)    Acquired hypothyroidism    Encounter for long-term (current) use of insulin (HCC)    Rheumatoid arthritis involving multiple sites ([facility] [address]    Primary hypertension  Resolved Problems:    * No resolved hospital problems.  *      Depression with SI/HI  · Duloxetine 30 mg BID for chronic pain and depression  · Bupropion 100 mg BID for depression and anxiety  · On Aripiprazole 2 mg daily for augmentation of treatment for depression  · On Melatonin 3 mg nightly for sleeping  · Participating in group therapy  · Suicide precautions  · SW to obtain collateral information  · Management per psychiatry     DM2 with neuropathy and gastroparesis  · Last HbA1C is 15%  · She is waiting on insulin pump, but currently requiring high doses of insulin  · Lantus decreased to 15 units overnight due to hypoglycemia, may hold tonight if blood glucose remains less than 150  · On Metformin 1000 mg BID  · Liraglutide stopped due to gastroparesis  · Continue home Lyrica 100 mg TID for neuropathy  · On Pepcid 20 mg BID  · On Maalox PRN  · Started on Reglan 5 mg 4 times per day for gastroparesis. EKG done, QTc is 474, went down from 485. Okay to continue with Reglan. Consider increasing Reglan dose if no improvement. Continue monitoring QTc.   · Hypoglycemic protocol in place  · Monitor glucose, consider decreasing basal insulin dose if morning glucose is < 60 mg     HTN  · On Lisinopril 20 mg daily  · Monitor BP, consider increasing Lisinopril dose or adding a new medication if BP is still elevated    Hypokalemia - resolved     Hypothyroidism  · Continue home Levothyroxine 88 mcg daily    Smoking use  · On Nicotine patch 21 mg/day     RA  · Home medications are Methotrexate and Hydroxychloroquine, patient not taking them      DVT Prophylaxis: none because patient is fully mobile  GI Prophylaxis: Pepcid 20 mg BID  Diet: Adult regular carb controlled plus oral supplements  Disposition: Psych unit  Full code      Electronically signed by Danitza Moon MD on 5/28/2022 at 9:17 AM  This case was discussed with attending physician Dr. Kwasi Byrd

## 2022-05-28 NOTE — PROGRESS NOTES
BEHAVIORAL HEALTH FOLLOW-UP NOTE     5/28/2022     Patient was seen and examined in person, Chart reviewed   Patient's case discussed with staff/team        Chief Complaint: Calm cooperative     Interim History: Patient seen in the dayroom this morning, she is calm, in no distress. Patient states that she does not feel any better or any worse. She tells me that she is beginning to feel as though she if working towards discharge but states I am not ready yet. She has blunted affect which brightens on conversation. She is in control of behaviors. Compliant with medications and is attending groups, she is visible in the milieu. Patient has significant cluster B personality. Appetite:   [x] Normal/Unchanged  [] Increased  [] Decreased      Sleep:      [] Normal/Unchanged  [x] Fair      [] Poor              Energy:    [x] Normal/Unchanged  [] Increased  [] Decreased        SI [] Present  [x] Absent    HI  []Present  [x] Absent     Aggression:  [] yes  [x] no    Patient is [x] able  [] unable to CONTRACT FOR SAFETY     PAST MEDICAL/PSYCHIATRIC HISTORY:   Past Medical History:   Diagnosis Date    Anxiety     Arthritis     Depression     Diabetes mellitus ([facility]     Fibromyalgia     HTN (hypertension)     Hypertensive chronic kidney disease     Left shoulder pain     LIZABETH (obstructive sleep apnea)     Rheumatoid arthritis involving multiple sites ([facility]     [facility]ren[facility] ([facility]     Thyroid disease        FAMILY/SOCIAL HISTORY:  Family History   Adopted: Yes   Family history unknown: Yes     Social History     Socioeconomic History    Marital status: Single     Spouse name: Not on file    Number of children: Not on file    Years of education: Not on file    Highest education level:  Bachelor's degree (e.g., BA, AB, BS)   Occupational History    Occupation: unemployed    Tobacco Use    Smoking status: Former Smoker     Packs/day: 2.00     Years: 0.50     Pack years: 1.00     Start date: 1997     Quit date: 0     Years since quittin.4    Smokeless tobacco: Never Used   Vaping Use    Vaping Use: Never used   Substance and Sexual Activity    Alcohol use: No    Drug use: No    Sexual activity: Never   Other Topics Concern    Not on file   Social History Narrative    2/3/2020-Financial Resource strain-states very hard-pt receives SNAP benefits for food, states that food doesn't run out as she also utilizes the [employer] the  of every month    2/3/2020-Stress-states she is very much stressed and reports attending [place] [facility] for her depression and receives medications as well     Social Determinants of Health     Financial Resource Strain: High Risk    Difficulty of Paying Living Expenses: Very hard   Food Insecurity: No Food Insecurity    Worried About Running Out of Food in the Last Year: Never true    Kai of Food in the Last Year: Never true   Transportation Needs:     Lack of Transportation (Medical): Not on file    Lack of Transportation (Non-Medical):  Not on file   Physical Activity:     Days of Exercise per Week: Not on file    Minutes of Exercise per Session: Not on file   Stress:     Feeling of Stress : Not on file   Social Connections:     Frequency of Communication with Friends and Family: Not on file    Frequency of Social Gatherings with Friends and Family: Not on file    Attends Synagogue Services: Not on file    Active Member of [address] [place] [address] or Organizations: Not on file    Attends Club or Organization Meetings: Not on file    Marital Status: Not on file   Intimate Partner Violence:     Fear of Current or Ex-Partner: Not on file    Emotionally Abused: Not on file    Physically Abused: Not on file    Sexually Abused: Not on file   Housing Stability:     Unable to Pay for Housing in the Last Year: Not on file    Number of [place] in the Last Year: Not on file    Unstable Housing in the Last Year: Not on file           ROS:  [x] All negative/unchanged except if checked.  Explain positive(checked items) below:  [] Constitutional  [] Eyes  [] Ear/Nose/Mouth/Throat  [] Respiratory  [] CV  [] GI  []   [] Musculoskeletal  [] Skin/Breast  [] Neurological  [] Endocrine  [] Heme/Lymph  [] Allergic/Immunologic    Explanation:     MEDICATIONS:    Current Facility-Administered Medications:     insulin glargine-yfgn (SEMGLEE-YFGN) injection vial 10 Units, 10 Units, SubCUTAneous, Nightly, Iris Gentile MD    ARIPiprazole (ABILIFY) tablet 5 mg, 5 mg, Oral, Daily, Casey Sales, APRN - CNP, 5 mg at 05/28/22 0855    pregabalin (LYRICA) capsule 50 mg, 50 mg, Oral, Once, Liliya Pastures, DO    metoclopramide (REGLAN) tablet 5 mg, 5 mg, Oral, 4x Daily AC & HS, Bakari Sanderson MD, 5 mg at 05/28/22 5514    DULoxetine (CYMBALTA) extended release capsule 30 mg, 30 mg, Oral, BID, Casey Sales, APRN - CNP, 30 mg at 05/28/22 0857    buPROPion [facility]) tablet 100 mg, 100 mg, Oral, BID, Insportant, APRN - CNP, 100 mg at 05/28/22 0855    acetaminophen (TYLENOL) tablet 650 mg, 650 mg, Oral, Q4H PRN, Litzy Khalil MD    magnesium hydroxide (MILK OF MAGNESIA) 400 MG/5ML suspension 30 mL, 30 mL, Oral, Daily PRN, Litzy Khalil MD    nicotine (NICODERM CQ) 21 MG/24HR 1 patch, 1 patch, TransDERmal, Daily, Litzy Khalil MD    aluminum & magnesium hydroxide-simethicone (MAALOX) 200-200-20 MG/5ML suspension 30 mL, 30 mL, Oral, PRN, Litzy Khalil MD    hydrOXYzine (VISTARIL) capsule 50 mg, 50 mg, Oral, TID PRN, Litzy Khalil MD    melatonin tablet 3 mg, 3 mg, Oral, Nightly, Nata Michaels MD, 3 mg at 05/27/22 2219    famotidine (PEPCID) tablet 20 mg, 20 mg, Oral, BID, Rose A Rech, DO, 20 mg at 78/07/71 4469    folic acid (FOLVITE) tablet 1 mg, 1 mg, Oral, Daily, Rose A Rech, DO, 1 mg at 05/28/22 0855    levothyroxine (SYNTHROID) tablet 88 mcg, 88 mcg, Oral, Daily, Rose A Rech, DO, 88 mcg at 05/28/22 0657    lisinopril (PRINIVIL;ZESTRIL) tablet 20 mg, 20 mg, Oral, Daily, Rose A Rech, DO, 20 mg at 05/28/22 0856    metFORMIN (GLUCOPHAGE-XR) extended release tablet 1,000 mg, 1,000 mg, Oral, BID, Rose A Rech, DO, 1,000 mg at 05/28/22 0859    ondansetron (ZOFRAN-ODT) disintegrating tablet 4 mg, 4 mg, Oral, Q8H PRN, Karoline Polo Rech, DO, 4 mg at 05/23/22 2205    pregabalin (LYRICA) capsule 100 mg, 100 mg, Oral, TID, Rose A Rech, DO, 100 mg at 05/28/22 0856    glucose chewable tablet 16 g, 4 tablet, Oral, PRN, Rose A Rech, DO    dextrose 50 % IV solution, 12.5 g, IntraVENous, PRN **OR** [DISCONTINUED] dextrose bolus 10% 250 mL, 250 mL, IntraVENous, PRN, [place] Batch A Rech, DO    glucagon (rDNA) injection 1 mg, 1 mg, IntraMUSCular, PRN, Rose A Rech, DO    dextrose 5 % solution, 100 mL/hr, IntraVENous, PRN, Rose A Rech, DO      Examination:  BP (!) 164/83   Pulse (!) 102   Temp 97.8 °F (36.6 °C) (Temporal)   Resp 18   Ht 5' 1\" (1.549 m)   LMP  (LMP Unknown)   SpO2 96%   BMI 27.78 kg/m²   Gait - steady  Medication side effects(SE): denies    Mental Status Examination:    Level of consciousness:  within normal limits   Appearance:  good grooming and good hygiene  Behavior/Motor:  no abnormalities noted  Attitude toward examiner:  cooperative  Speech:  normal volume, well articulated and slow   Mood: depressed  Affect:  blunted  Thought processes:  linear   Thought content:  The patient is devoid of suicidal or homicidal ideation intent or plan.  Devoid of auditory or visual hallucinations or other perceptual disturbances, there are no overt or covert signs of psychosis or paranoia.  There are no neurovegetative signs of depression.   Cognition:  oriented to person, place, and time   Concentration intact  Insight Improving  Judgement fair     ASSESSMENT:   Patient symptoms are:  [] Well controlled  [x] Improving  [] Worsening  [] No change      Diagnosis:   Principal Problem:    Severe episode of recurrent major depressive disorder, without psychotic features [facility])  Active Problems:    Hypokalemia due to loss of potassium    Intractable vomiting    Cluster B personality traits(HCC)    Type 2 diabetes mellitus with complication, with long-term current use of insulin ([facility]    Acquired hypothyroidism    Encounter for long-term (current) use of insulin (HCC)    Rheumatoid arthritis involving multiple sites [facility])    Primary hypertension  Resolved Problems:    * No resolved hospital problems. *      LABS:    No results for input(s): WBC, HGB, PLT in the last 72 hours. No results for input(s): NA, K, CL, CO2, BUN, CREATININE, GLUCOSE in the last 72 hours. No results for input(s): BILITOT, ALKPHOS, AST, ALT in the last 72 hours. Lab Results   Component Value Date    LABAMPH NOT DETECTED 05/22/2022    BARBSCNU NOT DETECTED 05/22/2022    LABBENZ NOT DETECTED 05/22/2022    LABMETH NOT DETECTED 05/22/2022    OPIATESCREENURINE NOT DETECTED 05/22/2022    PHENCYCLIDINESCREENURINE NOT DETECTED 05/22/2022    ETOH <10 05/22/2022     Lab Results   Component Value Date    TSH 5.220 05/16/2022     No results found for: LITHIUM  No results found for: VALPROATE, CBMZ    Treatment Plan:  The patient's diagnosis, treatment plan, medication management were formulated after patient was seen directly by the attending physician and myself and all relevant documentation was reviewed.     Risk, benefit, side effects, possible outcomes of the medication and alternatives discussed with the patient and the patient demonstrated understanding.   The patient was also educated that the outcome of treatment will depend on the medication compliance as directed by the prescribers along with regular follow-up, compliance with the labs and other work-up, as clinically indicated.     Cymbalta 30 mg twice daily for chronic pain and depression  Continue patient's Wellbutrin 100 mg twice daily for depression and anxiety  Abilify 5 mg daily for augmentation of treatment for depression     Collateral information: Followed by social work  CD evaluation  Encourage patient to attend group and other milieu activities.   Discharge planning discussed with the patient and treatment team.    PSYCHOTHERAPY/COUNSELING:  [x] Therapeutic interview  [x] Supportive  [] CBT  [] Ongoing  [] Other    [x] Patient continues to need, on a daily basis, active treatment furnished directly by or requiring the supervision of inpatient psychiatric personnel      Anticipated Length of stay: 5 to 7 days based on stability      Electronically signed by ANA Snow CNP on 5/28/2022 at 9:30 AM

## 2022-05-28 NOTE — PROGRESS NOTES
Attended evening relaxation group. Able to identify benefits and specific activity to apply when needed. Was 1 of 8 in attendance.

## 2022-05-28 NOTE — GROUP NOTE
Group Therapy Note    Date: 5/28/2022    Group Start Time: 1115  Group End Time: 1200  Group Topic: Psychoeducation    [facility]        Group Therapy Note    Attendees: 5         Notes: Attended discussion on stress management tips. Accepting of handout, but remains negative in her application. Able to share benefit of adding to wellness recovery, after much re framing.       Status After Intervention:  Unchanged    Participation Level: Minimal    Participation Quality: Attentive      Speech:  hesitant      Thought Process/Content: Perseverating      Affective Functioning: Flat      Mood: anxious and depressed      Level of consciousness:  Alert and Attentive      Response to Learning: Progressing to goal      Endings: None Reported    Modes of Intervention: Education, Support, Socialization and Exploration      Discipline Responsible: Psychoeducational Specialist      Signature:  Jesús Kinney [place]

## 2022-05-28 NOTE — PLAN OF CARE
Problem: Chronic Conditions and Co-morbidities  Goal: Patient's chronic conditions and co-morbidity symptoms are monitored and maintained or improved  Outcome: Progressing     Problem: Anxiety  Goal: Will report anxiety at manageable levels  Description: INTERVENTIONS:  1. Administer medication as ordered  2. Teach and rehearse alternative coping skills  3. Provide emotional support with 1:1 interaction with staff  Outcome: Progressing     Problem: Depression/Self Harm  Goal: Effect of psychiatric condition will be minimized and patient will be protected from self harm  Description: INTERVENTIONS:  1. Assess impact of patient's symptoms on level of functioning, self care needs and offer support as indicated  2. Assess patient/family knowledge of depression, impact on illness and need for teaching  3. Provide emotional support, presence and reassurance  4. Assess for possible suicidal thoughts or ideation. If patient expresses suicidal thoughts or statements do not leave alone, initiate Suicide Precautions, move to a room close to the nursing station and obtain sitter  5. Initiate consults as appropriate with Mental Health Professional, Spiritual Care, Psychosocial CNS, and consider a recommendation to the LIP for a Psychiatric Consultation  Outcome: Progressing     Problem: Coping  Goal: Pt/Family able to verbalize concerns and demonstrate effective coping strategies  Description: INTERVENTIONS:  1. Assist patient/family to identify coping skills, available support systems and cultural and spiritual values  2. Provide emotional support, including active listening and acknowledgement of concerns of patient and caregivers  3. Reduce environmental stimuli, as able  4. Instruct patient/family in relaxation techniques, as appropriate  5.  Assess for spiritual pain/suffering and initiate Spiritual Care, Psychosocial Clinical Specialist consults as needed  Outcome: Adequate for Discharge     Problem: Involuntary Admit  Goal: Will cooperate with staff recommendations and doctor's orders and will demonstrate appropriate behavior  Description: INTERVENTIONS:  1. Treat underlying conditions and offer medication as ordered  2. Educate regarding involuntary admission procedures and rules  3.  Contain excessive/inappropriate behavior per unit and hospital policies  Outcome: Adequate for Discharge

## 2022-05-28 NOTE — PLAN OF CARE
Patient up in day room watching TV. Patient friendly and cooperative. Patient continues to endorse SI/HI \"they are always there\" denies AVH rates depression 8/10 and anxiety 6/10. NO voiced complaints voiced patient compliant with medications. Will continue to monitor and assess q 15 min for safety throughout the shift. Problem: Chronic Conditions and Co-morbidities  Goal: Patient's chronic conditions and co-morbidity symptoms are monitored and maintained or improved  Outcome: Progressing     Problem: ABCDS Injury Assessment  Goal: Absence of physical injury  Outcome: Progressing     Problem: Anxiety  Goal: Will report anxiety at manageable levels  Description: INTERVENTIONS:  1. Administer medication as ordered  2. Teach and rehearse alternative coping skills  3. Provide emotional support with 1:1 interaction with staff  5/27/2022 2104 by Penelope Ch RN  Outcome: Progressing     Problem: Coping  Goal: Pt/Family able to verbalize concerns and demonstrate effective coping strategies  Description: INTERVENTIONS:  1. Assist patient/family to identify coping skills, available support systems and cultural and spiritual values  2. Provide emotional support, including active listening and acknowledgement of concerns of patient and caregivers  3. Reduce environmental stimuli, as able  4. Instruct patient/family in relaxation techniques, as appropriate  5. Assess for spiritual pain/suffering and initiate Spiritual Care, Psychosocial Clinical Specialist consults as needed  5/27/2022 2104 by Penelope Ch RN  Outcome: Progressing     Problem: Behavior  Goal: Pt/Family maintain appropriate behavior and adhere to behavioral management agreement, if implemented  Description: INTERVENTIONS:  1. Assess patient/family's coping skills and  non-compliant behavior (including use of illegal substances)  2.  Notify security of behavior or suspected illegal substances which indicate the need for search of the patient and/or belongings  3. Encourage verbalization of thoughts and concerns in a socially appropriate manner  4. Utilize positive, consistent limit setting strategies supporting safety of patient, staff and others  5. Encourage participation in the decision making process about the behavioral management agreement  6. Implement a Health Care Agreement if patient meets criteria  7. If a patient's behavior jeopardizes the safety of the patient, staff, or others refer to organization policy. If a visitor's behavior poses a threat to safety call refer to organization policy. 8. Initiate consult with , Psychosocial CNS, Spiritual Care as appropriate  Outcome: Progressing     Problem: Depression/Self Harm  Goal: Effect of psychiatric condition will be minimized and patient will be protected from self harm  Description: INTERVENTIONS:  1. Assess impact of patient's symptoms on level of functioning, self care needs and offer support as indicated  2. Assess patient/family knowledge of depression, impact on illness and need for teaching  3. Provide emotional support, presence and reassurance  4. Assess for possible suicidal thoughts or ideation. If patient expresses suicidal thoughts or statements do not leave alone, initiate Suicide Precautions, move to a room close to the nursing station and obtain sitter  5. Initiate consults as appropriate with Mental Health Professional, Spiritual Care, Psychosocial CNS, and consider a recommendation to the LIP for a Psychiatric Consultation  Outcome: Progressing     Problem: Involuntary Admit  Goal: Will cooperate with staff recommendations and doctor's orders and will demonstrate appropriate behavior  Description: INTERVENTIONS:  1. Treat underlying conditions and offer medication as ordered  2. Educate regarding involuntary admission procedures and rules  3.  Contain excessive/inappropriate behavior per unit and hospital policies  7/90/[id number] by Larry Goldberg RN  Outcome: Progressing     Problem: Pain  Goal: Verbalizes/displays adequate comfort level or baseline comfort level  Outcome: Progressing     Problem: Nutrition Deficit:  Goal: Optimize nutritional status  Outcome: Progressing

## 2022-05-28 NOTE — PROGRESS NOTES
[address] [facility]  Family Medicine Attending    S: 47 y.o. female with PMH of uncontrolled DM2 on long term insulin with neuropathy, HTN, Hypothyroidism, rheumatoid arthritis, who presented to the [facility] ED for intractable nausea and vomiting. After a negative evaluation,  she admitted to suicidal ideation and was transferred here for psychiatric treatment. She continued to have N/V and we were consulted for treatment. She recently had EGD, which she feels made her symptoms worse. She has known gastroparesis. States that she usually has one bad week a month, without any pattern    Today, continues to feel well without symptoms or concerns. Eating breakfast.  Tolerating diet. Glucose mildly low this AM.      O: VS- Blood pressure (!) 164/83, pulse (!) 102, temperature 97.8 °F (36.6 °C), temperature source Temporal, resp. rate 18, height 5' 1\" (1.549 m), SpO2 96 %, not currently breastfeeding. Exam is as noted by resident with the following changes, additions or corrections:  Awake, alert  Heart - RRR  Lungs- clear  Abdomen- soft, nontender      Impressions:   Principal Problem:    Severe episode of recurrent major depressive disorder, without psychotic features ([address]  Active Problems:    Hypokalemia due to loss of potassium    Intractable vomiting    Cluster B personality traits(HCC)    Type 2 diabetes mellitus with complication, with long-term current use of insulin (HCC)    Acquired hypothyroidism    Encounter for long-term (current) use of insulin ([address]    Rheumatoid arthritis involving multiple sites ([facility]    Primary hypertension  Resolved Problems:    * No resolved hospital problems. *      Plan:   Monitor and adjust insulin as needed; plan to decrease and possibly hold tonight's insulin based on glucose readings today. Continue metformin. Ozempic discontinued. Continue metoclopramide, EKG ok. Consider increasing lisinopril, adding other meds for BP control - follow today.      Psych for depression treatment       Attending Physician Statement  I have reviewed the chart and seen the patient with the resident(s). I personally reviewed images, EKG's and similar tests, if present. I personally reviewed and performed key elements of the history and exam.  I have reviewed and confirmed student and/or resident history and exam with changes as indicated above. I agree with the assessment, plan and orders as documented by the resident. Please refer to the resident and/or student note for additional information.       Meg Collado, DO

## 2022-05-29 LAB
METER GLUCOSE: 132 MG/DL (ref 74–99)
METER GLUCOSE: 139 MG/DL (ref 74–99)
METER GLUCOSE: 89 MG/DL (ref 74–99)
METER GLUCOSE: 97 MG/DL (ref 74–99)

## 2022-05-29 PROCEDURE — S5553 INSULIN LONG ACTING 5 U: HCPCS | Performed by: FAMILY MEDICINE

## 2022-05-29 PROCEDURE — 6370000000 HC RX 637 (ALT 250 FOR IP): Performed by: NURSE PRACTITIONER

## 2022-05-29 PROCEDURE — 6370000000 HC RX 637 (ALT 250 FOR IP): Performed by: FAMILY MEDICINE

## 2022-05-29 PROCEDURE — 6370000000 HC RX 637 (ALT 250 FOR IP): Performed by: PSYCHIATRY & NEUROLOGY

## 2022-05-29 PROCEDURE — 1240000000 HC EMOTIONAL WELLNESS R&B

## 2022-05-29 PROCEDURE — 82962 GLUCOSE BLOOD TEST: CPT

## 2022-05-29 PROCEDURE — 6370000000 HC RX 637 (ALT 250 FOR IP): Performed by: STUDENT IN AN ORGANIZED HEALTH CARE EDUCATION/TRAINING PROGRAM

## 2022-05-29 RX ORDER — AMLODIPINE BESYLATE 5 MG/1
5 TABLET ORAL NIGHTLY
Status: DISCONTINUED | OUTPATIENT
Start: 2022-05-29 | End: 2022-06-01 | Stop reason: HOSPADM

## 2022-05-29 RX ORDER — AMLODIPINE BESYLATE 5 MG/1
5 TABLET ORAL DAILY
Status: DISCONTINUED | OUTPATIENT
Start: 2022-05-29 | End: 2022-05-29

## 2022-05-29 RX ORDER — LISINOPRIL 10 MG/1
30 TABLET ORAL DAILY
Status: DISCONTINUED | OUTPATIENT
Start: 2022-05-29 | End: 2022-06-01 | Stop reason: HOSPADM

## 2022-05-29 RX ADMIN — METOCLOPRAMIDE 5 MG: 5 TABLET ORAL at 21:41

## 2022-05-29 RX ADMIN — METOCLOPRAMIDE 5 MG: 5 TABLET ORAL at 16:48

## 2022-05-29 RX ADMIN — FAMOTIDINE 20 MG: 20 TABLET ORAL at 09:13

## 2022-05-29 RX ADMIN — FOLIC ACID 1 MG: 1 TABLET ORAL at 09:13

## 2022-05-29 RX ADMIN — METFORMIN HYDROCHLORIDE 1000 MG: 500 TABLET, EXTENDED RELEASE ORAL at 21:40

## 2022-05-29 RX ADMIN — PREGABALIN 100 MG: 50 CAPSULE ORAL at 14:05

## 2022-05-29 RX ADMIN — PREGABALIN 100 MG: 50 CAPSULE ORAL at 09:13

## 2022-05-29 RX ADMIN — Medication 3 MG: at 21:41

## 2022-05-29 RX ADMIN — BUPROPION HYDROCHLORIDE 100 MG: 100 TABLET, FILM COATED ORAL at 21:41

## 2022-05-29 RX ADMIN — DULOXETINE 30 MG: 30 CAPSULE, DELAYED RELEASE ORAL at 21:41

## 2022-05-29 RX ADMIN — METOCLOPRAMIDE 5 MG: 5 TABLET ORAL at 06:53

## 2022-05-29 RX ADMIN — DULOXETINE 30 MG: 30 CAPSULE, DELAYED RELEASE ORAL at 09:14

## 2022-05-29 RX ADMIN — AMLODIPINE BESYLATE 5 MG: 5 TABLET ORAL at 21:41

## 2022-05-29 RX ADMIN — FAMOTIDINE 20 MG: 20 TABLET ORAL at 21:41

## 2022-05-29 RX ADMIN — INSULIN GLARGINE-YFGN 10 UNITS: 100 INJECTION, SOLUTION SUBCUTANEOUS at 21:44

## 2022-05-29 RX ADMIN — BUPROPION HYDROCHLORIDE 100 MG: 100 TABLET, FILM COATED ORAL at 09:13

## 2022-05-29 RX ADMIN — LEVOTHYROXINE SODIUM 88 MCG: 0.09 TABLET ORAL at 06:53

## 2022-05-29 RX ADMIN — PREGABALIN 100 MG: 50 CAPSULE ORAL at 21:41

## 2022-05-29 RX ADMIN — METOCLOPRAMIDE 5 MG: 5 TABLET ORAL at 09:13

## 2022-05-29 RX ADMIN — ARIPIPRAZOLE 5 MG: 5 TABLET ORAL at 09:13

## 2022-05-29 RX ADMIN — LISINOPRIL 30 MG: 20 TABLET ORAL at 09:14

## 2022-05-29 RX ADMIN — METFORMIN HYDROCHLORIDE 1000 MG: 500 TABLET, EXTENDED RELEASE ORAL at 09:13

## 2022-05-29 ASSESSMENT — PAIN SCALES - GENERAL
PAINLEVEL_OUTOF10: 0
PAINLEVEL_OUTOF10: 0

## 2022-05-29 NOTE — PROGRESS NOTES
Attended afternoon leisure activity. Pleasant, social, and interacting with peers. Enjoying activity of choice (movie, puzzles). Was 1 of 9 in attendance.
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
## 2022-05-29 NOTE — PLAN OF CARE
Problem: Chronic Conditions and Co-morbidities  Goal: Patient's chronic conditions and co-morbidity symptoms are monitored and maintained or improved  Outcome: Progressing     Problem: Anxiety  Goal: Will report anxiety at manageable levels  Description: INTERVENTIONS:  1. Administer medication as ordered  2. Teach and rehearse alternative coping skills  3. Provide emotional support with 1:1 interaction with staff  Outcome: Progressing     Problem: Coping  Goal: Pt/Family able to verbalize concerns and demonstrate effective coping strategies  Description: INTERVENTIONS:  1. Assist patient/family to identify coping skills, available support systems and cultural and spiritual values  2. Provide emotional support, including active listening and acknowledgement of concerns of patient and caregivers  3. Reduce environmental stimuli, as able  4. Instruct patient/family in relaxation techniques, as appropriate  5. Assess for spiritual pain/suffering and initiate Spiritual Care, Psychosocial Clinical Specialist consults as needed  Outcome: Progressing     Problem: Depression/Self Harm  Goal: Effect of psychiatric condition will be minimized and patient will be protected from self harm  Description: INTERVENTIONS:  1. Assess impact of patient's symptoms on level of functioning, self care needs and offer support as indicated  2. Assess patient/family knowledge of depression, impact on illness and need for teaching  3. Provide emotional support, presence and reassurance  4. Assess for possible suicidal thoughts or ideation. If patient expresses suicidal thoughts or statements do not leave alone, initiate Suicide Precautions, move to a room close to the nursing station and obtain sitter  5.  Initiate consults as appropriate with Mental Health Professional, Spiritual Care, Psychosocial CNS, and consider a recommendation to the LIP for a Psychiatric Consultation  Outcome: Progressing

## 2022-05-29 NOTE — PLAN OF CARE
Patient up in day room watching TV. Patient friendly and cooperative. Patient continues to endorse SI/HI \"they are always there\" denies AVH rates depression 8/10 and anxiety 0/10. No voiced complaints, patient compliant with medications. Will continue to monitor and assess q 15 min for safety throughout the shift.     Problem: Chronic Conditions and Co-morbidities  Goal: Patient's chronic conditions and co-morbidity symptoms are monitored and maintained or improved  Outcome: Progressing     Problem: ABCDS Injury Assessment  Goal: Absence of physical injury  Outcome: Progressing     Problem: Anxiety  Goal: Will report anxiety at manageable levels  Description: INTERVENTIONS:  1. Administer medication as ordered  2. Teach and rehearse alternative coping skills  3. Provide emotional support with 1:1 interaction with staff  5/27/2022 2104 by Bertha Crews RN  Outcome: Progressing     Problem: Coping  Goal: Pt/Family able to verbalize concerns and demonstrate effective coping strategies  Description: INTERVENTIONS:  1. Assist patient/family to identify coping skills, available support systems and cultural and spiritual values  2. Provide emotional support, including active listening and acknowledgement of concerns of patient and caregivers  3. Reduce environmental stimuli, as able  4. Instruct patient/family in relaxation techniques, as appropriate  5. Assess for spiritual pain/suffering and initiate Spiritual Care, Psychosocial Clinical Specialist consults as needed  5/27/2022 2104 by Bertha Crews RN  Outcome: Progressing     Problem: Behavior  Goal: Pt/Family maintain appropriate behavior and adhere to behavioral management agreement, if implemented  Description: INTERVENTIONS:  1. Assess patient/family's coping skills and  non-compliant behavior (including use of illegal substances)  2.  Notify security of behavior or suspected illegal substances which indicate the need for search of the patient and/or belongings  3. Encourage verbalization of thoughts and concerns in a socially appropriate manner  4. Utilize positive, consistent limit setting strategies supporting safety of patient, staff and others  5. Encourage participation in the decision making process about the behavioral management agreement  6. Implement a Health Care Agreement if patient meets criteria  7. If a patient's behavior jeopardizes the safety of the patient, staff, or others refer to organization policy. If a visitor's behavior poses a threat to safety call refer to organization policy. 8. Initiate consult with , Psychosocial CNS, Spiritual Care as appropriate  Outcome: Progressing     Problem: Depression/Self Harm  Goal: Effect of psychiatric condition will be minimized and patient will be protected from self harm  Description: INTERVENTIONS:  1. Assess impact of patient's symptoms on level of functioning, self care needs and offer support as indicated  2. Assess patient/family knowledge of depression, impact on illness and need for teaching  3. Provide emotional support, presence and reassurance  4. Assess for possible suicidal thoughts or ideation. If patient expresses suicidal thoughts or statements do not leave alone, initiate Suicide Precautions, move to a room close to the nursing station and obtain sitter  5. Initiate consults as appropriate with Mental Health Professional, Spiritual Care, Psychosocial CNS, and consider a recommendation to the LIP for a Psychiatric Consultation  Outcome: Progressing     Problem: Involuntary Admit  Goal: Will cooperate with staff recommendations and doctor's orders and will demonstrate appropriate behavior  Description: INTERVENTIONS:  1. Treat underlying conditions and offer medication as ordered  2. Educate regarding involuntary admission procedures and rules  3.  Contain excessive/inappropriate behavior per unit and hospital policies  [id number] by Tiera Spaulding, RN  Outcome: Progressing     Problem: Pain  Goal: Verbalizes/displays adequate comfort level or baseline comfort level  Outcome: Progressing     Problem: Nutrition Deficit:  Goal: Optimize nutritional status  Outcome: Progressing

## 2022-05-29 NOTE — GROUP NOTE
Group Therapy Note    Date: 5/29/2022    Group Start Time: 1400  Group End Time: 1430  Group Topic: Cognitive Skills    [facility]    PRESLEY Moreira        Group Therapy Note    Attendees: 9         Patient's Goal: Pt will be able to identify different cognitive distortions and learn how to challenge negative thoughts. Notes:  Pt was an active participant in group discussion. Status After Intervention:  Improved    Participation Level: Active Listener and Interactive    Participation Quality: Appropriate, Attentive, Sharing and Supportive      Speech:  normal      Thought Process/Content: Logical  Linear      Affective Functioning: Congruent      Mood: anxious and depressed      Level of consciousness:  Alert, Oriented x4 and Attentive      Response to Learning: Able to verbalize current knowledge/experience, Able to verbalize/acknowledge new learning, Able to retain information, Capable of insight and Progressing to goal      Endings: None Reported    Modes of Intervention: Education, Support, Socialization, Exploration, Clarifying and Problem-solving      Discipline Responsible: /Counselor      Signature:   Gio Moreira

## 2022-05-29 NOTE — GROUP NOTE
Group Therapy Note    Date: 5/29/2022    Group Start Time: 1115  Group End Time: 5397  Group Topic: Psychoeducation    [facility]        Group Therapy Note    Attendees: 10       Notes: Attended discussion on forgiveness. Pleasant in sharing experiences with peers. Status After Intervention:  Improved    Participation Level:  Active Listener and Interactive    Participation Quality: Appropriate, Attentive and Sharing      Speech:  normal      Thought Process/Content: Logical      Affective Functioning: Congruent      Mood: euthymic      Level of consciousness:  Alert and Attentive      Response to Learning: Progressing to goal      Endings: None Reported    Modes of Intervention: Education, Support, Socialization and Exploration      Discipline Responsible: Psychoeducational Specialist      Signature:  Maria Isabel Lara, [address]

## 2022-05-30 LAB
METER GLUCOSE: 110 MG/DL (ref 74–99)
METER GLUCOSE: 119 MG/DL (ref 74–99)
METER GLUCOSE: 178 MG/DL (ref 74–99)
METER GLUCOSE: 180 MG/DL (ref 74–99)

## 2022-05-30 PROCEDURE — 82962 GLUCOSE BLOOD TEST: CPT

## 2022-05-30 PROCEDURE — 6370000000 HC RX 637 (ALT 250 FOR IP): Performed by: PSYCHIATRY & NEUROLOGY

## 2022-05-30 PROCEDURE — 99231 SBSQ HOSP IP/OBS SF/LOW 25: CPT | Performed by: NURSE PRACTITIONER

## 2022-05-30 PROCEDURE — S5553 INSULIN LONG ACTING 5 U: HCPCS | Performed by: FAMILY MEDICINE

## 2022-05-30 PROCEDURE — 99232 SBSQ HOSP IP/OBS MODERATE 35: CPT | Performed by: FAMILY MEDICINE

## 2022-05-30 PROCEDURE — 1240000000 HC EMOTIONAL WELLNESS R&B

## 2022-05-30 PROCEDURE — 6370000000 HC RX 637 (ALT 250 FOR IP): Performed by: FAMILY MEDICINE

## 2022-05-30 PROCEDURE — 6370000000 HC RX 637 (ALT 250 FOR IP): Performed by: STUDENT IN AN ORGANIZED HEALTH CARE EDUCATION/TRAINING PROGRAM

## 2022-05-30 PROCEDURE — 6370000000 HC RX 637 (ALT 250 FOR IP): Performed by: NURSE PRACTITIONER

## 2022-05-30 RX ORDER — BENZTROPINE MESYLATE 0.5 MG/1
0.5 TABLET ORAL 2 TIMES DAILY
Status: DISCONTINUED | OUTPATIENT
Start: 2022-05-30 | End: 2022-06-01 | Stop reason: HOSPADM

## 2022-05-30 RX ADMIN — INSULIN GLARGINE-YFGN 10 UNITS: 100 INJECTION, SOLUTION SUBCUTANEOUS at 21:58

## 2022-05-30 RX ADMIN — PREGABALIN 100 MG: 50 CAPSULE ORAL at 13:23

## 2022-05-30 RX ADMIN — BUPROPION HYDROCHLORIDE 100 MG: 100 TABLET, FILM COATED ORAL at 10:03

## 2022-05-30 RX ADMIN — METOCLOPRAMIDE 5 MG: 5 TABLET ORAL at 16:42

## 2022-05-30 RX ADMIN — Medication 3 MG: at 21:49

## 2022-05-30 RX ADMIN — METOCLOPRAMIDE 5 MG: 5 TABLET ORAL at 21:49

## 2022-05-30 RX ADMIN — DULOXETINE 30 MG: 30 CAPSULE, DELAYED RELEASE ORAL at 10:03

## 2022-05-30 RX ADMIN — DULOXETINE 30 MG: 30 CAPSULE, DELAYED RELEASE ORAL at 21:49

## 2022-05-30 RX ADMIN — LEVOTHYROXINE SODIUM 88 MCG: 0.09 TABLET ORAL at 06:29

## 2022-05-30 RX ADMIN — FOLIC ACID 1 MG: 1 TABLET ORAL at 10:04

## 2022-05-30 RX ADMIN — FAMOTIDINE 20 MG: 20 TABLET ORAL at 10:04

## 2022-05-30 RX ADMIN — BUPROPION HYDROCHLORIDE 100 MG: 100 TABLET, FILM COATED ORAL at 23:12

## 2022-05-30 RX ADMIN — METOCLOPRAMIDE 5 MG: 5 TABLET ORAL at 10:04

## 2022-05-30 RX ADMIN — PREGABALIN 100 MG: 50 CAPSULE ORAL at 21:50

## 2022-05-30 RX ADMIN — METFORMIN HYDROCHLORIDE 1000 MG: 500 TABLET, EXTENDED RELEASE ORAL at 10:03

## 2022-05-30 RX ADMIN — ARIPIPRAZOLE 5 MG: 5 TABLET ORAL at 10:04

## 2022-05-30 RX ADMIN — METFORMIN HYDROCHLORIDE 1000 MG: 500 TABLET, EXTENDED RELEASE ORAL at 21:50

## 2022-05-30 RX ADMIN — PREGABALIN 100 MG: 50 CAPSULE ORAL at 10:03

## 2022-05-30 RX ADMIN — METOCLOPRAMIDE 5 MG: 5 TABLET ORAL at 06:29

## 2022-05-30 RX ADMIN — LISINOPRIL 30 MG: 20 TABLET ORAL at 10:03

## 2022-05-30 RX ADMIN — BENZTROPINE MESYLATE 0.5 MG: 0.5 TABLET ORAL at 21:49

## 2022-05-30 RX ADMIN — FAMOTIDINE 20 MG: 20 TABLET ORAL at 21:50

## 2022-05-30 RX ADMIN — AMLODIPINE BESYLATE 5 MG: 5 TABLET ORAL at 21:49

## 2022-05-30 ASSESSMENT — PAIN SCALES - GENERAL
PAINLEVEL_OUTOF10: 0
PAINLEVEL_OUTOF10: 0

## 2022-05-30 NOTE — PLAN OF CARE
Patient up in day room watching TV. Patient friendly and cooperative. Patient continues to endorse SI/HI \"they are always there\" denies AVH rates depression 9/10 and anxiety 0/10. No voiced complaints, patient compliant with medications. Will continue to monitor and assess q 15 min for safety throughout the shift.     Problem: Chronic Conditions and Co-morbidities  Goal: Patient's chronic conditions and co-morbidity symptoms are monitored and maintained or improved  Outcome: Progressing     Problem: ABCDS Injury Assessment  Goal: Absence of physical injury  Outcome: Progressing     Problem: Anxiety  Goal: Will report anxiety at manageable levels  Description: INTERVENTIONS:  1. Administer medication as ordered  2. Teach and rehearse alternative coping skills  3. Provide emotional support with 1:1 interaction with staff  5/27/2022 2104 by Argentina Mukherjee RN  Outcome: Progressing     Problem: Coping  Goal: Pt/Family able to verbalize concerns and demonstrate effective coping strategies  Description: INTERVENTIONS:  1. Assist patient/family to identify coping skills, available support systems and cultural and spiritual values  2. Provide emotional support, including active listening and acknowledgement of concerns of patient and caregivers  3. Reduce environmental stimuli, as able  4. Instruct patient/family in relaxation techniques, as appropriate  5. Assess for spiritual pain/suffering and initiate Spiritual Care, Psychosocial Clinical Specialist consults as needed  5/27/2022 2104 by Argentina Mukherjee RN  Outcome: Progressing     Problem: Behavior  Goal: Pt/Family maintain appropriate behavior and adhere to behavioral management agreement, if implemented  Description: INTERVENTIONS:  1. Assess patient/family's coping skills and  non-compliant behavior (including use of illegal substances)  2.  Notify security of behavior or suspected illegal substances which indicate the need for search of the patient and/or belongings  3. Encourage verbalization of thoughts and concerns in a socially appropriate manner  4. Utilize positive, consistent limit setting strategies supporting safety of patient, staff and others  5. Encourage participation in the decision making process about the behavioral management agreement  6. Implement a Health Care Agreement if patient meets criteria  7. If a patient's behavior jeopardizes the safety of the patient, staff, or others refer to organization policy. If a visitor's behavior poses a threat to safety call refer to organization policy. 8. Initiate consult with , Psychosocial CNS, Spiritual Care as appropriate  Outcome: Progressing     Problem: Depression/Self Harm  Goal: Effect of psychiatric condition will be minimized and patient will be protected from self harm  Description: INTERVENTIONS:  1. Assess impact of patient's symptoms on level of functioning, self care needs and offer support as indicated  2. Assess patient/family knowledge of depression, impact on illness and need for teaching  3. Provide emotional support, presence and reassurance  4. Assess for possible suicidal thoughts or ideation. If patient expresses suicidal thoughts or statements do not leave alone, initiate Suicide Precautions, move to a room close to the nursing station and obtain sitter  5. Initiate consults as appropriate with Mental Health Professional, Spiritual Care, Psychosocial CNS, and consider a recommendation to the LIP for a Psychiatric Consultation  Outcome: Progressing     Problem: Involuntary Admit  Goal: Will cooperate with staff recommendations and doctor's orders and will demonstrate appropriate behavior  Description: INTERVENTIONS:  1. Treat underlying conditions and offer medication as ordered  2. Educate regarding involuntary admission procedures and rules  3.  Contain excessive/inappropriate behavior per unit and hospital policies  [id number]/6200 [id number] by Lori Forrester RN  Outcome: Progressing     Problem: Pain  Goal: Verbalizes/displays adequate comfort level or baseline comfort level  Outcome: Progressing     Problem: Nutrition Deficit:  Goal: Optimize nutritional status  Outcome: Progressing

## 2022-05-30 NOTE — PROGRESS NOTES
[id number] [facility] Interdisciplinary Treatment Plan Note     Review Date & Time: 5/30/22 0900    Patient was in treatment team.    Estimated Length of Stay Update:  7-10 days   Estimated Discharge Date Update: 6/2/22    EDUCATION:   Learner Progress Toward Treatment Goals: Reviewed results and recommendations of this team and Reviewed goals and plan of care    Method: Small group    Outcome: Verbalized understanding    PATIENT GOALS: None at this time    PLAN/TREATMENT RECOMMENDATIONS UPDATE: Take prescribed medications, attend/participate in groups. Continue to provide emotional support to patient.     GOALS UPDATE:  Time frame for Short-Term Goals: Prior to discharge      Rigoberto Clemens RN

## 2022-05-30 NOTE — GROUP NOTE
Group Therapy Note    Date: 5/30/2022    Group Start Time: 5080  Group End Time: 8821  Group Topic: Psychoeducation    [facility]    Cristela Fothergill, MSW, LSW        Group Therapy Note    Attendees: 14         Patient's Goal:  Pt will be able to verbalize the importance of self-care. Notes:  Pt made connections and participated in group. Status After Intervention:  Improved    Participation Level:  Active Listener and Interactive    Participation Quality: Appropriate, Attentive, Sharing and Supportive      Speech:  normal      Thought Process/Content: Logical  Linear      Affective Functioning: Congruent      Mood: depressed      Level of consciousness:  Alert, Oriented x4 and Attentive      Response to Learning: Able to verbalize current knowledge/experience      Endings: None Reported    Modes of Intervention: Education, Support, Socialization and Exploration      Discipline Responsible: /Counselor      Signature:  Cristela Fothergill, MSW, LSW

## 2022-05-30 NOTE — GROUP NOTE
Group Therapy Note    Date: 5/30/2022    Group Start Time: 65  Group End Time: 2749  Group Topic: Music Therapy    [facility]        Group Therapy Note    Attendees:  11         Notes: Active and engaged during Josselin's Name that Tune activity. Able to share and relate to peers. Status After Intervention:  Improved    Participation Level:  Active Listener and Interactive    Participation Quality: Appropriate and Attentive      Speech:  normal      Thought Process/Content: Logical      Affective Functioning: Congruent      Mood: euthymic      Level of consciousness:  Alert and Attentive      Response to Learning: Progressing to goal      Endings: None Reported    Modes of Intervention: Education, Support, Socialization, Exploration and Activity      Discipline Responsible: Psychoeducational Specialist      Signature:  Shirley Felix, [address]

## 2022-05-30 NOTE — PLAN OF CARE
Problem: Anxiety  Goal: Will report anxiety at manageable levels  Description: INTERVENTIONS:  1. Administer medication as ordered  2. Teach and rehearse alternative coping skills  3. Provide emotional support with 1:1 interaction with staff  Outcome: Progressing     Problem: Chronic Conditions and Co-morbidities  Goal: Patient's chronic conditions and co-morbidity symptoms are monitored and maintained or improved  Outcome: Adequate for Discharge     Problem: Behavior  Goal: Pt/Family maintain appropriate behavior and adhere to behavioral management agreement, if implemented  Description: INTERVENTIONS:  1. Assess patient/family's coping skills and  non-compliant behavior (including use of illegal substances)  2. Notify security of behavior or suspected illegal substances which indicate the need for search of the patient and/or belongings  3. Encourage verbalization of thoughts and concerns in a socially appropriate manner  4. Utilize positive, consistent limit setting strategies supporting safety of patient, staff and others  5. Encourage participation in the decision making process about the behavioral management agreement  6. Implement a Health Care Agreement if patient meets criteria  7. If a patient's behavior jeopardizes the safety of the patient, staff, or others refer to organization policy. If a visitor's behavior poses a threat to safety call refer to organization policy. 8. Initiate consult with , Psychosocial CNS, Spiritual Care as appropriate  Outcome: Adequate for Discharge     Problem: Depression/Self Harm  Goal: Effect of psychiatric condition will be minimized and patient will be protected from self harm  Description: INTERVENTIONS:  1. Assess impact of patient's symptoms on level of functioning, self care needs and offer support as indicated  2. Assess patient/family knowledge of depression, impact on illness and need for teaching  3.  Provide emotional support, presence and reassurance  4. Assess for possible suicidal thoughts or ideation. If patient expresses suicidal thoughts or statements do not leave alone, initiate Suicide Precautions, move to a room close to the nursing station and obtain sitter  5. Initiate consults as appropriate with Mental Health Professional, Spiritual Care, Psychosocial CNS, and consider a recommendation to the LIP for a Psychiatric Consultation  Outcome: Adequate for Discharge     Problem: Involuntary Admit  Goal: Will cooperate with staff recommendations and doctor's orders and will demonstrate appropriate behavior  Description: INTERVENTIONS:  1. Treat underlying conditions and offer medication as ordered  2. Educate regarding involuntary admission procedures and rules  3.  Contain excessive/inappropriate behavior per unit and hospital policies  Outcome: Adequate for Discharge
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
## 2022-05-30 NOTE — PROGRESS NOTES
Never Used   Vaping Use    Vaping Use: Never used   Substance and Sexual Activity    Alcohol use: No    Drug use: No    Sexual activity: Never   Other Topics Concern    Not on file   Social History Narrative    2/3/2020-Financial Resource strain-states very hard-pt receives SNAP benefits for food, states that food doesn't run out as she also utilizes the [employer] the 3rd Saturday of every month    2/3/2020-Stress-states she is very much stressed and reports attending [place] [facility] for her depression and receives medications as well     Social Determinants of Health     Financial Resource Strain: High Risk    Difficulty of Paying Living Expenses: Very hard   Food Insecurity: No Food Insecurity    Worried About Running Out of Food in the Last Year: Never true    Kai of Food in the Last Year: Never true   Transportation Needs:     Lack of Transportation (Medical): Not on file    Lack of Transportation (Non-Medical): Not on file   Physical Activity:     Days of Exercise per Week: Not on file    Minutes of Exercise per Session: Not on file   Stress:     Feeling of Stress : Not on file   Social Connections:     Frequency of Communication with Friends and Family: Not on file    Frequency of Social Gatherings with Friends and Family: Not on file    Attends [employer] Services: Not on file    Active Member of [address] [place] [address] [employer] or Organizations: Not on file    Attends Club or Organization Meetings: Not on file    Marital Status: Not on file   Intimate Partner Violence:     Fear of Current or Ex-Partner: Not on file    Emotionally Abused: Not on file    Physically Abused: Not on file    Sexually Abused: Not on file   Housing Stability:     Unable to Pay for Housing in the Last Year: Not on file    Number of [place] in the Last Year: Not on file    Unstable Housing in the Last Year: Not on file           ROS:  [x] All negative/unchanged except if checked.  Explain positive(checked Rech, DO, 1 mg at 05/29/22 0913    levothyroxine (SYNTHROID) tablet 88 mcg, 88 mcg, Oral, Daily, Barbara Parker, DO, 88 mcg at 05/30/22 9571    metFORMIN (GLUCOPHAGE-XR) extended release tablet 1,000 mg, 1,000 mg, Oral, BID, Rose A Rech, DO, 1,000 mg at 05/29/22 2140    ondansetron (ZOFRAN-ODT) disintegrating tablet 4 mg, 4 mg, Oral, Q8H PRN, Naveen Cunninghamine Rech, DO, 4 mg at 05/23/22 2205    pregabalin (LYRICA) capsule 100 mg, 100 mg, Oral, TID, Rose A Rech, DO, 100 mg at 05/29/22 2141    glucose chewable tablet 16 g, 4 tablet, Oral, PRN, Rose A Rech, DO    dextrose 50 % IV solution, 12.5 g, IntraVENous, PRN **OR** [DISCONTINUED] dextrose bolus 10% 250 mL, 250 mL, IntraVENous, PRN, Myrl Fernando A Rech, DO    glucagon (rDNA) injection 1 mg, 1 mg, IntraMUSCular, PRN, Rose A Rech, DO    dextrose 5 % solution, 100 mL/hr, IntraVENous, PRN, Rose A Rech, DO      Examination:  BP (!) 153/83   Pulse (!) 108   Temp 97.4 °F (36.3 °C) (Temporal)   Resp 18   Ht 5' 1\" (1.549 m)   LMP  (LMP Unknown)   SpO2 96%   BMI 27.78 kg/m²   Gait - steady  Medication side effects(SE): denies    Mental Status Examination:    Level of consciousness:  within normal limits   Appearance:  good grooming and good hygiene  Behavior/Motor:  no abnormalities noted  Attitude toward examiner:  cooperative  Speech:  normal volume, well articulated and slow   Mood: depressed  Affect:  blunted  Thought processes:  linear   Thought content:  The patient is devoid of suicidal or homicidal ideation intent or plan.  Devoid of auditory or visual hallucinations or other perceptual disturbances, there are no overt or covert signs of psychosis or paranoia.  There are no neurovegetative signs of depression.   Cognition:  oriented to person, place, and time   Concentration intact  Insight Improving  Judgement fair     ASSESSMENT:   Patient symptoms are:  [x] Well controlled  [x] Improving  [] Worsening  [] No change      Diagnosis:   Principal Problem:    Severe episode of recurrent major depressive disorder, without psychotic features ([facility] [address]  Active Problems:    Hypokalemia due to loss of potassium    Intractable vomiting    Cluster B personality traits(HCC)    Type 2 diabetes mellitus with complication, with long-term current use of insulin (HCC)    Acquired hypothyroidism    Encounter for long-term (current) use of insulin (HCC)    Rheumatoid arthritis involving multiple sites [facility])    Primary hypertension  Resolved Problems:    * No resolved hospital problems. *      LABS:    No results for input(s): WBC, HGB, PLT in the last 72 hours. No results for input(s): NA, K, CL, CO2, BUN, CREATININE, GLUCOSE in the last 72 hours. No results for input(s): BILITOT, ALKPHOS, AST, ALT in the last 72 hours. Lab Results   Component Value Date    LABAMPH NOT DETECTED 05/22/2022    BARBSCNU NOT DETECTED 05/22/2022    LABBENZ NOT DETECTED 05/22/2022    LABMETH NOT DETECTED 05/22/2022    OPIATESCREENURINE NOT DETECTED 05/22/2022    PHENCYCLIDINESCREENURINE NOT DETECTED 05/22/2022    ETOH <10 05/22/2022     Lab Results   Component Value Date    TSH 5.220 05/16/2022     No results found for: LITHIUM  No results found for: VALPROATE, CBMZ    Treatment Plan:  The patient's diagnosis, treatment plan, medication management were formulated after patient was seen directly by the attending physician and myself and all relevant documentation was reviewed.     Risk, benefit, side effects, possible outcomes of the medication and alternatives discussed with the patient and the patient demonstrated understanding.   The patient was also educated that the outcome of treatment will depend on the medication compliance as directed by the prescribers along with regular follow-up, compliance with the labs and other work-up, as clinically indicated.     Cymbalta 30 mg twice daily for chronic pain and depression  Continue patient's Wellbutrin 100 mg twice daily for depression and anxiety  Abilify 5 mg daily for augmentation of treatment for depression     Collateral information: Followed by social work  CD evaluation  Encourage patient to attend group and other milieu activities.   Discharge planning discussed with the patient and treatment team.    PSYCHOTHERAPY/COUNSELING:  [x] Therapeutic interview  [x] Supportive  [] CBT  [] Ongoing  [] Other    [x] Patient continues to need, on a daily basis, active treatment furnished directly by or requiring the supervision of inpatient psychiatric personnel      Anticipated Length of stay: 5 to 7 days based on stability      Electronically signed by ANA Shaw CNP on 5/30/2022 at 8:07 AM

## 2022-05-30 NOTE — PROGRESS NOTES
[address] [facility]  Family Medicine Attending    S: 47 y.o. female with PMH of uncontrolled DM2 on long term insulin with neuropathy, HTN, Hypothyroidism, rheumatoid arthritis, who presented to [facility] for intractable nausea and vomiting. After a negative evaluation,  she admitted to suicidal ideation and was transferred here for psychiatric treatment. She continued to have N/V and we were consulted for treatment. She recently had EGD, which she feels made her symptoms worse. She has known gastroparesis. States that she usually has one bad week a month, without any pattern    Today, feeling well overall, but has had more tremor in the past day of her bilateral hands and feet/legs. Seems present throughout the day. Was not present when blood sugar was lower (2 days ago). No new symptoms or concerns otherwise. Tolerating diet well. O: VS- Blood pressure (!) 153/86, pulse (!) 103, temperature 97.5 °F (36.4 °C), temperature source Temporal, resp. rate 16, height 5' 1\" (1.549 m), SpO2 98 %, not currently breastfeeding. Exam is as noted by resident with the following changes, additions or corrections:  Awake, alert  Heart - RRR  Lungs- clear  Neuro- CN without deficits. Motor intact and equal x 4. Mild symmetric bilateral postural tremor noted of bilateral hands. No resting tremor. Cerebellar function intact otherwise. Impressions:   Principal Problem:    Severe episode of recurrent major depressive disorder, without psychotic features ([address]  Active Problems:    Hypokalemia due to loss of potassium    Intractable vomiting    Cluster B personality traits(HCC)    Type 2 diabetes mellitus with complication, with long-term current use of insulin (HCC)    Acquired hypothyroidism    Encounter for long-term (current) use of insulin (HCC)    Rheumatoid arthritis involving multiple sites [facility])    Primary hypertension  Resolved Problems:    * No resolved hospital problems.  *      Plan: Monitor and adjust insulin as needed; follow glucose, stable over the past 2 days on current insulin dose, but will monitor closely. Continue metformin. Ozempic discontinued. Continue metoclopramide. Recheck EKG today. Increased lisinopril, added amlodipine for persistently elevated BP. Follow. Psych for depression treatment. Will monitor tremor symptoms; may be related to medication effect. Seem less related to glucose levels. Attending Physician Statement  I have reviewed the chart and seen the patient with the resident(s). I personally reviewed images, EKG's and similar tests, if present. I personally reviewed and performed key elements of the history and exam.  I have reviewed and confirmed student and/or resident history and exam with changes as indicated above. I agree with the assessment, plan and orders as documented by the resident. Please refer to the resident and/or student note for additional information.       Pa Gallardo, DO

## 2022-05-30 NOTE — PROGRESS NOTES
[facility] - Family Medicine Inpatient   Resident Progress Note    S:  Hospital day: 7    Brief Synopsis: Denny Ross is a 47 y.o. female with a PMH of uncontrolled DM2 on long term insulin with neuropathy, HTN, Hypothyroidism and RA who presented to the [facility] ED for intractable nausea and vomiting. Patient had tolerated food at [facility] ED, but prior to discharge became tearful and admitted suicidal ideation without a plan, and homicidal ideation toward her mother. Patient was accepted by Dr. Rajiv Bertrand and was transferred to [facility]. Family medicine has been consulted for medical management. No acute events overnight. Patient was seen and examined this morning. Patient endorsing worsening shaking of all extremities over the past day. Had been increased on abilify. Reglan started, she did not have any nausea/vomiting since then. Appetite is improving. Denies chest pain, palpitations, or SOB. Cont meds:    dextrose       Scheduled meds:    lisinopril  30 mg Oral Daily    amLODIPine  5 mg Oral Nightly    insulin glargine  10 Units SubCUTAneous Nightly    ARIPiprazole  5 mg Oral Daily    pregabalin  50 mg Oral Once    metoclopramide  5 mg Oral 4x Daily AC & HS    DULoxetine  30 mg Oral BID    buPROPion  100 mg Oral BID    nicotine  1 patch TransDERmal Daily    melatonin  3 mg Oral Nightly    famotidine  20 mg Oral BID    folic acid  1 mg Oral Daily    levothyroxine  88 mcg Oral Daily    metFORMIN  1,000 mg Oral BID    pregabalin  100 mg Oral TID     PRN meds: acetaminophen, magnesium hydroxide, aluminum & magnesium hydroxide-simethicone, hydrOXYzine, ondansetron, glucose, dextrose **OR** [DISCONTINUED] dextrose bolus, glucagon (rDNA), dextrose     I reviewed the patient's Past Medical and Surgical History, Medications and Allergies.     O:  VS: BP (!) 153/83   Pulse (!) 108   Temp 97.4 °F (36.3 °C) (Temporal)   Resp 18   Ht 5' 1\" (1.549 m)   LMP  (LMP Unknown)   SpO2 96%   BMI 27.78 kg/m²     Physical Exam:  Physical Exam  Vitals reviewed. Constitutional:       General: She is not in acute distress. HENT:      Head: Normocephalic and atraumatic. Nose: Nose normal. No congestion or rhinorrhea. Cardiovascular:      Rate and Rhythm: Normal rate and regular rhythm. Pulses: Normal pulses. Heart sounds: Normal heart sounds. Pulmonary:      Effort: Pulmonary effort is normal.      Breath sounds: Normal breath sounds. Abdominal:      General: Bowel sounds are normal.      Palpations: Abdomen is soft. Tenderness: There is no abdominal tenderness. There is no guarding or rebound. Musculoskeletal:      Cervical back: Normal range of motion and neck supple. Right lower leg: No edema. Left lower leg: No edema. Skin:     General: Skin is warm. Findings: No rash. Neurological:      General: No focal deficit present. Mental Status: She is alert. Psychiatric:         Thought Content: Thought content normal.         Judgment: Judgment normal.            Labs:           CrCl cannot be calculated (Unknown ideal weight.). Other pertinent labs as noted below    New Imaging:  No orders to display       A/P:  Principal Problem:    Severe episode of recurrent major depressive disorder, without psychotic features ([facility] [address]  Active Problems:    Hypokalemia due to loss of potassium    Intractable vomiting    Cluster B personality traits(HCC)    Type 2 diabetes mellitus with complication, with long-term current use of insulin (HCC)    Acquired hypothyroidism    Encounter for long-term (current) use of insulin (HCC)    Rheumatoid arthritis involving multiple sites ([facility] [address]    Primary hypertension  Resolved Problems:    * No resolved hospital problems.  *      Depression with SI/HI  · Duloxetine 30 mg BID for chronic pain and depression  · Bupropion 100 mg BID for depression and anxiety  · On Aripiprazole 2 mg daily for augmentation of treatment for depression  · On Melatonin 3 mg nightly for sleeping  · Participating in group therapy  · Suicide precautions  · SW to obtain collateral information  · Management per psychiatry     DM2 with neuropathy and gastroparesis  · Last HbA1C is 15%  · She is waiting on insulin pump, but currently requiring high doses of insulin  · Lantus decreased to 10 units due to hypoglycemia as inpatient despite adequate nutrition  · On Metformin 1000 mg BID  · Liraglutide stopped due to gastroparesis  · Continue home Lyrica 100 mg TID for neuropathy  · On Pepcid 20 mg BID  · On Maalox PRN  · Started on Reglan 5 mg 4 times per day for gastroparesis. EKG done, QTc is 474, went down from 485. Okay to continue with Reglan. Continue monitoring QTc.    · Consider D/C reglan at discharge as is only temporary and sx may have been due to liraglutide  · Hypoglycemic protocol in place  · Monitor glucose, consider decreasing basal insulin dose if morning glucose is < 60 mg     HTN  · On Lisinopril 20 mg daily at home  · Increased lisinopril to 30mg and added amlodipine 5mg nightly  · Monitor BP    Tremor  · Likely medication induced  · Careful titration of medications  · Recheck EKG with increased dose abilify  · Continue to monitor     Hypokalemia - resolved     Hypothyroidism  · Continue home Levothyroxine 88 mcg daily    Smoking use  · On Nicotine patch 21 mg/day     RA  · Home medications are Methotrexate and Hydroxychloroquine, patient not taking them      DVT Prophylaxis: none because patient is fully mobile  GI Prophylaxis: Pepcid 20 mg BID  Diet: Adult regular carb controlled plus oral supplements  Disposition: Psych unit  Full code      Electronically signed by Dimitrios Hicks MD on 5/30/2022 at 6:41 AM  This case was discussed with attending physician Dr. Ashlie Sparks

## 2022-05-31 LAB
METER GLUCOSE: 107 MG/DL (ref 74–99)
METER GLUCOSE: 125 MG/DL (ref 74–99)
METER GLUCOSE: 133 MG/DL (ref 74–99)

## 2022-05-31 PROCEDURE — 1240000000 HC EMOTIONAL WELLNESS R&B

## 2022-05-31 PROCEDURE — 82962 GLUCOSE BLOOD TEST: CPT

## 2022-05-31 PROCEDURE — 99231 SBSQ HOSP IP/OBS SF/LOW 25: CPT | Performed by: NURSE PRACTITIONER

## 2022-05-31 PROCEDURE — 6370000000 HC RX 637 (ALT 250 FOR IP): Performed by: STUDENT IN AN ORGANIZED HEALTH CARE EDUCATION/TRAINING PROGRAM

## 2022-05-31 PROCEDURE — 6370000000 HC RX 637 (ALT 250 FOR IP): Performed by: FAMILY MEDICINE

## 2022-05-31 PROCEDURE — 6370000000 HC RX 637 (ALT 250 FOR IP): Performed by: PSYCHIATRY & NEUROLOGY

## 2022-05-31 PROCEDURE — 6370000000 HC RX 637 (ALT 250 FOR IP): Performed by: NURSE PRACTITIONER

## 2022-05-31 RX ORDER — METOCLOPRAMIDE 5 MG/1
5 TABLET ORAL
Qty: 120 TABLET | Refills: 0 | Status: SHIPPED | OUTPATIENT
Start: 2022-05-31 | End: 2022-06-17 | Stop reason: SDUPTHER

## 2022-05-31 RX ORDER — INSULIN GLARGINE-YFGN 100 [IU]/ML
10 INJECTION, SOLUTION SUBCUTANEOUS NIGHTLY
Qty: 1 EACH | Refills: 0 | Status: SHIPPED | OUTPATIENT
Start: 2022-05-31 | End: 2022-06-30

## 2022-05-31 RX ORDER — AMLODIPINE BESYLATE 5 MG/1
5 TABLET ORAL NIGHTLY
Qty: 30 TABLET | Refills: 0 | Status: SHIPPED | OUTPATIENT
Start: 2022-05-31 | End: 2022-05-31

## 2022-05-31 RX ORDER — LISINOPRIL 30 MG/1
30 TABLET ORAL DAILY
Qty: 30 TABLET | Refills: 3 | Status: SHIPPED | OUTPATIENT
Start: 2022-06-01 | End: 2022-05-31

## 2022-05-31 RX ORDER — FAMOTIDINE 20 MG/1
20 TABLET, FILM COATED ORAL 2 TIMES DAILY
Qty: 60 TABLET | Refills: 0 | Status: SHIPPED | OUTPATIENT
Start: 2022-05-31 | End: 2022-06-16

## 2022-05-31 RX ORDER — METFORMIN HYDROCHLORIDE 500 MG/1
1000 TABLET, EXTENDED RELEASE ORAL 2 TIMES DAILY
Qty: 120 TABLET | Refills: 0 | Status: SHIPPED | OUTPATIENT
Start: 2022-05-31 | End: 2022-06-16

## 2022-05-31 RX ORDER — BUPROPION HYDROCHLORIDE 100 MG/1
100 TABLET ORAL 2 TIMES DAILY
Qty: 60 TABLET | Refills: 0 | Status: SHIPPED | OUTPATIENT
Start: 2022-05-31 | End: 2022-06-17 | Stop reason: SDUPTHER

## 2022-05-31 RX ORDER — ARIPIPRAZOLE 5 MG/1
5 TABLET ORAL DAILY
Qty: 30 TABLET | Refills: 0 | Status: SHIPPED | OUTPATIENT
Start: 2022-05-31 | End: 2022-06-30

## 2022-05-31 RX ORDER — BENZTROPINE MESYLATE 0.5 MG/1
0.5 TABLET ORAL 2 TIMES DAILY
Qty: 60 TABLET | Refills: 0 | Status: SHIPPED | OUTPATIENT
Start: 2022-05-31 | End: 2022-06-30

## 2022-05-31 RX ORDER — LEVOTHYROXINE SODIUM 88 UG/1
88 TABLET ORAL DAILY
Qty: 30 TABLET | Refills: 0 | Status: SHIPPED | OUTPATIENT
Start: 2022-05-31 | End: 2022-06-16

## 2022-05-31 RX ORDER — DULOXETIN HYDROCHLORIDE 30 MG/1
30 CAPSULE, DELAYED RELEASE ORAL 2 TIMES DAILY
Qty: 60 CAPSULE | Refills: 0 | Status: SHIPPED | OUTPATIENT
Start: 2022-05-31 | End: 2022-06-30

## 2022-05-31 RX ORDER — ARIPIPRAZOLE 5 MG/1
5 TABLET ORAL DAILY
Qty: 30 TABLET | Refills: 0 | Status: SHIPPED | OUTPATIENT
Start: 2022-05-31 | End: 2022-05-31

## 2022-05-31 RX ORDER — LISINOPRIL 30 MG/1
30 TABLET ORAL DAILY
Qty: 30 TABLET | Refills: 0 | Status: SHIPPED | OUTPATIENT
Start: 2022-06-01 | End: 2022-06-17 | Stop reason: SDUPTHER

## 2022-05-31 RX ORDER — BENZTROPINE MESYLATE 0.5 MG/1
0.5 TABLET ORAL 2 TIMES DAILY
Qty: 60 TABLET | Refills: 0 | Status: SHIPPED | OUTPATIENT
Start: 2022-05-31 | End: 2022-05-31

## 2022-05-31 RX ORDER — AMLODIPINE BESYLATE 5 MG/1
5 TABLET ORAL NIGHTLY
Qty: 30 TABLET | Refills: 0 | Status: SHIPPED | OUTPATIENT
Start: 2022-05-31 | End: 2022-06-30

## 2022-05-31 RX ORDER — INSULIN GLARGINE 100 [IU]/ML
10 INJECTION, SOLUTION SUBCUTANEOUS NIGHTLY
Qty: 10 ML | Refills: 1 | Status: SHIPPED | OUTPATIENT
Start: 2022-05-31 | End: 2022-05-31 | Stop reason: HOSPADM

## 2022-05-31 RX ORDER — METOCLOPRAMIDE 5 MG/1
5 TABLET ORAL
Qty: 120 TABLET | Refills: 1 | Status: SHIPPED | OUTPATIENT
Start: 2022-05-31 | End: 2022-05-31

## 2022-05-31 RX ADMIN — METOCLOPRAMIDE 5 MG: 5 TABLET ORAL at 17:17

## 2022-05-31 RX ADMIN — BUPROPION HYDROCHLORIDE 100 MG: 100 TABLET, FILM COATED ORAL at 08:28

## 2022-05-31 RX ADMIN — PREGABALIN 100 MG: 50 CAPSULE ORAL at 08:27

## 2022-05-31 RX ADMIN — ARIPIPRAZOLE 5 MG: 5 TABLET ORAL at 08:28

## 2022-05-31 RX ADMIN — AMLODIPINE BESYLATE 5 MG: 5 TABLET ORAL at 20:29

## 2022-05-31 RX ADMIN — PREGABALIN 100 MG: 50 CAPSULE ORAL at 15:01

## 2022-05-31 RX ADMIN — METFORMIN HYDROCHLORIDE 1000 MG: 500 TABLET, EXTENDED RELEASE ORAL at 08:27

## 2022-05-31 RX ADMIN — FAMOTIDINE 20 MG: 20 TABLET ORAL at 08:27

## 2022-05-31 RX ADMIN — DULOXETINE 30 MG: 30 CAPSULE, DELAYED RELEASE ORAL at 20:29

## 2022-05-31 RX ADMIN — LISINOPRIL 30 MG: 20 TABLET ORAL at 08:27

## 2022-05-31 RX ADMIN — FOLIC ACID 1 MG: 1 TABLET ORAL at 08:28

## 2022-05-31 RX ADMIN — FAMOTIDINE 20 MG: 20 TABLET ORAL at 20:30

## 2022-05-31 RX ADMIN — BUPROPION HYDROCHLORIDE 100 MG: 100 TABLET, FILM COATED ORAL at 20:29

## 2022-05-31 RX ADMIN — Medication 3 MG: at 20:29

## 2022-05-31 RX ADMIN — METOCLOPRAMIDE 5 MG: 5 TABLET ORAL at 11:51

## 2022-05-31 RX ADMIN — BENZTROPINE MESYLATE 0.5 MG: 0.5 TABLET ORAL at 08:28

## 2022-05-31 RX ADMIN — PREGABALIN 100 MG: 50 CAPSULE ORAL at 20:29

## 2022-05-31 RX ADMIN — METOCLOPRAMIDE 5 MG: 5 TABLET ORAL at 20:29

## 2022-05-31 RX ADMIN — LEVOTHYROXINE SODIUM 88 MCG: 0.09 TABLET ORAL at 06:39

## 2022-05-31 RX ADMIN — DULOXETINE 30 MG: 30 CAPSULE, DELAYED RELEASE ORAL at 08:28

## 2022-05-31 RX ADMIN — METFORMIN HYDROCHLORIDE 1000 MG: 500 TABLET, EXTENDED RELEASE ORAL at 20:29

## 2022-05-31 RX ADMIN — METOCLOPRAMIDE 5 MG: 5 TABLET ORAL at 06:39

## 2022-05-31 RX ADMIN — BENZTROPINE MESYLATE 0.5 MG: 0.5 TABLET ORAL at 20:29

## 2022-05-31 ASSESSMENT — PAIN SCALES - GENERAL: PAINLEVEL_OUTOF10: 0

## 2022-05-31 NOTE — DISCHARGE SUMMARY
DISCHARGE SUMMARY      Patient ID:  Naima Constantino  [id number]  47 y.o.  1970    Admit date: 5/23/2022    Discharge date and time: 5/31/2022    Admitting Physician: Marek Mac MD     Discharge Physician: Dr Robin Chin MD    Discharge Diagnoses:   Patient Active Problem List   Diagnosis    Type 2 diabetes mellitus with complication, with long-term current use of insulin ([address]    Sjogren's syndrome ([address]    Diabetic ketosis ([address]    Anemia    Hyperglycemia    Acquired hypothyroidism    Vitamin D deficiency    Encounter for long-term (current) use of insulin ([address]    Infective urethritis    Severe obstructive sleep apnea    Mild depression ([address]    Anxiety disorder    Benign hypertensive kidney disease with chronic kidney disease    Chronic kidney disease, stage I    COVID-19    Fibromyalgia    Other hyperlipidemia    Hypoglycemia    Hypokalemia    Nephrotic syndrome due to type 2 diabetes mellitus (HCC)    Hypothyroidism    Proteinuria    Depressive disorder    Type 2 diabetes mellitus with chronic kidney disease (HCC)    Rheumatoid arthritis involving multiple sites ([address]    Chronic fatigue syndrome    Diabetic renal disease ([address]    Primary hypertension    Hyperglycemia due to type 2 diabetes mellitus (HCC)    Major depression, single episode    Metabolic acidosis    Neuropathy    Dietary noncompliance    Hypokalemia due to loss of potassium    Depression with suicidal ideation    Elevated troponin    Intractable vomiting    Severe episode of recurrent major depressive disorder, without psychotic features ([address]    Cluster B personality traits(HCC)       Admission Condition: poor    Discharged Condition: stable    Admission Circumstance:   Patient was pink slipped at [facility] ED for suicidal statements and homicidal ideation towards her mother.        PAST MEDICAL/PSYCHIATRIC HISTORY:   Past Medical History:   Diagnosis Date    Anxiety     Arthritis     Depression  Diabetes mellitus ([facility]     Fibromyalgia     HTN (hypertension)     Hypertensive chronic kidney disease     Left shoulder pain     LIZABETH (obstructive sleep apnea)     Rheumatoid arthritis involving multiple sites ([facility]     Sjogren's disease ([place] [address].)     Thyroid disease        FAMILY/SOCIAL HISTORY:  Family History   Adopted: Yes   Family history unknown: Yes     Social History     Socioeconomic History    Marital status: Single     Spouse name: Not on file    Number of children: Not on file    Years of education: Not on file    Highest education level: Bachelor's degree (e.g., BA, AB, BS)   Occupational History    Occupation: unemployed    Tobacco Use    Smoking status: Former Smoker     Packs/day: 2.00     Years: 0.50     Pack years: 1.00     Start date:      Quit date:      Years since quittin.4    Smokeless tobacco: Never Used   Vaping Use    Vaping Use: Never used   Substance and Sexual Activity    Alcohol use: No    Drug use: No    Sexual activity: Never   Other Topics Concern    Not on file   Social History Narrative    2/3/2020-Financial Resource strain-states very hard-pt receives [employer] benefits for food, states that food doesn't run out as she also utilizes the [employer] the  of every month    2/3/2020-Stress-states she is very much stressed and reports attending [place] [facility] for her depression and receives medications as well     Social Determinants of Health     Financial Resource Strain: High Risk    Difficulty of Paying Living Expenses: Very hard   Food Insecurity: No Food Insecurity    Worried About Running Out of Food in the Last Year: Never true    Kai of Food in the Last Year: Never true   Transportation Needs:     Lack of Transportation (Medical): Not on file    Lack of Transportation (Non-Medical):  Not on file   Physical Activity:     Days of Exercise per Week: Not on file    Minutes of Exercise per Session: Not on file Stress:     Feeling of Stress : Not on file   Social Connections:     Frequency of Communication with Friends and Family: Not on file    Frequency of Social Gatherings with Friends and Family: Not on file    Attends Hinduism Services: Not on file    Active Member of [address] [place] [address] or Organizations: Not on file    Attends Club or Organization Meetings: Not on file    Marital Status: Not on file   Intimate Partner Violence:     Fear of Current or Ex-Partner: Not on file    Emotionally Abused: Not on file    Physically Abused: Not on file    Sexually Abused: Not on file   Housing Stability:     Unable to Pay for Housing in the Last Year: Not on file    Number of [place] in the Last Year: Not on file    Unstable Housing in the Last Year: Not on file       MEDICATIONS:    Current Facility-Administered Medications:     benztropine (COGENTIN) tablet 0.5 mg, 0.5 mg, Oral, BID, ANA Young - CNP, 0.5 mg at 05/30/22 2149    lisinopril (PRINIVIL;ZESTRIL) tablet 30 mg, 30 mg, Oral, Daily, Shmuel Acosta MD, 30 mg at 05/30/22 1003    amLODIPine (NORVASC) tablet 5 mg, 5 mg, Oral, Nightly, Shmuel Acosta MD, 5 mg at 05/30/22 2149    insulin glargine-yfgn (SEMGLEE-YFGN) injection vial 10 Units, 10 Units, SubCUTAneous, Nightly, Shmuel Acosta MD, 10 Units at 05/30/22 2158    ARIPiprazole (ABILIFY) tablet 5 mg, 5 mg, Oral, Daily, ANA Young - CNP, 5 mg at 05/30/22 1004    pregabalin (LYRICA) capsule 50 mg, 50 mg, Oral, Once, Rose TRINO Correia, DO    metoclopramide (REGLAN) tablet 5 mg, 5 mg, Oral, 4x Daily AC & HS, Omid Becerra MD, 5 mg at 05/31/22 8283    DULoxetine (CYMBALTA) extended release capsule 30 mg, 30 mg, Oral, BID, ANA Young - CNP, 30 mg at 05/30/22 2149    buPROPion [facility]) tablet 100 mg, 100 mg, Oral, BID, ANA Young - CNP, 100 mg at 05/30/22 2312    acetaminophen (TYLENOL) tablet 650 mg, 650 mg, Oral, Q4H PRN, Kaela Richardson MD    magnesium hydroxide (MILK OF MAGNESIA) 400 MG/5ML suspension 30 mL, 30 mL, Oral, Daily PRN, Latanya Blue MD    nicotine (NICODERM CQ) 21 MG/24HR 1 patch, 1 patch, TransDERmal, Daily, Latanya Blue MD    aluminum & magnesium hydroxide-simethicone (MAALOX) 200-200-20 MG/5ML suspension 30 mL, 30 mL, Oral, PRN, Latanya Blue MD    hydrOXYzine (VISTARIL) capsule 50 mg, 50 mg, Oral, TID PRN, Latanya Blue MD    melatonin tablet 3 mg, 3 mg, Oral, Nightly, Latanya Blue MD, 3 mg at 05/30/22 2149    famotidine (PEPCID) tablet 20 mg, 20 mg, Oral, BID, Rose A Rech, DO, 20 mg at 18/82/78 9898    folic acid (FOLVITE) tablet 1 mg, 1 mg, Oral, Daily, Rose A Rech, DO, 1 mg at 05/30/22 1004    levothyroxine (SYNTHROID) tablet 88 mcg, 88 mcg, Oral, Daily, Welby Learn, DO, 88 mcg at 05/31/22 6607    metFORMIN (GLUCOPHAGE-XR) extended release tablet 1,000 mg, 1,000 mg, Oral, BID, Rose A Rech, DO, 1,000 mg at 05/30/22 2150    ondansetron (ZOFRAN-ODT) disintegrating tablet 4 mg, 4 mg, Oral, Q8H PRN, Neptali Stack A Rech, DO, 4 mg at 05/23/22 2205    pregabalin (LYRICA) capsule 100 mg, 100 mg, Oral, TID, Rose A Rech, DO, 100 mg at 05/30/22 2150    glucose chewable tablet 16 g, 4 tablet, Oral, PRN, Rose A Rech, DO    dextrose 50 % IV solution, 12.5 g, IntraVENous, PRN **OR** [DISCONTINUED] dextrose bolus 10% 250 mL, 250 mL, IntraVENous, PRN, Neptali Stack A Rech, DO    glucagon (rDNA) injection 1 mg, 1 mg, IntraMUSCular, PRN, Rose A Rech, DO    dextrose 5 % solution, 100 mL/hr, IntraVENous, PRN, Rose A Rech, DO    Examination:  BP (!) 147/89   Pulse (!) 106   Temp 98.4 °F (36.9 °C) (Oral)   Resp 16   Ht 5' 1\" (1.549 m)   LMP  (LMP Unknown)   SpO2 97%   BMI 27.78 kg/m²   Gait - steady    HOSPITAL COURSE[de-identified]  Following admission to the hospital, patient had a complete physical exam and blood work up, which she was medically cleared and admitted to [facility] for psychiatric evaluation and stabilization.   The patient was monitored closely with suicide and appropriate precautions. She was started on Abilify 2 mg daily for augmentation of treatment for depression which was increased to Abilify 5 mg daily, she was continued on her Cymbalta 30 mg twice daily for chronic pain depression and anxiety, continued on her Wellbutrin  mg twice daily as patient did not want this medication changed. Patient has significant cluster B personality traits and states that she has chronic suicidal ideation but has never acted on this and has never had a suicide attempt, she is able to contract for safety stating that she would never actually hurt herself which is something she thinks about. She also was reporting homicidal ideation against her adoptive mother who she has not seen in 8 to 13 years and who lives in another state, she states that she would never actually act out on any of these thoughts is just something she thinks about. Patient also mentioned multiple times that she had been denied her SSI/SSDI 1 and she has applied for disability in the past.  She was demonstrating some malingering type behaviors, but was agreeable to discharge to the crisis unit. She was encouraged to participate in group and other milieu activity and started to feel better with this combination of treatment. There has been significant progress in the improvement of symptoms since admission. The patient has been an active participant in his treatment, and discharge planning. The patient was able to spontaneously describe with richness of detail their future plans and goals. The patient was no longer suicidal, homicidal, psychotic or manic. She received the required treatment with medication, participated in group milieu, remained engaged in unit activities and learn appropriate coping skills. She was seen to be watching television playing games and socializing with peers and using the phone. There were no mention or gestures of self-harm or harm to others. Her mental status returned to baseline. The treatment team believes patient has obtained maximum benefit out of this hospitalization and does not meet criteria for inpatient hospitalization anymore. However she will continue to benefit from outpatient follow-up and treatment to maintain stability. Collateral information has been obtained and reconciled and there are no concerns about her safety. She has no access to guns or weapons. She appreciates the help that she received here. This patient no longer meets criteria for inpatient hospitalization. She was discharged to the crisis unit in psychiatrically stable condition. Appetite:  [x] Normal  [] Increased  [] Decreased    Sleep:       [x] Normal  [] Fair       [] Poor            Energy:    [x] Normal  [] Increased  [] Decreased     SI [] Present  [x] Absent  HI  []Present  [x] Absent   Aggression:  [] yes  [] no  Patient is [x] able  [] unable to CONTRACT FOR SAFETY   Medication side effects(SE):  [x] None(Psych. Meds.) [] Other      Mental Status Examination on discharge:    Level of consciousness:  within normal limits   Appearance:  well-appearing  Behavior/Motor:  no abnormalities noted  Attitude toward examiner:  attentive and good eye contact  Speech:  spontaneous, normal rate and normal volume   Mood: euthymic  Affect:  mood congruent  Thought processes:  linear and goal directed   Thought content: The patient is devoid of suicidal or homicidal ideation intent or plan. Devoid of auditory or visual hallucinations or other perceptual disturbances, there are no overt or covert signs of psychosis or paranoia. There are no neurovegetative signs of depression.   Cognition:  oriented to person, place, and time   Concentration intact  Memory intact  Insight good   Judgement fair   Fund of Knowledge adequate      ASSESSMENT:  Patient symptoms are:  [x] Well controlled  [x] Improving  [] Worsening  [] No change    Reason for more than one antipsychotic:  [x] N/A  [] 3 Failed Monotherapy attempts (Drugs tried:)  [] Crossover to a new antipsychotic  [] Taper to Monotherapy from Polypharmacy  [] Augmentation of clozapine therapy due to treatment resistance to single therapy    Diagnosis:  Principal Problem:    Severe episode of recurrent major depressive disorder, without psychotic features ([facility]  Active Problems:    Hypokalemia due to loss of potassium    Intractable vomiting    Cluster B personality traits(HCC)    Type 2 diabetes mellitus with complication, with long-term current use of insulin (HCC)    Acquired hypothyroidism    Encounter for long-term (current) use of insulin ([facility])    Rheumatoid arthritis involving multiple sites ([facility]    Primary hypertension  Resolved Problems:    * No resolved hospital problems. *      LABS:    No results for input(s): WBC, HGB, PLT in the last 72 hours. No results for input(s): NA, K, CL, CO2, BUN, CREATININE, GLUCOSE in the last 72 hours. No results for input(s): BILITOT, ALKPHOS, AST, ALT in the last 72 hours. Lab Results   Component Value Date    LABAMPH NOT DETECTED 05/22/2022    BARBSCNU NOT DETECTED 05/22/2022    LABBENZ NOT DETECTED 05/22/2022    LABMETH NOT DETECTED 05/22/2022    OPIATESCREENURINE NOT DETECTED 05/22/2022    PHENCYCLIDINESCREENURINE NOT DETECTED 05/22/2022    ETOH <10 05/22/2022     Lab Results   Component Value Date    TSH 5.220 05/16/2022     No results found for: LITHIUM  No results found for: VALPROATE, CBMZ    RISK ASSESSMENT AT DISCHARGE: Low risk for suicide and homicide. Treatment Plan:  The patient's diagnosis, treatment plan, medication management were formulated after patient was seen directly by the attending physician and myself and all relevant documentation was reviewed. Risk, benefit, side effects, possible outcomes of the medication and alternatives discussed with the patient and the patient demonstrated understanding.   The patient was also educated that the outcome of treatment will depend on the medication compliance as directed by the prescribers along with regular follow-up, compliance with the labs and other work-up, as clinically indicated. Risks, benefits, side effects, drug-to-drug interactions and alternatives to treatment were discussed. Encourage patient to attend outpatient follow up appointment and therapy, outpatient follow-up appointments have been scheduled prior to discharge. Patient was recommended for dialectical behavioral therapy in the community for coping skills and to reduce her eliminate self-injurious or parasuicidal behaviors. Patient was advised to call the outpatient provider, visit the nearest ED or call 911 if symptoms are not manageable. Patient's family member was contacted prior to the discharge, patient has been discharged to the crisis unit in psychiatrically stable condition.          Medication List      START taking these medications    amLODIPine 5 MG tablet  Commonly known as: NORVASC  Take 1 tablet by mouth nightly     ARIPiprazole 5 MG tablet  Commonly known as: ABILIFY  Take 1 tablet by mouth daily     benztropine 0.5 MG tablet  Commonly known as: COGENTIN  Take 1 tablet by mouth 2 times daily     DULoxetine 30 MG extended release capsule  Commonly known as: CYMBALTA  Take 1 capsule by mouth 2 times daily        CHANGE how you take these medications    buPROPion 100 mg tablet  Commonly known as: WELLBUTRIN  Take 1 tablet by mouth 2 times daily  What changed: when to take this        CONTINUE taking these medications    Blood Pressure Kit  1 box by Does not apply route 2 times daily     calcium elemental 500 MG Tabs tablet  Commonly known as: OSCAL  TAKE 1 TABLET BY MOUTH TWICE A DAY WITH LUNCH AND DINNER     diclofenac sodium 1 % Gel  Commonly known as: VOLTAREN  Apply 4 g topically 4 times daily for 7 days     famotidine 20 MG tablet  Commonly known as: PEPCID  TAKE 1 TABLET BY MOUTH TWICE DAILY     folic acid 1 MG tablet  Commonly known as: FOLVITE  Take 1 tablet by mouth daily     FreeStyle Lancets Misc  CHECK BS 4 TIMES DAILY     * FreeStyle Dio 2 Sensor Misc  USE TO TEST SUGAR CONTINUOUSLY AS DIRECTED     * Guardian Sensor 3 Misc  Change every 7 days     glucose 4g chewable tablet  Take 4 tablets by mouth as needed for Low blood sugar     Guardian Link 3 Transmitter Misc  Use with insulin pump     hydrocortisone 2.5 % cream  Apply topically 2 times daily. hydroxychloroquine 200 mg tablet  Commonly known as: PLAQUENIL     insulin glargine 100 UNIT/ML injection vial  Commonly known as: LANTUS     Insulin Pen Needle 31G X 5 MM Misc  1 each by Does not apply route 3 times daily     levothyroxine 88 MCG tablet  Commonly known as: SYNTHROID  TAKE 1 TABLET BY MOUTH DAILY     lidocaine 5 %  Commonly known as: Lidoderm  Place 1 patch onto the skin daily 12 hours on, 12 hours off.     lisinopril 20 MG tablet  Commonly known as: PRINIVIL;ZESTRIL  Take 2 tablets by mouth daily     melatonin 3 mg Tabs tablet  TAKE ONE TABLET BY MOUTH EVERY NIGHT AT BEDTIME     metFORMIN 500 mg extended release tablet  Commonly known as: GLUCOPHAGE-XR  TAKE 2 TABLETS BY MOUTH 2 TIMES DAILY     methotrexate 2.5 MG chemo tablet  Commonly known as: RHEUMATREX     MiniMed 770G Insulin Pump Sys Kit  Use to infuse insulin     naproxen 250 MG tablet  Commonly known as: NAPROSYN  TAKE 1 TABLET BY MOUTH TWICE A DAY WITH MEALS     OneTouch Ultra strip  Generic drug: blood glucose test strips  USE TO TEST THREE TIMES A DAY     Ozempic (1 MG/DOSE) 4 MG/3ML Sopn  Generic drug: Semaglutide (1 MG/DOSE)  INJECT 1 MG UNDER THE SKIN ONCE WEEKLY     pregabalin 100 MG capsule  Commonly known as: LYRICA         * This list has 2 medication(s) that are the same as other medications prescribed for you. Read the directions carefully, and ask your doctor or other care provider to review them with you.             STOP taking these medications    ciprofloxacin 0.3 % ophthalmic solution  Commonly known as: CILOXAN     HUMALOG KWIKPEN SC     lamoTRIgine 100 MG tablet  Commonly known as: LAMICTAL     omeprazole 40 MG delayed release capsule  Commonly known as: PRILOSEC     ondansetron 4 MG disintegrating tablet  Commonly known as: Zofran ODT     prochlorperazine 10 mg tablet  Commonly known as: COMPAZINE           Where to Get Your Medications      These medications were sent to [address] [place] [address]  [address] [facility] [address], [facility] [place] [address]    Phone: [phone]   · amLODIPine 5 MG tablet  · ARIPiprazole 5 MG tablet  · benztropine 0.5 MG tablet  · buPROPion 100 mg tablet  · DULoxetine 30 MG extended release capsule       NOTE: This report was transcribed using voice recognition software. Every effort was made to ensure accuracy; however, inadvertent computerized transcription errors may be present.     TIME SPEND - 35 MINUTES TO COMPLETE THE EVALUATION, DISCHARGE SUMMARY, MEDICATION RECONCILIATION AND FOLLOW UP CARE     Signed:  ANA Portillo CNP  5/31/2022  7:35 AM

## 2022-05-31 NOTE — PROGRESS NOTES
Spiritual Support Group Note    Number of Participants in Group:    9                    Time:  2pm    Goal: Relief from isolation and loneliness             Brittney Sharing             Self-understanding and gain insight              Acceptance and belonging            Recognize they are not alone                Socialization             Empowerment       Encouragement    Topic:  [] Spiritual Wellness and Self Care                  [] Hope                     [] Connecting with Divine/Others        [] Thankfulness and Gratitude               []  Meaningfulness and Purpose               [] Forgiveness               [] Peace               [] Connect to [employer]      [x] Other    Participation Level:   [x] Active Listener   [] Minimal   [] Monopolizing   [] Interactive   [] No Participation   []  Other:     Attention:   [x] Alert   [] Distractible   [] Drowsy   [] Poor   [] Other:    Manner:   [x] Cooperative   [] Suspicious   [] Withdrawn   [] Guarded   [] Irritable   [] Inhospitable   [] Other:     Others Comments from Group:

## 2022-05-31 NOTE — PROGRESS NOTES
Attended morning community meeting. Updated on staffing and daily expectations. Shared goal for the day as to to accept change.

## 2022-05-31 NOTE — GROUP NOTE
Group Therapy Note    Date: 5/31/2022    Group Start Time: 1100  Group End Time: 1130  Group Topic: Cognitive Skills    [facility]    PRESLEY Moreira        Group Therapy Note    Attendees: 10         Patient's Goal: Pt will be able to identify unhealthy coping skills and discuss ways to replace them with healthy coping skills. Notes:  Pt was an active participant in group discussion. Status After Intervention:  Improved    Participation Level: Active Listener and Interactive    Participation Quality: Appropriate, Attentive, Sharing and Supportive      Speech:  normal      Thought Process/Content: Logical  Linear      Affective Functioning: Congruent      Mood: anxious and depressed      Level of consciousness:  Alert, Oriented x4 and Attentive      Response to Learning: Able to verbalize current knowledge/experience, Able to verbalize/acknowledge new learning, Able to retain information, Capable of insight and Progressing to goal      Endings: None Reported    Modes of Intervention: Education, Support, Socialization, Exploration, Clarifying and Problem-solving      Discipline Responsible: /Counselor      Signature:   Gio Moreira

## 2022-05-31 NOTE — PROGRESS NOTES
CLINICAL PHARMACY NOTE: MEDS TO BEDS    Total # of Prescriptions Filled: 6   The following medications were delivered to the patient:  · Metoclopramide 5  · Lisinopril 30  · Benztropine 005  · Abilify 5  · Amlodipine 5  · Bupropion 100    Additional Documentation:  To ELADIO Gillette

## 2022-05-31 NOTE — PLAN OF CARE
Patient up in day room watching TV. Patient friendly and cooperative. Patient continues to endorse SI/HI states \"they are always there, but I would never act on either one. \" Patient denies AVH rates depression 5/10 and anxiety 0/10. No voiced complaints, patient compliant with medications. Will continue to monitor and assess q 15 min for safety throughout the shift.     Problem: Chronic Conditions and Co-morbidities  Goal: Patient's chronic conditions and co-morbidity symptoms are monitored and maintained or improved  Outcome: Progressing     Problem: ABCDS Injury Assessment  Goal: Absence of physical injury  Outcome: Progressing     Problem: Anxiety  Goal: Will report anxiety at manageable levels  Description: INTERVENTIONS:  1. Administer medication as ordered  2. Teach and rehearse alternative coping skills  3. Provide emotional support with 1:1 interaction with staff  5/27/2022 2104 by Julian Rice RN  Outcome: Progressing     Problem: Coping  Goal: Pt/Family able to verbalize concerns and demonstrate effective coping strategies  Description: INTERVENTIONS:  1. Assist patient/family to identify coping skills, available support systems and cultural and spiritual values  2. Provide emotional support, including active listening and acknowledgement of concerns of patient and caregivers  3. Reduce environmental stimuli, as able  4. Instruct patient/family in relaxation techniques, as appropriate  5. Assess for spiritual pain/suffering and initiate Spiritual Care, Psychosocial Clinical Specialist consults as needed  5/27/2022 2104 by Julian Rice RN  Outcome: Progressing     Problem: Behavior  Goal: Pt/Family maintain appropriate behavior and adhere to behavioral management agreement, if implemented  Description: INTERVENTIONS:  1. Assess patient/family's coping skills and  non-compliant behavior (including use of illegal substances)  2.  Notify security of behavior or suspected illegal substances which indicate the need for search of the patient and/or belongings  3. Encourage verbalization of thoughts and concerns in a socially appropriate manner  4. Utilize positive, consistent limit setting strategies supporting safety of patient, staff and others  5. Encourage participation in the decision making process about the behavioral management agreement  6. Implement a Health Care Agreement if patient meets criteria  7. If a patient's behavior jeopardizes the safety of the patient, staff, or others refer to organization policy. If a visitor's behavior poses a threat to safety call refer to organization policy. 8. Initiate consult with , Psychosocial CNS, Spiritual Care as appropriate  Outcome: Progressing     Problem: Depression/Self Harm  Goal: Effect of psychiatric condition will be minimized and patient will be protected from self harm  Description: INTERVENTIONS:  1. Assess impact of patient's symptoms on level of functioning, self care needs and offer support as indicated  2. Assess patient/family knowledge of depression, impact on illness and need for teaching  3. Provide emotional support, presence and reassurance  4. Assess for possible suicidal thoughts or ideation. If patient expresses suicidal thoughts or statements do not leave alone, initiate Suicide Precautions, move to a room close to the nursing station and obtain sitter  5. Initiate consults as appropriate with Mental Health Professional, Spiritual Care, Psychosocial CNS, and consider a recommendation to the [facility]IP for a Psychiatric Consultation  Outcome: Progressing     Problem: Involuntary Admit  Goal: Will cooperate with staff recommendations and doctor's orders and will demonstrate appropriate behavior  Description: INTERVENTIONS:  1. Treat underlying conditions and offer medication as ordered  2. Educate regarding involuntary admission procedures and rules  3.  Contain excessive/inappropriate behavior per unit and hospital policies  3/88/9879 2104 by Blaine Roberts, RN  Outcome: Progressing     Problem: Pain  Goal: Verbalizes/displays adequate comfort level or baseline comfort level  Outcome: Progressing     Problem: Nutrition Deficit:  Goal: Optimize nutritional status  Outcome: Progressing

## 2022-05-31 NOTE — PROGRESS NOTES
Patient A&Ox4. Pt endorses SI with no plan and contracts for safety. Pt denies HI and hallucinations. Pt rates anxiety 3/10 and depression 8/10. Patient is pleasant and cooperative with a bright affect that is incongruent with pt statements of anxiety and depression. Pt is visible on the unit, attends group and is social with [facility]. Pt is med. & meal compliant. Pt with even and unlabored breathing, no signs of acute physical distress noted. Will continue to monitor and offer support as needed.

## 2022-06-01 VITALS
TEMPERATURE: 97.5 F | OXYGEN SATURATION: 98 % | DIASTOLIC BLOOD PRESSURE: 77 MMHG | HEART RATE: 118 BPM | HEIGHT: 61 IN | SYSTOLIC BLOOD PRESSURE: 121 MMHG | RESPIRATION RATE: 18 BRPM | BODY MASS INDEX: 27.78 KG/M2

## 2022-06-01 DIAGNOSIS — E11.65 UNCONTROLLED TYPE 2 DIABETES MELLITUS WITH HYPERGLYCEMIA (HCC): ICD-10-CM

## 2022-06-01 LAB
METER GLUCOSE: 154 MG/DL (ref 74–99)
METER GLUCOSE: 158 MG/DL (ref 74–99)
METER GLUCOSE: 165 MG/DL (ref 74–99)
METER GLUCOSE: 174 MG/DL (ref 74–99)

## 2022-06-01 PROCEDURE — 6370000000 HC RX 637 (ALT 250 FOR IP): Performed by: FAMILY MEDICINE

## 2022-06-01 PROCEDURE — 6370000000 HC RX 637 (ALT 250 FOR IP): Performed by: STUDENT IN AN ORGANIZED HEALTH CARE EDUCATION/TRAINING PROGRAM

## 2022-06-01 PROCEDURE — 99239 HOSP IP/OBS DSCHRG MGMT >30: CPT | Performed by: NURSE PRACTITIONER

## 2022-06-01 PROCEDURE — 6370000000 HC RX 637 (ALT 250 FOR IP): Performed by: NURSE PRACTITIONER

## 2022-06-01 PROCEDURE — 82962 GLUCOSE BLOOD TEST: CPT

## 2022-06-01 PROCEDURE — 99232 SBSQ HOSP IP/OBS MODERATE 35: CPT | Performed by: FAMILY MEDICINE

## 2022-06-01 RX ORDER — INSULIN GLARGINE-YFGN 100 [IU]/ML
12 INJECTION, SOLUTION SUBCUTANEOUS NIGHTLY
Status: DISCONTINUED | OUTPATIENT
Start: 2022-06-01 | End: 2022-06-01 | Stop reason: HOSPADM

## 2022-06-01 RX ORDER — INSULIN GLARGINE 100 [IU]/ML
INJECTION, SOLUTION SUBCUTANEOUS
Qty: 30 ML | Refills: 1 | OUTPATIENT
Start: 2022-06-01

## 2022-06-01 RX ADMIN — FAMOTIDINE 20 MG: 20 TABLET ORAL at 09:40

## 2022-06-01 RX ADMIN — METOCLOPRAMIDE 5 MG: 5 TABLET ORAL at 17:42

## 2022-06-01 RX ADMIN — BUPROPION HYDROCHLORIDE 100 MG: 100 TABLET, FILM COATED ORAL at 09:39

## 2022-06-01 RX ADMIN — METOCLOPRAMIDE 5 MG: 5 TABLET ORAL at 06:29

## 2022-06-01 RX ADMIN — PREGABALIN 100 MG: 50 CAPSULE ORAL at 14:20

## 2022-06-01 RX ADMIN — DULOXETINE 30 MG: 30 CAPSULE, DELAYED RELEASE ORAL at 09:39

## 2022-06-01 RX ADMIN — PREGABALIN 100 MG: 50 CAPSULE ORAL at 09:40

## 2022-06-01 RX ADMIN — BENZTROPINE MESYLATE 0.5 MG: 0.5 TABLET ORAL at 09:39

## 2022-06-01 RX ADMIN — METOCLOPRAMIDE 5 MG: 5 TABLET ORAL at 11:48

## 2022-06-01 RX ADMIN — LISINOPRIL 30 MG: 20 TABLET ORAL at 09:39

## 2022-06-01 RX ADMIN — METFORMIN HYDROCHLORIDE 1000 MG: 500 TABLET, EXTENDED RELEASE ORAL at 09:39

## 2022-06-01 RX ADMIN — ARIPIPRAZOLE 5 MG: 5 TABLET ORAL at 09:38

## 2022-06-01 RX ADMIN — LEVOTHYROXINE SODIUM 88 MCG: 0.09 TABLET ORAL at 06:29

## 2022-06-01 RX ADMIN — FOLIC ACID 1 MG: 1 TABLET ORAL at 09:39

## 2022-06-01 NOTE — PROGRESS NOTES
[address] [facility]  Family Medicine Attending    S: 47 y.o. female with PMH of uncontrolled DM2 on long term insulin with neuropathy, HTN, Hypothyroidism, rheumatoid arthritis, who presented to the [facility] ED for intractable nausea and vomiting. After a negative evaluation,  she admitted to suicidal ideation and was transferred here for psychiatric treatment. She continued to have N/V and we were consulted for treatment. She recently had EGD, which she feels made her symptoms worse. She has known gastroparesis. States that she usually has one bad week a month, without any pattern    Today, feels well tolerating PO intake fine. Has had good improvement with [place]. Reports no issues and ready to go home on Thursday. She would like to stay 1 more day. O: VS- Blood pressure 121/77, pulse (!) 118, temperature 97.5 °F (36.4 °C), temperature source Temporal, resp. rate 18, height 5' 1\" (1.549 m), SpO2 98 %, not currently breastfeeding. Exam is as noted by resident with the following changes, additions or corrections:  Gen: Awake, alert  CV/Resp: unlabored. Symmetric chest rise. Neuro- No facial droop, EOMI, CAPPS=. Skin: no rash/jaundice. Impressions:   Principal Problem:    Severe episode of recurrent major depressive disorder, without psychotic features ([address]  Active Problems:    Hypokalemia due to loss of potassium    Intractable vomiting    Cluster B personality traits(HCC)    Type 2 diabetes mellitus with complication, with long-term current use of insulin (HCC)    Acquired hypothyroidism    Encounter for long-term (current) use of insulin (HCC)    Rheumatoid arthritis involving multiple sites [facility])    Primary hypertension  Resolved Problems:    * No resolved hospital problems. *      Plan:   DM - Lantus increased from 10 to 12 units once a day. Continue metoclopramide given positive response.     S/p Increased lisinopril, added amlodipine for persistently elevated BP which are now at goal.    Psych for depression treatment. Tremor unlikely related to glucose, suspect psychiatric component. Attending Physician Statement  I have reviewed the chart and seen the patient with the resident(s). I personally reviewed images, EKG's and similar tests, if present. I personally reviewed and performed key elements of the history and exam.  I have reviewed and confirmed student and/or resident history and exam with changes as indicated above. I agree with the assessment, plan and orders as documented by the resident. Please refer to the resident and/or student note for additional information.       Lina Michaels MD

## 2022-06-01 NOTE — PLAN OF CARE
Patient up in day room watching TV. Patient friendly and cooperative. Patient continues to endorse SI/HI states \"they are always there, but I would never act on either one. \" Patient denies AVH rates depression 9/10 and anxiety 7/10. No voiced complaints, patient compliant with medications. Will continue to monitor and assess q 15 min for safety throughout the shift.     Problem: Chronic Conditions and Co-morbidities  Goal: Patient's chronic conditions and co-morbidity symptoms are monitored and maintained or improved  Outcome: Progressing     Problem: ABCDS Injury Assessment  Goal: Absence of physical injury  Outcome: Progressing     Problem: Anxiety  Goal: Will report anxiety at manageable levels  Description: INTERVENTIONS:  1. Administer medication as ordered  2. Teach and rehearse alternative coping skills  3. Provide emotional support with 1:1 interaction with staff  5/27/2022 2104 by La Jean Baptiste RN  Outcome: Progressing     Problem: Coping  Goal: Pt/Family able to verbalize concerns and demonstrate effective coping strategies  Description: INTERVENTIONS:  1. Assist patient/family to identify coping skills, available support systems and cultural and spiritual values  2. Provide emotional support, including active listening and acknowledgement of concerns of patient and caregivers  3. Reduce environmental stimuli, as able  4. Instruct patient/family in relaxation techniques, as appropriate  5. Assess for spiritual pain/suffering and initiate Spiritual Care, Psychosocial Clinical Specialist consults as needed  5/27/2022 2104 by La Jean Baptiste RN  Outcome: Progressing     Problem: Behavior  Goal: Pt/Family maintain appropriate behavior and adhere to behavioral management agreement, if implemented  Description: INTERVENTIONS:  1. Assess patient/family's coping skills and  non-compliant behavior (including use of illegal substances)  2.  Notify security of behavior or suspected illegal substances which indicate the need for search of the patient and/or belongings  3. Encourage verbalization of thoughts and concerns in a socially appropriate manner  4. Utilize positive, consistent limit setting strategies supporting safety of patient, staff and others  5. Encourage participation in the decision making process about the behavioral management agreement  6. Implement a Health Care Agreement if patient meets criteria  7. If a patient's behavior jeopardizes the safety of the patient, staff, or others refer to organization policy. If a visitor's behavior poses a threat to safety call refer to organization policy. 8. Initiate consult with , Psychosocial CNS, Spiritual Care as appropriate  Outcome: Progressing     Problem: Depression/Self Harm  Goal: Effect of psychiatric condition will be minimized and patient will be protected from self harm  Description: INTERVENTIONS:  1. Assess impact of patient's symptoms on level of functioning, self care needs and offer support as indicated  2. Assess patient/family knowledge of depression, impact on illness and need for teaching  3. Provide emotional support, presence and reassurance  4. Assess for possible suicidal thoughts or ideation. If patient expresses suicidal thoughts or statements do not leave alone, initiate Suicide Precautions, move to a room close to the nursing station and obtain sitter  5. Initiate consults as appropriate with Mental Health Professional, Spiritual Care, Psychosocial CNS, and consider a recommendation to the LIP for a Psychiatric Consultation  Outcome: Progressing     Problem: Involuntary Admit  Goal: Will cooperate with staff recommendations and doctor's orders and will demonstrate appropriate behavior  Description: INTERVENTIONS:  1. Treat underlying conditions and offer medication as ordered  2. Educate regarding involuntary admission procedures and rules  3.  Contain excessive/inappropriate behavior per unit and hospital policies  8/06/9518 2104 by Katherine Soto, RN  Outcome: Progressing     Problem: Pain  Goal: Verbalizes/displays adequate comfort level or baseline comfort level  Outcome: Progressing     Problem: Nutrition Deficit:  Goal: Optimize nutritional status  Outcome: Progressing

## 2022-06-01 NOTE — CARE COORDINATION
Group Therapy Note    Date: 6/1/2022    Group Start Time: 1100  Group End Time: 0744  Group Topic: Psychotherapy    [facility]    Meryle Setters, MSW, LSW        Group Therapy Note    Attendees: 2         Patient's Goal:  Pt's goal was to \"express emotions\" during group. Notes:  Pt made connections and participated in group. Status After Intervention:  Improved    Participation Level:  Active Listener and Interactive    Participation Quality: Appropriate, Attentive, Sharing and Supportive      Speech:  normal      Thought Process/Content: Logical  Linear      Affective Functioning: Congruent      Mood: depressed      Level of consciousness:  Alert, Oriented x4 and Attentive      Response to Learning: Able to verbalize current knowledge/experience      Endings: None Reported    Modes of Intervention: Education, Support, Socialization and Exploration      Discipline Responsible: /Counselor      Signature:  Meryle Setters, MSW, LSW

## 2022-06-01 NOTE — PROGRESS NOTES
Patient attended morning community meeting. Updated on staffing and daily expectations. Declined to share goal for the day.

## 2022-06-01 NOTE — PROGRESS NOTES
Patient MAR lantus insulin not marked given . Patient states that she has had her insulin injection this evening.

## 2022-06-01 NOTE — DISCHARGE SUMMARY
DISCHARGE SUMMARY      Patient ID:  Ila Barker  [id number]  47 y.o.  1970    Admit date: 5/23/2022    Discharge date and time: 6/1/2022    Admitting Physician: Verona Zapata MD     Discharge Physician: Dr Jose Rafael Marie MD    Discharge Diagnoses:   Patient Active Problem List   Diagnosis    Type 2 diabetes mellitus with complication, with long-term current use of insulin ([address]    Sjogren's syndrome ([address]    Diabetic ketosis ([address]    Anemia    Hyperglycemia    Acquired hypothyroidism    Vitamin D deficiency    Encounter for long-term (current) use of insulin ([address]    Infective urethritis    Severe obstructive sleep apnea    Mild depression ([address]    Anxiety disorder    Benign hypertensive kidney disease with chronic kidney disease    Chronic kidney disease, stage I    COVID-19    Fibromyalgia    Other hyperlipidemia    Hypoglycemia    Hypokalemia    Nephrotic syndrome due to type 2 diabetes mellitus (HCC)    Hypothyroidism    Proteinuria    Depressive disorder    Type 2 diabetes mellitus with chronic kidney disease (HCC)    Rheumatoid arthritis involving multiple sites ([address]    Chronic fatigue syndrome    Diabetic renal disease ([address]    Primary hypertension    Hyperglycemia due to type 2 diabetes mellitus ([address]    Major depression, single episode    Metabolic acidosis    Neuropathy    Dietary noncompliance    Hypokalemia due to loss of potassium    Depression with suicidal ideation    Elevated troponin    Intractable vomiting    Severe episode of recurrent major depressive disorder, without psychotic features ([address]    Cluster B personality traits(HCC)       Admission Condition: poor    Discharged Condition: stable    Admission Circumstance:   Patient was pink slipped at [facility] ED for suicidal statements and homicidal ideation towards her mother.        PAST MEDICAL/PSYCHIATRIC HISTORY:   Past Medical History:   Diagnosis Date    Anxiety     Arthritis     Depression     Diabetes mellitus ([facility]     Fibromyalgia     HTN (hypertension)     Hypertensive chronic kidney disease     Left shoulder pain     LIZABETH (obstructive sleep apnea)     Rheumatoid arthritis involving multiple sites ([facility]     Sjogren's disease ([place] [address]     Thyroid disease        FAMILY/SOCIAL HISTORY:  Family History   Adopted: Yes   Family history unknown: Yes     Social History     Socioeconomic History    Marital status: Single     Spouse name: Not on file    Number of children: Not on file    Years of education: Not on file    Highest education level: Bachelor's degree (e.g., BA, AB, BS)   Occupational History    Occupation: unemployed    Tobacco Use    Smoking status: Former Smoker     Packs/day: 2.00     Years: 0.50     Pack years: 1.00     Start date:      Quit date:      Years since quittin.4    Smokeless tobacco: Never Used   Vaping Use    Vaping Use: Never used   Substance and Sexual Activity    Alcohol use: No    Drug use: No    Sexual activity: Never   Other Topics Concern    Not on file   Social History Narrative    2/3/2020-Financial Resource strain-states very hard-pt receives [employer] benefits for food, states that food doesn't run out as she also utilizes the [employer] the  of every month    2/3/2020-Stress-states she is very much stressed and reports attending [place] [facility] for her depression and receives medications as well     Social Determinants of Health     Financial Resource Strain: High Risk    Difficulty of Paying Living Expenses: Very hard   Food Insecurity: No Food Insecurity    Worried About Running Out of Food in the Last Year: Never true    Kai of Food in the Last Year: Never true   Transportation Needs:     Lack of Transportation (Medical): Not on file    Lack of Transportation (Non-Medical):  Not on file   Physical Activity:     Days of Exercise per Week: Not on file    Minutes of Exercise per Session: Not on file Stress:     Feeling of Stress : Not on file   Social Connections:     Frequency of Communication with Friends and Family: Not on file    Frequency of Social Gatherings with Friends and Family: Not on file    Attends Quaker Services: Not on file    Active Member of [address] [place] [address] or Organizations: Not on file    Attends Club or Organization Meetings: Not on file    Marital Status: Not on file   Intimate Partner Violence:     Fear of Current or Ex-Partner: Not on file    Emotionally Abused: Not on file    Physically Abused: Not on file    Sexually Abused: Not on file   Housing Stability:     Unable to Pay for Housing in the Last Year: Not on file    Number of Luli in the Last Year: Not on file    Unstable Housing in the Last Year: Not on file       MEDICATIONS:    Current Facility-Administered Medications:     insulin glargine-yfgn (SEMGLEE-YFGN) injection vial 12 Units, 12 Units, SubCUTAneous, Nightly, Raymond Herrera MD    benztropine (COGENTIN) tablet 0.5 mg, 0.5 mg, Oral, BID, ANA Palacios - CNP, 0.5 mg at 06/01/22 0939    lisinopril (PRINIVIL;ZESTRIL) tablet 30 mg, 30 mg, Oral, Daily, Saman Amaral MD, 30 mg at 06/01/22 0939    amLODIPine (NORVASC) tablet 5 mg, 5 mg, Oral, Nightly, Saman Amaral MD, 5 mg at 05/31/22 2029    ARIPiprazole (ABILIFY) tablet 5 mg, 5 mg, Oral, Daily, ANA Palacios - CNP, 5 mg at 06/01/22 6781    pregabalin (LYRICA) capsule 50 mg, 50 mg, Oral, Once, Rose A Masood, DO    metoclopramide (REGLAN) tablet 5 mg, 5 mg, Oral, 4x Daily AC & HS, Fede Moreno MD, 5 mg at 06/01/22 1148    DULoxetine (CYMBALTA) extended release capsule 30 mg, 30 mg, Oral, BID, ANA Palacios - CNP, 30 mg at 06/01/22 0939    buPROPion (WELLBUTRIN) tablet 100 mg, 100 mg, Oral, BID, ANA Palacios - CNP, 100 mg at 06/01/22 9901    acetaminophen (TYLENOL) tablet 650 mg, 650 mg, Oral, Q4H PRN, Vita Mujica MD    magnesium hydroxide (MILK OF MAGNESIA) 400 MG/5ML suspension 30 mL, 30 mL, Oral, Daily PRN, Juan R Blank MD    nicotine (NICODERM CQ) 21 MG/24HR 1 patch, 1 patch, TransDERmal, Daily, Juan R Blank MD    aluminum & magnesium hydroxide-simethicone (MAALOX) 200-200-20 MG/5ML suspension 30 mL, 30 mL, Oral, PRN, Juan R Blank MD    hydrOXYzine (VISTARIL) capsule 50 mg, 50 mg, Oral, TID PRN, Juan R Blank MD    melatonin tablet 3 mg, 3 mg, Oral, Nightly, Juan R Blank MD, 3 mg at 05/31/22 2029    famotidine (PEPCID) tablet 20 mg, 20 mg, Oral, BID, Rose A Rech, DO, 20 mg at 43/41/44 4038    folic acid (FOLVITE) tablet 1 mg, 1 mg, Oral, Daily, Rose A Rech, DO, 1 mg at 06/01/22 0939    levothyroxine (SYNTHROID) tablet 88 mcg, 88 mcg, Oral, Daily, Rose A Rech, DO, 88 mcg at 06/01/22 1357    metFORMIN (GLUCOPHAGE-XR) extended release tablet 1,000 mg, 1,000 mg, Oral, BID, Rose A Rech, DO, 1,000 mg at 06/01/22 4347    ondansetron (ZOFRAN-ODT) disintegrating tablet 4 mg, 4 mg, Oral, Q8H PRN, Parker Bartley A Rech, DO, 4 mg at 05/23/22 2205    pregabalin (LYRICA) capsule 100 mg, 100 mg, Oral, TID, Rose A Rech, DO, 100 mg at 06/01/22 1420    glucose chewable tablet 16 g, 4 tablet, Oral, PRN, Rose A Rech, DO    dextrose 50 % IV solution, 12.5 g, IntraVENous, PRN **OR** [DISCONTINUED] dextrose bolus 10% 250 mL, 250 mL, IntraVENous, PRN, Parker Bartley A Rech, DO    glucagon (rDNA) injection 1 mg, 1 mg, IntraMUSCular, PRN, Rose A Rech, DO    dextrose 5 % solution, 100 mL/hr, IntraVENous, PRN, Parker Bartley A Rech, DO    Examination:  /77   Pulse (!) 118   Temp 97.5 °F (36.4 °C) (Temporal)   Resp 18   Ht 5' 1\" (1.549 m)   LMP  (LMP Unknown)   SpO2 98%   BMI 27.78 kg/m²   Gait - steady    HOSPITAL COURSE[de-identified]  Following admission to the hospital, patient had a complete physical exam and blood work up, which she was medically cleared and admitted to [facility] for psychiatric evaluation and stabilization.   The patient was monitored closely with suicide and appropriate precautions. She was started on Abilify 2 mg daily for augmentation of treatment for depression which was increased to Abilify 5 mg daily, she was continued on her Cymbalta 30 mg twice daily for chronic pain depression and anxiety, continued on her Wellbutrin  mg twice daily as patient did not want this medication changed. Patient has significant cluster B personality traits and states that she has chronic suicidal ideation but has never acted on this and has never had a suicide attempt, she is able to contract for safety stating that she would never actually hurt herself which is something she thinks about. She also was reporting homicidal ideation against her adoptive mother who she has not seen in 8 to 13 years and who lives in another state, she states that she would never actually act out on any of these thoughts is just something she thinks about. Patient also mentioned multiple times that she had been denied her SSI/SSDI 1 and she has applied for disability in the past.  She was demonstrating some malingering type behaviors, but was agreeable to discharge to the crisis unit. She was encouraged to participate in group and other milieu activity and started to feel better with this combination of treatment. There has been significant progress in the improvement of symptoms since admission. The patient has been an active participant in his treatment, and discharge planning. The patient was able to spontaneously describe with richness of detail their future plans and goals. The patient was no longer suicidal, homicidal, psychotic or manic. She received the required treatment with medication, participated in group milieu, remained engaged in unit activities and learn appropriate coping skills. She was seen to be watching television playing games and socializing with peers and using the phone. There were no mention or gestures of self-harm or harm to others. Her mental status returned to baseline. The treatment team believes patient has obtained maximum benefit out of this hospitalization and does not meet criteria for inpatient hospitalization anymore. However she will continue to benefit from outpatient follow-up and treatment to maintain stability. Collateral information has been obtained and reconciled and there are no concerns about her safety. She has no access to guns or weapons. She appreciates the help that she received here. This patient no longer meets criteria for inpatient hospitalization. She was discharged to the crisis unit in psychiatrically stable condition. Appetite:  [x] Normal  [] Increased  [] Decreased    Sleep:       [x] Normal  [] Fair       [] Poor            Energy:    [x] Normal  [] Increased  [] Decreased     SI [] Present  [x] Absent  HI  []Present  [x] Absent   Aggression:  [] yes  [] no  Patient is [x] able  [] unable to CONTRACT FOR SAFETY   Medication side effects(SE):  [x] None(Psych. Meds.) [] Other      Mental Status Examination on discharge:    Level of consciousness:  within normal limits   Appearance:  well-appearing  Behavior/Motor:  no abnormalities noted  Attitude toward examiner:  attentive and good eye contact  Speech:  spontaneous, normal rate and normal volume   Mood: euthymic  Affect:  mood congruent  Thought processes:  linear and goal directed   Thought content: The patient is devoid of suicidal or homicidal ideation intent or plan. Devoid of auditory or visual hallucinations or other perceptual disturbances, there are no overt or covert signs of psychosis or paranoia. There are no neurovegetative signs of depression.   Cognition:  oriented to person, place, and time   Concentration intact  Memory intact  Insight good   Judgement fair   Fund of Knowledge adequate      ASSESSMENT:  Patient symptoms are:  [x] Well controlled  [x] Improving  [] Worsening  [] No change    Reason for more than one antipsychotic:  [x] N/A  [] 3 Failed Monotherapy attempts (Drugs tried:)  [] Crossover to a new antipsychotic  [] Taper to Monotherapy from Polypharmacy  [] Augmentation of clozapine therapy due to treatment resistance to single therapy    Diagnosis:  Principal Problem:    Severe episode of recurrent major depressive disorder, without psychotic features ([facility]  Active Problems:    Hypokalemia due to loss of potassium    Intractable vomiting    Cluster B personality traits(HCC)    Type 2 diabetes mellitus with complication, with long-term current use of insulin (HCC)    Acquired hypothyroidism    Encounter for long-term (current) use of insulin ([facility])    Rheumatoid arthritis involving multiple sites ([facility]    Primary hypertension  Resolved Problems:    * No resolved hospital problems. *      LABS:    No results for input(s): WBC, HGB, PLT in the last 72 hours. No results for input(s): NA, K, CL, CO2, BUN, CREATININE, GLUCOSE in the last 72 hours. No results for input(s): BILITOT, ALKPHOS, AST, ALT in the last 72 hours. Lab Results   Component Value Date    LABAMPH NOT DETECTED 05/22/2022    BARBSCNU NOT DETECTED 05/22/2022    LABBENZ NOT DETECTED 05/22/2022    LABMETH NOT DETECTED 05/22/2022    OPIATESCREENURINE NOT DETECTED 05/22/2022    PHENCYCLIDINESCREENURINE NOT DETECTED 05/22/2022    ETOH <10 05/22/2022     Lab Results   Component Value Date    TSH 5.220 05/16/2022     No results found for: LITHIUM  No results found for: VALPROATE, CBMZ    RISK ASSESSMENT AT DISCHARGE: Low risk for suicide and homicide. Treatment Plan:  The patient's diagnosis, treatment plan, medication management were formulated after patient was seen directly by the attending physician and myself and all relevant documentation was reviewed. Risk, benefit, side effects, possible outcomes of the medication and alternatives discussed with the patient and the patient demonstrated understanding.   The patient was also educated that the outcome of treatment will depend on the medication compliance as directed by the prescribers along with regular follow-up, compliance with the labs and other work-up, as clinically indicated. Risks, benefits, side effects, drug-to-drug interactions and alternatives to treatment were discussed. Encourage patient to attend outpatient follow up appointment and therapy, outpatient follow-up appointments have been scheduled prior to discharge. Patient was recommended for dialectical behavioral therapy in the community for coping skills and to reduce her eliminate self-injurious or parasuicidal behaviors. Patient was advised to call the outpatient provider, visit the nearest ED or call 911 if symptoms are not manageable. Patient's family member was contacted prior to the discharge, patient has been discharged to the crisis unit in psychiatrically stable condition. Medication List      START taking these medications    amLODIPine 5 MG tablet  Commonly known as: NORVASC  Take 1 tablet by mouth nightly     ARIPiprazole 5 MG tablet  Commonly known as: ABILIFY  Take 1 tablet by mouth daily     benztropine 0.5 MG tablet  Commonly known as: COGENTIN  Take 1 tablet by mouth 2 times daily     DULoxetine 30 MG extended release capsule  Commonly known as: CYMBALTA  Take 1 capsule by mouth 2 times daily     insulin glargine-yfgn 100 UNIT/ML injection vial  Commonly known as: SEMGLEE-YFGN  Inject 10 Units into the skin nightly     metoclopramide 5 MG tablet  Commonly known as: REGLAN  Take 1 tablet by mouth 4 times daily (before meals and nightly)        CHANGE how you take these medications    buPROPion 100 mg tablet  Commonly known as: WELLBUTRIN  Take 1 tablet by mouth 2 times daily  What changed: when to take this     famotidine 20 MG tablet  Commonly known as: PEPCID  Take 1 tablet by mouth 2 times daily  What changed: See the new instructions.      lisinopril 30 MG tablet  Commonly known as: PRINIVIL;ZESTRIL  Take 1 tablet by mouth daily  What changed: · medication strength  · how much to take        CONTINUE taking these medications    Blood Pressure Kit  1 box by Does not apply route 2 times daily     calcium elemental 500 MG Tabs tablet  Commonly known as: OSCAL  TAKE 1 TABLET BY MOUTH TWICE A DAY WITH LUNCH AND DINNER     diclofenac sodium 1 % Gel  Commonly known as: VOLTAREN  Apply 4 g topically 4 times daily for 7 days     folic acid 1 MG tablet  Commonly known as: FOLVITE  Take 1 tablet by mouth daily     FreeStyle Lancets Misc  CHECK BS 4 TIMES DAILY     * FreeStyle Dio 2 Sensor Misc  USE TO TEST SUGAR CONTINUOUSLY AS DIRECTED     * Guardian Sensor 3 Misc  Change every 7 days     glucose 4g chewable tablet  Take 4 tablets by mouth as needed for Low blood sugar     Guardian Link 3 Transmitter Misc  Use with insulin pump     hydrocortisone 2.5 % cream  Apply topically 2 times daily. Insulin Pen Needle 31G X 5 MM Misc  1 each by Does not apply route 3 times daily     levothyroxine 88 MCG tablet  Commonly known as: SYNTHROID  Take 1 tablet by mouth daily     lidocaine 5 %  Commonly known as: Lidoderm  Place 1 patch onto the skin daily 12 hours on, 12 hours off.     melatonin 3 mg Tabs tablet  TAKE ONE TABLET BY MOUTH EVERY NIGHT AT BEDTIME     metFORMIN 500 mg extended release tablet  Commonly known as: GLUCOPHAGE-XR  Take 2 tablets by mouth 2 times daily     MiniMed 770G Insulin Pump Sys Kit  Use to infuse insulin     naproxen 250 MG tablet  Commonly known as: NAPROSYN  TAKE 1 TABLET BY MOUTH TWICE A DAY WITH MEALS     OneTouch Ultra strip  Generic drug: blood glucose test strips  USE TO TEST THREE TIMES A DAY     pregabalin 100 MG capsule  Commonly known as: LYRICA         * This list has 2 medication(s) that are the same as other medications prescribed for you. Read the directions carefully, and ask your doctor or other care provider to review them with you.             STOP taking these medications    ciprofloxacin 0.3 % ophthalmic solution  Commonly known as: CILOXAN     HUMALOG KWIKPEN SC     hydroxychloroquine 200 mg tablet  Commonly known as: PLAQUENIL     insulin glargine 100 UNIT/ML injection vial  Commonly known as: LANTUS     lamoTRIgine 100 MG tablet  Commonly known as: LAMICTAL     methotrexate 2.5 MG chemo tablet  Commonly known as: RHEUMATREX     omeprazole 40 MG delayed release capsule  Commonly known as: PRILOSEC     ondansetron 4 MG disintegrating tablet  Commonly known as: Zofran ODT     Ozempic (1 MG/DOSE) 4 MG/3ML Sopn  Generic drug: Semaglutide (1 MG/DOSE)     prochlorperazine 10 mg tablet  Commonly known as: COMPAZINE           Where to Get Your Medications      These medications were sent to [facility], [address] [place] [address] [phone] Celestino Gunter [phone]  [address] [facility] [place] [address]    Phone: [phone]   · buPROPion 100 mg tablet  · DULoxetine 30 MG extended release capsule     These medications were sent to [facility], [address] [address]    Phone: [phone]   · amLODIPine 5 MG tablet  · ARIPiprazole 5 MG tablet  · benztropine 0.5 MG tablet  · famotidine 20 MG tablet  · insulin glargine-yfgn 100 UNIT/ML injection vial  · levothyroxine 88 MCG tablet  · lisinopril 30 MG tablet  · metFORMIN 500 mg extended release tablet  · metoclopramide 5 MG tablet       NOTE: This report was transcribed using voice recognition software. Every effort was made to ensure accuracy; however, inadvertent computerized transcription errors may be present.     TIME SPEND - 35 MINUTES TO COMPLETE THE EVALUATION, DISCHARGE SUMMARY, MEDICATION RECONCILIATION AND FOLLOW UP CARE     Signed:  ANA Valentino CNP  6/1/2022  2:27 PM

## 2022-06-01 NOTE — PROGRESS NOTES
General: She is not in acute distress. HENT:      Head: Normocephalic and atraumatic. Nose: Nose normal. No congestion or rhinorrhea. Cardiovascular:      Rate and Rhythm: Normal rate and regular rhythm. Pulses: Normal pulses. Heart sounds: Normal heart sounds. Pulmonary:      Effort: Pulmonary effort is normal.      Breath sounds: Normal breath sounds. Abdominal:      General: Bowel sounds are normal.      Palpations: Abdomen is soft. Tenderness: There is no abdominal tenderness. There is no guarding or rebound. Musculoskeletal:      Cervical back: Normal range of motion and neck supple. Right lower leg: No edema. Left lower leg: No edema. Skin:     General: Skin is warm. Findings: No rash. Neurological:      General: No focal deficit present. Mental Status: She is alert. Psychiatric:         Thought Content: Thought content normal.         Judgment: Judgment normal.            Labs:           CrCl cannot be calculated (Unknown ideal weight.). Other pertinent labs as noted below    New Imaging:  No orders to display       A/P:  Principal Problem:    Severe episode of recurrent major depressive disorder, without psychotic features ([address]  Active Problems:    Hypokalemia due to loss of potassium    Intractable vomiting    Cluster B personality traits(HCC)    Type 2 diabetes mellitus with complication, with long-term current use of insulin (HCC)    Acquired hypothyroidism    Encounter for long-term (current) use of insulin (HCC)    Rheumatoid arthritis involving multiple sites ([facility]    Primary hypertension  Resolved Problems:    * No resolved hospital problems.  *      Depression with SI/HI  · Duloxetine 30 mg BID for chronic pain and depression  · Bupropion 100 mg BID for depression and anxiety  · On Aripiprazole 5 mg daily for augmentation of treatment for depression  · On Melatonin 3 mg nightly for sleeping  · Participating in group therapy  · Suicide precautions  · SW to obtain collateral information  · Management per psychiatry     DM2 with neuropathy and gastroparesis  · Last HbA1C is 15%  · She is waiting on insulin pump, but currently requiring high doses of insulin  · Lantus increased to 12 units  · On Metformin 1000 mg BID  · Liraglutide stopped due to gastroparesis  · Continue home Lyrica 100 mg TID for neuropathy  · On Pepcid 20 mg BID  · On Maalox PRN  · Started on Reglan 5 mg 4 times per day for gastroparesis. EKG done, QTc is 474, went down from 485. Okay to continue with Reglan. Continue monitoring QTc.    · Consider D/C reglan at discharge as is only temporary and sx may have been due to liraglutide  · Hypoglycemic protocol in place  · Monitor glucose, consider decreasing basal insulin dose if morning glucose is < 60 mg  · Diabetic education consulted.        HTN  · On Lisinopril 20 mg daily at home  · Increased lisinopril to 30mg and added amlodipine 5mg nightly  · Monitor BP    Tremor  · Likely medication induced  · Careful titration of medications  · Recheck EKG with increased dose abilify  · Continue to monitor     Hypokalemia - resolved     Hypothyroidism  · Continue home Levothyroxine 88 mcg daily    Smoking use  · On Nicotine patch 21 mg/day     RA  · Home medications are Methotrexate and Hydroxychloroquine, patient not taking them      DVT Prophylaxis: none because patient is fully mobile  GI Prophylaxis: Pepcid 20 mg BID  Diet: Adult regular carb controlled plus oral supplements  Disposition: Psych unit  Full code      Electronically signed by Austyn Louie MD on 6/1/2022 at 8:25 AM  This case was discussed with attending physician Dr. Darrel Smith

## 2022-06-01 NOTE — PROGRESS NOTES
Reason for consult: Insulin use reports needing Lantus 50 units BID at home- here pt has needed only 10 units    A1C: 15%     [] Not available                 Patient states the following concerns/barriers to diabetes self-management:     [x] None       [] Medication cost   [] Food cost/availability        [] Reading  [] Hearing   [] Vision                [] Work    [] Transportation  [] No insurance  [] Physical limitations    [] Other:    Diabetes survival packet provided to:   [x] Patient     [] Other:    Information reviewed:   Definition of diabetes   Target glucose ranges/A1C   Self-monitoring of blood glucose   Prevention/symptoms/treatment of hypo-/hyperglycemia   Medication adherence   The plate method/meal planning guidelines   The benefits of exercise and recommendations   Reducing the risk of chronic complications      Diabetes medications reviewed    Patient states the following:    [x]  Drinks water and milk  [x]  Eats 3 meals a day and some snacks of chips and candy  [x]  Is not currently active- patient states due to bad knees   [x]  Take Metformin, Lantus- states 50 units with breakfast and dinner along with humalog as needed  [x]  Tests blood glucose - CGM and finger sticks   []  Patient states that she had diabetes education in the past- 4/2019 classes        Post-education Assessment  [x]  Attentive to teaching  [x]  Answered questions appropriately when asked   [x]  Seems able to apply concepts to daily lifestyle  [x]  Seems motivated to do well  [x]  Verbalized an understanding of the plate method for portion control   [x]  Verbalized an understanding of prescribed antidiabetes medications   [x]  Verbalized an understanding of target glucose ranges/A1C level  [x]  Expresses an intent to comply with treatment plan   []  Showed very little interest in complying with treatment plan   []  Seems to have trouble applying concepts to daily lifestyle       COMMENTS:    Patient provided educational material, \"Diabetes Survival Packet\". Alternative beverages to help with glycemic control reviewed. Diabetes plate method for meal planning encouraged. Benefits of physical activity or increase activities of daily living on blood glucose control provided. Diabetes medications, mechanism of action, timing and side effects reviewed. Patient verbalizes understanding of teaching at this time. Contact information provided for future questions and concerns. Patient would likely benefit from [facility] classes after discharge. Recommendations:   [x] Carbohydrate-controlled diet    [] Script for glucometer and supplies (per preference of patient's insurance)  [] Script for insulin pens and pen needles (if insulin is ordered at discharge for home use)   [] Consult to social work: patient has no insurance or has financial hardship  [] Inpatient consult to endocrinologist   [] Follow up with endocrinologist as an outpatient   [] Home healthcare nursing to increase compliance to treatment plan   [x] Script for outpatient diabetes education classes (from doctor)        Thank you for this consult.

## 2022-06-01 NOTE — CARE COORDINATION
SW met with this pt to discuss discharge. Pt stating \"I don't know. Maybe I can stay another day. \" Pt discussed with treatment team and no longer meets criteria for admission. Pt is currently denying HI/ hallucinations/ delusions, but admits to chronic SI. Pt states her SI never subsides. Pt will return home where she resides alone. Pt will follow up with [facility], where she receives outpatient services. Pt will utilize her insurance for transportation. Pt appears flat and blunt. Pt appears to be malingering. Pt's eye-contact is good. SW will continue to assist as needed.     In order to ensure appropriate transition and discharge planning is in place, the following documents have been transmitted to [facility], as the new outpatient provider:     The d/c diagnosis was transmitted to the next care provider   The reason for hospitalization was transmitted to the next care provider   The d/c medications (dosage and indication) were transmitted to the next care provider    The continuing care plan was transmitted to the next care provider

## 2022-06-01 NOTE — PROGRESS NOTES
BEHAVIORAL HEALTH FOLLOW-UP NOTE     2022     Patient was seen and examined in person, Chart reviewed   Patient's case discussed with staff/team        Chief Complaint: Calm cooperative \"I am okay\"    Interim History: Patient seen in the dayroom this morning, she is calm, in no distress. Patient states that she is \"okay\". She has blunted affect which brightens on conversation. She is in control of behaviors. Compliant with medications and is attending groups, she is visible in the milieu. Patient has significant cluster B personality. Taking her medications, present in the milieu and attending groups. Appetite:   [x] Normal/Unchanged  [] Increased  [] Decreased      Sleep:      [] Normal/Unchanged  [x] Fair      [] Poor              Energy:    [x] Normal/Unchanged  [] Increased  [] Decreased        SI [] Present  [x] Absent    HI  []Present  [x] Absent     Aggression:  [] yes  [x] no    Patient is [x] able  [] unable to CONTRACT FOR SAFETY     PAST MEDICAL/PSYCHIATRIC HISTORY:   Past Medical History:   Diagnosis Date    Anxiety     Arthritis     Depression     [facility] mellitus ([facility]     Fibromyalgia     HTN (hypertension)     Hypertensive chronic kidney disease     Left shoulder pain     LIZABETH (obstructive sleep apnea)     Rheumatoid arthritis involving multiple sites ([facility]     Sjogren's disease ([facility]     Thyroid disease        FAMILY/SOCIAL HISTORY:  Family History   Adopted: Yes   Family history unknown: Yes     Social History     Socioeconomic History    Marital status: Single     Spouse name: Not on file    Number of children: Not on file    Years of education: Not on file    Highest education level:  Bachelor's degree (e.g., BA, AB, BS)   Occupational History    Occupation: unemployed    Tobacco Use    Smoking status: Former Smoker     Packs/day: 2.00     Years: 0.50     Pack years: 1.00     Start date:      Quit date:      Years since quittin.4    Smokeless tobacco: Never Used   Vaping Use    Vaping Use: Never used   Substance and Sexual Activity    Alcohol use: No    Drug use: No    Sexual activity: Never   Other Topics Concern    Not on file   Social History Narrative    2/3/2020-Financial Resource strain-states very hard-pt receives SNAP benefits for food, states that food doesn't run out as she also utilizes the [employer] the 3rd Saturday of every month    2/3/2020-Stress-states she is very much stressed and reports attending [place] [facility] for her depression and receives medications as well     Social Determinants of Health     Financial Resource Strain: High Risk    Difficulty of Paying Living Expenses: Very hard   Food Insecurity: No Food Insecurity    Worried About Running Out of Food in the Last Year: Never true    Kai of Food in the Last Year: Never true   Transportation Needs:     Lack of Transportation (Medical): Not on file    Lack of Transportation (Non-Medical): Not on file   Physical Activity:     Days of Exercise per Week: Not on file    Minutes of Exercise per Session: Not on file   Stress:     Feeling of Stress : Not on file   Social Connections:     Frequency of Communication with Friends and Family: Not on file    Frequency of Social Gatherings with Friends and Family: Not on file    Attends Jewish Services: Not on file    Active Member of [address] [place] [address] [employer] Organizations: Not on file    Attends Club or Organization Meetings: Not on file    Marital Status: Not on file   Intimate Partner Violence:     Fear of Current or Ex-Partner: Not on file    Emotionally Abused: Not on file    Physically Abused: Not on file    Sexually Abused: Not on file   Housing Stability:     Unable to Pay for Housing in the Last Year: Not on file    Number of [place] in the Last Year: Not on file    Unstable Housing in the Last Year: Not on file           ROS:  [x] All negative/unchanged except if checked.  Explain positive(checked items) below:  [] Constitutional  [] Eyes  [] Ear/Nose/Mouth/Throat  [] Respiratory  [] CV  [] GI  []   [] Musculoskeletal  [] Skin/Breast  [] Neurological  [x] Endocrine  [] Heme/Lymph  [] Allergic/Immunologic    Explanation:   DM       MEDICATIONS:    Current Facility-Administered Medications:     insulin glargine-yfgn (SEMGLEE-YFGN) injection vial 12 Units, 12 Units, SubCUTAneous, Nightly, Raymond Herrera MD    benztropine (COGENTIN) tablet 0.5 mg, 0.5 mg, Oral, BID, ANA Palacios - CNP, 0.5 mg at 06/01/22 0939    lisinopril (PRINIVIL;ZESTRIL) tablet 30 mg, 30 mg, Oral, Daily, Saman Amaral MD, 30 mg at 06/01/22 0939    amLODIPine (NORVASC) tablet 5 mg, 5 mg, Oral, Nightly, Saman Amaral MD, 5 mg at 05/31/22 2029    ARIPiprazole (ABILIFY) tablet 5 mg, 5 mg, Oral, Daily, ANA Palacios - CNP, 5 mg at 06/01/22 9755    pregabalin (LYRICA) capsule 50 mg, 50 mg, Oral, Once, Navi Mccarthy DO    metoclopramide (REGLAN) tablet 5 mg, 5 mg, Oral, 4x Daily AC & HS, Fede Moreno MD, 5 mg at 06/01/22 1148    DULoxetine (CYMBALTA) extended release capsule 30 mg, 30 mg, Oral, BID, ANA Palacios - CNP, 30 mg at 06/01/22 0939    buPROPion (WELLBUTRIN) tablet 100 mg, 100 mg, Oral, BID, ANA Palacios - CNP, 100 mg at 06/01/22 5538    acetaminophen (TYLENOL) tablet 650 mg, 650 mg, Oral, Q4H PRN, Vita Mujica MD    magnesium hydroxide (MILK OF MAGNESIA) 400 MG/5ML suspension 30 mL, 30 mL, Oral, Daily PRN, Vita Mujica MD    nicotine (NICODERM CQ) 21 MG/24HR 1 patch, 1 patch, TransDERmal, Daily, Vita Mujica MD    aluminum & magnesium hydroxide-simethicone (MAALOX) 200-200-20 MG/5ML suspension 30 mL, 30 mL, Oral, PRN, Vita Mujica MD    hydrOXYzine (VISTARIL) capsule 50 mg, 50 mg, Oral, TID PRN, Vita Mujica MD    melatonin tablet 3 mg, 3 mg, Oral, Nightly, Nata Michaels MD, 3 mg at 05/31/22 2029    famotidine (PEPCID) tablet 20 mg, 20 mg, Oral, BID, Rose Correia DO, 20 mg at 65/48/74 0601    folic acid (FOLVITE) tablet 1 mg, 1 mg, Oral, Daily, Rose A Rech, DO, 1 mg at 06/01/22 5415    levothyroxine (SYNTHROID) tablet 88 mcg, 88 mcg, Oral, Daily, [place] See, DO, 88 mcg at 06/01/22 8358    metFORMIN (GLUCOPHAGE-XR) extended release tablet 1,000 mg, 1,000 mg, Oral, BID, Rose A Rech, DO, 1,000 mg at 06/01/22 7053    ondansetron (ZOFRAN-ODT) disintegrating tablet 4 mg, 4 mg, Oral, Q8H PRN, Cassy Govea Rech, DO, 4 mg at 05/23/22 2205    pregabalin (LYRICA) capsule 100 mg, 100 mg, Oral, TID, Rose A Rech, DO, 100 mg at 06/01/22 1420    glucose chewable tablet 16 g, 4 tablet, Oral, PRN, Rose A Rech, DO    dextrose 50 % IV solution, 12.5 g, IntraVENous, PRN **OR** [DISCONTINUED] dextrose bolus 10% 250 mL, 250 mL, IntraVENous, PRN, Elihue Solid A Rech, DO    glucagon (rDNA) injection 1 mg, 1 mg, IntraMUSCular, PRN, Rose A Rech, DO    dextrose 5 % solution, 100 mL/hr, IntraVENous, PRN, Elihue Solid A Rech, DO      Examination:  /77   Pulse (!) 118   Temp 97.5 °F (36.4 °C) (Temporal)   Resp 18   Ht 5' 1\" (1.549 m)   LMP  (LMP Unknown)   SpO2 98%   BMI 27.78 kg/m²   Gait - steady  Medication side effects(SE): denies    Mental Status Examination:    Level of consciousness:  within normal limits   Appearance:  good grooming and good hygiene  Behavior/Motor:  no abnormalities noted  Attitude toward examiner:  cooperative  Speech:  normal volume, well articulated and slow   Mood: depressed  Affect:  blunted  Thought processes:  linear   Thought content:  The patient is devoid of suicidal or homicidal ideation intent or plan.  Devoid of auditory or visual hallucinations or other perceptual disturbances, there are no overt or covert signs of psychosis or paranoia.  There are no neurovegetative signs of depression.   Cognition:  oriented to person, place, and time   Concentration intact  Insight Improving  Judgement fair     ASSESSMENT:   Patient symptoms are:  [x] Well controlled  [x] Improving  [] Worsening  [] No change      Diagnosis:   Principal Problem:    Severe episode of recurrent major depressive disorder, without psychotic features (HCC)  Active Problems:    Hypokalemia due to loss of potassium    Intractable vomiting    Cluster B personality traits(HCC)    Type 2 diabetes mellitus with complication, with long-term current use of insulin (HCC)    Acquired hypothyroidism    Encounter for long-term (current) use of insulin (HCC)    Rheumatoid arthritis involving multiple sites [facility])    Primary hypertension  Resolved Problems:    * No resolved hospital problems. *      LABS:    No results for input(s): WBC, HGB, PLT in the last 72 hours. No results for input(s): NA, K, CL, CO2, BUN, CREATININE, GLUCOSE in the last 72 hours. No results for input(s): BILITOT, ALKPHOS, AST, ALT in the last 72 hours. Lab Results   Component Value Date    LABAMPH NOT DETECTED 05/22/2022    BARBSCNU NOT DETECTED 05/22/2022    LABBENZ NOT DETECTED 05/22/2022    LABMETH NOT DETECTED 05/22/2022    OPIATESCREENURINE NOT DETECTED 05/22/2022    PHENCYCLIDINESCREENURINE NOT DETECTED 05/22/2022    ETOH <10 05/22/2022     Lab Results   Component Value Date    TSH 5.220 05/16/2022     No results found for: LITHIUM  No results found for: VALPROATE, CBMZ    Treatment Plan:  The patient's diagnosis, treatment plan, medication management were formulated after patient was seen directly by the attending physician and myself and all relevant documentation was reviewed.     Risk, benefit, side effects, possible outcomes of the medication and alternatives discussed with the patient and the patient demonstrated understanding.   The patient was also educated that the outcome of treatment will depend on the medication compliance as directed by the prescribers along with regular follow-up, compliance with the labs and other work-up, as clinically indicated.     Cymbalta 30 mg twice daily for chronic pain and depression  Continue patient's Wellbutrin 100 mg twice daily for depression and anxiety  Abilify 5 mg daily for augmentation of treatment for depression     Collateral information: Followed by social work  CD evaluation  Encourage patient to attend group and other milieu activities.   Discharge planning discussed with the patient and treatment team.    PSYCHOTHERAPY/COUNSELING:  [x] Therapeutic interview  [x] Supportive  [] CBT  [] Ongoing  [] Other    [x] Patient continues to need, on a daily basis, active treatment furnished directly by or requiring the supervision of inpatient psychiatric personnel      Anticipated Length of stay: 5 to 7 days based on stability      Electronically signed by ANA Downs CNP on 6/1/2022 at 2:28 PM

## 2022-06-01 NOTE — CARE COORDINATION
JILLIAN called pt's insurance, [employer] ([phone]), and scheduled transportation. JILLIAN spoke with representative Daniel Villanueva. Transportation will arrive anytime between now and 4:52 PM.  instructed to call the nurse's station upon arrival. SW provided nurse's station phone number. Trip ID: [id number].

## 2022-06-01 NOTE — PROGRESS NOTES
Patient A&Ox4. Pt endorses SI and HI towards mother. Pt states no plan or intention and contracts for safety. Pt denies hallucinations. Pt rates anxiety 3/10 and depression 9/10. Patient is pleasant and cooperative with a bright affect, which incongruent with statements of high depression. Pt is visible on the unit, attends group and is social with [facility]. Pt is med. & meal compliant. Pt with even and unlabored breathing, no signs of acute physical distress noted. Will continue to monitor and offer support as needed.

## 2022-06-01 NOTE — PROGRESS NOTES
[address] ([phone]) to inquire on ETA for patient's transportation to her home as pickup time has passed. New ride arranged. TRIP ID [id number]. The representative stated to call back in 10-15 minutes for ETA.

## 2022-06-01 NOTE — PROGRESS NOTES
TRIG 270 (H) 05/23/2022    TRIG 271 (H) 06/18/2021    TRIG 227 (H) 06/07/2021    TRIG 551 (H) 03/24/2021    TRIG 466 (H) 03/30/2020    TRIG 248 (H) 04/18/2019    TRIG 418 (H) 04/06/2018    TRIG 419 (H) 11/08/2017    TRIG 290 (H) 10/26/2016    TRIG 261 (H) 05/04/2016    TRIG 304 (H) 03/09/2015    TRIG 270 (H) 02/20/2015    TRIG 274 (H) 10/21/2014    TRIG 349 (H) 05/02/2014     Lab Results   Component Value Date    HDL 44 05/23/2022    HDL 31 06/18/2021    HDL 37 06/07/2021    HDL 26 03/24/2021    HDL 31 03/30/2020    HDL 34 04/18/2019    HDL 28 04/06/2018    HDL 26 11/08/2017    HDL 31 10/26/2016    HDL 35 05/04/2016    HDL 27 03/09/2015    HDL 26 02/20/2015    HDL 33 10/21/2014    HDL 28 05/02/2014     No components found for: LDLCAL  Lab Results   Component Value Date    LABVLDL 54 05/23/2022    LABVLDL 54 06/18/2021    LABVLDL 45 06/07/2021    LABVLDL - (AA) 03/24/2021    LABVLDL - (AA) 03/30/2020    LABVLDL 50 04/18/2019    LABVLDL - (AA) 04/06/2018    LABVLDL - (AA) 11/08/2017    LABVLDL 58 10/26/2016    LABVLDL 52 05/04/2016    LABVLDL 61 03/09/2015    LABVLDL 54 02/20/2015    LABVLDL 55 10/21/2014    LABVLDL 70 05/02/2014       Shilpa Carrasco RN

## 2022-06-01 NOTE — PROGRESS NOTES
[facility] [phone]. Spoke with representative [employer]. She stated previous ride was canceled @1350. New transportation arranged. P/u scheduled for 1830.  Specified main entrance and provided unit phone number to call on arrival. TRIP ID: [id number]

## 2022-06-02 NOTE — PROGRESS NOTES
Spoke to Lien Gray, from [facility] regarding transportation. Stated that transportation will arrive @ 2037. TRIP ID [id number].

## 2022-06-13 DIAGNOSIS — E11.65 UNCONTROLLED TYPE 2 DIABETES MELLITUS WITH HYPERGLYCEMIA (HCC): ICD-10-CM

## 2022-06-13 RX ORDER — INSULIN GLARGINE 100 [IU]/ML
INJECTION, SOLUTION SUBCUTANEOUS
Qty: 30 ML | Refills: 1 | OUTPATIENT
Start: 2022-06-13

## 2022-06-13 NOTE — PROGRESS NOTES
Date:  6/13/2022          Dear Dr. Gabino Mark: This is to inform you that, as per [facility] Physical Therapy department policy, your patient Anabelle Lacy is as of todays date being discharged from Physical Therapy secondary to the following reasons:    1. If a Physical Therapy outpatient misses three consecutive scheduled appointments   without any prior notification, he or she will be discharged from physical therapy services. A new prescription will be necessary to resume physical therapy. 2. If a client is absent for 50% of scheduled visits during a one-month period, he or she will be discharged from physical therapy services. A new prescription will be necessary to resume physical therapy. Last date of service:  5/10/22    If you have any questions, please feel free to call at [phone]      Thank you          Tsering Paez, PT    [phone]    The information contained in this Fax the designated recipients intend message only for the personal and confidential use named above. This information is to be handled as privileged and confidential.  If the reader of this message is not the intended recipient, you are hereby notified that you have received this document in error and that any review, dissemination, distribution or copying of this message is strictly prohibited. If you receive this communication in error, please notify us immediately at the above number and return the original to us by mail.   Thank you      [address] [place], [place] [address] [phone]

## 2022-06-14 DIAGNOSIS — E11.65 UNCONTROLLED TYPE 2 DIABETES MELLITUS WITH HYPERGLYCEMIA (HCC): ICD-10-CM

## 2022-06-14 RX ORDER — INSULIN GLARGINE 100 [IU]/ML
INJECTION, SOLUTION SUBCUTANEOUS
Qty: 30 ML | Refills: 1 | OUTPATIENT
Start: 2022-06-14

## 2022-06-16 ENCOUNTER — TELEPHONE (OUTPATIENT)
Dept: ENDOCRINOLOGY | Age: 52
End: 2022-06-16

## 2022-06-16 DIAGNOSIS — E11.65 UNCONTROLLED TYPE 2 DIABETES MELLITUS WITH HYPERGLYCEMIA (HCC): ICD-10-CM

## 2022-06-16 DIAGNOSIS — E11.65 TYPE 2 DIABETES MELLITUS WITH HYPERGLYCEMIA, WITH LONG-TERM CURRENT USE OF INSULIN (HCC): Primary | ICD-10-CM

## 2022-06-16 DIAGNOSIS — E03.9 HYPOTHYROIDISM, UNSPECIFIED TYPE: ICD-10-CM

## 2022-06-16 DIAGNOSIS — Z79.4 TYPE 2 DIABETES MELLITUS WITH HYPERGLYCEMIA, WITH LONG-TERM CURRENT USE OF INSULIN (HCC): Primary | ICD-10-CM

## 2022-06-16 RX ORDER — FAMOTIDINE 20 MG/1
TABLET, FILM COATED ORAL
Qty: 60 TABLET | Refills: 1 | Status: SHIPPED
Start: 2022-06-16 | End: 2022-06-17 | Stop reason: SDUPTHER

## 2022-06-16 RX ORDER — INSULIN GLARGINE 100 [IU]/ML
INJECTION, SOLUTION SUBCUTANEOUS
Qty: 30 ML | Refills: 1 | OUTPATIENT
Start: 2022-06-16

## 2022-06-16 RX ORDER — BLOOD SUGAR DIAGNOSTIC
STRIP MISCELLANEOUS
Qty: 100 EACH | Refills: 2 | Status: SHIPPED | OUTPATIENT
Start: 2022-06-16

## 2022-06-16 RX ORDER — METFORMIN HYDROCHLORIDE 500 MG/1
1000 TABLET, EXTENDED RELEASE ORAL 2 TIMES DAILY
Qty: 120 TABLET | Refills: 1 | Status: SHIPPED
Start: 2022-06-16 | End: 2022-06-17 | Stop reason: SDUPTHER

## 2022-06-16 RX ORDER — LEVOTHYROXINE SODIUM 88 UG/1
88 TABLET ORAL DAILY
Qty: 30 TABLET | Refills: 2 | Status: SHIPPED
Start: 2022-06-16 | End: 2022-06-17 | Stop reason: SDUPTHER

## 2022-06-16 NOTE — TELEPHONE ENCOUNTER
Last Appointment:  5/2/2022  Future Appointments   Date Time Provider Jenae Nazarioi   6/17/2022  2:00 PM MD Carolyn Avila [facility]   7/7/2022  3:00 PM ANA Nam - NP [facility]

## 2022-06-16 NOTE — TELEPHONE ENCOUNTER
Pt was in hospital last month and states that her insulin was changed. I do not have access to the notes. Please advise what her DM regimen should be.  Thanks

## 2022-06-17 ENCOUNTER — OFFICE VISIT (OUTPATIENT)
Dept: FAMILY MEDICINE CLINIC | Age: 52
End: 2022-06-17
Payer: MEDICAID

## 2022-06-17 VITALS
BODY MASS INDEX: 27.75 KG/M2 | HEART RATE: 86 BPM | DIASTOLIC BLOOD PRESSURE: 76 MMHG | TEMPERATURE: 97.5 F | HEIGHT: 61 IN | RESPIRATION RATE: 18 BRPM | SYSTOLIC BLOOD PRESSURE: 136 MMHG | OXYGEN SATURATION: 99 % | WEIGHT: 147 LBS

## 2022-06-17 DIAGNOSIS — K31.84 GASTROPARESIS: Primary | ICD-10-CM

## 2022-06-17 DIAGNOSIS — Z79.4 INSULIN DEPENDENT TYPE 2 DIABETES MELLITUS (HCC): ICD-10-CM

## 2022-06-17 DIAGNOSIS — Z76.0 MEDICATION REFILL: ICD-10-CM

## 2022-06-17 DIAGNOSIS — F33.9 RECURRENT MAJOR DEPRESSIVE DISORDER, REMISSION STATUS UNSPECIFIED (HCC): ICD-10-CM

## 2022-06-17 DIAGNOSIS — E11.9 INSULIN DEPENDENT TYPE 2 DIABETES MELLITUS (HCC): ICD-10-CM

## 2022-06-17 PROCEDURE — 99212 OFFICE O/P EST SF 10 MIN: CPT | Performed by: STUDENT IN AN ORGANIZED HEALTH CARE EDUCATION/TRAINING PROGRAM

## 2022-06-17 PROCEDURE — 99213 OFFICE O/P EST LOW 20 MIN: CPT | Performed by: STUDENT IN AN ORGANIZED HEALTH CARE EDUCATION/TRAINING PROGRAM

## 2022-06-17 PROCEDURE — 3046F HEMOGLOBIN A1C LEVEL >9.0%: CPT | Performed by: STUDENT IN AN ORGANIZED HEALTH CARE EDUCATION/TRAINING PROGRAM

## 2022-06-17 RX ORDER — LISINOPRIL 30 MG/1
30 TABLET ORAL DAILY
Qty: 30 TABLET | Refills: 0 | Status: SHIPPED | OUTPATIENT
Start: 2022-06-17 | End: 2022-07-17

## 2022-06-17 RX ORDER — METFORMIN HYDROCHLORIDE 500 MG/1
1000 TABLET, EXTENDED RELEASE ORAL 2 TIMES DAILY
Qty: 120 TABLET | Refills: 1 | Status: SHIPPED | OUTPATIENT
Start: 2022-06-17 | End: 2022-07-17

## 2022-06-17 RX ORDER — LEVOTHYROXINE SODIUM 88 UG/1
88 TABLET ORAL DAILY
Qty: 30 TABLET | Refills: 2 | Status: SHIPPED | OUTPATIENT
Start: 2022-06-17 | End: 2022-07-17

## 2022-06-17 RX ORDER — BUPROPION HYDROCHLORIDE 100 MG/1
100 TABLET ORAL 2 TIMES DAILY
Qty: 60 TABLET | Refills: 0 | Status: SHIPPED
Start: 2022-06-17 | End: 2022-07-12

## 2022-06-17 RX ORDER — METOCLOPRAMIDE 5 MG/1
5 TABLET ORAL
Qty: 120 TABLET | Refills: 0 | Status: SHIPPED | OUTPATIENT
Start: 2022-06-17 | End: 2022-07-17

## 2022-06-17 RX ORDER — PREGABALIN 100 MG/1
100 CAPSULE ORAL 3 TIMES DAILY
Qty: 60 CAPSULE | Refills: 0 | Status: SHIPPED | OUTPATIENT
Start: 2022-06-17 | End: 2022-09-15

## 2022-06-17 RX ORDER — LANOLIN ALCOHOL/MO/W.PET/CERES
CREAM (GRAM) TOPICAL
Qty: 30 TABLET | Refills: 0 | Status: SHIPPED | OUTPATIENT
Start: 2022-06-17

## 2022-06-17 RX ORDER — FAMOTIDINE 20 MG/1
TABLET, FILM COATED ORAL
Qty: 60 TABLET | Refills: 1 | Status: SHIPPED | OUTPATIENT
Start: 2022-06-17

## 2022-06-17 NOTE — TELEPHONE ENCOUNTER
She says they changed her insulin up in the hospital but they didn't send her home with any papers. ... and she doesn't know how many units to inject of lantus

## 2022-06-17 NOTE — PROGRESS NOTES
S: 47 y.o. female presents today for: f/u hospital     Discharged from [facility] June 1 for abdominal pain, GI sx. Gastric emptying study showed  Gastroparesis. She did complete an inpatient psychiatric stay. O: VS: /76 (Site: Right Upper Arm, Position: Sitting, Cuff Size: Medium Adult)   Pulse 86   Temp 97.5 °F (36.4 °C) (Temporal)   Resp 18   Ht 5' 1\" (1.549 m)   Wt 147 lb (66.7 kg)   LMP  (LMP Unknown)   SpO2 99%   BMI 27.78 kg/m²      AAO/NAD, depressed. No active SI plan. Apathetic. CV:  RRR, no murmur  Resp: CTAB    Impression/Plan:   1) Gastroparesis - explained in detail the treatment w/ reglan. 2) Diabetes - restart diabetes meds as described below. Stopped ozempic. 3) Mood Disorder - declining cogentin, cymbalta, abilify. She agrees to wellbutrin. She reports passive suicidality. She reports not acting on suicidal thoughts in the past.         Attending Physician Statement  I have discussed the case, including pertinent history and exam findings with the resident. I also have seen the patient and performed key portions of the examination. I agree with the documented assessment and plan.       Douglass Cooks, MD

## 2022-06-17 NOTE — PROGRESS NOTES
CC: Hospital follow-up for gastroparesis /inpatient psych admission  ------------------------------------------------------------------------------------------------------------------------  Assessment/Plan  1. Gastroparesis  Reglan  Explained gastroparesis to patient  Follow-up with endocrinology first week of July, consider agree referral to Dr. Elizabeth Alvarado    2. Insulin dependent type 2 diabetes mellitus ([facility]  Went over insulin dosing with patient  Patient is refusing to stop Humalog at this time, although it was D/C in the hospital  Patient will discuss insulin dosing and diabetic meds in July, when she sees endocrinology  Agrees to discontinue Ozempic given the GI side effect and gastroparesis    3. Recurrent major depressive disorder, remission status unspecified ([facility]  Passive suicidal ideation today, although at baseline  Seen counseling soon  Declining Abilify, Cogentin  Refilled albuterol    4. Medication refill  Explained all medications on discharge summary to patient  Patient verbalizes an understanding  Patient took a printout sheet of medication she is to be taking, and is no longer to be taking home will call us if she chooses to get us to fill any more meds      RTO 1 month to establish with new PCP  -------------------------------------------------------------------------------------------------------------------------    HPI:  47 y.o. woman presents for hospital follow-up was in patient 5/23-6/1, intractable n/v was worked up for gastroparesis, with gastric emptying study. During hospital course she was shifted to the psychiatric inpatient unit for suicidal ideation and worsening mood. Patient was discharged on 6/1 she states that her medication list was not discussed with her and she has been not taking any medication since discharge    Gastroparesis-Reglan daily. Is to follow-up with endocrinology first week of July. Is denying any GI symptoms today.   Ozempic methotrexate and there is rheumatoid arthritis meds were discontinued. Insulin-dependent type 2 diabetes- glargine 10 units and metformin. Patient states she is not taking either of these and has only been doing her Premeal insulin which was D/C during the hospital course. Not taking Ozempic. Has an understanding of what medications to use after going through med rec with me today. Patient is seeing endocrinologist first week of July. Not been checking blood glucose. Denying symptoms of hypo or hyperglycemia today    Depression/suicidal ideation.-  Patient's counselor has no left disposition that she was following for a long time. Not feeling good about this. Is going back to counseling service within the next 2 to 3 weeks to reestablish care there. Not taking any psychiatric meds. Willing to restart Wellbutrin, but not Wellbutrin or Cogentin or Abilify that were all prescribed to her on psychiatric inpatient unit. Is having suicidal passive ideation today. No identifiable protective factors but patient's mood is at her baseline, states she has been at this wound since the age of 6.   No substance use no HI no auditory hallucination          Patient Active Problem List    Diagnosis Date Noted    Severe episode of recurrent major depressive disorder, without psychotic features ([facility] 05/24/2022    Cluster B personality traits(HCC) 05/24/2022    Depression with suicidal ideation 05/23/2022    Elevated troponin     Intractable vomiting     Hypokalemia due to loss of potassium 05/16/2022    Dietary noncompliance 01/08/2022    Chronic fatigue syndrome 12/15/2021    Diabetic renal disease ([address] 12/15/2021    Primary hypertension 12/15/2021    Hyperglycemia due to type 2 diabetes mellitus ([address] 12/15/2021    Major depression, single episode 12/15/2021    Neuropathy 84/49/4128    Metabolic acidosis 43/73/7843    Rheumatoid arthritis involving multiple sites ([facility] 09/21/2021    COVID-19 06/17/2021    Hypoglycemia 06/17/2021    Anemia 06/16/2021    Proteinuria 06/16/2021    Hypokalemia 03/10/2021    Encounter for long-term (current) use of insulin ([facility] 03/08/2021    Anxiety disorder 03/08/2021    Benign hypertensive kidney disease with chronic kidney disease 03/08/2021    Chronic kidney disease, stage I 03/08/2021    Fibromyalgia 03/08/2021    Other hyperlipidemia 03/08/2021    Nephrotic syndrome due to type 2 diabetes mellitus ([facility] 03/08/2021    Hypothyroidism 03/08/2021    Type 2 diabetes mellitus with chronic kidney disease ([facility] 03/08/2021    Mild depression ([facility] 10/02/2020    Severe obstructive sleep apnea 07/16/2020    Infective urethritis 06/28/2018    Acquired hypothyroidism 12/20/2016    Vitamin D deficiency 12/20/2016    Diabetic ketosis ([facility] 09/01/2015    Type 2 diabetes mellitus with complication, with long-term current use of insulin ([facility] 05/30/2014    Sjogren's syndrome ([facility] 05/30/2014    Hyperglycemia 05/30/2014    Depressive disorder 05/30/2014       Past Medical History:   Diagnosis Date    Anxiety     Arthritis     Depression     Diabetes mellitus (HCC)     Fibromyalgia     HTN (hypertension)     Hypertensive chronic kidney disease     Left shoulder pain     LIZABETH (obstructive sleep apnea)     Rheumatoid arthritis involving multiple sites ([facility]     Sjogren's disease ([facility]     Thyroid disease        Current Outpatient Medications on File Prior to Visit   Medication Sig Dispense Refill    buPROPion (WELLBUTRIN) 100 mg tablet Take 1 tablet by mouth 2 times daily 60 tablet 0    DULoxetine (CYMBALTA) 30 MG extended release capsule Take 1 capsule by mouth 2 times daily 60 capsule 0    pregabalin (LYRICA) 100 MG capsule 100 mg 3 times daily.        naproxen (NAPROSYN) 250 MG tablet TAKE 1 TABLET BY MOUTH TWICE A DAY WITH MEALS 60 tablet 5    ONETOUCH ULTRA strip USE TO TEST THREE TIMES A  each 2    famotidine (PEPCID) 20 MG tablet TAKE 1 TABLET BY MOUTH TWICE DAILY 60 tablet 1    levothyroxine (SYNTHROID) 88 MCG tablet TAKE 1 TABLET BY MOUTH DAILY 30 tablet 2    metFORMIN (GLUCOPHAGE-XR) 500 MG extended release tablet TAKE 2 TABLETS BY MOUTH 2 TIMES DAILY 120 tablet 1    Nutritional Supplements (GLUCERNA SHAKE) LIQD Use daily 237 mL 5    lisinopril (PRINIVIL;ZESTRIL) 30 MG tablet Take 1 tablet by mouth daily 30 tablet 0    benztropine (COGENTIN) 0.5 MG tablet Take 1 tablet by mouth 2 times daily 60 tablet 0    ARIPiprazole (ABILIFY) 5 MG tablet Take 1 tablet by mouth daily 30 tablet 0    amLODIPine (NORVASC) 5 MG tablet Take 1 tablet by mouth nightly 30 tablet 0    metoclopramide (REGLAN) 5 MG tablet Take 1 tablet by mouth 4 times daily (before meals and nightly) 120 tablet 0    insulin glargine-yfgn (SEMGLEE-YFGN) 100 UNIT/ML injection vial Inject 10 Units into the skin nightly (Patient not taking: Reported on 6/17/2022) 1 each 0    lidocaine (LIDODERM) 5 % Place 1 patch onto the skin daily 12 hours on, 12 hours off.  30 patch 2    glucose 4g chewable tablet Take 4 tablets by mouth as needed for Low blood sugar (Patient not taking: Reported on 6/17/2022) 60 tablet 3    Insulin Infusion Pump (MINIMED 770G INSULIN PUMP SYS) KIT Use to infuse insulin 1 kit 0    Continuous Blood Gluc Transmit (GUARDIAN LINK 3 TRANSMITTER) MISC Use with insulin pump 1 each 0    Continuous Blood Gluc Sensor (GUARDIAN SENSOR 3) MISC Change every 7 days 4 each 5    Continuous Blood Gluc Sensor (FREESTYLE NIMISHA 2 SENSOR) MISC USE TO TEST SUGAR CONTINUOUSLY AS DIRECTED 2 each 3    diclofenac sodium (VOLTAREN) 1 % GEL Apply 4 g topically 4 times daily for 7 days 112 g 0    calcium elemental (OSCAL) 500 MG TABS tablet TAKE 1 TABLET BY MOUTH TWICE A DAY WITH LUNCH AND DINNER (Patient not taking: Reported on 5/16/2022) 60 tablet 5    Insulin Pen Needle 31G X 5 MM MISC 1 each by Does not apply route 3 times daily 100 each 3    melatonin 3 MG TABS tablet TAKE ONE TABLET BY MOUTH EVERY NIGHT AT BEDTIME 30 tablet 0    [DISCONTINUED] melatonin 3 MG TABS tablet Take 1 tablet by mouth nightly as needed (insomnia) 30 tablet 0    hydrocortisone 2.5 % cream Apply topically 2 times daily. 45 g 0    Blood Pressure KIT 1 box by Does not apply route 2 times daily 1 kit 0    folic acid (FOLVITE) 1 MG tablet Take 1 tablet by mouth daily 30 tablet 1    FREESTYLE LANCETS MISC CHECK BS 4 TIMES DAILY 100 each 3     No current facility-administered medications on file prior to visit. Allergies   Allergen Reactions    Amoxicillin Other (See Comments)     Hives all over body, states that hives are severe     Sulfa Antibiotics     Cefdinir     Doxycycline Other (See Comments)     Pt states no allergy to this med    Other Other (See Comments)       Family History   Adopted: Yes   Family history unknown: Yes       Past Surgical History:   Procedure Laterality Date    CT BIOPSY RENAL  2021    CT BIOPSY RENAL 2021 Mulugeta Rolon MD [facility]    SHOULDER ARTHROSCOPY Right 2017    UPPER GASTROINTESTINAL ENDOSCOPY N/A 2022    EGD BIOPSY performed by Lyndsay Mixon MD at [address] [place] [address] History     Tobacco Use    Smoking status: Former Smoker     Packs/day: 2.00     Years: 0.50     Pack years: 1.00     Start date:      Quit date:      Years since quittin.4    Smokeless tobacco: Never Used   Vaping Use    Vaping Use: Never used   Substance Use Topics    Alcohol use: No    Drug use: No       ROS:    Review of Systems   Constitutional: Fatigue negative for fevers  HENT: Negative for congestion and sore throat. Respiratory: Negative for choking and chest tightness. Cardiovascular: Negative for chest pain, palpitations and leg swelling. Gastrointestinal: Negative for abdominal distention and abdominal pain. Genitourinary: Negative for difficulty urinating and dysuria. Musculoskeletal: Negative for arthralgias and back pain.    Skin: Negative for color change and pallor. Neurological: Negative for facial asymmetry and headaches. Psychiatric/Behavioral: Sadness no auditory visual loose [place] past suicidal ideation      Objective:    VS:  Blood pressure 136/76, pulse 86, temperature 97.5 °F (36.4 °C), temperature source Temporal, resp. rate 18, height 5' 1\" (1.549 m), weight 147 lb (66.7 kg), SpO2 99 %, not currently breastfeeding. Physical Exam   Constitutional: Oriented to person, place, and time. Pale appearing  HENT:   Head: Normocephalic and atraumatic. Eyes: EOM are normal.   Neck: Neck supple. Cardiovascular: Normal rate, regular rhythm and intact distal pulses. Exam reveals no gallop and no friction rub. No murmur heard. Pulmonary/Chest: Effort normal and breath sounds normal. No wheezes. No rhales. Abdominal: Soft. Bowel sounds are normal. No distension. No abdominal tenderness. Musculoskeletal: Normal range of motion. General: No edema. Neurological: Alert and oriented to person, place, and time. Skin: Excoriations on lower extremities, signs of urticaria dried healing on LE. Psychiatric: Normal mood and affect. Behavior is normal.   Nursing note and vitals reviewed. Plans:  As above. Please see Patient Instructions for further counseling and information given. Advised to please be adherent to the treatment plans discussed today, and please call with any questions or concerns, letting the office know of any reasons that the plans may not be followed. The risks of untreated conditions include worsening illness, injury, disability, and possibly, death. Please call if symptoms change in any way, worsen, or fail to completely resolve, as this could necessitate a change to treatment plans. Patient and/or caregiver expressed understanding. Indications and proper use of medication(s) reviewed. Potential side-effects and risks of medication(s) also explained.   Patient and/or caregiver was instructed to call if any new symptoms develop prior to next visit. Health risk factors discussed and addressed.

## 2022-06-26 NOTE — PROGRESS NOTES
BEHAVIORAL HEALTH FOLLOW-UP NOTE     5/26/2022     Patient was seen and examined in person, Chart reviewed   Patient's case discussed with staff/team  I personally seen and assessed the patient this morning agree with the below assessment recommendations and evaluation by the medical student. Mental status examination reveals female average hygiene average grooming superficially cooperative and forthcoming for assessment. Psychomotor reveals no agitation retardation involuntary movement or abnormal posture. Speech is clear she is able to answer questions with relevance and there is no latency of response. Eye contact is average during assessment. Mood is not stated. Affect is relaxed calm cooperative. Thought process linear goal-directed no looseness of association no flight of ideas. Thought content devoid of auditory or visual hallucinations there is no overt or covert sign of psychosis or paranoia. Denies any neurovegetative signs of depression. Memory intact during conversation cognitive function baseline. Insight judgment and pulse control improving. Alert and oriented to person place time situation. Chief Complaint: \"I'm exhausted\"    Interim History: Patient assessed in the milieu today. She states that she feels \"exhausted\" and her mood has not worsened or improved. She has been sleeping \"too much\" but does feel well-rested when she awakens. She states she \"always\" has had SI since she was a child and no medication have ever helped her. She said that she has not been receiving any medication because she has been [place] sick\" but starting getting them today. Patient denies any intent or plan to harm herself. She also endorses thoughts of wanting to harm her mother, but no others, and does not have any intent or plan to harm her mother. Denies feeling anxious or aggressive. She reports she has been going to group therapy sessions as well as socializing with other patients.  Patient denies having any goals for the day, for treatment, or her future. Appetite:   [x] Normal/Unchanged  [] Increased  [] Decreased      Sleep:      [] Normal/Unchanged  [x] Fair      [] Poor              Energy:    [] Normal/Unchanged  [] Increased  [x] Decreased        SI [x] Present  [] Absent    HI  []Present  [x] Absent     Aggression:  [] yes  [x] no    Patient is [x] able  [] unable to CONTRACT FOR SAFETY     PAST MEDICAL/PSYCHIATRIC HISTORY:   Past Medical History:   Diagnosis Date    Anxiety     Arthritis     Depression     Diabetes [facility]     Fibromyalgia     HTN (hypertension)     Hypertensive chronic kidney disease     Left shoulder pain     LIZABETH (obstructive sleep apnea)     Rheumatoid arthritis involving multiple sites ([facility]     Sjogren's disease ([facility]     Thyroid disease        FAMILY/SOCIAL HISTORY:  Family History   Adopted: Yes   Family history unknown: Yes     Social History     Socioeconomic History    Marital status: Single     Spouse name: Not on file    Number of children: Not on file    Years of education: Not on file    Highest education level:  Bachelor's degree (e.g., BA, AB, BS)   Occupational History    Occupation: unemployed    Tobacco Use    Smoking status: Former Smoker     Packs/day: 2.00     Years: 0.50     Pack years: 1.00     Start date:      Quit date:      Years since quittin.4    Smokeless tobacco: Never Used   Vaping Use    Vaping Use: Never used   Substance and Sexual Activity    Alcohol use: No    Drug use: No    Sexual activity: Never   Other Topics Concern    Not on file   Social History Narrative    2/3/2020-Financial Resource strain-states very hard-pt receives [employer] benefits for food, states that food doesn't run out as she also utilizes the [employer] the  of every month    2/3/2020-Stress-states she is very much stressed and reports attending [place] [facility] for her depression and receives medications as well     Social Determinants of Health     Financial Resource Strain: High Risk    Difficulty of Paying Living Expenses: Very hard   Food Insecurity: No Food Insecurity    Worried About Running Out of Food in the Last Year: Never true    Kai of Food in the Last Year: Never true   Transportation Needs:     Lack of Transportation (Medical): Not on file    Lack of Transportation (Non-Medical): Not on file   Physical Activity:     Days of Exercise per Week: Not on file    Minutes of Exercise per Session: Not on file   Stress:     Feeling of Stress : Not on file   Social Connections:     Frequency of Communication with Friends and Family: Not on file    Frequency of Social Gatherings with Friends and Family: Not on file    Attends Baptism Services: Not on file    Active Member of [address] [place] [address] [employer] or Organizations: Not on file    Attends Club or Organization Meetings: Not on file    Marital Status: Not on file   Intimate Partner Violence:     Fear of Current or Ex-Partner: Not on file    Emotionally Abused: Not on file    Physically Abused: Not on file    Sexually Abused: Not on file   Housing Stability:     Unable to Pay for Housing in the Last Year: Not on file    Number of [place] in the Last Year: Not on file    Unstable Housing in the Last Year: Not on file           ROS:  [x] All negative/unchanged except if checked.  Explain positive(checked items) below:  [] Constitutional  [] Eyes  [] Ear/Nose/Mouth/Throat  [] Respiratory  [] CV  [] GI  []   [] Musculoskeletal  [] Skin/Breast  [] Neurological  [] Endocrine  [] Heme/Lymph  [] Allergic/Immunologic    Explanation:     MEDICATIONS:    Current Facility-Administered Medications:     metoclopramide (REGLAN) tablet 5 mg, 5 mg, Oral, TID ACNoah MD    DULoxetine (CYMBALTA) extended release capsule 30 mg, 30 mg, Oral, BID, ANA Gerardo CNP, 30 mg at 05/25/22 215    ARIPiprazole (ABILIFY) tablet 2 mg, 2 mg, Oral, Daily, Pecola Ohms, APRN - CNP, 2 mg at 05/25/22 1335    buPROPion [facility]) tablet 100 mg, 100 mg, Oral, BID, Pecola Ohms, APRN - CNP, 100 mg at 05/25/22 2157    acetaminophen (TYLENOL) tablet 650 mg, 650 mg, Oral, Q4H PRN, Franny Gleason MD    magnesium hydroxide (MILK OF MAGNESIA) 400 MG/5ML suspension 30 mL, 30 mL, Oral, Daily PRN, Franny Gleason MD    nicotine (NICODERM CQ) 21 MG/24HR 1 patch, 1 patch, TransDERmal, Daily, Franny Gleason MD    aluminum & magnesium hydroxide-simethicone (MAALOX) 200-200-20 MG/5ML suspension 30 mL, 30 mL, Oral, PRN, Franny Gleason MD    hydrOXYzine (VISTARIL) capsule 50 mg, 50 mg, Oral, TID PRN, Franny Gleason MD    melatonin tablet 3 mg, 3 mg, Oral, Nightly, Franny Gleason MD, 3 mg at 05/25/22 2158    famotidine (PEPCID) tablet 20 mg, 20 mg, Oral, BID, Rose A Rech, DO, 20 mg at 12/39/79 7884    folic acid (FOLVITE) tablet 1 mg, 1 mg, Oral, Daily, Rose A Rech, DO, 1 mg at 05/25/22 0839    levothyroxine (SYNTHROID) tablet 88 mcg, 88 mcg, Oral, Daily, Rose A Rech, DO, 88 mcg at 05/26/22 8948    lisinopril (PRINIVIL;ZESTRIL) tablet 20 mg, 20 mg, Oral, Daily, Rose A Rech, DO, 20 mg at 05/25/22 0839    metFORMIN (GLUCOPHAGE-XR) extended release tablet 1,000 mg, 1,000 mg, Oral, BID, Rose A Rech, DO, 1,000 mg at 05/25/22 2158    ondansetron (ZOFRAN-ODT) disintegrating tablet 4 mg, 4 mg, Oral, Q8H PRN, Margee Aliyah A Rech, DO, 4 mg at 05/23/22 2205    pregabalin (LYRICA) capsule 100 mg, 100 mg, Oral, TID, Rose A Rech, DO, 100 mg at 05/25/22 2158    insulin glargine-yfgn (SEMGLEE-YFGN) injection vial 25 Units, 25 Units, SubCUTAneous, Nightly, Rose A Rech, DO, 25 Units at 05/25/22 2202    glucose chewable tablet 16 g, 4 tablet, Oral, PRN, Rose A Rech, DO    dextrose 50 % IV solution, 12.5 g, IntraVENous, PRN **OR** [DISCONTINUED] dextrose bolus 10% 250 mL, 250 mL, IntraVENous, PRN, Rose A Rech, DO    glucagon (rDNA) injection 1 mg, 1 mg, IntraMUSCular, PRN, Rose A Rech, DO    dextrose 5 % solution, 100 mL/hr, IntraVENous, PRN, Rose A Rech, DO      Examination:  BP (!) 170/92   Pulse 98   Temp 98.4 °F (36.9 °C) (Temporal)   Resp 16   Ht 5' 1\" (1.549 m)   LMP  (LMP Unknown)   SpO2 95%   BMI 27.78 kg/m²   Gait - steady  Medication side effects(SE): denies    Mental Status Examination:    Level of consciousness:  within normal limits   Appearance:  good grooming and good hygiene  Behavior/Motor:  no abnormalities noted  Attitude toward examiner:  cooperative  Speech:  normal volume, well articulated and slow   Mood: depressed  Affect:  blunted  Thought processes:  linear   Thought content:  The patient is devoid of suicidal or homicidal ideation intent or plan.  Devoid of auditory or visual hallucinations or other perceptual disturbances, there are no overt or covert signs of psychosis or paranoia.  There are no neurovegetative signs of depression. Cognition:  oriented to person, place, and time   Concentration intact  Insight poor   Judgement poor     ASSESSMENT:   Patient symptoms are:  [] Well controlled  [] Improving  [] Worsening  [x] No change      Diagnosis:   Principal Problem:    Severe episode of recurrent major depressive disorder, without psychotic features ([facility]  Active Problems:    Hypokalemia due to loss of potassium    Intractable vomiting    Cluster B personality traits(HCC)    Type 2 diabetes mellitus with complication, with long-term current use of insulin ([address]    Acquired hypothyroidism    Encounter for long-term (current) use of insulin (HCC)    Rheumatoid arthritis involving multiple sites ([facility]    Primary hypertension  Resolved Problems:    * No resolved hospital problems. *      LABS:    No results for input(s): WBC, HGB, PLT in the last 72 hours.   Recent Labs     05/23/22 2027 05/24/22  0835    142   K 3.6 3.6   * 109*   CO2 21* 21*   BUN 10 9   CREATININE 0.9 0.8   GLUCOSE 69* 166*     No results for input(s): BILITOT, ALKPHOS, AST, ALT in the last 72 hours. Lab Results   Component Value Date    LABAMPH NOT DETECTED 05/22/2022    BARBSCNU NOT DETECTED 05/22/2022    LABBENZ NOT DETECTED 05/22/2022    LABMETH NOT DETECTED 05/22/2022    OPIATESCREENURINE NOT DETECTED 05/22/2022    PHENCYCLIDINESCREENURINE NOT DETECTED 05/22/2022    ETOH <10 05/22/2022     Lab Results   Component Value Date    TSH 5.220 05/16/2022     No results found for: LITHIUM  No results found for: VALPROATE, CBMZ    Treatment Plan:  The patient's diagnosis, treatment plan, medication management were formulated after patient was seen directly by the attending physician and myself and all relevant documentation was reviewed.     Risk, benefit, side effects, possible outcomes of the medication and alternatives discussed with the patient and the patient demonstrated understanding. The patient was also educated that the outcome of treatment will depend on the medication compliance as directed by the prescribers along with regular follow-up, compliance with the labs and other work-up, as clinically indicated.     Cymbalta 30 mg twice daily for chronic pain and depression  Continue patient's Wellbutrin 100 mg twice daily for depression and anxiety  Abilify 2 mg daily for augmentation of treatment for depression     Collateral information: Followed by social work  CD evaluation  Encourage patient to attend group and other milieu activities.   Discharge planning discussed with the patient and treatment team.    PSYCHOTHERAPY/COUNSELING:  [x] Therapeutic interview  [x] Supportive  [] CBT  [] Ongoing  [] Other    [x] Patient continues to need, on a daily basis, active treatment furnished directly by or requiring the supervision of inpatient psychiatric personnel      Anticipated Length of stay: 5 to 7 days based on stability      Electronically signed by Marielle Lucas on 5/26/2022 at 9:49 AM complains of pain/discomfort

## 2022-07-07 ENCOUNTER — OFFICE VISIT (OUTPATIENT)
Dept: ENDOCRINOLOGY | Age: 52
End: 2022-07-07
Payer: MEDICAID

## 2022-07-07 VITALS
DIASTOLIC BLOOD PRESSURE: 60 MMHG | BODY MASS INDEX: 29.85 KG/M2 | OXYGEN SATURATION: 98 % | WEIGHT: 158 LBS | SYSTOLIC BLOOD PRESSURE: 170 MMHG

## 2022-07-07 DIAGNOSIS — R80.9 MICROALBUMINURIA: ICD-10-CM

## 2022-07-07 DIAGNOSIS — Z91.119 DIETARY NONCOMPLIANCE: ICD-10-CM

## 2022-07-07 DIAGNOSIS — Z79.4 TYPE 2 DIABETES MELLITUS WITH HYPERGLYCEMIA, WITH LONG-TERM CURRENT USE OF INSULIN (HCC): Primary | ICD-10-CM

## 2022-07-07 DIAGNOSIS — E03.9 HYPOTHYROIDISM, UNSPECIFIED TYPE: ICD-10-CM

## 2022-07-07 DIAGNOSIS — E11.65 TYPE 2 DIABETES MELLITUS WITH HYPERGLYCEMIA, WITH LONG-TERM CURRENT USE OF INSULIN (HCC): Primary | ICD-10-CM

## 2022-07-07 PROCEDURE — 99214 OFFICE O/P EST MOD 30 MIN: CPT | Performed by: NURSE PRACTITIONER

## 2022-07-07 PROCEDURE — 3046F HEMOGLOBIN A1C LEVEL >9.0%: CPT | Performed by: NURSE PRACTITIONER

## 2022-07-07 NOTE — PROGRESS NOTES
[address] [place] [address] Department of Endocrinology Diabetes and Metabolism   [address] [facility], [address] [place] [address]   Phone: [phone]  Fax: [phone]    Date of Service: 7/7/2022  Primary Care Physician: Fred Cullen MD  Referring physician: No ref. provider found  Provider: ANA Adan NP    Reason for the visit:    Uncontrolled DM     History of Present Illness: The history is provided by the patient. No  was used. Accuracy of the patient data is excellent. Scott Jeong is a very pleasant 47 y.o. female seen today for diabetes management     Scott Jeong was diagnosed with diabetes in 2010 and currently ordered prior to hospitalization Metformin 1000 mg BID, Ozempic 1 mg/wk, Jardiance 25 mg, Lantus 50u BID, Humalog 25u with meals + ss 3:50>150    However after recent hospitalization in May, Pt was ordered Lantus 10 units at HS and metformin 500 mg po BID    The patient hasnot been checking her BS. She has Wilde sensors at home    She is noncompliant with her DM regimen   Most recent A1c results summarized below  Lab Results   Component Value Date/Time    LABA1C 15.0 05/03/2022 08:26 AM    LABA1C 14.5 12/15/2021 02:00 PM    LABA1C 13.6 09/13/2021 01:53 PM     Patient has had no hypoglycemic episodes   The patient hasn't been mindful of what has been eating and wasn't following diabetes diet   She has possible gastroparesis, suspicious per pt. Had [facility] in May 2022  If gastroparesis is dx need to stop Ozempic.   I reviewed current medications and the patient has no issues with diabetes medications  Scott Jeong is up to date with eye exam. + history of diabetic retinopathy   The patient seeing podiatrist every   performs  own feet care  Microvascular complications:  + Retinopathy, + Nephropathy + Neuropathy   Macrovascular complications: no CAD,  or Stroke    PAST MEDICAL HISTORY   Past Medical History:   Diagnosis Date    Anxiety     Arthritis     Depression     Diabetes mellitus ([facility]     Fibromyalgia     HTN (hypertension)     Hypertensive chronic kidney disease     Left shoulder pain     LIZABETH (obstructive sleep apnea)     Rheumatoid arthritis involving multiple sites ([facility]     Sjogren's disease ([facility]     Thyroid disease        PAST SURGICAL HISTORY   Past Surgical History:   Procedure Laterality Date    CT BIOPSY RENAL  11/18/2021    CT BIOPSY RENAL 11/18/2021 Sunshine Miguel MD [facility]    SHOULDER ARTHROSCOPY Right 06/16/2017    UPPER GASTROINTESTINAL ENDOSCOPY N/A 5/6/2022    EGD BIOPSY performed by Jad Jack MD at [facility]:   reports that she quit smoking about 24 years ago. She started smoking about 25 years ago. She has a 1.00 pack-year smoking history. She has never used smokeless tobacco.  Alcohol:   reports no history of alcohol use. Drugs:   reports no history of drug use. FAMILY HISTORY   Family History   Adopted: Yes   Family history unknown:  Yes       ALLERGIES AND DRUG REACTIONS   Allergies   Allergen Reactions    Amoxicillin Other (See Comments)     Hives all over body, states that hives are severe     Sulfa Antibiotics     Cefdinir     Doxycycline Other (See Comments)     Pt states no allergy to this med    Other Other (See Comments)       CURRENT MEDICATIONS   Current Outpatient Medications   Medication Sig Dispense Refill    buPROPion (WELLBUTRIN) 100 MG tablet Take 1 tablet by mouth 2 times daily 60 tablet 0    metoclopramide (REGLAN) 5 MG tablet Take 1 tablet by mouth 4 times daily (before meals and nightly) 120 tablet 0    famotidine (PEPCID) 20 MG tablet TAKE 1 TABLET BY MOUTH TWICE DAILY 60 tablet 1    lisinopril (PRINIVIL;ZESTRIL) 30 MG tablet Take 1 tablet by mouth daily 30 tablet 0    diclofenac sodium (VOLTAREN) 1 % GEL Apply 4 g topically 4 times daily for 7 days 112 g 0    levothyroxine (SYNTHROID) 88 MCG tablet Take 1 tablet by mouth daily 30 tablet 2    melatonin 3 MG TABS tablet TAKE ONE TABLET BY MOUTH EVERY NIGHT AT BEDTIME 30 tablet 0    metFORMIN (GLUCOPHAGE-XR) 500 MG extended release tablet Take 2 tablets by mouth 2 times daily (Patient not taking: Reported on 7/7/2022) 120 tablet 1    pregabalin (LYRICA) 100 MG capsule Take 1 capsule by mouth 3 times daily for 90 days.  60 capsule 0    Multiple Vitamins-Minerals (HM MULTIVITAMIN ADULT GUMMY) CHEW Take 1 Package by mouth daily for 14 days 14 tablet 3    ONETOUCH ULTRA strip USE TO TEST THREE TIMES A  each 2    Nutritional Supplements (GLUCERNA SHAKE) LIQD Use daily 237 mL 5    DULoxetine (CYMBALTA) 30 MG extended release capsule Take 1 capsule by mouth 2 times daily 60 capsule 0    benztropine (COGENTIN) 0.5 MG tablet Take 1 tablet by mouth 2 times daily 60 tablet 0    ARIPiprazole (ABILIFY) 5 MG tablet Take 1 tablet by mouth daily 30 tablet 0    amLODIPine (NORVASC) 5 MG tablet Take 1 tablet by mouth nightly 30 tablet 0    insulin glargine-yfgn (SEMGLEE-YFGN) 100 UNIT/ML injection vial Inject 10 Units into the skin nightly (Patient not taking: Reported on 6/17/2022) 1 each 0    glucose 4g chewable tablet Take 4 tablets by mouth as needed for Low blood sugar (Patient not taking: Reported on 6/17/2022) 60 tablet 3    Insulin Infusion Pump (MINIMED 770G INSULIN PUMP SYS) KIT Use to infuse insulin (Patient not taking: Reported on 7/7/2022) 1 kit 0    Continuous Blood Gluc Transmit (GUARDIAN LINK 3 TRANSMITTER) MISC Use with insulin pump (Patient not taking: Reported on 7/7/2022) 1 each 0    Continuous Blood Gluc Sensor (GUARDIAN SENSOR 3) MISC Change every 7 days (Patient not taking: Reported on 7/7/2022) 4 each 5    Continuous Blood Gluc Sensor (FREESTYLE NIMISHA 2 SENSOR) MISC USE TO TEST SUGAR CONTINUOUSLY AS DIRECTED (Patient not taking: Reported on 7/7/2022) 2 each 3    naproxen (NAPROSYN) 250 MG tablet TAKE 1 TABLET BY MOUTH TWICE A DAY WITH MEALS 60 tablet 5    calcium elemental (OSCAL) 500 MG TABS tablet TAKE 1 TABLET BY MOUTH TWICE A DAY WITH LUNCH AND DINNER (Patient not taking: Reported on 5/16/2022) 60 tablet 5    Insulin Pen Needle 31G X 5 MM MISC 1 each by Does not apply route 3 times daily 100 each 3    hydrocortisone 2.5 % cream Apply topically 2 times daily. 45 g 0    Blood Pressure KIT 1 box by Does not apply route 2 times daily 1 kit 0    folic acid (FOLVITE) 1 MG tablet Take 1 tablet by mouth daily 30 tablet 1    FREESTYLE LANCETS MISC CHECK BS 4 TIMES DAILY 100 each 3     No current facility-administered medications for this visit. Review of Systems  Constitutional: No fever, no chills, no diaphoresis, no generalized weakness. HEENT: No blurred vision, No sore throat, no ear pain, no hair loss  Neck: denied any neck swelling, difficulty swallowing,   Cardio-pulmonary: No CP, SOB or palpitation, No orthopnea or PND. No cough or wheezing. GI: No N/V/D, no constipation, No abdominal pain, no melena or hematochezia + upset stomach on occasion   : Denied any dysuria, hematuria, flank pain, discharge, or incontinence. Skin: denied any rash, ulcer, Hirsute, or hyperpigmentation. MSK: denied any joint deformity, joint pain/swelling, muscle pain, or back pain. Neuro: no numbness, no tingling, no weakness    OBJECTIVE    BP (!) 170/60   Wt 158 lb (71.7 kg)   LMP  (LMP Unknown)   SpO2 98%   BMI 29.85 kg/m²   BP Readings from Last 4 Encounters:   07/07/22 (!) 170/60   06/17/22 136/76   06/01/22 121/77   05/23/22 (!) 158/82     Wt Readings from Last 6 Encounters:   07/07/22 158 lb (71.7 kg)   06/17/22 147 lb (66.7 kg)   05/22/22 147 lb (66.7 kg)   05/16/22 147 lb (66.7 kg)   05/16/22 147 lb (66.7 kg)   05/13/22 160 lb (72.6 kg)       Physical examination:  General: awake alert, oriented x3, no abnormal position or movements.   HEENT: normocephalic non-traumatic, no exophthalmos   Neck: supple, no LN enlargement, no thyromegaly, no thyroid tenderness, no JVD.  Pulm: Clear equal air entry no added sounds, no wheezing or rhonchi    CVS: S1 + S2, no murmur, no heave. Dorsalis pedis pulse palpable   Abd: soft lax, no tenderness, no organomegaly, audible bowel sounds. Skin: warm, no lesions, no rash.  No callus, no Ulcers, No acanthosis nigricans  Musculoskeletal: No back tenderness, no kyphosis/scoliosis    Neuro: CN intact,sensation decreased bilateral , muscle power normal  Psych: normal mood, and affect    Review of Laboratory Data:  I personally reviewed the following lab:  Lab Results   Component Value Date/Time    WBC 10.4 05/22/2022 03:48 PM    RBC 3.99 05/22/2022 03:48 PM    HGB 9.6 (L) 05/22/2022 03:48 PM    HCT 31.3 (L) 05/22/2022 03:48 PM    MCV 78.4 (L) 05/22/2022 03:48 PM    MCH 24.1 (L) 05/22/2022 03:48 PM    MCHC 30.7 (L) 05/22/2022 03:48 PM    RDW 14.8 05/22/2022 03:48 PM     05/22/2022 03:48 PM    MPV 9.9 05/22/2022 03:48 PM      Lab Results   Component Value Date/Time     05/24/2022 08:35 AM    K 3.6 05/24/2022 08:35 AM    CO2 21 (L) 05/24/2022 08:35 AM    BUN 9 05/24/2022 08:35 AM    CREATININE 0.8 05/24/2022 08:35 AM    CALCIUM 8.6 05/24/2022 08:35 AM    LABGLOM >60 05/24/2022 08:35 AM    GFRAA >60 05/24/2022 08:35 AM      Lab Results   Component Value Date/Time    TSH 5.220 (H) 05/16/2022 09:47 AM    T4FREE 0.91 (L) 06/18/2021 06:33 AM    TPOABS <0.3 04/18/2019 08:55 AM     Lab Results   Component Value Date/Time    LABA1C 15.0 05/03/2022 08:26 AM    GLUCOSE 166 05/24/2022 08:35 AM    MALBCR 3235.3 06/18/2021 06:33 AM    LABMICR >4400.0 06/18/2021 06:33 AM    LABCREA 136 06/18/2021 06:33 AM     Lab Results   Component Value Date/Time    LABA1C 15.0 05/03/2022 08:26 AM    LABA1C 14.5 12/15/2021 02:00 PM    LABA1C 13.6 09/13/2021 01:53 PM     Lab Results   Component Value Date/Time    TRIG 270 05/23/2022 08:27 PM    HDL 44 05/23/2022 08:27 PM    LDLCALC 167 05/23/2022 08:27 PM    CHOL 265 05/23/2022 08:27 PM     Lab Results   Component reviewed with the patient who understood and agreed with the plan. Thank you for allowing us to participate in the care of this patient. Please do not hesitate to contact us with any additional questions. Diagnosis Orders   1. Type 2 diabetes mellitus with hyperglycemia, with long-term current use of insulin ([address]     2. Dietary noncompliance     3. Hypothyroidism, unspecified type     4. Microalbuminuria            ANA Daugherty NP Diabetes Care and Endocrinology   [address] [place] [id number]   Phone: [phone]  Fax: [phone]  --------------------------------------------  An electronic signature was used to authenticate this note.  ANA Daugherty NP on 7/7/2022 at 3:34 PM

## 2022-07-12 DIAGNOSIS — F33.9 RECURRENT MAJOR DEPRESSIVE DISORDER, REMISSION STATUS UNSPECIFIED (HCC): ICD-10-CM

## 2022-07-12 DIAGNOSIS — Z76.0 MEDICATION REFILL: ICD-10-CM

## 2022-07-12 NOTE — TELEPHONE ENCOUNTER
Last Appointment:  6/17/2022  Future Appointments   Date Time Provider Jenae Pearl   7/18/2022  2:00 PM [facility]   8/15/2022  3:00 PM ANA Nam NP [facility]

## 2022-07-15 RX ORDER — BUPROPION HYDROCHLORIDE 100 MG/1
100 TABLET ORAL 2 TIMES DAILY
Qty: 60 TABLET | Refills: 0 | Status: SHIPPED | OUTPATIENT
Start: 2022-07-15 | End: 2022-08-14

## 2024-04-13 NOTE — PROGRESS NOTES
Lead advanced under fluoro to the right atrium. Patient resting in bed quietly at this time with eyes closed. Respirations are even and unlabored. No signs of discomfort or distress. No PRN medications given at this time. Safety needs met. Environmental rounds will be continued.

## 2024-09-18 NOTE — CARE COORDINATION
JILLIAN called and notified [address] that this pt is no longer being discharged today. No

## (undated) DEVICE — BITEBLOCK 54FR W/ DENT RIM BLOX

## (undated) DEVICE — FORCEPS BX L160CM JAW DIA2.4MM YEL L CAP W/ NDL DISP RAD

## (undated) DEVICE — Z DISCONTINUED NO SUB IDED TUBING ETCO2 AD L6.5FT NSL ORAL CVD PRNG NONFLARED TIP OVR

## (undated) DEVICE — TRAP POLYP ETRAP

## (undated) DEVICE — SNARE VASC L240CM LOOP W10MM SHTH DIA2.4MM RND STIFF CLD

## (undated) DEVICE — SPONGE GZ W4XL4IN RAYON POLY FILL CVR W/ NONWOVEN FAB

## (undated) DEVICE — CONTAINER SPEC 60ML PH 7NEUTRAL BUFF FRMLN RDY TO USE

## (undated) DEVICE — DEFENDO AIR WATER SUCTION AND BIOPSY VALVE KIT FOR  OLYMPUS: Brand: DEFENDO AIR/WATER/SUCTION AND BIOPSY VALVE